# Patient Record
Sex: FEMALE | Race: WHITE | NOT HISPANIC OR LATINO | Employment: OTHER | URBAN - METROPOLITAN AREA
[De-identification: names, ages, dates, MRNs, and addresses within clinical notes are randomized per-mention and may not be internally consistent; named-entity substitution may affect disease eponyms.]

---

## 2017-01-16 ENCOUNTER — HOSPITAL ENCOUNTER (OUTPATIENT)
Dept: ULTRASOUND IMAGING | Facility: CLINIC | Age: 77
Discharge: HOME | End: 2017-01-16
Attending: NURSE PRACTITIONER | Admitting: NURSE PRACTITIONER
Payer: MEDICARE

## 2017-01-16 ENCOUNTER — OFFICE VISIT (OUTPATIENT)
Dept: FAMILY MEDICINE | Facility: CLINIC | Age: 77
End: 2017-01-16
Payer: COMMERCIAL

## 2017-01-16 VITALS
SYSTOLIC BLOOD PRESSURE: 132 MMHG | BODY MASS INDEX: 24.8 KG/M2 | TEMPERATURE: 97.7 F | OXYGEN SATURATION: 96 % | HEIGHT: 59 IN | WEIGHT: 123 LBS | DIASTOLIC BLOOD PRESSURE: 72 MMHG | HEART RATE: 67 BPM

## 2017-01-16 DIAGNOSIS — M79.89 RIGHT LEG SWELLING: Primary | ICD-10-CM

## 2017-01-16 DIAGNOSIS — Z94.4 LIVER REPLACED BY TRANSPLANT (H): Chronic | ICD-10-CM

## 2017-01-16 DIAGNOSIS — N18.30 CKD (CHRONIC KIDNEY DISEASE) STAGE 3, GFR 30-59 ML/MIN (H): ICD-10-CM

## 2017-01-16 PROCEDURE — 99214 OFFICE O/P EST MOD 30 MIN: CPT | Performed by: NURSE PRACTITIONER

## 2017-01-16 PROCEDURE — 93971 EXTREMITY STUDY: CPT | Mod: RT

## 2017-01-16 RX ORDER — FUROSEMIDE 20 MG
60 TABLET ORAL DAILY
Qty: 30 TABLET | Refills: 0 | Status: SHIPPED | OUTPATIENT
Start: 2017-01-16 | End: 2017-01-26

## 2017-01-16 NOTE — NURSING NOTE
"Initial /72 mmHg  Pulse 67  Temp(Src) 97.7  F (36.5  C) (Tympanic)  Ht 4' 11\" (1.499 m)  Wt 123 lb (55.792 kg)  BMI 24.83 kg/m2  SpO2 96% Estimated body mass index is 24.83 kg/(m^2) as calculated from the following:    Height as of this encounter: 4' 11\" (1.499 m).    Weight as of this encounter: 123 lb (55.792 kg). .    Nancy Bruce    "

## 2017-01-16 NOTE — PROGRESS NOTES
"  SUBJECTIVE:                                                    Amanda Nails is a 76 year old female who presents to clinic today for the following health issues:      C/O bilateral leg edema off/on for one month. Right swells more than the left- painful right leg. No redness.   No cough, no fever. No history of DVT. Does not monitor salt in diet. Wearing compression socks.     History of cirrhosis of liver and transplant in 1983.     -------------------------------------    Problem list and histories reviewed & adjusted, as indicated.  Additional history: as documented    Problem list, Medication list, Allergies, and Medical/Social/Surgical histories reviewed in EPIC and updated as appropriate.    ROS:  Constitutional, HEENT, cardiovascular, pulmonary, GI, , musculoskeletal, neuro, skin, endocrine and psych systems are negative, except as otherwise noted.    OBJECTIVE:                                                    /72 mmHg  Pulse 67  Temp(Src) 97.7  F (36.5  C) (Tympanic)  Ht 4' 11\" (1.499 m)  Wt 123 lb (55.792 kg)  BMI 24.83 kg/m2  SpO2 96%  Body mass index is 24.83 kg/(m^2).  GENERAL: healthy, alert and no distress  RESP: lungs clear to auscultation - no rales, rhonchi or wheezes  CV: regular rates and rhythm, normal S1 S2, no S3 or S4, peripheral pulses strong and 2+ right lower extremity and 1+ edema of LEFT LOWER EXTREMITY. + murmur.     MS: no gross musculoskeletal defects noted, no edema    Diagnostic Test Results:  none      ASSESSMENT/PLAN:                                                      1. Right leg swelling  ? DVT vs edema- will order STAT ultrasound to r/u DVT.  Patient does take Amlodipine which can be contributing to her symptoms.   - US Lower Extremity Venous Duplex Right  -start  furosemide (LASIX) 20 MG tablet; Take 3 tablets (60 mg) by mouth daily  Dispense: 30 tablet; Refill: 0 (take for 5 days then stop taking medication)  - decrease sodium in diet- elevate feet when " sitting/ wear compression socks  - follow up in 1 week    2. CKD (chronic kidney disease) stage 3, GFR 30-59 ml/min  Will need to check BMP in 1 week-     3. Liver Replaced by Transplant  Followed by Hepatologist yearly - patient has been feeling well- therefore, doubt that patient having acute decompensation of liver disease causing edema        JEMAL Ferrari CNP  [facility]

## 2017-01-16 NOTE — MR AVS SNAPSHOT
After Visit Summary   1/16/2017    Amanda Nails    MRN: [id number]           Patient Information     Date Of Birth          1940        Visit Information        Provider Department      1/16/2017 8:20 AM Aby Downing APRN CNP [facility]        Today's Diagnoses     Right leg swelling    -  1       Care Instructions    1. Take 3 tablets of lasix once a day in the morning for 4- 5 days then stop taking  2. Ultrasound today  3. Follow up in 1 week        Thank you for choosing [facility].  You may be receiving a survey in the mail from [facility] regarding your visit today.  Please take a few minutes to complete and return the survey to let us know how we are doing.      If you have questions or concerns, please contact us via [employer] or you can contact your care team at [phone].    Our Clinic hours are:  Monday 6:40 am  to 7:00 pm  Tuesday -Friday 6:40 am to 5:00 pm    The [place] outpatient lab hours are:  Monday - Friday 6:10 am to 4:45 pm  Saturdays 7:00 am to 11:00 am  Appointments are required, call [phone]    If you have clinical questions after hours or would like to schedule an appointment,  call the clinic at [phone].        Follow-ups after your visit        Your next 10 appointments already scheduled     Apr 04, 2017 11:45 AM   (Arrive by 11:30 AM)   Return Liver Transplant with Luis Daniel Lomeli MD   [facility] Hepatology ([facility])    [address] [place] [id number] [phone]            Apr 04, 2017  1:05 PM   (Arrive by 12:35 PM)   Return Visit with Sarai Abrams MD   [facility] Nephrology ([facility])    [address] [place] [id number] [phone]              Who to contact     If you have questions or need follow up information about today's clinic visit or your schedule please contact [facility] directly at  "[phone].  Normal or non-critical lab and imaging results will be communicated to you by MyChart, letter or phone within 4 business days after the clinic has received the results. If you do not hear from us within 7 days, please contact the clinic through [employer] or phone. If you have a critical or abnormal lab result, we will notify you by phone as soon as possible.  Submit refill requests through [employer] or call your pharmacy and they will forward the refill request to us. Please allow 3 business days for your refill to be completed.          Additional Information About Your Visit        [employer] Information     [employer] gives you secure access to your electronic health record. If you see a primary care provider, you can also send messages to your care team and make appointments. If you have questions, please call your primary care clinic.  If you do not have a primary care provider, please call [phone] and they will assist you.        Care EveryWhere ID     This is your Care EveryWhere ID. This could be used by other organizations to access your [facility] medical records  [id number]        Your Vitals Were     Pulse Temperature Height BMI (Body Mass Index) Pulse Oximetry       67 97.7  F (36.5  C) (Tympanic) 4' 11\" (1.499 m) 24.83 kg/m2 96%        Blood Pressure from Last 3 Encounters:   01/16/17 132/72   11/17/16 158/86   11/12/16 115/62    Weight from Last 3 Encounters:   01/16/17 123 lb (55.792 kg)   11/17/16 123 lb (55.792 kg)   11/11/16 127 lb 10.3 oz (57.9 kg)              We Performed the Following     US Lower Extremity Venous Duplex Right          Today's Medication Changes          These changes are accurate as of: 1/16/17  8:40 AM.  If you have any questions, ask your nurse or doctor.               Start taking these medicines.        Dose/Directions    furosemide 20 MG tablet   Commonly known as:  LASIX   Used for:  Right leg swelling        Dose:  60 mg   Take 3 tablets (60 mg) by mouth " daily   Quantity:  30 tablet   Refills:  0            Where to get your medicines      These medications were sent to [id number] - [place], [place] - [address]  [address], [facility] [address]     Phone:  [phone]    - furosemide 20 MG tablet             Primary Care Provider Office Phone # Fax #    JEMAL Ferrari [facility] [phone]       [facility] [address] [facility] [address]        Thank you!     Thank you for choosing [facility]  for your care. Our goal is always to provide you with excellent care. Hearing back from our patients is one way we can continue to improve our services. Please take a few minutes to complete the written survey that you may receive in the mail after your visit with us. Thank you!             Your Updated Medication List - Protect others around you: Learn how to safely use, store and throw away your medicines at www.disposemymeds.org.          This list is accurate as of: 1/16/17  8:40 AM.  Always use your most recent med list.                   Brand Name Dispense Instructions for use    amLODIPine 2.5 MG tablet    NORVASC    180 tablet    Take 2 tablets (5 mg) by mouth daily       carvedilol 6.25 MG tablet    COREG    180 tablet    Take 1 tablet (6.25 mg) by mouth 2 times daily (with meals)       furosemide 20 MG tablet    LASIX    30 tablet    Take 3 tablets (60 mg) by mouth daily       lactobacillus rhamnosus (GG) capsule     90 capsule    Take 1 capsule by mouth 3 times daily (before meals)       predniSONE 1 MG tablet    DELTASONE    270 tablet    TAKE THREE TABLETS BY MOUTH EVERY DAY       sertraline 50 MG tablet    ZOLOFT    90 tablet    TAKE ONE TABLET BY MOUTH EVERY DAY       sirolimus 0.5 MG tablet    RAPAMUNE - GENERIC EQUIVALENT    360 tablet    TAKE FOUR TABLETS BY MOUTH EVERY DAY       triamcinolone 0.1 % ointment    KENALOG    500 g    Apply to itchy rash twice daily for 7 to 10 days as needed.        ursodiol 300 MG capsule    ACTIGALL    270 capsule    TAKE THREE CAPSULES BY MOUTH EVERY DAY       VANICREAM EX      Externally apply topically 2 times daily

## 2017-01-16 NOTE — PATIENT INSTRUCTIONS
1. Take 3 tablets of lasix once a day in the morning for 4- 5 days then stop taking  2. Ultrasound today  3. Follow up in 1 week        Thank you for choosing [facility].  You may be receiving a survey in the mail from [employer] regarding your visit today.  Please take a few minutes to complete and return the survey to let us know how we are doing.      If you have questions or concerns, please contact us via [employer] or you can contact your care team at [phone].    Our Clinic hours are:  Monday 6:40 am  to 7:00 pm  Tuesday -Friday 6:40 am to 5:00 pm    The [place] outpatient lab hours are:  Monday - Friday 6:10 am to 4:45 pm  Saturdays 7:00 am to 11:00 am  Appointments are required, call [phone]    If you have clinical questions after hours or would like to schedule an appointment,  call the clinic at [phone].

## 2017-01-26 ENCOUNTER — OFFICE VISIT (OUTPATIENT)
Dept: FAMILY MEDICINE | Facility: CLINIC | Age: 77
End: 2017-01-26
Payer: COMMERCIAL

## 2017-01-26 VITALS
BODY MASS INDEX: 24.19 KG/M2 | OXYGEN SATURATION: 99 % | HEART RATE: 62 BPM | SYSTOLIC BLOOD PRESSURE: 166 MMHG | HEIGHT: 59 IN | WEIGHT: 120 LBS | DIASTOLIC BLOOD PRESSURE: 70 MMHG

## 2017-01-26 DIAGNOSIS — M79.89 RIGHT LEG SWELLING: Primary | ICD-10-CM

## 2017-01-26 DIAGNOSIS — I10 BENIGN ESSENTIAL HYPERTENSION: ICD-10-CM

## 2017-01-26 DIAGNOSIS — R01.1 HEART MURMUR: ICD-10-CM

## 2017-01-26 DIAGNOSIS — Z94.4 LIVER REPLACED BY TRANSPLANT (H): ICD-10-CM

## 2017-01-26 DIAGNOSIS — R06.09 DOE (DYSPNEA ON EXERTION): ICD-10-CM

## 2017-01-26 DIAGNOSIS — N18.30 CHRONIC KIDNEY DISEASE, STAGE III (MODERATE) (H): Chronic | ICD-10-CM

## 2017-01-26 DIAGNOSIS — Z94.4 LIVER REPLACED BY TRANSPLANT (H): Chronic | ICD-10-CM

## 2017-01-26 LAB
ALBUMIN SERPL-MCNC: 3.2 G/DL (ref 3.4–5)
ALP SERPL-CCNC: 197 U/L (ref 40–150)
ALT SERPL W P-5'-P-CCNC: 33 U/L (ref 0–50)
ANION GAP SERPL CALCULATED.3IONS-SCNC: 9 MMOL/L (ref 3–14)
AST SERPL W P-5'-P-CCNC: 41 U/L (ref 0–45)
BILIRUB DIRECT SERPL-MCNC: <0.1 MG/DL (ref 0–0.2)
BILIRUB SERPL-MCNC: 0.4 MG/DL (ref 0.2–1.3)
BUN SERPL-MCNC: 21 MG/DL (ref 7–30)
CALCIUM SERPL-MCNC: 8.7 MG/DL (ref 8.5–10.1)
CHLORIDE SERPL-SCNC: 104 MMOL/L (ref 94–109)
CO2 SERPL-SCNC: 27 MMOL/L (ref 20–32)
CREAT SERPL-MCNC: 1.26 MG/DL (ref 0.52–1.04)
ERYTHROCYTE [DISTWIDTH] IN BLOOD BY AUTOMATED COUNT: 16.2 % (ref 10–15)
GFR SERPL CREATININE-BSD FRML MDRD: 41 ML/MIN/1.7M2
GLUCOSE SERPL-MCNC: 80 MG/DL (ref 70–99)
HCT VFR BLD AUTO: 34.4 % (ref 35–47)
HGB BLD-MCNC: 10.4 G/DL (ref 11.7–15.7)
MCH RBC QN AUTO: 27.7 PG (ref 26.5–33)
MCHC RBC AUTO-ENTMCNC: 30.2 G/DL (ref 31.5–36.5)
MCV RBC AUTO: 92 FL (ref 78–100)
PLATELET # BLD AUTO: 280 10E9/L (ref 150–450)
POTASSIUM SERPL-SCNC: 3.6 MMOL/L (ref 3.4–5.3)
PROT SERPL-MCNC: 7.1 G/DL (ref 6.8–8.8)
RBC # BLD AUTO: 3.75 10E12/L (ref 3.8–5.2)
SODIUM SERPL-SCNC: 140 MMOL/L (ref 133–144)
WBC # BLD AUTO: 5 10E9/L (ref 4–11)

## 2017-01-26 PROCEDURE — 36415 COLL VENOUS BLD VENIPUNCTURE: CPT | Performed by: INTERNAL MEDICINE

## 2017-01-26 PROCEDURE — 80048 BASIC METABOLIC PNL TOTAL CA: CPT | Performed by: INTERNAL MEDICINE

## 2017-01-26 PROCEDURE — 85027 COMPLETE CBC AUTOMATED: CPT | Performed by: INTERNAL MEDICINE

## 2017-01-26 PROCEDURE — 99214 OFFICE O/P EST MOD 30 MIN: CPT | Performed by: NURSE PRACTITIONER

## 2017-01-26 PROCEDURE — 80076 HEPATIC FUNCTION PANEL: CPT | Performed by: INTERNAL MEDICINE

## 2017-01-26 RX ORDER — FUROSEMIDE 20 MG
20 TABLET ORAL DAILY PRN
Qty: 30 TABLET | Refills: 0 | COMMUNITY
Start: 2017-01-26 | End: 2017-05-15

## 2017-01-26 NOTE — NURSING NOTE
"Initial /78 mmHg  Pulse 62  Ht 4' 11\" (1.499 m)  Wt 120 lb (54.432 kg)  BMI 24.22 kg/m2  SpO2 99% Estimated body mass index is 24.22 kg/(m^2) as calculated from the following:    Height as of this encounter: 4' 11\" (1.499 m).    Weight as of this encounter: 120 lb (54.432 kg). .    Nancy Bruce    "

## 2017-01-26 NOTE — PATIENT INSTRUCTIONS
1. Take Lasix only if needed once a day for increase in swelling  2. Labs today   3. Take Carvedilol and amlodipine as directed          Thank you for choosing [facility].  You may be receiving a survey in the mail from [employer] regarding your visit today.  Please take a few minutes to complete and return the survey to let us know how we are doing.      If you have questions or concerns, please contact us via [employer] or you can contact your care team at [phone].    Our Clinic hours are:  Monday 6:40 am  to 7:00 pm  Tuesday -Friday 6:40 am to 5:00 pm    The [place] outpatient lab hours are:  Monday - Friday 6:10 am to 4:45 pm  Saturdays 7:00 am to 11:00 am  Appointments are required, call [phone]    If you have clinical questions after hours or would like to schedule an appointment,  call the clinic at [phone].

## 2017-01-26 NOTE — PROGRESS NOTES
"  SUBJECTIVE:                                                    Amanda Nails is a 76 year old female who presents to clinic today for the following health issues:          Follow up edema, seen 1/16/17. Swelling has improved- bilateral edema- rt> left. ultrasound was done of right leg- negative for DVT.   Swelling resolved after taking 60 mg Lasix X 5 days- she is off of lasix now.     Hypertension:  Uncontrolled- patient forgot to take her Carvedilol this week  BP Readings from Last 3 Encounters:   01/26/17 166/70   01/16/17 132/72   11/17/16 158/86     Chronic kidney disease: will check K+ and kidney function today since was taking lasix.     History of liver transplant in 1983- stable on medications. Followed yearly by Dr. Armani GARBER.       -------------------------------------    Problem list and histories reviewed & adjusted, as indicated.  Additional history: as documented    Problem list, Medication list, Allergies, and Medical/Social/Surgical histories reviewed in EPIC and updated as appropriate.    ROS:  Constitutional, HEENT, cardiovascular, pulmonary, GI, , musculoskeletal, neuro, skin, endocrine and psych systems are negative, except as otherwise noted.    OBJECTIVE:                                                    /70 mmHg  Pulse 62  Ht 4' 11\" (1.499 m)  Wt 120 lb (54.432 kg)  BMI 24.22 kg/m2  SpO2 99%  Body mass index is 24.22 kg/(m^2).  GENERAL: healthy, alert and no distress  RESP: lungs clear to auscultation - no rales, rhonchi or wheezes  CV: regular rate and rhythm, normal S1 S2, no S3 or S4, no  click or rub, no peripheral edema and peripheral pulses strong, + Murmur.   MS: no gross musculoskeletal defects noted, no edema    Diagnostic Test Results:  none      ASSESSMENT/PLAN:                                                      1. Right leg swelling  Resolved- patient is taking Amlodipine which can contribute to edema- will continue to monitor  - furosemide (LASIX) 20 MG tablet; " Take 1 tablet (20 mg) by mouth daily as needed  Dispense: 30 tablet; Refill: 0 (use prn)    2. Benign essential hypertension  Uncontrolled- patient forgot to take Carvedilol this week  Return to clinic next week for blood pressure recheck with RN    3. Chronic kidney disease, stage III (moderate)  Stable- will need to recheck kidney function today due to take Lasix    4. Liver replaced by transplant (H)  Stable- patient overdue for appointment with Dr. Lomeli    5. Heart murmur  Continue to monitor- patient asymptomatic- (no shortness of breath, fatigue)  Consider in future to obtain echo- especially if continues to have problems with edema          JEMAL Ferrari [facility]

## 2017-02-09 ENCOUNTER — TELEPHONE (OUTPATIENT)
Dept: TRANSPLANT | Facility: CLINIC | Age: 77
End: 2017-02-09

## 2017-02-09 DIAGNOSIS — I10 HTN (HYPERTENSION): Primary | ICD-10-CM

## 2017-02-13 DIAGNOSIS — I10 ESSENTIAL HYPERTENSION: ICD-10-CM

## 2017-02-13 RX ORDER — CARVEDILOL 6.25 MG/1
6.25 TABLET ORAL 2 TIMES DAILY WITH MEALS
Qty: 180 TABLET | Refills: 3 | Status: SHIPPED | OUTPATIENT
Start: 2017-02-13 | End: 2017-06-16

## 2017-02-13 RX ORDER — CARVEDILOL 6.25 MG/1
6.25 TABLET ORAL 2 TIMES DAILY WITH MEALS
Qty: 180 TABLET | Refills: 3 | Status: CANCELLED | OUTPATIENT
Start: 2017-02-13

## 2017-02-14 NOTE — TELEPHONE ENCOUNTER
[facility] Prior Authorization Team   Phone: [phone]  Fax: [phone]  Prior Authorization Approval    Authorization Effective Date: 11/15/2016  Authorization Expiration Date: 12/31/2039  Medication: sirolimus (RAPAMUNE - GENERIC EQUIVALENT) 0.5 MG tablet   Approved Dose/Quantity: TAKE FOUR TABLETS BY MOUTH EVERY DAY / #360  Reference #: CMM KEY# [id number]   Insurance Company: [employer] - Phone [phone] Fax [phone]  Expected CoPay: $10.00     CoPay Card Available: N/A  Foundation Assistance Needed: N/A  Which Pharmacy is filling the prescription (Not needed for infusion/clinic administered): [facility] - [place], [place] - [facility] [address]  Pharmacy Notified: Yes  Patient Notified: Yes

## 2017-03-09 ENCOUNTER — OFFICE VISIT (OUTPATIENT)
Dept: FAMILY MEDICINE | Facility: CLINIC | Age: 77
End: 2017-03-09
Payer: COMMERCIAL

## 2017-03-09 VITALS
OXYGEN SATURATION: 98 % | WEIGHT: 121 LBS | BODY MASS INDEX: 24.44 KG/M2 | HEART RATE: 62 BPM | DIASTOLIC BLOOD PRESSURE: 74 MMHG | SYSTOLIC BLOOD PRESSURE: 150 MMHG | TEMPERATURE: 98.2 F

## 2017-03-09 DIAGNOSIS — J20.9 ACUTE BRONCHITIS, UNSPECIFIED ORGANISM: Primary | ICD-10-CM

## 2017-03-09 DIAGNOSIS — I10 BENIGN ESSENTIAL HYPERTENSION: ICD-10-CM

## 2017-03-09 DIAGNOSIS — Z94.4 LIVER REPLACED BY TRANSPLANT (H): Chronic | ICD-10-CM

## 2017-03-09 PROCEDURE — 99214 OFFICE O/P EST MOD 30 MIN: CPT | Performed by: NURSE PRACTITIONER

## 2017-03-09 RX ORDER — AZITHROMYCIN 250 MG/1
TABLET, FILM COATED ORAL
Qty: 6 TABLET | Refills: 0 | Status: SHIPPED | OUTPATIENT
Start: 2017-03-09 | End: 2017-04-19

## 2017-03-09 RX ORDER — ALBUTEROL SULFATE 90 UG/1
2 AEROSOL, METERED RESPIRATORY (INHALATION) EVERY 6 HOURS PRN
Qty: 1 INHALER | Refills: 0 | Status: SHIPPED | OUTPATIENT
Start: 2017-03-09 | End: 2017-04-19

## 2017-03-09 NOTE — PROGRESS NOTES
SUBJECTIVE:                                                    Amanda Nails is a 76 year old female who presents to clinic today for the following health issues:      ENT Symptoms             Symptoms: cc Present Absent Comment   Fever/Chills   x    Fatigue  x     Muscle Aches   x    Eye Irritation   x    Sneezing   x    Nasal Nba/Drg   x    Sinus Pressure/Pain   x    Loss of smell   x    Dental pain   x    Sore Throat   x    Swollen Glands   x    Ear Pain/Fullness   x    Cough  x     Wheeze   x    Chest Pain   x    Shortness of breath  x     Rash   x    Other   x      Symptom duration:  5 days   Symptom severity:  mild   Treatments tried:  none   Contacts:  sisters/brother- was on vacation   No history of asthma or COPD- no tobacco use.   Feels like a tight non productive cough with wheezing. History of liver transplant- on immunosuppressed therapy.   No fevers.     Hypertension: patient forgot to take medications today.   BP Readings from Last 3 Encounters:   03/09/17 150/74   01/26/17 166/70   01/16/17 132/72         -------------------------------------    Problem list and histories reviewed & adjusted, as indicated.  Additional history: as documented    Patient Active Problem List   Diagnosis     Chronic kidney disease, stage III (moderate)     Liver replaced by transplant (H)     Primary biliary cirrhosis (H)     CARDIOVASCULAR SCREENING; LDL GOAL LESS THAN 130     Chronic dermatitis     Esophageal reflux     Mixed hyperlipidemia     Iron deficiency anemia     Gout attack     Skin cancer     Osteoporosis     Advanced directives, counseling/discussion     Nausea and vomiting     Fever, unspecified     Diarrhea     Benign essential hypertension     Past Surgical History   Procedure Laterality Date     Transplant  1983     liver     Colonoscopy  4/9/2013     Procedure: COLONOSCOPY;  Colonoscopy;  Surgeon: Kendra Archer MD;  Location: [facility]     Esophagoscopy, gastroscopy, duodenoscopy (egd),  combined  8/23/2013     Procedure: COMBINED ESOPHAGOSCOPY, GASTROSCOPY, DUODENOSCOPY (EGD), BIOPSY SINGLE OR MULTIPLE;  Gastroscopy       Cholecytectomy       Splenectomy         Social History   Substance Use Topics     Smoking status: Former Smoker     Years: 3.00     Smokeless tobacco: Never Used      Comment: Years ago.     Alcohol use Yes      Comment: very rarely     Family History   Problem Relation Age of Onset     Respiratory Mother      GASTROINTESTINAL DISEASE Sister      celiac     GASTROINTESTINAL DISEASE Son      celiac     GASTROINTESTINAL DISEASE Daughter      celiac     GASTROINTESTINAL DISEASE Sister      GASTROINTESTINAL DISEASE Son      celiac     GASTROINTESTINAL DISEASE Daughter      PBC     Endocrine Disease Daughter      Graves disease     Endocrine Disease Daughter      hypothyroidism           Reviewed and updated as needed this visit by clinical staff  Meds       Reviewed and updated as needed this visit by Provider         ROS:  Constitutional, HEENT, cardiovascular, pulmonary, GI, , musculoskeletal, neuro, skin, endocrine and psych systems are negative, except as otherwise noted.    OBJECTIVE:                                                    /86 (BP Location: Left arm, Patient Position: Chair, Cuff Size: Adult Regular)  Pulse 62  Temp 98.2  F (36.8  C) (Tympanic)  Wt 121 lb (54.9 kg)  SpO2 98%  BMI 24.44 kg/m2  Body mass index is 24.44 kg/(m^2).  GENERAL: healthy, alert and no distress  NECK: no adenopathy, no asymmetry, masses, or scars and thyroid normal to palpation  RESP: wheeze in LLL- congested cough  CV: regular rate and rhythm, normal S1 S2, no S3 or S4, no murmur, click or rub,   MS: no gross musculoskeletal defects noted, no edema    Diagnostic Test Results:  none      ASSESSMENT/PLAN:                                                      1. Acute bronchitis, unspecified organism  Will treat patient with antibiotics since patient taking immunosuppressive therapy for  history of liver tx.   - albuterol (PROAIR HFA/PROVENTIL HFA/VENTOLIN HFA) 108 (90 BASE) MCG/ACT Inhaler; Inhale 2 puffs into the lungs every 6 hours as needed for shortness of breath / dyspnea or wheezing  Dispense: 1 Inhaler; Refill: 0  - azithromycin (ZITHROMAX) 250 MG tablet; Two tablets first day, then one tablet daily for four days.  Dispense: 6 tablet; Refill: 0    2. Liver replaced by transplant (H)   patient taking immunosuppressive therapy for history of liver tx. - at risk for developing infection.     3. Hypertension:  Patient forgot to take medications today  Return to clinic for RN blood pressure check        JEMAL Ferrari [facility]

## 2017-03-09 NOTE — NURSING NOTE
"Initial /74  Pulse 62  Temp 98.2  F (36.8  C) (Tympanic)  Wt 121 lb (54.9 kg)  SpO2 98%  BMI 24.44 kg/m2 Estimated body mass index is 24.44 kg/(m^2) as calculated from the following:    Height as of 1/26/17: 4' 11\" (1.499 m).    Weight as of this encounter: 121 lb (54.9 kg). .  Nancy Bruce    "

## 2017-03-09 NOTE — NURSING NOTE
"Initial /86 (BP Location: Left arm, Patient Position: Chair, Cuff Size: Adult Regular)  Pulse 62  Temp 98.2  F (36.8  C) (Tympanic)  Wt 121 lb (54.9 kg)  SpO2 98%  BMI 24.44 kg/m2 Estimated body mass index is 24.44 kg/(m^2) as calculated from the following:    Height as of 1/26/17: 4' 11\" (1.499 m).    Weight as of this encounter: 121 lb (54.9 kg). .    Nancy Bruec    "

## 2017-03-09 NOTE — MR AVS SNAPSHOT
After Visit Summary   3/9/2017    Amanda Nails    MRN: [id number]           Patient Information     Date Of Birth          1940        Visit Information        Provider Department      3/9/2017 1:40 PM Aby Downing APRN CNP [facility]        Today's Diagnoses     Acute bronchitis, unspecified organism    -  1    Liver replaced by transplant (H)          Care Instructions          Thank you for choosing [facility].  You may be receiving a survey in the mail from [facility] regarding your visit today.  Please take a few minutes to complete and return the survey to let us know how we are doing.      If you have questions or concerns, please contact us via [employer] or you can contact your care team at [phone].    Our Clinic hours are:  Monday 6:40 am  to 7:00 pm  Tuesday -Friday 6:40 am to 5:00 pm    The [place] outpatient lab hours are:  Monday - Friday 6:10 am to 4:45 pm  Saturdays 7:00 am to 11:00 am  Appointments are required, call [phone]    If you have clinical questions after hours or would like to schedule an appointment,  call the clinic at [phone].        Follow-ups after your visit        Your next 10 appointments already scheduled     Apr 04, 2017 11:45 AM CDT   (Arrive by 11:30 AM)   Return Liver Transplant with Luis Daniel Lomeli MD   [facility] Hepatology ([facility])    [address] [place] [address] [id number] [phone]            Apr 04, 2017  1:05 PM CDT   (Arrive by 12:35 PM)   Return Visit with Sarai Abrams MD   [facility] Nephrology ([facility])    [address] [place] [address] [id number] [phone]              Who to contact     If you have questions or need follow up information about today's clinic visit or your schedule please contact [facility] directly at [phone].  Normal or non-critical lab and imaging results will be  communicated to you by [employer], letter or phone within 4 business days after the clinic has received the results. If you do not hear from us within 7 days, please contact the clinic through [employer] or phone. If you have a critical or abnormal lab result, we will notify you by phone as soon as possible.  Submit refill requests through [employer] or call your pharmacy and they will forward the refill request to us. Please allow 3 business days for your refill to be completed.          Additional Information About Your Visit        PingMD Information     [employer] gives you secure access to your electronic health record. If you see a primary care provider, you can also send messages to your care team and make appointments. If you have questions, please call your primary care clinic.  If you do not have a primary care provider, please call [phone] and they will assist you.        Care EveryWhere ID     This is your Care EveryWhere ID. This could be used by other organizations to access your [place] medical records  [id number]        Your Vitals Were     Pulse Temperature Pulse Oximetry BMI (Body Mass Index)          62 98.2  F (36.8  C) (Tympanic) 98% 24.44 kg/m2         Blood Pressure from Last 3 Encounters:   03/09/17 182/86   01/26/17 166/70   01/16/17 132/72    Weight from Last 3 Encounters:   03/09/17 121 lb (54.9 kg)   01/26/17 120 lb (54.4 kg)   01/16/17 123 lb (55.8 kg)              Today, you had the following     No orders found for display         Today's Medication Changes          These changes are accurate as of: 3/9/17  1:48 PM.  If you have any questions, ask your nurse or doctor.               Start taking these medicines.        Dose/Directions    albuterol 108 (90 BASE) MCG/ACT Inhaler   Commonly known as:  PROAIR HFA/PROVENTIL HFA/VENTOLIN HFA   Used for:  Acute bronchitis, unspecified organism   Started by:  Aby Downing APRN CNP        Dose:  2 puff   Inhale 2 puffs into the lungs every 6  hours as needed for shortness of breath / dyspnea or wheezing   Quantity:  1 Inhaler   Refills:  0       azithromycin 250 MG tablet   Commonly known as:  ZITHROMAX   Used for:  Acute bronchitis, unspecified organism   Started by:  Aby Downing APRN CNP        Two tablets first day, then one tablet daily for four days.   Quantity:  6 tablet   Refills:  0            Where to get your medicines      These medications were sent to [facility] [address] [facility]  [address] [facility] [address]     Phone:  [phone]     albuterol 108 (90 BASE) MCG/ACT Inhaler    azithromycin 250 MG tablet                Primary Care Provider Office Phone # Fax #    JEMAL Ferrari [phone]       [facility] [address] [facility] [address]        Thank you!     Thank you for choosing [facility]  for your care. Our goal is always to provide you with excellent care. Hearing back from our patients is one way we can continue to improve our services. Please take a few minutes to complete the written survey that you may receive in the mail after your visit with us. Thank you!             Your Updated Medication List - Protect others around you: Learn how to safely use, store and throw away your medicines at www.disposemymeds.org.          This list is accurate as of: 3/9/17  1:48 PM.  Always use your most recent med list.                   Brand Name Dispense Instructions for use    albuterol 108 (90 BASE) MCG/ACT Inhaler    PROAIR HFA/PROVENTIL HFA/VENTOLIN HFA    1 Inhaler    Inhale 2 puffs into the lungs every 6 hours as needed for shortness of breath / dyspnea or wheezing       amLODIPine 2.5 MG tablet    NORVASC    180 tablet    Take 2 tablets (5 mg) by mouth daily       azithromycin 250 MG tablet    ZITHROMAX    6 tablet    Two tablets first day, then one tablet daily for four days.       carvedilol 6.25 MG tablet    COREG    180 tablet     Take 1 tablet (6.25 mg) by mouth 2 times daily (with meals)       lactobacillus rhamnosus (GG) capsule     90 capsule    Take 1 capsule by mouth 3 times daily (before meals)       LASIX 20 MG tablet   Generic drug:  furosemide     30 tablet    Take 1 tablet (20 mg) by mouth daily as needed       predniSONE 1 MG tablet    DELTASONE    270 tablet    TAKE THREE TABLETS BY MOUTH EVERY DAY       sertraline 50 MG tablet    ZOLOFT    90 tablet    TAKE ONE TABLET BY MOUTH EVERY DAY       sirolimus 0.5 MG tablet    RAPAMUNE - GENERIC EQUIVALENT    360 tablet    TAKE FOUR TABLETS BY MOUTH EVERY DAY       triamcinolone 0.1 % ointment    KENALOG    500 g    Apply to itchy rash twice daily for 7 to 10 days as needed.       ursodiol 300 MG capsule    ACTIGALL    270 capsule    TAKE THREE CAPSULES BY MOUTH EVERY DAY       VANICREAM EX      Externally apply topically 2 times daily

## 2017-03-09 NOTE — PATIENT INSTRUCTIONS
Thank you for choosing [facility].  You may be receiving a survey in the mail from Martín Guardado regarding your visit today.  Please take a few minutes to complete and return the survey to let us know how we are doing.      If you have questions or concerns, please contact us via [employer] or you can contact your care team at [phone].    Our Clinic hours are:  Monday 6:40 am  to 7:00 pm  Tuesday -Friday 6:40 am to 5:00 pm    The [place] outpatient lab hours are:  Monday - Friday 6:10 am to 4:45 pm  Saturdays 7:00 am to 11:00 am  Appointments are required, call [phone]    If you have clinical questions after hours or would like to schedule an appointment,  call the clinic at [phone].

## 2017-03-22 DIAGNOSIS — N18.30 CKD (CHRONIC KIDNEY DISEASE) STAGE 3, GFR 30-59 ML/MIN (H): Primary | ICD-10-CM

## 2017-04-05 DIAGNOSIS — M10.9 GOUT OF LEFT FOOT, UNSPECIFIED CAUSE, UNSPECIFIED CHRONICITY: ICD-10-CM

## 2017-04-05 RX ORDER — COLCHICINE 0.6 MG/1
TABLET ORAL
Qty: 15 TABLET | Refills: 0 | Status: SHIPPED | OUTPATIENT
Start: 2017-04-05 | End: 2018-04-17

## 2017-04-05 NOTE — TELEPHONE ENCOUNTER
[facility]      Last Written Prescription Date:  Not on med ist  Last Fill Quantity: 0,   # refills: 0  Last Office Visit with [facility], P or [facility] prescribing provider: 03/09/2017  Future Office visit:       Routing refill request to provider for review/approval because:  Drug not active on patient's medication list    Leroy CHAVEZ (R)

## 2017-04-13 ENCOUNTER — TELEPHONE (OUTPATIENT)
Dept: GASTROENTEROLOGY | Facility: CLINIC | Age: 77
End: 2017-04-13

## 2017-04-13 NOTE — TELEPHONE ENCOUNTER
Patient contacted and reminded of upcoming appointment.  Patient confirmed they will be attending.  Patient instructed to bring updated medications list to appointment.  Patient instructed to arrive early for check-in  Kael Amaya CMA

## 2017-04-18 DIAGNOSIS — Z94.4 LIVER REPLACED BY TRANSPLANT (H): ICD-10-CM

## 2017-04-18 DIAGNOSIS — N18.30 CKD (CHRONIC KIDNEY DISEASE) STAGE 3, GFR 30-59 ML/MIN (H): ICD-10-CM

## 2017-04-18 LAB
ALBUMIN SERPL-MCNC: 3 G/DL (ref 3.4–5)
ALP SERPL-CCNC: 206 U/L (ref 40–150)
ALT SERPL W P-5'-P-CCNC: 53 U/L (ref 0–50)
ANION GAP SERPL CALCULATED.3IONS-SCNC: 6 MMOL/L (ref 3–14)
AST SERPL W P-5'-P-CCNC: 38 U/L (ref 0–45)
BILIRUB DIRECT SERPL-MCNC: <0.1 MG/DL (ref 0–0.2)
BILIRUB SERPL-MCNC: 0.3 MG/DL (ref 0.2–1.3)
BUN SERPL-MCNC: 26 MG/DL (ref 7–30)
CALCIUM SERPL-MCNC: 8.7 MG/DL (ref 8.5–10.1)
CHLORIDE SERPL-SCNC: 109 MMOL/L (ref 94–109)
CO2 SERPL-SCNC: 28 MMOL/L (ref 20–32)
CREAT SERPL-MCNC: 1.13 MG/DL (ref 0.52–1.04)
CREAT UR-MCNC: 107 MG/DL
ERYTHROCYTE [DISTWIDTH] IN BLOOD BY AUTOMATED COUNT: 14.8 % (ref 10–15)
GFR SERPL CREATININE-BSD FRML MDRD: 47 ML/MIN/1.7M2
GLUCOSE SERPL-MCNC: 80 MG/DL (ref 70–99)
HCT VFR BLD AUTO: 33.9 % (ref 35–47)
HGB BLD-MCNC: 11.3 G/DL (ref 11.7–15.7)
MCH RBC QN AUTO: 29.4 PG (ref 26.5–33)
MCHC RBC AUTO-ENTMCNC: 33.3 G/DL (ref 31.5–36.5)
MCV RBC AUTO: 88 FL (ref 78–100)
PHOSPHATE SERPL-MCNC: 2.8 MG/DL (ref 2.5–4.5)
PLATELET # BLD AUTO: 268 10E9/L (ref 150–450)
POTASSIUM SERPL-SCNC: 3.5 MMOL/L (ref 3.4–5.3)
PROT SERPL-MCNC: 6.7 G/DL (ref 6.8–8.8)
PROT UR-MCNC: 1.34 G/L
PROT/CREAT 24H UR: 1.25 G/G CR (ref 0–0.2)
RBC # BLD AUTO: 3.84 10E12/L (ref 3.8–5.2)
SODIUM SERPL-SCNC: 143 MMOL/L (ref 133–144)
WBC # BLD AUTO: 4.1 10E9/L (ref 4–11)

## 2017-04-18 PROCEDURE — 84550 ASSAY OF BLOOD/URIC ACID: CPT | Performed by: INTERNAL MEDICINE

## 2017-04-18 PROCEDURE — 84460 ALANINE AMINO (ALT) (SGPT): CPT | Performed by: INTERNAL MEDICINE

## 2017-04-18 PROCEDURE — 84075 ASSAY ALKALINE PHOSPHATASE: CPT | Performed by: INTERNAL MEDICINE

## 2017-04-18 PROCEDURE — 36415 COLL VENOUS BLD VENIPUNCTURE: CPT | Performed by: INTERNAL MEDICINE

## 2017-04-18 PROCEDURE — 82247 BILIRUBIN TOTAL: CPT | Performed by: INTERNAL MEDICINE

## 2017-04-18 PROCEDURE — 84155 ASSAY OF PROTEIN SERUM: CPT | Performed by: INTERNAL MEDICINE

## 2017-04-18 PROCEDURE — 80069 RENAL FUNCTION PANEL: CPT | Performed by: INTERNAL MEDICINE

## 2017-04-18 PROCEDURE — 84156 ASSAY OF PROTEIN URINE: CPT | Performed by: INTERNAL MEDICINE

## 2017-04-18 PROCEDURE — 82248 BILIRUBIN DIRECT: CPT | Performed by: INTERNAL MEDICINE

## 2017-04-18 PROCEDURE — 80195 ASSAY OF SIROLIMUS: CPT | Performed by: INTERNAL MEDICINE

## 2017-04-18 PROCEDURE — 84450 TRANSFERASE (AST) (SGOT): CPT | Performed by: INTERNAL MEDICINE

## 2017-04-18 PROCEDURE — 85027 COMPLETE CBC AUTOMATED: CPT | Performed by: INTERNAL MEDICINE

## 2017-04-19 ENCOUNTER — OFFICE VISIT (OUTPATIENT)
Dept: GASTROENTEROLOGY | Facility: CLINIC | Age: 77
End: 2017-04-19
Attending: INTERNAL MEDICINE
Payer: MEDICARE

## 2017-04-19 ENCOUNTER — OFFICE VISIT (OUTPATIENT)
Dept: NEPHROLOGY | Facility: CLINIC | Age: 77
End: 2017-04-19
Attending: INTERNAL MEDICINE
Payer: MEDICARE

## 2017-04-19 VITALS
RESPIRATION RATE: 18 BRPM | HEART RATE: 67 BPM | WEIGHT: 120 LBS | OXYGEN SATURATION: 99 % | SYSTOLIC BLOOD PRESSURE: 193 MMHG | DIASTOLIC BLOOD PRESSURE: 73 MMHG | TEMPERATURE: 97.6 F | BODY MASS INDEX: 24.19 KG/M2 | HEIGHT: 59 IN

## 2017-04-19 VITALS — DIASTOLIC BLOOD PRESSURE: 88 MMHG | SYSTOLIC BLOOD PRESSURE: 187 MMHG

## 2017-04-19 DIAGNOSIS — M10.072 ACUTE IDIOPATHIC GOUT OF LEFT FOOT: Primary | ICD-10-CM

## 2017-04-19 DIAGNOSIS — R80.9 PROTEINURIA: ICD-10-CM

## 2017-04-19 DIAGNOSIS — N18.30 CHRONIC KIDNEY DISEASE, STAGE III (MODERATE) (H): Primary | Chronic | ICD-10-CM

## 2017-04-19 DIAGNOSIS — I10 BENIGN ESSENTIAL HYPERTENSION: ICD-10-CM

## 2017-04-19 LAB
SIROLIMUS BLD-MCNC: 5.5 UG/L (ref 5–15)
TME LAST DOSE: 840 H
URATE SERPL-MCNC: 5.3 MG/DL (ref 2.6–6)

## 2017-04-19 PROCEDURE — 99212 OFFICE O/P EST SF 10 MIN: CPT | Mod: ZF

## 2017-04-19 PROCEDURE — 99213 OFFICE O/P EST LOW 20 MIN: CPT | Mod: ZF

## 2017-04-19 RX ORDER — ALLOPURINOL 100 MG/1
100 TABLET ORAL DAILY
Qty: 90 TABLET | Refills: 3 | Status: SHIPPED | OUTPATIENT
Start: 2017-04-19 | End: 2017-11-02

## 2017-04-19 ASSESSMENT — PAIN SCALES - GENERAL: PAINLEVEL: NO PAIN (0)

## 2017-04-19 NOTE — MR AVS SNAPSHOT
After Visit Summary   4/19/2017    Amanda Nails    MRN: [id number]           Patient Information     Date Of Birth          1940        Visit Information        Provider Department      4/19/2017 1:30 PM Sarai Abrams MD [facility] Nephrology        Today's Diagnoses     Chronic kidney disease, stage III (moderate)    -  1    Proteinuria        Benign essential hypertension          Care Instructions    1.  Increase carvedilol to 6.25 mg twice per day.  Can increase this further if BP stays high  2.  Allopurinol 100 mg.  Check uric acid level 2 weeks after you start this medicine  3.  Check your BP at home and call us with readings.  Stephanie Pereyra   4.  Target BP is < 140/90  5.  Exercise is good!         Follow-ups after your visit        Follow-up notes from your care team     Return in about 6 months (around 10/19/2017).      Your next 10 appointments already scheduled     Oct 17, 2017  4:25 PM CDT   (Arrive by 3:55 PM)   Return Visit with Sarai Abrams MD   [facility] Nephrology ([facility])    [address] [place] [id number] [phone]              Who to contact     If you have questions or need follow up information about today's clinic visit or your schedule please contact [facility] NEPHROLOG[facility] directly at [phone].  Normal or non-critical lab and imaging results will be communicated to you by MyChart, letter or phone within 4 business days after the clinic has received the results. If you do not hear from us within 7 days, please contact the clinic through MyChart or phone. If you have a critical or abnormal lab result, we will notify you by phone as soon as possible.  Submit refill requests through [employer] or call your pharmacy and they will forward the refill request to us. Please allow 3 business days for your refill to be completed.          Additional Information About Your Visit        MyChart Information      [employer] gives you secure access to your electronic health record. If you see a primary care provider, you can also send messages to your care team and make appointments. If you have questions, please call your primary care clinic.  If you do not have a primary care provider, please call [phone] and they will assist you.        Care EveryWhere ID     This is your Care EveryWhere ID. This could be used by other organizations to access your [place] medical records  [id number]         Blood Pressure from Last 3 Encounters:   04/19/17 187/88   04/19/17 193/73   03/09/17 150/74    Weight from Last 3 Encounters:   04/19/17 54.4 kg (120 lb)   03/09/17 54.9 kg (121 lb)   01/26/17 54.4 kg (120 lb)              Today, you had the following     No orders found for display         Today's Medication Changes          These changes are accurate as of: 4/19/17 11:59 PM.  If you have any questions, ask your nurse or doctor.               Start taking these medicines.        Dose/Directions    allopurinol 100 MG tablet   Commonly known as:  ZYLOPRIM   Used for:  Acute idiopathic gout of left foot   Started by:  Luis Daniel Lomeli MD        Dose:  100 mg   Take 1 tablet (100 mg) by mouth daily   Quantity:  90 tablet   Refills:  3         These medicines have changed or have updated prescriptions.        Dose/Directions    carvedilol 6.25 MG tablet   Commonly known as:  COREG   This may have changed:  when to take this   Used for:  Essential hypertension        Dose:  6.25 mg   Take 1 tablet (6.25 mg) by mouth 2 times daily (with meals)   Quantity:  180 tablet   Refills:  3            Where to get your medicines      These medications were sent to [facility] - [place], [place] - [address] [place] [address]  [address] [facility] [address]    Hours:  Mon-Fri 8:30am-5:00pm Toll Free [phone] Phone:  [phone]     allopurinol 100 MG tablet                Primary Care Provider Office Phone # Fae  #    Abyisaac Downing, APRN [phone]       [facility] [address] [facility] [address]        Thank you!     Thank you for choosing [facility] NEPHROLOGY  for your care. Our goal is always to provide you with excellent care. Hearing back from our patients is one way we can continue to improve our services. Please take a few minutes to complete the written survey that you may receive in the mail after your visit with us. Thank you!             Your Updated Medication List - Protect others around you: Learn how to safely use, store and throw away your medicines at www.disposemymeds.org.          This list is accurate as of: 4/19/17 11:59 PM.  Always use your most recent med list.                   Brand Name Dispense Instructions for use    allopurinol 100 MG tablet    ZYLOPRIM    90 tablet    Take 1 tablet (100 mg) by mouth daily       amLODIPine 2.5 MG tablet    NORVASC    180 tablet    Take 2 tablets (5 mg) by mouth daily       carvedilol 6.25 MG tablet    COREG    180 tablet    Take 1 tablet (6.25 mg) by mouth 2 times daily (with meals)       colchicine 0.6 MG tablet     15 tablet    TAKE 2 TABLETS BY MOUTH AT THE FIRST SIGN OF FLARE, TAKE 1 ADDITIONAL TABLET ONE HOUR LATER       lactobacillus rhamnosus (GG) capsule     90 capsule    Take 1 capsule by mouth 3 times daily (before meals)       LASIX 20 MG tablet   Generic drug:  furosemide     30 tablet    Take 20 mg by mouth daily as needed Reported on 4/19/2017       predniSONE 1 MG tablet    DELTASONE    270 tablet    TAKE THREE TABLETS BY MOUTH EVERY DAY       sertraline 50 MG tablet    ZOLOFT    90 tablet    TAKE ONE TABLET BY MOUTH EVERY DAY       sirolimus 0.5 MG tablet    RAPAMUNE - GENERIC EQUIVALENT    360 tablet    TAKE FOUR TABLETS BY MOUTH EVERY DAY       triamcinolone 0.1 % ointment    KENALOG    500 g    Apply to itchy rash twice daily for 7 to 10 days as needed.       TYLENOL 325 MG Caps   Generic drug:  Acetaminophen       Take 325-650 mg by mouth daily as needed (pain)       ursodiol 300 MG capsule    ACTIGALL    270 capsule    TAKE THREE CAPSULES BY MOUTH EVERY DAY       VANICREAM EX      Externally apply topically 2 times daily

## 2017-04-19 NOTE — NURSING NOTE
"Chief Complaint   Patient presents with     RECHECK     S/P Liver Transplant 11/7/1984       Initial /73 (BP Location: Right arm, Patient Position: Chair, Cuff Size: Adult Regular)  Pulse 67  Temp 97.6  F (36.4  C) (Oral)  Resp 18  Ht 1.499 m (4' 11.02\")  Wt 54.4 kg (120 lb)  SpO2 99%  BMI 24.22 kg/m2 Estimated body mass index is 24.22 kg/(m^2) as calculated from the following:    Height as of this encounter: 1.499 m (4' 11.02\").    Weight as of this encounter: 54.4 kg (120 lb).  Medication Reconciliation: complete    "

## 2017-04-19 NOTE — MR AVS SNAPSHOT
"              After Visit Summary   4/19/2017    Amanda Nails    MRN: [id number]           Patient Information     Date Of Birth          1940        Visit Information        Provider Department      4/19/2017 12:45 PM Luis Daniel Lomeli MD [facility] Hepatology        Today's Diagnoses     Acute idiopathic gout of left foot    -  1       Follow-ups after your visit        Follow-up notes from your care team     Return in about 1 year (around 4/19/2018).      Who to contact     If you have questions or need follow up information about today's clinic visit or your schedule please contact [facility] HEPATOLOG[facility] directly at [phone].  Normal or non-critical lab and imaging results will be communicated to you by MyChart, letter or phone within 4 business days after the clinic has received the results. If you do not hear from us within 7 days, please contact the clinic through [employer] or phone. If you have a critical or abnormal lab result, we will notify you by phone as soon as possible.  Submit refill requests through [employer] or call your pharmacy and they will forward the refill request to us. Please allow 3 business days for your refill to be completed.          Additional Information About Your Visit        MyChart Information     [employer] gives you secure access to your electronic health record. If you see a primary care provider, you can also send messages to your care team and make appointments. If you have questions, please call your primary care clinic.  If you do not have a primary care provider, please call [phone] and they will assist you.        Care EveryWhere ID     This is your Care EveryWhere ID. This could be used by other organizations to access your [facility] medical records  [id number]        Your Vitals Were     Pulse Temperature Respirations Height Pulse Oximetry BMI (Body Mass Index)    67 97.6  F (36.4  C) (Oral) 18 1.499 m (4' 11.02\") 99% 24.22 kg/m2       Blood Pressure from Last 3 " Encounters:   04/19/17 187/88   04/19/17 193/73   03/09/17 150/74    Weight from Last 3 Encounters:   04/19/17 54.4 kg (120 lb)   03/09/17 54.9 kg (121 lb)   01/26/17 54.4 kg (120 lb)              Today, you had the following     No orders found for display         Today's Medication Changes          These changes are accurate as of: 4/19/17 11:59 PM.  If you have any questions, ask your nurse or doctor.               Start taking these medicines.        Dose/Directions    allopurinol 100 MG tablet   Commonly known as:  ZYLOPRIM   Used for:  Acute idiopathic gout of left foot   Started by:  Luis Daniel Lomeli MD        Dose:  100 mg   Take 1 tablet (100 mg) by mouth daily   Quantity:  90 tablet   Refills:  3         These medicines have changed or have updated prescriptions.        Dose/Directions    carvedilol 6.25 MG tablet   Commonly known as:  COREG   This may have changed:  when to take this   Used for:  Essential hypertension        Dose:  6.25 mg   Take 1 tablet (6.25 mg) by mouth 2 times daily (with meals)   Quantity:  180 tablet   Refills:  3            Where to get your medicines      These medications were sent to [facility] - [address]  [address] [facility] [address]    Hours:  Mon-Fri 8:30am-5:00pm Toll Free [phone] Phone:  [phone]     allopurinol 100 MG tablet                Primary Care Provider Office Phone # Fax #    Aby JEMAL Villanueva [facility] [phone]       [address] [address]        Thank you!     Thank you for choosing [facility] HEPATOLOGY  for your care. Our goal is always to provide you with excellent care. Hearing back from our patients is one way we can continue to improve our services. Please take a few minutes to complete the written survey that you may receive in the mail after your visit with us. Thank you!             Your Updated Medication List - Protect others  around you: Learn how to safely use, store and throw away your medicines at www.disposemymeds.org.          This list is accurate as of: 4/19/17 11:59 PM.  Always use your most recent med list.                   Brand Name Dispense Instructions for use    allopurinol 100 MG tablet    ZYLOPRIM    90 tablet    Take 1 tablet (100 mg) by mouth daily       amLODIPine 2.5 MG tablet    NORVASC    180 tablet    Take 2 tablets (5 mg) by mouth daily       carvedilol 6.25 MG tablet    COREG    180 tablet    Take 1 tablet (6.25 mg) by mouth 2 times daily (with meals)       colchicine 0.6 MG tablet     15 tablet    TAKE 2 TABLETS BY MOUTH AT THE FIRST SIGN OF FLARE, TAKE 1 ADDITIONAL TABLET ONE HOUR LATER       lactobacillus rhamnosus (GG) capsule     90 capsule    Take 1 capsule by mouth 3 times daily (before meals)       LASIX 20 MG tablet   Generic drug:  furosemide     30 tablet    Take 20 mg by mouth daily as needed Reported on 4/19/2017       predniSONE 1 MG tablet    DELTASONE    270 tablet    TAKE THREE TABLETS BY MOUTH EVERY DAY       sertraline 50 MG tablet    ZOLOFT    90 tablet    TAKE ONE TABLET BY MOUTH EVERY DAY       sirolimus 0.5 MG tablet    RAPAMUNE - GENERIC EQUIVALENT    360 tablet    TAKE FOUR TABLETS BY MOUTH EVERY DAY       triamcinolone 0.1 % ointment    KENALOG    500 g    Apply to itchy rash twice daily for 7 to 10 days as needed.       TYLENOL 325 MG Caps   Generic drug:  Acetaminophen      Take 325-650 mg by mouth daily as needed (pain)       ursodiol 300 MG capsule    ACTIGALL    270 capsule    TAKE THREE CAPSULES BY MOUTH EVERY DAY       VANICREAM EX      Externally apply topically 2 times daily

## 2017-04-19 NOTE — PROGRESS NOTES
I had the pleasure of seeing Amanda Nails for followup in the Liver Transplantation Clinic at the [facility] on 04/19/2017.  Ms. Nails returns for followup now 32-1/2 years status post liver transplantation for primary biliary cirrhosis.      She is actually doing reasonably well at this point in time.  She just had an episode of gout and does have a high uric acid.  She was treated with colchicine which seemed to work well, but she is not taking anything in between time.      She denies any abdominal pain, itching or skin rash.  She did have another skin cancer just recently removed from her right lower lip.  She has very mild fatigue.  She denies any increased abdominal girth or lower extremity edema.  She denies fevers or chills, cough or shortness of breath.  She denies any nausea, vomiting or diarrhea.  Her appetite has been good, and her weight has been stable.  Besides that noted above, there have been no other new events since she was last seen.       Current Outpatient Prescriptions   Medication     Acetaminophen (TYLENOL) 325 MG CAPS     allopurinol (ZYLOPRIM) 100 MG tablet     carvedilol (COREG) 6.25 MG tablet     ursodiol (ACTIGALL) 300 MG capsule     amLODIPine (NORVASC) 2.5 MG tablet     sirolimus (RAPAMUNE - GENERIC EQUIVALENT) 0.5 MG tablet     predniSONE (DELTASONE) 1 MG tablet     sertraline (ZOLOFT) 50 MG tablet     triamcinolone (KENALOG) 0.1 % ointment     Emollient (VANICREAM EX)     colchicine 0.6 MG tablet     furosemide (LASIX) 20 MG tablet     lactobacillus rhamnosus, GG, (CULTURELL) capsule     No current facility-administered medications for this visit.      B/P: 193/73, T: 97.6, P: 67, R: 18    HEENT exam shows no scleral icterus and no temporal muscle wasting.  Chest is clear.  Abdominal exam shows no increase in girth.  No masses or tenderness to palpation are present.  Liver is 10 cm in span without left lobe enlargement.  No spleen tip is palpable, and  extremity exam shows no edema.  There is no evidence of active gout.  Skin exam shows a healing lesion on her right lower lip where the skin cancer was removed.      Laboratory Data:  White count is 4.1, hemoglobin 11.3, platelets are 268,000.  Sodium 143, potassium 2.5, BUN is 26, creatinine 1.13.  AST 38, ALT is 53, alkaline phosphatase 206, albumin is 3.0 with total protein of 6.7, total bilirubin is 0.3      My impression is that Ms. Nails is 32-1/2 years status post liver transplantation.  She certainly is one of the longest surviving adults who have undergone liver transplantation in the [place] at this point in time.  Her immunosuppressive regimen is a bit of an issue because she does have some proteinuria; however, she has chronic kidney disease and so CNIs are not particularly attractive.  She also has had recurrent skin cancers, and so azathioprine is not particularly attractive.  She has had GI tract issues with the MM[facility]; and thus, I have spoken with Dr. Abrams, and we will plan on just simply tolerating the degree of proteinuria that she has.  She probably could benefit from being on an ACE inhibitor.        Thank you very much for allowing me to participate in the care of this patient.  If you have any questions regarding my recommendations, please do not hesitate to contact me.       Luis Daniel Lomeli MD      Professor of Medicine  [employer]      Executive Medical Director, Solid Organ Transplant Program  [facility]

## 2017-04-19 NOTE — NURSING NOTE
"Vitals taken from previous visit, in flow sheet.  Chief Complaint   Patient presents with     RECHECK     Follow up CKD stage 3.       Initial /88 Estimated body mass index is 24.22 kg/(m^2) as calculated from the following:    Height as of an earlier encounter on 4/19/17: 1.499 m (4' 11.02\").    Weight as of an earlier encounter on 4/19/17: 54.4 kg (120 lb).  Medication Reconciliation: complete   Elizabeth Goodman. CMA    "

## 2017-04-19 NOTE — LETTER
4/19/2017      RE: Amanda Nails  [address]   [place] [address]       REASON FOR VISIT:  Ms. Nails is a 73-year-old woman here for followup of CKD status post liver transplant in 1984.  She is the oldest living liver transplant recipient in our program.  She has CKD with a baseline creatinine 1.5-2, attributed to CNI toxicity.  She was switched from CNI to Sirolimus in 2012 and her baseline creatinine has been somewhat lower recently.      Since her last clinic visit she has done well.  She had an additional episode of gout, once again treated with colchicine and Dr. Lomeli is considering putting her on allopurinol.  Her symptoms are classic with the big toe involvement in the top of the foot and she has an immediate response to colchicine.      Her blood pressure control has been variable to poor with the reading today in clinic of 193/73.  She says it is higher at home, but she has not been taking her blood pressure meds correctly and has omitted the evening Coreg dose, so her current blood pressure regimen is Coreg 6.25 b.i.d. and amlodipine 5 daily.      She has ongoing problems with skin cancers with 4 lesions removed at her last visit.  She just got back from winter in [place] at [place].      Overall, she feels well but her energy level is low over the past several months.  Of note, she was treated for pneumonia over Christmas and she is probably not quite back up to her usual level of exercise.  She does not have any specific symptoms, sleeps well and does not have shortness of breath or chest pain or other complaints.  She has been on colchicine for 2 weeks, but does not have any myopathy or muscle pain.      REVIEW OF SYSTEMS:  Comprehensive review of systems is completed and negative except as in the HPI.      PAST MEDICAL HISTORY:   1.  Status post liver transplant in 1984.   2.  Angioedema from ACE inhibitor.   3.  Osteoporosis.   4.  Gout, not on prophylaxis.   5.  Many skin  cancers removed with 4 in the last session.   6.  Hypertension.   7.  Primary biliary cirrhosis.      SOCIAL HISTORY:  She lives in [place].  [facility], but her daughter does teach water aerobics there.  Does not smoke or drink alcohol.      CURRENT MEDICATIONS:  See below.      PHYSICAL EXAMINATION:   VITAL SIGNS:  Blood pressure 187/88, 3 reading systolic range from 172 to 193, weight 120 pounds, BMI 24.   GENERAL:  She is petite, well-developed, well-nourished, extensive skin changes on her forehead, cheeks, arms, legs with some underlying ecchymoses in addition.   LUNGS:  Clear bilaterally.   CARDIAC:  Regular sinus rhythm, 2/6 systolic ejection murmur which she says is not new.   ABDOMEN:  Benign, no bruits over her kidneys.   LOWER EXTREMITIES:  No peripheral edema.      LABORATORY DATA:  Creatinine 1.13, albumin 3.0.  Protein-to-creatinine ratio 1.25 grams per gram.  Uric acid add on 5.3.   Electrolytes/Renal -   Recent Labs   Lab Test  04/18/17 0833 01/26/17   0826  11/12/16   1120  11/12/16   0640   08/01/16   1142  04/04/16   1015   07/10/15   0805   NA  143  140   --   144   < >  140  140   < >  138   POTASSIUM  3.5  3.6  4.7  3.3*   < >  3.8  4.0   < >  3.9   CHLORIDE  109  104   --   109   < >  105  106   < >  105   CO2  28  27   --   29   < >  29  26   < >  24   BUN  26  21   --   22   < >  32*  26   < >  31*   CR  1.13*  1.26*   --   1.23*   < >  1.16*  1.22*   < >  1.32*   GLC  80  80   --   79   < >  84  99   < >  94   BLANK  8.7  8.7   --   8.2*   < >  9.4  8.8   < >  8.5   MAG   --    --    --   1.7   --   1.8   --    --   1.6   PHOS  2.8   --    --    --    --   3.1  2.9   --   3.4    < > = values in this interval not displayed.       CBC -   Recent Labs   Lab Test  04/18/17 0833 01/26/17   0826  11/17/16   0757   WBC  4.1  5.0  4.7   HGB  11.3*  10.4*  10.3*   PLT  268  280  255       LFTs -   Recent Labs   Lab Test  04/18/17   0833   01/26/17   0826  11/10/16   2055   ALKPHOS  206*  197*  252*   BILITOTAL  0.3  0.4  0.5   ALT  53*  33  49   AST  38  41  56*   PROTTOTAL  6.7*  7.1  7.7   ALBUMIN  3.0*  3.2*  3.5       Coags - No lab results found.    Iron Panel -   Recent Labs   Lab Test  08/01/16   1142  04/04/16   1015  01/02/15   0851   IRON  70  54  70   IRONSAT  17  15  21   CHAPIN  9  13  56       Endocrine -   Recent Labs   Lab Test  11/17/16   0757  10/06/14   1017  09/26/12   0807   TSH  3.78  2.60  4.17        IMPRESSION:   1.  Chronic kidney disease.  She has stable CKD, creatinine 1.1 with estimated GFR 47 and is probably closer to 30 given her very small size and low muscle mass.  It has been stable for many years.  As we discussed today in clinic, best strategy for her is excellent blood pressure control and she will work harder on that.   2.  Hypertension.  Blood pressure is uncontrolled.  She is going back to carvedilol b.i.d. and will increase the dose if her systolic pressure does not come down to less than 150.  I have asked my nurse to contact her in followup on the blood pressure.   3.  Skin cancer.  She is getting regular followup.  She is still not very good about staying out of the sun according to her daughter.   4.  Joint pain.  Her symptoms are classic for gout but her uric acid is not elevated, so she might not respond to allopurinol.  Will discuss with Dr. Lomeli.   5.  Proteinuria.  She has got stable proteinuria likely secondary to RAPA.  It is stable at 1.3 grams per gram which might actually be an estimation given her very small muscle mass. We will continue to monitor.      PLAN:   1.  B.i.d. carvedilol, increase to 12.5 b.i.d. if needed.   2.  Patient to monitor blood pressure at home and call us with blood pressure results.   3.  Discuss allopurinol with Dr. Lomeli in view of the uric acid level in the normal range.   4.  The patient encouraged to increase exercise because of her nonspecific increase in fatigue.          WILL LEON MD             D: 2017 17:26   T: 2017 10:10   MT: LG      Name:     QING CORONA   MRN:      [id number]        Account:      [id number]   :      1940           Service Date: 2017      Document: [id number]

## 2017-04-19 NOTE — LETTER
4/19/2017      RE: Amanda Nails  [address]   [place] [address]       I had the pleasure of seeing Amanda Nails for followup in the Liver Transplantation Clinic at the [facility] on 04/19/2017.  Ms. Nails returns for followup now 32-1/2 years status post liver transplantation for primary biliary cirrhosis.      She is actually doing reasonably well at this point in time.  She just had an episode of gout and does have a high uric acid.  She was treated with colchicine which seemed to work well, but she is not taking anything in between time.      She denies any abdominal pain, itching or skin rash.  She did have another skin cancer just recently removed from her right lower lip.  She has very mild fatigue.  She denies any increased abdominal girth or lower extremity edema.  She denies fevers or chills, cough or shortness of breath.  She denies any nausea, vomiting or diarrhea.  Her appetite has been good, and her weight has been stable.  Besides that noted above, there have been no other new events since she was last seen.       Current Outpatient Prescriptions   Medication     Acetaminophen (TYLENOL) 325 MG CAPS     allopurinol (ZYLOPRIM) 100 MG tablet     carvedilol (COREG) 6.25 MG tablet     ursodiol (ACTIGALL) 300 MG capsule     amLODIPine (NORVASC) 2.5 MG tablet     sirolimus (RAPAMUNE - GENERIC EQUIVALENT) 0.5 MG tablet     predniSONE (DELTASONE) 1 MG tablet     sertraline (ZOLOFT) 50 MG tablet     triamcinolone (KENALOG) 0.1 % ointment     Emollient (VANICREAM EX)     colchicine 0.6 MG tablet     furosemide (LASIX) 20 MG tablet     lactobacillus rhamnosus, GG, (CULTURELL) capsule     No current facility-administered medications for this visit.      B/P: 193/73, T: 97.6, P: 67, R: 18    HEENT exam shows no scleral icterus and no temporal muscle wasting.  Chest is clear.  Abdominal exam shows no increase in girth.  No masses or tenderness to palpation are  present.  Liver is 10 cm in span without left lobe enlargement.  No spleen tip is palpable, and extremity exam shows no edema.  There is no evidence of active gout.  Skin exam shows a healing lesion on her right lower lip where the skin cancer was removed.      Laboratory Data:  White count is 4.1, hemoglobin 11.3, platelets are 268,000.  Sodium 143, potassium 2.5, BUN is 26, creatinine 1.13.  AST 38, ALT is 53, alkaline phosphatase 206, albumin is 3.0 with total protein of 6.7, total bilirubin is 0.3      My impression is that Ms. Nails is 32-1/2 years status post liver transplantation.  She certainly is one of the longest surviving adults who have undergone liver transplantation in the [place] at this point in time.  Her immunosuppressive regimen is a bit of an issue because she does have some proteinuria; however, she has chronic kidney disease and so CNIs are not particularly attractive.  She also has had recurrent skin cancers, and so azathioprine is not particularly attractive.  She has had GI tract issues with the [facility]; and thus, I have spoken with Dr. Abrams, and we will plan on just simply tolerating the degree of proteinuria that she has.  She probably could benefit from being on an ACE inhibitor.        Thank you very much for allowing me to participate in the care of this patient.  If you have any questions regarding my recommendations, please do not hesitate to contact me.       Luis Daniel Lomeli MD      Professor of Medicine  [employer]      Executive Medical Director, Solid Organ Transplant Program  [facility]     Luis Daniel Lomeli MD

## 2017-04-19 NOTE — PATIENT INSTRUCTIONS
1.  Increase carvedilol to 6.25 mg twice per day.  Can increase this further if BP stays high  2.  Allopurinol 100 mg.  Check uric acid level 2 weeks after you start this medicine  3.  Check your BP at home and call us with readings.  Stephanie Pereyra   4.  Target BP is < 140/90  5.  Exercise is good!

## 2017-04-20 ENCOUNTER — TELEPHONE (OUTPATIENT)
Dept: NEPHROLOGY | Facility: CLINIC | Age: 77
End: 2017-04-20

## 2017-04-20 NOTE — PROGRESS NOTES
REASON FOR VISIT:  Ms. Nails is a 73-year-old woman here for followup of CKD status post liver transplant in 1984.  She is the oldest living liver transplant recipient in our program.  She has CKD with a baseline creatinine 1.5-2, attributed to CNI toxicity.  She was switched from CNI to Sirolimus in 2012 and her baseline creatinine has been somewhat lower recently.      Since her last clinic visit she has done well.  She had an additional episode of gout, once again treated with colchicine and Dr. Lomeli is considering putting her on allopurinol.  Her symptoms are classic with the big toe involvement in the top of the foot and she has an immediate response to colchicine.      Her blood pressure control has been variable to poor with the reading today in clinic of 193/73.  She says it is higher at home, but she has not been taking her blood pressure meds correctly and has omitted the evening Coreg dose, so her current blood pressure regimen is Coreg 6.25 b.i.d. and amlodipine 5 daily.      She has ongoing problems with skin cancers with 4 lesions removed at her last visit.  She just got back from winter in [place] at [place].      Overall, she feels well but her energy level is low over the past several months.  Of note, she was treated for pneumonia over Christmas and she is probably not quite back up to her usual level of exercise.  She does not have any specific symptoms, sleeps well and does not have shortness of breath or chest pain or other complaints.  She has been on colchicine for 2 weeks, but does not have any myopathy or muscle pain.      REVIEW OF SYSTEMS:  Comprehensive review of systems is completed and negative except as in the HPI.      PAST MEDICAL HISTORY:   1.  Status post liver transplant in 1984.   2.  Angioedema from ACE inhibitor.   3.  Osteoporosis.   4.  Gout, not on prophylaxis.   5.  Many skin cancers removed with 4 in the last session.   6.  Hypertension.   7.  Primary biliary  cirrhosis.      SOCIAL HISTORY:  She lives in [place].  [facility], but her daughter does teach water aerobics there.  Does not smoke or drink alcohol.      CURRENT MEDICATIONS:  See below.      PHYSICAL EXAMINATION:   VITAL SIGNS:  Blood pressure 187/88, 3 reading systolic range from 172 to 193, weight 120 pounds, BMI 24.   GENERAL:  She is petite, well-developed, well-nourished, extensive skin changes on her forehead, cheeks, arms, legs with some underlying ecchymoses in addition.   LUNGS:  Clear bilaterally.   CARDIAC:  Regular sinus rhythm, 2/6 systolic ejection murmur which she says is not new.   ABDOMEN:  Benign, no bruits over her kidneys.   LOWER EXTREMITIES:  No peripheral edema.      LABORATORY DATA:  Creatinine 1.13, albumin 3.0.  Protein-to-creatinine ratio 1.25 grams per gram.  Uric acid add on 5.3.   Electrolytes/Renal -   Recent Labs   Lab Test  04/18/17   0833  01/26/17   0826  11/12/16   1120  11/12/16   0640   08/01/16   1142  04/04/16   1015   07/10/15   0805   NA  143  140   --   144   < >  140  140   < >  138   POTASSIUM  3.5  3.6  4.7  3.3*   < >  3.8  4.0   < >  3.9   CHLORIDE  109  104   --   109   < >  105  106   < >  105   CO2  28  27   --   29   < >  29  26   < >  24   BUN  26  21   --   22   < >  32*  26   < >  31*   CR  1.13*  1.26*   --   1.23*   < >  1.16*  1.22*   < >  1.32*   GLC  80  80   --   79   < >  84  99   < >  94   BLANK  8.7  8.7   --   8.2*   < >  9.4  8.8   < >  8.5   MAG   --    --    --   1.7   --   1.8   --    --   1.6   PHOS  2.8   --    --    --    --   3.1  2.9   --   3.4    < > = values in this interval not displayed.       CBC -   Recent Labs   Lab Test  04/18/17   0833  01/26/17   0826  11/17/16   0757   WBC  4.1  5.0  4.7   HGB  11.3*  10.4*  10.3*   PLT  268  280  255       LFTs -   Recent Labs   Lab Test  04/18/17   0833  01/26/17   0826  11/10/16   2055   ALKPHOS  206*  197*  252*   BILITOTAL  0.3  0.4   0.5   ALT  53*  33  49   AST  38  41  56*   PROTTOTAL  6.7*  7.1  7.7   ALBUMIN  3.0*  3.2*  3.5       Coags - No lab results found.    Iron Panel -   Recent Labs   Lab Test  08/01/16   1142  04/04/16   1015  01/02/15   0851   IRON  70  54  70   IRONSAT  17  15  21   CHAPIN  9  13  56       Endocrine -   Recent Labs   Lab Test  11/17/16   0757  10/06/14   1017  09/26/12   0807   TSH  3.78  2.60  4.17        IMPRESSION:   1.  Chronic kidney disease.  She has stable CKD, creatinine 1.1 with estimated GFR 47 and is probably closer to 30 given her very small size and low muscle mass.  It has been stable for many years.  As we discussed today in clinic, best strategy for her is excellent blood pressure control and she will work harder on that.   2.  Hypertension.  Blood pressure is uncontrolled.  She is going back to carvedilol b.i.d. and will increase the dose if her systolic pressure does not come down to less than 150.  I have asked my nurse to contact her in followup on the blood pressure.   3.  Skin cancer.  She is getting regular followup.  She is still not very good about staying out of the sun according to her daughter.   4.  Joint pain.  Her symptoms are classic for gout but her uric acid is not elevated, so she might not respond to allopurinol.  Will discuss with Dr. Lomeli.   5.  Proteinuria.  She has got stable proteinuria likely secondary to RAPA.  It is stable at 1.3 grams per gram which might actually be an estimation given her very small muscle mass. We will continue to monitor.      PLAN:   1.  B.i.d. carvedilol, increase to 12.5 b.i.d. if needed.   2.  Patient to monitor blood pressure at home and call us with blood pressure results.   3.  Discuss allopurinol with Dr. Lomeli in view of the uric acid level in the normal range.   4.  The patient encouraged to increase exercise because of her nonspecific increase in fatigue.         WILL LEON MD             D: 04/19/2017 17:26   T: 04/20/2017 10:10   MT:  LG      Name:     QING CORONA   MRN:      [id number]        Account:      [id number]   :      1940           Service Date: 2017      Document: [id number]

## 2017-04-25 ENCOUNTER — TELEPHONE (OUTPATIENT)
Dept: NEPHROLOGY | Facility: CLINIC | Age: 77
End: 2017-04-25

## 2017-05-15 ENCOUNTER — TELEPHONE (OUTPATIENT)
Dept: NEPHROLOGY | Facility: CLINIC | Age: 77
End: 2017-05-15

## 2017-05-15 DIAGNOSIS — M79.89 RIGHT LEG SWELLING: ICD-10-CM

## 2017-05-15 RX ORDER — FUROSEMIDE 20 MG
20 TABLET ORAL DAILY PRN
Qty: 30 TABLET | Refills: 1 | Status: SHIPPED | OUTPATIENT
Start: 2017-05-15 | End: 2017-08-22

## 2017-05-15 NOTE — TELEPHONE ENCOUNTER
"Patient called and reports that she has not been taking her BP's (too much going on) and reports that SBP has been around 166/\"something\". Patient reports that she checks her BP first thing in the morning and is taking Coreg 6.25 mg BID and amlodipine 5 mg in am. Patient reports that the lasix 20 mg has been a PRN (last time she took was in January for puffy feet), patient reports that her feet and ankles are puffy again (2 days now) but she needs a new prescription. Will update Dr. Abrams. Patient wanted to check her BP for 3 days and call back as she did not have enough readings.   Debbie Quintana LPN  Nephrology  [facility]  [phone]    "

## 2017-05-24 ENCOUNTER — TELEPHONE (OUTPATIENT)
Dept: NEPHROLOGY | Facility: CLINIC | Age: 77
End: 2017-05-24

## 2017-05-31 NOTE — TELEPHONE ENCOUNTER
Call to patient regarding BP who reports BP for the past 5 days as below:  164/79 P 63  168/70 P 67  152/70 P 58  168/79 P 70  169/73 P 67 and an hour after her BP medications today 166/71 P 66  Will update Dr. Abrams and follow up with any recommendations.  Debbie Quintana LPN  Nephrology  [facility]  [phone]

## 2017-06-14 ENCOUNTER — TELEPHONE (OUTPATIENT)
Dept: NEPHROLOGY | Facility: CLINIC | Age: 77
End: 2017-06-14

## 2017-06-14 DIAGNOSIS — I10 ESSENTIAL HYPERTENSION: ICD-10-CM

## 2017-06-14 NOTE — TELEPHONE ENCOUNTER
Call to patient regarding Dr. Abrams's message below. Left voice message. Provided number for call back. Unsure at this point if she is taking 6.25 BID of Coreg or 12.5. Will update Stephanie DANIELS as well.  Debbie Quintana LPN  Nephrology  [facility]  [phone]

## 2017-06-14 NOTE — TELEPHONE ENCOUNTER
----- Message from Sarai Abrams MD sent at 6/12/2017  4:04 PM CDT -----  Regarding: RE: Bp readings, finally. sorry so late  Did she increase carvedilol to 12.5 bid?  If not she should.  If she has done that then can go to 25 bid. Thanks  ----- Message -----     From: Debbie Quintana LPN     Sent: 5/31/2017  11:51 AM       To: Sarai Abrams MD  Subject: Bp readings, finally. mireille so late              Call to patient regarding BP who reports BP for the past 5 days as below:  164/79 P 63  168/70 P 67  152/70 P 58  168/79 P 70  169/73 P 67 and an hour after her BP medications today 166/71 P 66  Will update Dr. Abrams and follow up with any recommendations.

## 2017-06-16 RX ORDER — CARVEDILOL 6.25 MG/1
12.5 TABLET ORAL 2 TIMES DAILY WITH MEALS
Qty: 180 TABLET | Refills: 3 | COMMUNITY
Start: 2017-06-16 | End: 2017-08-22

## 2017-06-16 NOTE — TELEPHONE ENCOUNTER
Spoke with patient. Confirmed she is taking 6.25mg coreg BID. Encouraged her to increase dose to 12.5mg BID. She reports minor edema and occasional shortness of breath. Said shortness of breath occurs when BP is high. Denied chest pain. She will increase coreg and follow up next week.    Stephanie Pereyra RN

## 2017-06-23 ENCOUNTER — CARE COORDINATION (OUTPATIENT)
Dept: NEPHROLOGY | Facility: CLINIC | Age: 77
End: 2017-06-23

## 2017-06-23 NOTE — PROGRESS NOTES
Spoke with patient. States she has a computer person at her house now, will call back on Monday.    Stephanie Pereyra RN

## 2017-06-26 NOTE — PROGRESS NOTES
Called patient. Said she hasn't been checking it consistently, will call back later with readings.    Stephanie Pereyra RN

## 2017-07-03 ENCOUNTER — CARE COORDINATION (OUTPATIENT)
Dept: NEPHROLOGY | Facility: CLINIC | Age: 77
End: 2017-07-03

## 2017-07-03 DIAGNOSIS — I10 ESSENTIAL HYPERTENSION WITH GOAL BLOOD PRESSURE LESS THAN 140/90: ICD-10-CM

## 2017-07-03 NOTE — PROGRESS NOTES
Reason for Call    Spoke with patient. BP has been higher lately, especially in the morning.    6/27: before meds: 163/75, 61           7:00pm: 155/73, 63  6/28: before meds: 174/73, 61           Noon: 152/63  6/29: before meds: 175/70, 60           4:00pm: 153/75, 59  6/30: before meds: 187/81, 58           5:00pm 177/69, 63           9:00pm 167/73, 67  7/2: before meds: 172/82, 63         4:00pm: 133/73, 74    She scraped her leg sometime last week. Said it became quite swollen and had white drainage. Bandaged it herself and took lasix to help with swelling. Denied further drainage or edema. Also denies chest pain, headache, dizziness, or blurred vision.    Currently prescribed coreg 12.5mg BID, lasix 20mg daily prn, and amlodipine 5mg daily.    Collaboration    Above discussed with Dr. Abrams.  Orders received.    Increase amlodine to 10 mg and she should see doc if the leg is still red. Might need abx.  Thanks     Patient Education    1. Call this nurse if systolic (top number) blood pressure is consistently <110 or >160 for further instructions.     Plan    1. Increase amlodipine to 10mg daily  2. Follow up in 2 weeks    Patient was given an opportunity to ask questions and have those questions answered to her satisfaction.  Patient verbalized understanding of instructions provided and agreed to plan of care.    Stephanie Pereyra RN

## 2017-07-05 RX ORDER — AMLODIPINE BESYLATE 10 MG/1
10 TABLET ORAL DAILY
Qty: 90 TABLET | Refills: 3 | COMMUNITY
Start: 2017-07-05 | End: 2017-08-21

## 2017-07-06 DIAGNOSIS — Z94.4 LIVER REPLACED BY TRANSPLANT (H): ICD-10-CM

## 2017-07-14 ENCOUNTER — TELEPHONE (OUTPATIENT)
Dept: TRANSPLANT | Facility: CLINIC | Age: 77
End: 2017-07-14

## 2017-07-14 NOTE — TELEPHONE ENCOUNTER
Writer rec'd message that pt lost her bottle of Zoloft and is requesting a refill. Called pt who states she thinks she may have thrown the bottle away. Writer explained insurance may not cover a refill this soon, but she could call the pharmacy to find out. Pt verbalized understanding and had no further questions.

## 2017-07-19 ENCOUNTER — CARE COORDINATION (OUTPATIENT)
Dept: NEPHROLOGY | Facility: CLINIC | Age: 77
End: 2017-07-19

## 2017-07-20 NOTE — PROGRESS NOTES
Reason for Call    Patient states she still hasn't been checking BP. She's been very busy planning a family reunion, and just hasn't gotten to it. She will call next week with an update.      Plan    1. Begin monitoring BP  2. Follow up in 1-2 weeks    Patient was given an opportunity to ask questions and have those questions answered to her satisfaction.  Patient verbalized understanding of instructions provided and agreed to plan of care.    Stephanie Pereyra RN

## 2017-08-07 ENCOUNTER — CARE COORDINATION (OUTPATIENT)
Dept: NEPHROLOGY | Facility: CLINIC | Age: 77
End: 2017-08-07

## 2017-08-14 DIAGNOSIS — R80.9 PROTEINURIA: ICD-10-CM

## 2017-08-14 DIAGNOSIS — Z94.4 LIVER REPLACED BY TRANSPLANT (H): ICD-10-CM

## 2017-08-14 DIAGNOSIS — N18.30 CHRONIC KIDNEY DISEASE, STAGE III (MODERATE) (H): Chronic | ICD-10-CM

## 2017-08-14 LAB
ALBUMIN SERPL-MCNC: 3.2 G/DL (ref 3.4–5)
ALP SERPL-CCNC: 244 U/L (ref 40–150)
ALT SERPL W P-5'-P-CCNC: 42 U/L (ref 0–50)
ANION GAP SERPL CALCULATED.3IONS-SCNC: 6 MMOL/L (ref 3–14)
AST SERPL W P-5'-P-CCNC: 50 U/L (ref 0–45)
BILIRUB DIRECT SERPL-MCNC: 0.1 MG/DL (ref 0–0.2)
BILIRUB SERPL-MCNC: 0.5 MG/DL (ref 0.2–1.3)
BUN SERPL-MCNC: 28 MG/DL (ref 7–30)
CALCIUM SERPL-MCNC: 9 MG/DL (ref 8.5–10.1)
CHLORIDE SERPL-SCNC: 102 MMOL/L (ref 94–109)
CO2 SERPL-SCNC: 28 MMOL/L (ref 20–32)
CREAT SERPL-MCNC: 1.29 MG/DL (ref 0.52–1.04)
CREAT UR-MCNC: 69 MG/DL
ERYTHROCYTE [DISTWIDTH] IN BLOOD BY AUTOMATED COUNT: 14.3 % (ref 10–15)
GFR SERPL CREATININE-BSD FRML MDRD: 40 ML/MIN/1.7M2
GLUCOSE SERPL-MCNC: 106 MG/DL (ref 70–99)
HCT VFR BLD AUTO: 34.1 % (ref 35–47)
HGB BLD-MCNC: 10.8 G/DL (ref 11.7–15.7)
MCH RBC QN AUTO: 29.1 PG (ref 26.5–33)
MCHC RBC AUTO-ENTMCNC: 31.7 G/DL (ref 31.5–36.5)
MCV RBC AUTO: 92 FL (ref 78–100)
PLATELET # BLD AUTO: 282 10E9/L (ref 150–450)
POTASSIUM SERPL-SCNC: 3.6 MMOL/L (ref 3.4–5.3)
PROT SERPL-MCNC: 7.3 G/DL (ref 6.8–8.8)
PROT UR-MCNC: 0.66 G/L
PROT/CREAT 24H UR: 0.95 G/G CR (ref 0–0.2)
RBC # BLD AUTO: 3.71 10E12/L (ref 3.8–5.2)
SODIUM SERPL-SCNC: 136 MMOL/L (ref 133–144)
URATE SERPL-MCNC: 4.5 MG/DL (ref 2.6–6)
WBC # BLD AUTO: 4.4 10E9/L (ref 4–11)

## 2017-08-14 PROCEDURE — 85027 COMPLETE CBC AUTOMATED: CPT | Performed by: INTERNAL MEDICINE

## 2017-08-14 PROCEDURE — 80076 HEPATIC FUNCTION PANEL: CPT | Performed by: INTERNAL MEDICINE

## 2017-08-14 PROCEDURE — 36415 COLL VENOUS BLD VENIPUNCTURE: CPT | Performed by: INTERNAL MEDICINE

## 2017-08-14 PROCEDURE — 80048 BASIC METABOLIC PNL TOTAL CA: CPT | Performed by: INTERNAL MEDICINE

## 2017-08-14 PROCEDURE — 84550 ASSAY OF BLOOD/URIC ACID: CPT | Performed by: INTERNAL MEDICINE

## 2017-08-14 PROCEDURE — 84156 ASSAY OF PROTEIN URINE: CPT | Performed by: INTERNAL MEDICINE

## 2017-08-21 ENCOUNTER — TELEPHONE (OUTPATIENT)
Dept: NEPHROLOGY | Facility: CLINIC | Age: 77
End: 2017-08-21

## 2017-08-21 DIAGNOSIS — I10 ESSENTIAL HYPERTENSION WITH GOAL BLOOD PRESSURE LESS THAN 140/90: ICD-10-CM

## 2017-08-21 RX ORDER — AMLODIPINE BESYLATE 2.5 MG/1
TABLET ORAL
Qty: 180 TABLET | Refills: 3 | OUTPATIENT
Start: 2017-08-21

## 2017-08-21 RX ORDER — AMLODIPINE BESYLATE 10 MG/1
10 TABLET ORAL DAILY
Qty: 90 TABLET | Refills: 3 | Status: SHIPPED | OUTPATIENT
Start: 2017-08-21 | End: 2017-08-22

## 2017-08-21 NOTE — TELEPHONE ENCOUNTER
Pt called into today, as she wanted Kenyetta to know that she is completely out of her Amlodipine.  She cannot wait for it to come to her mail order, so she would like it sent to [facility] pharmacy in [place].    Pt also has questions about her lasix order, she does need a refill and is wondering how often she needs to take it?  Please seen My Chart encounter from 8/2/17, she sent a message with all the questions.    Will send to Nephrology nurse for follow up.    Jewell Garner RN  [facility]

## 2017-08-22 ENCOUNTER — TELEPHONE (OUTPATIENT)
Dept: NEPHROLOGY | Facility: CLINIC | Age: 77
End: 2017-08-22

## 2017-08-22 DIAGNOSIS — I10 ESSENTIAL HYPERTENSION: ICD-10-CM

## 2017-08-22 DIAGNOSIS — M79.89 RIGHT LEG SWELLING: ICD-10-CM

## 2017-08-22 DIAGNOSIS — I10 ESSENTIAL HYPERTENSION WITH GOAL BLOOD PRESSURE LESS THAN 140/90: ICD-10-CM

## 2017-08-22 RX ORDER — CARVEDILOL 6.25 MG/1
6.25 TABLET ORAL 2 TIMES DAILY WITH MEALS
Qty: 60 TABLET | Refills: 1 | Status: SHIPPED | OUTPATIENT
Start: 2017-08-22 | End: 2018-01-04

## 2017-08-22 RX ORDER — FUROSEMIDE 20 MG
20 TABLET ORAL DAILY
Qty: 30 TABLET | Refills: 1 | Status: SHIPPED | OUTPATIENT
Start: 2017-08-22 | End: 2017-09-05

## 2017-08-22 RX ORDER — AMLODIPINE BESYLATE 5 MG/1
10 TABLET ORAL DAILY
Qty: 90 TABLET | Refills: 3 | COMMUNITY
Start: 2017-08-22 | End: 2018-02-06

## 2017-08-22 RX ORDER — CARVEDILOL 12.5 MG/1
12.5 TABLET ORAL 2 TIMES DAILY WITH MEALS
Qty: 180 TABLET | Refills: 3 | Status: SHIPPED | OUTPATIENT
Start: 2017-08-22 | End: 2017-08-22

## 2017-08-22 NOTE — TELEPHONE ENCOUNTER
"Communicated to patient message below per PAUL Javier. Sent in script for lasix 20 mg daily, changed Coreg dose to 6.25 mg BID, spoke to pharmacy regarding previous dose. Went over Coreg dose with patient a few times. Will also send message in my chart per request. Patient reports BP at eye clinic today 130/62 P 64. Encouraged patient to check daily weights and BP if possible and will call later this week or early next.  Debbie Quintana LPN  Nephrology  [facility]  [phone]    \"Thanks Debbie,     Based on the low HR, we should leave the Coreg total daily dose as is.  Let's do the following instead since per patient report, she has a little extra volume on board.     Coreg 6.25 mg twice daily   Cut Amlodipine down to 5 mg at night.   Lasix 20 mg daily (rather than PRN) for now.   Patient should try to weigh herself daily if possible, but at least every 2-3 days.  If she is losing more than 5-7 lbs in next week, she can  switch back to as needed Lasix.  Probably will need additional adjustment but lets just start there for now.  Can we follow-up with her in one week to see how things are going.     Thanks so much.       Starla\"  "

## 2017-08-22 NOTE — TELEPHONE ENCOUNTER
Patient called today (after speaking to Starla yesterday) and reports BP last night before medications 167/79 P 91. This morning /79 P 62 (before meds), 30 minutes later 151/74 P 62 (patient reports that she took Coreg this morning). Patient also reports that she prefers to communicate to Manuel Gustafson Pharmacist I believe from [facility]. Informed patient of Starla's recommendation to decrease amlodipine to 5 mg HS and to take carvedilol 12.5 BID. Went over this a few times with patient. Patient reports that she has been taking Coreg 6.25 mg (2 tabs at night), encouraged her to take 2 tabs in am and 2 tabs in pm and will send new script for 1 tab 12.5 BID. Patient reports that swelling still present, will send message to Starla regarding lasix and how often. And follow up with patient.  Debbie Quintana LPN  Nephrology  [facility]  [phone]

## 2017-09-05 ENCOUNTER — TELEPHONE (OUTPATIENT)
Dept: FAMILY MEDICINE | Facility: CLINIC | Age: 77
End: 2017-09-05

## 2017-09-05 DIAGNOSIS — M79.89 RIGHT LEG SWELLING: ICD-10-CM

## 2017-09-05 DIAGNOSIS — Z94.4 LIVER REPLACED BY TRANSPLANT (H): ICD-10-CM

## 2017-09-05 NOTE — TELEPHONE ENCOUNTER
Drug: Prednisone 1mg  Last Fill Date: 6/8/2017  Quantity: 270        Drug: Sirolimus  Last Fill Date: 6/8/2017  Quantity: 360

## 2017-09-06 RX ORDER — FUROSEMIDE 20 MG
20 TABLET ORAL DAILY
Qty: 30 TABLET | Refills: 1 | Status: SHIPPED | OUTPATIENT
Start: 2017-09-06 | End: 2017-11-02

## 2017-09-06 RX ORDER — FUROSEMIDE 20 MG
20 TABLET ORAL DAILY
Qty: 30 TABLET | Refills: 1 | OUTPATIENT
Start: 2017-09-06

## 2017-09-06 RX ORDER — SIROLIMUS 0.5 MG/1
2 TABLET, FILM COATED ORAL DAILY
Qty: 360 TABLET | Refills: 3 | Status: SHIPPED | OUTPATIENT
Start: 2017-09-06 | End: 2018-09-11

## 2017-09-06 RX ORDER — PREDNISONE 1 MG/1
3 TABLET ORAL DAILY
Qty: 270 TABLET | Refills: 3 | Status: SHIPPED | OUTPATIENT
Start: 2017-09-06 | End: 2018-09-11

## 2017-09-06 NOTE — TELEPHONE ENCOUNTER
Left message for patient to return call to clinic.  Please give message below when patient calls back.    May Sultana RN

## 2017-09-06 NOTE — TELEPHONE ENCOUNTER
Routing refill request to provider for review/approval because:  Last signed by another provider.    Lyly Arellano RN

## 2017-09-06 NOTE — TELEPHONE ENCOUNTER
refilled X 2 months- patient will need to be seen in clinic for further refills  She will need BMP /creatinine checked

## 2017-09-12 ENCOUNTER — TELEPHONE (OUTPATIENT)
Dept: NEPHROLOGY | Facility: CLINIC | Age: 77
End: 2017-09-12

## 2017-09-12 NOTE — TELEPHONE ENCOUNTER
Call to patient regarding BP. Patient reports that she will check BP and call back.  Debbie Quintana LPN  Nephrology  [facility]  [phone]

## 2017-09-13 ENCOUNTER — TELEPHONE (OUTPATIENT)
Dept: NEPHROLOGY | Facility: CLINIC | Age: 77
End: 2017-09-13

## 2017-09-13 NOTE — TELEPHONE ENCOUNTER
Patient called and reports that BP before medications in am and pm has been 160's/70's P 60's around 1pm /60's P 70's. Patient reports that she is taking amlodipine 5 mg HS, lasix 20 mg daily, carvedilol 6.25 mg BID. Patient reports that she has had some swelling in her feet but has not been checking her weight. Encouraged patient to start checking her weight and keeping a log daily and take a few BP readings in am and pm after her medications. Will call back in a week in or two.  Debbie Quintana LPN  Nephrology  [facility]  [phone]

## 2017-10-04 ENCOUNTER — DOCUMENTATION ONLY (OUTPATIENT)
Dept: TRANSPLANT | Facility: CLINIC | Age: 77
End: 2017-10-04

## 2017-11-02 ENCOUNTER — OFFICE VISIT (OUTPATIENT)
Dept: FAMILY MEDICINE | Facility: CLINIC | Age: 77
End: 2017-11-02
Payer: COMMERCIAL

## 2017-11-02 VITALS
TEMPERATURE: 96 F | SYSTOLIC BLOOD PRESSURE: 144 MMHG | DIASTOLIC BLOOD PRESSURE: 77 MMHG | BODY MASS INDEX: 24.31 KG/M2 | HEIGHT: 59 IN | HEART RATE: 60 BPM | WEIGHT: 120.6 LBS

## 2017-11-02 DIAGNOSIS — M79.89 RIGHT LEG SWELLING: ICD-10-CM

## 2017-11-02 DIAGNOSIS — Z94.4 LIVER REPLACED BY TRANSPLANT (H): Primary | Chronic | ICD-10-CM

## 2017-11-02 DIAGNOSIS — R60.0 LOCALIZED EDEMA: ICD-10-CM

## 2017-11-02 DIAGNOSIS — M10.072 ACUTE IDIOPATHIC GOUT OF LEFT FOOT: ICD-10-CM

## 2017-11-02 DIAGNOSIS — N18.30 CHRONIC KIDNEY DISEASE, STAGE III (MODERATE) (H): Chronic | ICD-10-CM

## 2017-11-02 PROCEDURE — 99215 OFFICE O/P EST HI 40 MIN: CPT | Performed by: NURSE PRACTITIONER

## 2017-11-02 RX ORDER — FUROSEMIDE 20 MG
20 TABLET ORAL DAILY
Qty: 90 TABLET | Refills: 3 | Status: SHIPPED | OUTPATIENT
Start: 2017-11-02 | End: 2018-11-12

## 2017-11-02 RX ORDER — ALLOPURINOL 100 MG/1
200 TABLET ORAL DAILY
Qty: 180 TABLET | Refills: 3 | Status: SHIPPED | OUTPATIENT
Start: 2017-11-02 | End: 2017-11-02

## 2017-11-02 RX ORDER — ALLOPURINOL 100 MG/1
200 TABLET ORAL DAILY
Qty: 180 TABLET | Refills: 3 | Status: SHIPPED | OUTPATIENT
Start: 2017-11-02 | End: 2018-11-12

## 2017-11-02 NOTE — PROGRESS NOTES
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 Followed by Dr. Lomeli  History of CHRONIC KIDNEY DISEASE and hypertension - followed by Nephrology       Concern - Gout  Onset: 10 days ago- took colchicine and it went away    Description:   Right foot is swollen on the top of her foot     Intensity: moderate    Progression of Symptoms:  improving    Accompanying Signs & Symptoms:  None    Previous history of similar problem:   Yes    Precipitating factors:   Worsened by: None    Alleviating factors:  Improved by: None    Therapies Tried and outcome: Tylenol       PROBLEMS TO ADD ON...  Edema in legs: stopped taking lasix daily- wonders if she should restart.     Problem list and histories reviewed & adjusted, as indicated.  Additional history: as documented    Patient Active Problem List   Diagnosis     Chronic kidney disease, stage III (moderate)     Liver replaced by transplant (H)     Primary biliary cirrhosis     CARDIOVASCULAR SCREENING; LDL GOAL LESS THAN 130     Chronic dermatitis     Esophageal reflux     Mixed hyperlipidemia     Iron deficiency anemia     Gout attack     Skin cancer     Osteoporosis     Advanced directives, counseling/discussion     Nausea and vomiting     Fever, unspecified     Diarrhea     Benign essential hypertension     Proteinuria     Past Surgical History:   Procedure Laterality Date     cholecytectomy       COLONOSCOPY  4/9/2013    Procedure: COLONOSCOPY;  Colonoscopy;  Surgeon: Kendra Archer MD;  Location: [facility]     ESOPHAGOSCOPY, GASTROSCOPY, DUODENOSCOPY (EGD), COMBINED  8/23/2013    Procedure: COMBINED ESOPHAGOSCOPY, GASTROSCOPY, DUODENOSCOPY (EGD), BIOPSY SINGLE OR MULTIPLE;  Gastroscopy       SPLENECTOMY       TRANSPLANT  1983    liver       Social History   Substance Use Topics     Smoking status: Former Smoker     Years: 3.00      "Smokeless tobacco: Never Used      Comment: Years ago.     Alcohol use Yes      Comment: very rarely     Family History   Problem Relation Age of Onset     Respiratory Mother      GASTROINTESTINAL DISEASE Sister      celiac     GASTROINTESTINAL DISEASE Son      celiac     GASTROINTESTINAL DISEASE Daughter      celiac     GASTROINTESTINAL DISEASE Sister      GASTROINTESTINAL DISEASE Son      celiac     GASTROINTESTINAL DISEASE Daughter      PBC     Endocrine Disease Daughter      Graves disease     Endocrine Disease Daughter      hypothyroidism             Reviewed and updated as needed this visit by clinical staffTobacco  Allergies  Med Hx  Surg Hx  Fam Hx  Soc Hx      Reviewed and updated as needed this visit by Provider         ROS:  Constitutional, HEENT, cardiovascular, pulmonary, GI, , musculoskeletal, neuro, skin, endocrine and psych systems are negative, except as otherwise noted.      OBJECTIVE:   /77  Pulse 60  Temp 96  F (35.6  C) (Tympanic)  Ht 4' 11\" (1.499 m)  Wt 120 lb 9.6 oz (54.7 kg)  BMI 24.36 kg/m2  Body mass index is 24.36 kg/(m^2).  GENERAL: healthy, alert and no distress  RESP: lungs clear to auscultation - no rales, rhonchi or wheezes  CV: regular rates and rhythm, normal S1 S2, no S3 or S4, no murmur, click or rub, peripheral pulses strong 1+ bilateral LE edema  MS: no gross musculoskeletal defects noted, no edema  No redness of MTP on right foot     Diagnostic Test Results:  none     ASSESSMENT/PLAN:         1. Acute idiopathic gout of left foot  Recently resolved with Cochicine patient had at home  She is taking allopurinol however 100 mg will increase to 200 mg daily to see if this helps- reviewed diet with patient   - allopurinol (ZYLOPRIM) 100 MG tablet; Take 2 tablets (200 mg) by mouth daily  Dispense: 180 tablet; Refill: 3  - will need to monitor LFT's.   - will recheck uric acid level with next lab/ acute attack- overall uric acid has been WNL    2. Localized " edema  Ok to start Lasix daily- will need to monitor kidneys  - furosemide (LASIX) 20 MG tablet; Take 1 tablet (20 mg) by mouth daily  Dispense: 90 tablet; Refill: 3  - low sodium diet    4. Liver replaced by transplant (H)  Stable- followed by Dr. Lomeli  - need to monitor LFT's with use of Allopurinol     5. Chronic kidney disease, stage III (moderate)  Stable- followed by Nephrology- creatine baseline 1.2-1.3    Plan for patient to follow up in 1 month for reevaluation of swelling and BMP     > 40  min spent in direct face to face time with this patient, greater than 50% in counseling and coordination of care discussing liver transplant, chronic kidney disease, edema, gout          JEMAL Ferrari [facility]

## 2017-11-02 NOTE — MR AVS SNAPSHOT
After Visit Summary   11/2/2017    Amanda Nails    MRN: [id number]           Patient Information     Date Of Birth          1940        Visit Information        Provider Department      11/2/2017 11:20 AM Aby Downing APRN CNP [facility]        Today's Diagnoses     Acute idiopathic gout of left foot        Right leg swelling          Care Instructions    1. Start taking Allopurinol 2 tablet once a day  2. Start taking Lasix once a day  3. Labs and office visit in 1 month          Thank you for choosing [facility].  You may be receiving a survey in the mail from [employer] [facility] [employer] regarding your visit today.  Please take a few minutes to complete and return the survey to let us know how we are doing.      If you have questions or concerns, please contact us via [employer] or you can contact your care team at [phone].    Our Clinic hours are:  Monday 6:40 am  to 7:00 pm  Tuesday -Friday 6:40 am to 5:00 pm    The [place] outpatient lab hours are:  Monday - Friday 6:10 am to 4:45 pm  Saturdays 7:00 am to 11:00 am  Appointments are required, call [phone]    If you have clinical questions after hours or would like to schedule an appointment,  call the clinic at [phone].          Follow-ups after your visit        Your next 10 appointments already scheduled     Nov 02, 2017 11:20 AM CDT   Office Visit with JEMAL Ferrari CNP   [facility]    [address] [address]   [phone]           Bring a current list of meds and any records pertaining to this visit. For Physicals, please bring immunization records and any forms needing to be filled out. Please arrive 10 minutes early to complete paperwork.            Apr 17, 2018 11:45 AM CDT   (Arrive by 11:30 AM)   Return Liver Transplant with Luis Daniel Lomeli MD   [facility] ([facility])    [address] [place] [address]  "Floor  [facility] [id number] [phone]            Apr 17, 2018  1:30 PM CDT   (Arrive by 1:00 PM)   Return Visit with Sarai Abrams MD   [facility] Nephrology ([facility])    [address]  [address] [facility] [id number] [phone]              Who to contact     If you have questions or need follow up information about today's clinic visit or your schedule please contact [facility] directly at [phone].  Normal or non-critical lab and imaging results will be communicated to you by SocialStay[employer], letter or phone within 4 business days after the clinic has received the results. If you do not hear from us within 7 days, please contact the clinic through [employer] or phone. If you have a critical or abnormal lab result, we will notify you by phone as soon as possible.  Submit refill requests through [employer] or call your pharmacy and they will forward the refill request to us. Please allow 3 business days for your refill to be completed.          Additional Information About Your Visit        MyChart Information     [employer] gives you secure access to your electronic health record. If you see a primary care provider, you can also send messages to your care team and make appointments. If you have questions, please call your primary care clinic.  If you do not have a primary care provider, please call [phone] and they will assist you.        Care EveryWhere ID     This is your Care EveryWhere ID. This could be used by other organizations to access your [facility] medical records  [id number]        Your Vitals Were     Pulse Temperature Height BMI (Body Mass Index)          60 96  F (35.6  C) (Tympanic) 4' 11\" (1.499 m) 24.36 kg/m2         Blood Pressure from Last 3 Encounters:   11/02/17 144/77   04/19/17 187/88   04/19/17 193/73    Weight from Last 3 Encounters:   11/02/17 120 lb 9.6 oz (54.7 kg)   04/19/17 120 lb (54.4 kg)   03/09/17 121 lb (54.9 kg)    "           Today, you had the following     No orders found for display         Today's Medication Changes          These changes are accurate as of: 11/2/17 11:07 AM.  If you have any questions, ask your nurse or doctor.               Start taking these medicines.        Dose/Directions    allopurinol 100 MG tablet   Commonly known as:  ZYLOPRIM   Used for:  Acute idiopathic gout of left foot   Started by:  Aby Downing APRN CNP        Dose:  200 mg   Take 2 tablets (200 mg) by mouth daily   Quantity:  180 tablet   Refills:  3            Where to get your medicines      These medications were sent to [facility] - [place], [place] - [address] [facility]  [address] [address]    Hours:  Mon-Fri 8:30am-5:00pm Toll Free [phone] Phone:  [phone]     allopurinol 100 MG tablet    furosemide 20 MG tablet                Primary Care Provider Office Phone # Fax #    JEMAL Ferrari [phone] [address] [address]        Equal Access to Services     ISABEL STRAUSS AH: Hadii toi manzanares hadasho Sosoledad, waaxda luqadaha, qaybta kaalmada adeegyaorin, gloria briceno . So [facility] [phone].    ATENCIÓN: Si habla español, tiene a power disposición servicios gratuitos de asistencia lingüística. Deepika al [phone].    We comply with applicable federal civil rights laws and [place] laws. We do not discriminate on the basis of race, color, national origin, age, disability, sex, sexual orientation, or gender identity.            Thank you!     Thank you for choosing [facility]  for your care. Our goal is always to provide you with excellent care. Hearing back from our patients is one way we can continue to improve our services. Please take a few minutes to complete the written survey that you may receive in the mail after your visit with us. Thank you!             Your Updated Medication List -  Protect others around you: Learn how to safely use, store and throw away your medicines at www.disposemymeds.org.          This list is accurate as of: 11/2/17 11:07 AM.  Always use your most recent med list.                   Brand Name Dispense Instructions for use Diagnosis    allopurinol 100 MG tablet    ZYLOPRIM    180 tablet    Take 2 tablets (200 mg) by mouth daily    Acute idiopathic gout of left foot       amLODIPine 5 MG tablet    NORVASC    90 tablet    Take 10 mg by mouth daily    Essential hypertension with goal blood pressure less than 140/90       carvedilol 6.25 MG tablet    COREG    60 tablet    Take 1 tablet (6.25 mg) by mouth 2 times daily (with meals)    Essential hypertension       colchicine 0.6 MG tablet     15 tablet    TAKE 2 TABLETS BY MOUTH AT THE FIRST SIGN OF FLARE, TAKE 1 ADDITIONAL TABLET ONE HOUR LATER    Gout of left foot, unspecified cause, unspecified chronicity       furosemide 20 MG tablet    LASIX    90 tablet    Take 1 tablet (20 mg) by mouth daily    Right leg swelling       lactobacillus rhamnosus (GG) capsule     90 capsule    Take 1 capsule by mouth 3 times daily (before meals)    Non-intractable vomiting with nausea, unspecified vomiting type       predniSONE 1 MG tablet    DELTASONE    270 tablet    Take 3 tablets (3 mg) by mouth daily    Liver replaced by transplant (H)       sertraline 50 MG tablet    ZOLOFT    30 tablet    Take 1 tablet (50 mg) by mouth daily    Liver replaced by transplant (H)       sirolimus 0.5 MG tablet    GENERIC EQUIVALENT    360 tablet    Take 4 tablets (2 mg) by mouth daily    Liver replaced by transplant (H)       triamcinolone 0.1 % ointment    KENALOG    500 g    Apply to itchy rash twice daily for 7 to 10 days as needed.    Contact dermatitis and other eczema, due to unspecified cause       TYLENOL 325 MG Caps   Generic drug:  Acetaminophen      Take 325-650 mg by mouth daily as needed (pain)        ursodiol 300 MG capsule    ACTIGALL     270 capsule    TAKE THREE CAPSULES BY MOUTH EVERY DAY    Liver replaced by transplant (H)       VANICREAM EX      Externally apply topically 2 times daily

## 2017-11-02 NOTE — NURSING NOTE
"Chief Complaint   Patient presents with     Arthritis     Recheck gout on right foot        Initial /77  Pulse 60  Temp 96  F (35.6  C) (Tympanic)  Ht 4' 11\" (1.499 m)  Wt 120 lb 9.6 oz (54.7 kg)  BMI 24.36 kg/m2 Estimated body mass index is 24.36 kg/(m^2) as calculated from the following:    Height as of this encounter: 4' 11\" (1.499 m).    Weight as of this encounter: 120 lb 9.6 oz (54.7 kg).  Medication Reconciliation: complete  "

## 2017-11-24 DIAGNOSIS — N18.30 CHRONIC KIDNEY DISEASE, STAGE III (MODERATE) (H): Chronic | ICD-10-CM

## 2017-11-24 LAB
ANION GAP SERPL CALCULATED.3IONS-SCNC: 7 MMOL/L (ref 3–14)
BUN SERPL-MCNC: 37 MG/DL (ref 7–30)
CALCIUM SERPL-MCNC: 8.8 MG/DL (ref 8.5–10.1)
CHLORIDE SERPL-SCNC: 103 MMOL/L (ref 94–109)
CO2 SERPL-SCNC: 28 MMOL/L (ref 20–32)
CREAT SERPL-MCNC: 1.59 MG/DL (ref 0.52–1.04)
GFR SERPL CREATININE-BSD FRML MDRD: 32 ML/MIN/1.7M2
GLUCOSE SERPL-MCNC: 105 MG/DL (ref 70–99)
POTASSIUM SERPL-SCNC: 3.7 MMOL/L (ref 3.4–5.3)
SODIUM SERPL-SCNC: 138 MMOL/L (ref 133–144)

## 2017-11-24 PROCEDURE — 80048 BASIC METABOLIC PNL TOTAL CA: CPT | Performed by: NURSE PRACTITIONER

## 2017-11-24 PROCEDURE — 36415 COLL VENOUS BLD VENIPUNCTURE: CPT | Performed by: NURSE PRACTITIONER

## 2017-11-27 ENCOUNTER — CARE COORDINATION (OUTPATIENT)
Dept: NEPHROLOGY | Facility: CLINIC | Age: 77
End: 2017-11-27

## 2017-11-27 DIAGNOSIS — R80.9 PROTEINURIA: Primary | ICD-10-CM

## 2017-11-27 NOTE — PROGRESS NOTES
Per Dr. Abrams:  Cr is up.  She has proteinuria, needs serum and urine immunofixation and serum free light chains.     Called patient. She agreed to plan. Said she's been having trouble urinating the last 2 weeks due to back pain. Will go to lab tomorrow.    Stephanie Pereyra RN

## 2017-11-28 ENCOUNTER — HOSPITAL ENCOUNTER (OUTPATIENT)
Dept: PHYSICAL THERAPY | Facility: CLINIC | Age: 77
Setting detail: THERAPIES SERIES
End: 2017-11-28
Attending: NURSE PRACTITIONER
Payer: MEDICARE

## 2017-11-28 ENCOUNTER — OFFICE VISIT (OUTPATIENT)
Dept: FAMILY MEDICINE | Facility: CLINIC | Age: 77
End: 2017-11-28
Payer: COMMERCIAL

## 2017-11-28 VITALS
BODY MASS INDEX: 23.79 KG/M2 | HEART RATE: 66 BPM | DIASTOLIC BLOOD PRESSURE: 77 MMHG | OXYGEN SATURATION: 98 % | WEIGHT: 118 LBS | TEMPERATURE: 97.9 F | HEIGHT: 59 IN | SYSTOLIC BLOOD PRESSURE: 150 MMHG

## 2017-11-28 DIAGNOSIS — R80.9 PROTEINURIA: ICD-10-CM

## 2017-11-28 DIAGNOSIS — M54.50 ACUTE BILATERAL LOW BACK PAIN WITHOUT SCIATICA: Primary | ICD-10-CM

## 2017-11-28 DIAGNOSIS — R32 URINARY INCONTINENCE, UNSPECIFIED TYPE: ICD-10-CM

## 2017-11-28 LAB
KAPPA LC UR-MCNC: 2.8 MG/DL (ref 0.33–1.94)
KAPPA LC/LAMBDA SER: 1.09 {RATIO} (ref 0.26–1.65)
LAMBDA LC SERPL-MCNC: 2.57 MG/DL (ref 0.57–2.63)

## 2017-11-28 PROCEDURE — 97110 THERAPEUTIC EXERCISES: CPT | Mod: GP | Performed by: PHYSICAL THERAPIST

## 2017-11-28 PROCEDURE — 86334 IMMUNOFIX E-PHORESIS SERUM: CPT | Performed by: INTERNAL MEDICINE

## 2017-11-28 PROCEDURE — 83883 ASSAY NEPHELOMETRY NOT SPEC: CPT | Performed by: INTERNAL MEDICINE

## 2017-11-28 PROCEDURE — 99214 OFFICE O/P EST MOD 30 MIN: CPT | Performed by: NURSE PRACTITIONER

## 2017-11-28 PROCEDURE — 82565 ASSAY OF CREATININE: CPT | Performed by: INTERNAL MEDICINE

## 2017-11-28 PROCEDURE — G8979 MOBILITY GOAL STATUS: HCPCS | Mod: GP,CI | Performed by: PHYSICAL THERAPIST

## 2017-11-28 PROCEDURE — 82784 ASSAY IGA/IGD/IGG/IGM EACH: CPT | Performed by: INTERNAL MEDICINE

## 2017-11-28 PROCEDURE — 86335 IMMUNFIX E-PHORSIS/URINE/CSF: CPT | Performed by: INTERNAL MEDICINE

## 2017-11-28 PROCEDURE — G8978 MOBILITY CURRENT STATUS: HCPCS | Mod: GP,CK | Performed by: PHYSICAL THERAPIST

## 2017-11-28 PROCEDURE — 40000718 ZZHC STATISTIC PT DEPARTMENT ORTHO VISIT: Performed by: PHYSICAL THERAPIST

## 2017-11-28 PROCEDURE — 97161 PT EVAL LOW COMPLEX 20 MIN: CPT | Mod: GP | Performed by: PHYSICAL THERAPIST

## 2017-11-28 PROCEDURE — 36415 COLL VENOUS BLD VENIPUNCTURE: CPT | Performed by: INTERNAL MEDICINE

## 2017-11-28 PROCEDURE — 97140 MANUAL THERAPY 1/> REGIONS: CPT | Mod: GP | Performed by: PHYSICAL THERAPIST

## 2017-11-28 RX ORDER — CYCLOBENZAPRINE HCL 10 MG
5-10 TABLET ORAL 3 TIMES DAILY PRN
Qty: 30 TABLET | Refills: 0 | Status: SHIPPED | OUTPATIENT
Start: 2017-11-28 | End: 2018-04-17

## 2017-11-28 NOTE — MR AVS SNAPSHOT
After Visit Summary   11/28/2017    Amanda Nails    MRN: [id number]           Patient Information     Date Of Birth          1940        Visit Information        Provider Department      11/28/2017 8:20 AM Aby Downing APRN [facility]        Today's Diagnoses     Acute bilateral low back pain without sciatica    -  1    Urinary incontinence, unspecified type          Care Instructions    1. Take 1/2 tablet- 1 tablet up to three times a day as needed for back pain or muscle spasms   2. MRI today  3. Call physical therapy as needed          Thank you for choosing [facility].  You may be receiving a survey in the mail from [employer] [facility] regarding your visit today.  Please take a few minutes to complete and return the survey to let us know how we are doing.      If you have questions or concerns, please contact us via [employer] or you can contact your care team at [phone].    Our Clinic hours are:  Monday 6:40 am  to 7:00 pm  Tuesday -Friday 6:40 am to 5:00 pm    The [place] outpatient lab hours are:  Monday - Friday 6:10 am to 4:45 pm  Saturdays 7:00 am to 11:00 am  Appointments are required, call [phone]    If you have clinical questions after hours or would like to schedule an appointment,  call the clinic at [phone].          Follow-ups after your visit        Additional Services     PHYSICAL THERAPY REFERRAL       *This therapy referral will be filtered to a centralized scheduling office at [facility] and the patient will receive a call to schedule an appointment at a [facility] location most convenient for them. *     [facility] provides Physical Therapy evaluation and treatment and many specialty services across the [facility] system.  If requesting a specialty program, please choose from the list below.    If you have not heard from the scheduling office within 2 business days, please call [phone] for all  "locations, with the exception of [place], please call [phone].  Treatment: Evaluation & Treatment  Special Instructions/Modalities:   Special Programs: none    Please be aware that coverage of these services is subject to the terms and limitations of your health insurance plan.  Call member services at your health plan with any benefit or coverage questions.      **Note to Provider:  If you are referring outside of [place] for the therapy appointment, please list the name of the location in the \"special instructions\" above, print the referral and give to the patient to schedule the appointment.                  Your next 10 appointments already scheduled     Nov 28, 2017  9:05 AM CST   LAB with [facility] ([facility])    [address] [address] [phone]           Please do not eat 10-12 hours before your appointment if you are coming in fasting for labs on lipids, cholesterol, or glucose (sugar). This does not apply to pregnant women. Water, hot tea and black coffee (with nothing added) are okay. Do not drink other fluids, diet soda or chew gum.            Apr 17, 2018 11:45 AM CDT   (Arrive by 11:30 AM)   Return Liver Transplant with Luis Daniel Lomeli MD   [facility] ([facility])    [address] [place] [id number] [phone]            Apr 17, 2018  1:30 PM CDT   (Arrive by 1:00 PM)   Return Visit with Sarai Abrams MD   [facility] ([facility])    [address] [place] [id number] [phone]              Future tests that were ordered for you today     Open Future Orders        Priority Expected Expires Ordered    MR Lumbar Spine w/o Contrast STAT  11/28/2018 11/28/2017    Protein Immunofixation Serum Routine  12/27/2017 11/27/2017    Protein immunofixation urine Routine  12/27/2017 11/27/2017    Kappa and lambda light " "chain Routine  12/27/2017 11/27/2017            Who to contact     If you have questions or need follow up information about today's clinic visit or your schedule please contact [facility] directly at [phone].  Normal or non-critical lab and imaging results will be communicated to you by [employer], letter or phone within 4 business days after the clinic has received the results. If you do not hear from us within 7 days, please contact the clinic through [employer] or phone. If you have a critical or abnormal lab result, we will notify you by phone as soon as possible.  Submit refill requests through [employer] or call your pharmacy and they will forward the refill request to us. Please allow 3 business days for your refill to be completed.          Additional Information About Your Visit        [employer] Information     [employer] gives you secure access to your electronic health record. If you see a primary care provider, you can also send messages to your care team and make appointments. If you have questions, please call your primary care clinic.  If you do not have a primary care provider, please call [phone] and they will assist you.        Care EveryWhere ID     This is your Care EveryWhere ID. This could be used by other organizations to access your [place] medical records  [id number]        Your Vitals Were     Pulse Temperature Height Pulse Oximetry BMI (Body Mass Index)       66 97.9  F (36.6  C) (Tympanic) 4' 11\" (1.499 m) 98% 23.83 kg/m2        Blood Pressure from Last 3 Encounters:   11/28/17 150/77   11/02/17 144/77   04/19/17 187/88    Weight from Last 3 Encounters:   11/28/17 118 lb (53.5 kg)   11/02/17 120 lb 9.6 oz (54.7 kg)   04/19/17 120 lb (54.4 kg)              We Performed the Following     PHYSICAL THERAPY REFERRAL          Today's Medication Changes          These changes are accurate as of: 11/28/17  8:56 AM.  If you have any questions, ask your nurse or doctor.             "   Start taking these medicines.        Dose/Directions    cyclobenzaprine 10 MG tablet   Commonly known as:  FLEXERIL   Used for:  Acute bilateral low back pain without sciatica   Started by:  Aby Downing APRN CNP        Dose:  5-10 mg   Take 0.5-1 tablets (5-10 mg) by mouth 3 times daily as needed for muscle spasms   Quantity:  30 tablet   Refills:  0            Where to get your medicines      These medications were sent to [facility] - [address]  [address] [facility] [address]    Hours:  Mon-Fri 8:30am-5:00pm Toll Free [phone] Phone:  [phone]     cyclobenzaprine 10 MG tablet                Primary Care Provider Office Phone # Fax #    JEMAL Ferrari [phone] [address] [address]        Equal Access to Services     ALFONSO STRAUSS AH: Hadii toi ku hadasho Soomaali, waaxda luqadaha, qaybta kaalmada adeegyada, waxay melein hayfatoumata briceno . So [facility] [phone].    ATENCIÓN: Si aissatoula esprosa, tiene a power disposición servicios gratuitos de asistencia lingüística. Llame al [phone].    We comply with applicable federal civil rights laws and [place] laws. We do not discriminate on the basis of race, color, national origin, age, disability, sex, sexual orientation, or gender identity.            Thank you!     Thank you for choosing [facility]  for your care. Our goal is always to provide you with excellent care. Hearing back from our patients is one way we can continue to improve our services. Please take a few minutes to complete the written survey that you may receive in the mail after your visit with us. Thank you!             Your Updated Medication List - Protect others around you: Learn how to safely use, store and throw away your medicines at www.disposemymeds.org.          This list is accurate as of: 11/28/17  8:56 AM.  Always use your most recent  med list.                   Brand Name Dispense Instructions for use Diagnosis    allopurinol 100 MG tablet    ZYLOPRIM    180 tablet    Take 2 tablets (200 mg) by mouth daily    Acute idiopathic gout of left foot       amLODIPine 5 MG tablet    NORVASC    90 tablet    Take 10 mg by mouth daily    Essential hypertension with goal blood pressure less than 140/90       carvedilol 6.25 MG tablet    COREG    60 tablet    Take 1 tablet (6.25 mg) by mouth 2 times daily (with meals)    Essential hypertension       colchicine 0.6 MG tablet     15 tablet    TAKE 2 TABLETS BY MOUTH AT THE FIRST SIGN OF FLARE, TAKE 1 ADDITIONAL TABLET ONE HOUR LATER    Gout of left foot, unspecified cause, unspecified chronicity       cyclobenzaprine 10 MG tablet    FLEXERIL    30 tablet    Take 0.5-1 tablets (5-10 mg) by mouth 3 times daily as needed for muscle spasms    Acute bilateral low back pain without sciatica       furosemide 20 MG tablet    LASIX    90 tablet    Take 1 tablet (20 mg) by mouth daily    Right leg swelling       lactobacillus rhamnosus (GG) capsule     90 capsule    Take 1 capsule by mouth 3 times daily (before meals)    Non-intractable vomiting with nausea, unspecified vomiting type       predniSONE 1 MG tablet    DELTASONE    270 tablet    Take 3 tablets (3 mg) by mouth daily    Liver replaced by transplant (H)       sertraline 50 MG tablet    ZOLOFT    30 tablet    Take 1 tablet (50 mg) by mouth daily    Liver replaced by transplant (H)       sirolimus 0.5 MG tablet    GENERIC EQUIVALENT    360 tablet    Take 4 tablets (2 mg) by mouth daily    Liver replaced by transplant (H)       triamcinolone 0.1 % ointment    KENALOG    500 g    Apply to itchy rash twice daily for 7 to 10 days as needed.    Contact dermatitis and other eczema, due to unspecified cause       TYLENOL 325 MG Caps   Generic drug:  Acetaminophen      Take 325-650 mg by mouth daily as needed (pain)        ursodiol 300 MG capsule    ACTIGALL    270  capsule    TAKE THREE CAPSULES BY MOUTH EVERY DAY    Liver replaced by transplant (H)       VANICREAM EX      Externally apply topically 2 times daily

## 2017-11-28 NOTE — PATIENT INSTRUCTIONS
1. Take 1/2 tablet- 1 tablet up to three times a day as needed for back pain or muscle spasms   2. MRI today  3. Call physical therapy as needed          Thank you for choosing [facility].  You may be receiving a survey in the mail from [employer] regarding your visit today.  Please take a few minutes to complete and return the survey to let us know how we are doing.      If you have questions or concerns, please contact us via [employer] or you can contact your care team at [phone].    Our Clinic hours are:  Monday 6:40 am  to 7:00 pm  Tuesday -Friday 6:40 am to 5:00 pm    The [place] outpatient lab hours are:  Monday - Friday 6:10 am to 4:45 pm  Saturdays 7:00 am to 11:00 am  Appointments are required, call [phone]    If you have clinical questions after hours or would like to schedule an appointment,  call the clinic at [phone].

## 2017-11-28 NOTE — PROGRESS NOTES
[facility]          OUTPATIENT PHYSICAL THERAPY ORTHOPEDIC EVALUATION  PLAN OF TREATMENT FOR OUTPATIENT REHABILITATION  (COMPLETE FOR INITIAL CLAIMS ONLY)  Patient's Last Name, First Name, M.I.  YOB: 1940  Amanda Nails    Provider s Name:  [facility]   Medical Record No.  [id number]   Start of Care Date:  11/28/17   Onset Date:  11/07/17   Type:     _X__PT   ___OT   ___SLP Medical Diagnosis:  Acute bilateral low back pain without sciatica      PT Diagnosis:  Diffuse low back pain related to likely underlying stenotic changes and weakness   Visits from SOC:  1      _________________________________________________________________________________  Plan of Treatment/Functional Goals:  ADL retraining, joint mobilization, manual therapy, motor coordination training, neuromuscular re-education, ROM, strengthening, stretching     Cryotherapy, TENS     Goals     Goal Description: Following PT interventions, patient will be able to walk >=2 blocks without low back pain.  Target Date: 01/09/18       Goal Description: Following PT interventions, patient will be able to perform sit to stand without upper extremity support.  Target Date: 01/09/18       Goal Description: Following PT interventions, patient will have >=4+/5 bilateral hip abduction strength to reduce Trendelenburg gait.  Target Date: 01/23/18        Therapy Frequency:  1 time/week  Predicted Duration of Therapy Intervention:  8 weeks    Ashanti Barrett, PT                 I CERTIFY THE NEED FOR THESE SERVICES FURNISHED UNDER        THIS PLAN OF TREATMENT AND WHILE UNDER MY CARE     (Physician co-signature of this document indicates review and certification of the therapy plan).                         Certification Date From:  11/28/17   Certification Date To:  01/23/18    Referring Provider:  Aby Downing MD    Initial Assessment        See Epic Evaluation Start of Care Date: 11/28/17                                                                             I CERTIFY THE NEED FOR THESE SERVICES FURNISHED UNDER        THIS PLAN OF TREATMENT AND WHILE UNDER MY CARE       Aby Downing  11/29/2017

## 2017-11-28 NOTE — PROGRESS NOTES
Patient called to check on lab results. Advised they are in process now, will call when results are final.     She saw her PCP today for back pain. Started PT, which she said is helpful. They advised she have an MRI done. Patient wondering if she should do this, requesting recommendations from Dr. Abrams.    Stephanie Pereyra RN

## 2017-11-28 NOTE — PROGRESS NOTES
SUBJECTIVE:   Amanda Nails is a 77 year old female who presents to clinic today for the following health issues:      Back Pain       Duration: 2-3 weeks         Specific cause: lifting something over head     Description:   Location of pain: low back bilateral and waist bilateral  Character of pain: spasms  Pain radiation:none  New numbness or weakness in legs, not attributed to pain:  no     Intensity: Currently 3/10, At its worst 5/10    History:   Pain interferes with job: Not applicable  History of back problems: no prior back problems  Any previous MRI or X-rays: None  Sees a specialist for back pain:  No  Therapies tried without relief: tylenol, cold and heat    Alleviating factors:   Improved by: None      Precipitating factors:  Worsened by: Lifting, Bending, Standing and Lying Flat    Functional and Psychosocial Screen (Rupali STarT Back):      Not performed today      Accompanying Signs & Symptoms:  Risk of Fracture:  Age >64  Risk of Cauda Equina:  Unexplained bowel or bladder changes- patient states that when she gets up to walk she has urinary incontinence and no control over her bladder- she does have history of overactive bladder however nothing to this extent.   Risk of Infection:  None  Risk of Cancer:  None  Risk of Ankylosing Spondylitis:  Onset at age <35, male, AND morning back stiffness. no           -------------------------------------    Problem list and histories reviewed & adjusted, as indicated.  Additional history: as documented    Patient Active Problem List   Diagnosis     Chronic kidney disease, stage III (moderate)     Liver replaced by transplant (H)     Primary biliary cirrhosis     CARDIOVASCULAR SCREENING; LDL GOAL LESS THAN 130     Chronic dermatitis     Esophageal reflux     Mixed hyperlipidemia     Iron deficiency anemia     Gout attack     Skin cancer     Osteoporosis     Advanced directives, counseling/discussion     Nausea and vomiting     Fever, unspecified      "Diarrhea     Benign essential hypertension     Proteinuria     Past Surgical History:   Procedure Laterality Date     cholecytectomy       COLONOSCOPY  4/9/2013    Procedure: COLONOSCOPY;  Colonoscopy;  Surgeon: Kendra Archer MD;  Location: [facility]     ESOPHAGOSCOPY, GASTROSCOPY, DUODENOSCOPY (EGD), COMBINED  8/23/2013    Procedure: COMBINED ESOPHAGOSCOPY, GASTROSCOPY, DUODENOSCOPY (EGD), BIOPSY SINGLE OR MULTIPLE;  Gastroscopy       SPLENECTOMY       TRANSPLANT  1983    liver       Social History   Substance Use Topics     Smoking status: Former Smoker     Years: 3.00     Smokeless tobacco: Never Used      Comment: Years ago.     Alcohol use Yes      Comment: very rarely     Family History   Problem Relation Age of Onset     Respiratory Mother      GASTROINTESTINAL DISEASE Sister      celiac     GASTROINTESTINAL DISEASE Son      celiac     GASTROINTESTINAL DISEASE Daughter      celiac     GASTROINTESTINAL DISEASE Sister      GASTROINTESTINAL DISEASE Son      celiac     GASTROINTESTINAL DISEASE Daughter      PBC     Endocrine Disease Daughter      Graves disease     Endocrine Disease Daughter      hypothyroidism             Reviewed and updated as needed this visit by clinical staff       Reviewed and updated as needed this visit by Provider         ROS:  Constitutional, HEENT, cardiovascular, pulmonary, GI, , musculoskeletal, neuro, skin, endocrine and psych systems are negative, except as otherwise noted.      OBJECTIVE:   /77  Pulse 66  Temp 97.9  F (36.6  C) (Tympanic)  Ht 4' 11\" (1.499 m)  Wt 118 lb (53.5 kg)  SpO2 98%  BMI 23.83 kg/m2  Body mass index is 23.83 kg/(m^2).  GENERAL APPEARANCE: healthy, alert and no distress  RESP: lungs clear to auscultation - no rales, rhonchi or wheezes  ORTHO: Lumber/Thoracic Spine Exam: Tender:  lumbar spinous processes  Non-tender:  thoracic spinous processes, left para lumbar muscles, right para lumbar muscles  Range of Motion: patient declined- " "states that \" any bending or rotation is limited by pain\"  Strength:  able to heel walk, able to toe walk- bilateral equal LE strength      Diagnostic Test Results:  none     ASSESSMENT/PLAN:       1. Acute bilateral low back pain without sciatica  Concerned that patient having increase in urinary incontinence since she has injured her back- no weakness or numbness though in extremities. Will obtain stat MRI today - earliest time today was 5 pm- patient did not want that time- therefore she stated she would do tomorrow- I explained to patient my concerns about Risk of Cauda Equina- patient verbalized understanding   - cyclobenzaprine (FLEXERIL) 10 MG tablet; Take 0.5-1 tablets (5-10 mg) by mouth 3 times daily as needed for muscle spasms  Dispense: 30 tablet; Refill: 0  - MR Lumbar Spine w/o Contrast; Future  - PHYSICAL THERAPY REFERRAL    2. Urinary incontinence, unspecified type  Ongoing history however patient feels like symptoms worsened since her back pain staretd 2-3 weeks ago   - MR Lumbar Spine w/o Contrast; Future    25  min spent in direct face to face time with this patient, greater than 50% in counseling and coordination of care.      JEMAL Ferrari [facility]  "

## 2017-11-28 NOTE — NURSING NOTE
"Chief Complaint   Patient presents with     Musculoskeletal Problem       Initial LMP 02/24/2017 (Exact Date) Estimated body mass index is 23.49 kg/(m^2) as calculated from the following:    Height as of 4/4/17: 5' 7\" (1.702 m).    Weight as of 4/4/17: 150 lb (68 kg).  Medication Reconciliation: complete     Sandra Rooney CMA   "

## 2017-11-28 NOTE — PROGRESS NOTES
11/28/17 1000   General Information   Type of Visit Initial OP Ortho PT Evaluation   Start of Care Date 11/28/17   Referring Physician Aby Downing MD   Patient/Family Goals Statement To make it better, to strengthen the back, to have you massage it.   Orders Evaluate and Treat   Date of Order 11/28/17   Insurance Type [employer];Medicare   Medical Diagnosis Acute bilateral low back pain without sciatica    Surgical/Medical history reviewed Yes   General Information Comments s/p kidney transplant > 30 years prior       Present No   Body Part(s)   Body Part(s) Lumbar Spine/SI   Presentation and Etiology   Pertinent history of current problem (include personal factors and/or comorbidities that impact the POC) Pt reports low back pain started about 2 weeks ago after lifting overhead.  Feels like her bladder control is worse after the back injury, this is why the MD advised a MR tomorrow morning.  Patient feels her abdomen is more swollen after the injury as well.   Impairments A. Pain   Functional Limitations perform required work activities;perform desired leisure / sports activities;perform activities of daily living   Symptom Location Bilateral L3-L5   How/Where did it occur While lifting   Onset date of current episode/exacerbation 11/07/17   Chronicity New   Pain rating (0-10 point scale) Best (/10);Worst (/10)   Best (/10) 0   Worst (/10) 0   Pain quality C. Aching   Frequency of pain/symptoms C. With activity   Pain/symptoms are: The same all the time   Pain/symptoms exacerbated by I. Bending;J. ADL;K. Home tasks   Pain/symptoms eased by G. Heat   Progression of symptoms since onset: Unchanged   Current / Previous Interventions   Diagnostic Tests: MRI   Prior Level of Function   Prior Level of Function-Mobility Independent   Prior Level of Function-ADLs Indpendent   Current Level of Function   Current Community Support Family/friend caregiver   Patient role/employment history F. Retired  "  Living environment [facility]   Fall Risk Screen   Fall screen completed by PT   Have you fallen 2 or more times in the past year? No   Have you fallen and had an injury in the past year? No   Is patient a fall risk? No   System Outcome Measures   Outcome Measures Low Back Pain (see Oswestry and Rupali)   Lumbar Spine/SI Objective Findings   Flexion ROM 75% fingertips to inferior patella \"Normally this is more painful\"   Extension ROM 50% pain free   Right Side Bending ROM 50% pain free   Left Side Bending ROM 50% pain free   Hip Flexion (L2) Strength Osmar=5/5 with back pain   Hip Abduction Strength Osmar=2/5   Knee Extension (L3) Strength Osmar=4/5 pain free   Ankle Dorsiflexion (L4) Strength Osmar=5/5   Great Toe Extension (L5) Strength Osmar=5/5   Ankle Plantar Flexion (S1) Strength Osmar=5/5   Palpation Hypertonic left > right paraspinals; QL   Posture Increased thoracic kyphosis, osmar rounded shoulders, forward head   Gait/Locomotion Bilateral compensated Trendelenburg   Planned Therapy Interventions   Planned Therapy Interventions ADL retraining;joint mobilization;manual therapy;motor coordination training;neuromuscular re-education;ROM;strengthening;stretching   Planned Modality Interventions   Planned Modality Interventions Cryotherapy;TENS   Clinical Impression   Criteria for Skilled Therapeutic Interventions Met yes, treatment indicated   PT Diagnosis Diffuse low back pain related to likely underlying stenotic changes and weakness   Influenced by the following impairments Pain; impaired ROM; weakness; impaired posture   Functional limitations due to impairments Pain and difficulty walking any distance, performing ADL's, transferring from sit <> stand   Clinical Presentation Stable/Uncomplicated   Clinical Presentation Rationale (+) motivation; overall health   (-) bladder changes - pending MR   Clinical Decision Making (Complexity) Low complexity   Therapy Frequency 1 time/week   Predicted Duration of Therapy " Intervention (days/wks) 8 weeks   Risk & Benefits of therapy have been explained Yes   Patient, Family & other staff in agreement with plan of care Yes   Clinical Impression Comments Anali is a pleasant 78 yo female who presents today with diffuse low back pain after lifting overhead.   She will benefit from skilled PT to address the above impairments and functional limitations.  Recommended patient to undergo MR tomorrow as prescribed by MD to address any red flags with kidney history and bladder changes.   Education Assessment   Preferred Learning Style Listening;Demonstration;Pictures/video   Barriers to Learning No barriers   ORTHO GOALS   PT Ortho Eval Goals 1;2;3   Ortho Goal 1   Goal Description Following PT interventions, patient will be able to walk >=2 blocks without low back pain.   Target Date 01/09/18   Ortho Goal 2   Goal Description Following PT interventions, patient will be able to perform sit to stand without upper extremity support.   Target Date 01/09/18   Ortho Goal 3   Goal Description Following PT interventions, patient will have >=4+/5 bilateral hip abduction strength to reduce Trendelenburg gait.   Target Date 01/23/18   Total Evaluation Time   Total Evaluation Time 15 min   Therapy Certification   Certification date from 11/28/17   Certification date to 01/23/18   Medical Diagnosis Acute bilateral low back pain without sciatica        Ashanti Barrett, PT, DPT  Doctor of Physical Therapy #[id number]  [facility]  [phone]  [email or web address]

## 2017-11-29 NOTE — PROGRESS NOTES
Per Dr. Abrams:  MRI is not much radiation.  I would do it unless she has a contraindication like something metal in her body somewhere.     Called patient, who verbalized understanding.    Stephanie Pereyra RN

## 2017-11-30 ENCOUNTER — HOSPITAL ENCOUNTER (OUTPATIENT)
Dept: MRI IMAGING | Facility: CLINIC | Age: 77
Discharge: HOME | End: 2017-11-30
Attending: NURSE PRACTITIONER | Admitting: NURSE PRACTITIONER
Payer: MEDICARE

## 2017-11-30 DIAGNOSIS — M54.50 ACUTE BILATERAL LOW BACK PAIN WITHOUT SCIATICA: ICD-10-CM

## 2017-11-30 DIAGNOSIS — R32 URINARY INCONTINENCE, UNSPECIFIED TYPE: ICD-10-CM

## 2017-11-30 LAB
CREAT SERPL-MCNC: 1.57 MG/DL (ref 0.52–1.04)
GFR SERPL CREATININE-BSD FRML MDRD: 32 ML/MIN/1.7M2
IGA SERPL-MCNC: 241 MG/DL (ref 70–380)
IGG SERPL-MCNC: 928 MG/DL (ref 695–1620)
IGM SERPL-MCNC: 132 MG/DL (ref 60–265)
PROT ELPH PNL UR ELPH: NORMAL
PROT PATTERN SERPL IFE-IMP: NORMAL

## 2017-11-30 PROCEDURE — 72148 MRI LUMBAR SPINE W/O DYE: CPT

## 2017-12-05 ENCOUNTER — HOSPITAL ENCOUNTER (OUTPATIENT)
Dept: PHYSICAL THERAPY | Facility: CLINIC | Age: 77
Setting detail: THERAPIES SERIES
End: 2017-12-05
Attending: NURSE PRACTITIONER
Payer: MEDICARE

## 2017-12-05 PROCEDURE — 97110 THERAPEUTIC EXERCISES: CPT | Mod: GP | Performed by: PHYSICAL THERAPIST

## 2017-12-05 PROCEDURE — 97140 MANUAL THERAPY 1/> REGIONS: CPT | Mod: GP | Performed by: PHYSICAL THERAPIST

## 2017-12-05 PROCEDURE — 40000718 ZZHC STATISTIC PT DEPARTMENT ORTHO VISIT: Performed by: PHYSICAL THERAPIST

## 2017-12-13 DIAGNOSIS — Z94.4 LIVER REPLACED BY TRANSPLANT (H): ICD-10-CM

## 2017-12-13 DIAGNOSIS — I10 ESSENTIAL HYPERTENSION: ICD-10-CM

## 2017-12-14 DIAGNOSIS — Z94.4 LIVER REPLACED BY TRANSPLANT (H): ICD-10-CM

## 2017-12-14 RX ORDER — URSODIOL 300 MG/1
CAPSULE ORAL
Qty: 270 CAPSULE | Refills: 3 | Status: SHIPPED | OUTPATIENT
Start: 2017-12-14 | End: 2018-11-29

## 2017-12-18 DIAGNOSIS — I10 ESSENTIAL HYPERTENSION: ICD-10-CM

## 2017-12-18 DIAGNOSIS — Z94.4 LIVER REPLACED BY TRANSPLANT (H): ICD-10-CM

## 2017-12-18 RX ORDER — CARVEDILOL 6.25 MG/1
TABLET ORAL
Qty: 60 TABLET | Refills: 0 | Status: SHIPPED | OUTPATIENT
Start: 2017-12-18 | End: 2018-01-04

## 2017-12-18 NOTE — TELEPHONE ENCOUNTER
Left message for patient to return call to clinic.    9/5/17 OV notes  refilled X 2 months- patient will need to be seen in clinic for further refills  She will need BMP /creatinine checked  Labs done 11/2017, elevated CR    LOV 11/28/17 for back pain and urinary incontinence    Do you want to see patient back in clinic for Carvedilol and Sertraline refill?  Carvedilol was also prescribed by another provider 8/22/17    Routing to provider.    Mago CEE Rn

## 2017-12-22 RX ORDER — CARVEDILOL 6.25 MG/1
TABLET ORAL
Qty: 180 TABLET | Refills: 3 | Status: SHIPPED | OUTPATIENT
Start: 2017-12-22 | End: 2017-12-22

## 2017-12-22 NOTE — TELEPHONE ENCOUNTER
Please see pt refill request and notes from 12/13/17.  Erroneously sent coreg for refill before seeing note from Aby regarding labs.  Called pharmacy and cancelled prescription.  Please advise on pended medications.    Carmina RODRIGUEZ RN

## 2017-12-26 ENCOUNTER — HOSPITAL ENCOUNTER (OUTPATIENT)
Dept: PHYSICAL THERAPY | Facility: CLINIC | Age: 77
Setting detail: THERAPIES SERIES
End: 2017-12-26
Attending: NURSE PRACTITIONER
Payer: MEDICARE

## 2017-12-26 PROCEDURE — 97110 THERAPEUTIC EXERCISES: CPT | Mod: GP | Performed by: PHYSICAL THERAPIST

## 2017-12-26 PROCEDURE — 97140 MANUAL THERAPY 1/> REGIONS: CPT | Mod: GP | Performed by: PHYSICAL THERAPIST

## 2017-12-26 PROCEDURE — 40000718 ZZHC STATISTIC PT DEPARTMENT ORTHO VISIT: Performed by: PHYSICAL THERAPIST

## 2017-12-27 RX ORDER — CARVEDILOL 6.25 MG/1
6.25 TABLET ORAL 2 TIMES DAILY WITH MEALS
Qty: 60 TABLET | Refills: 0 | Status: SHIPPED | OUTPATIENT
Start: 2017-12-27 | End: 2018-03-13

## 2018-01-04 ENCOUNTER — APPOINTMENT (OUTPATIENT)
Dept: GENERAL RADIOLOGY | Facility: CLINIC | Age: 78
End: 2018-01-04
Attending: FAMILY MEDICINE
Payer: MEDICARE

## 2018-01-04 ENCOUNTER — HOSPITAL ENCOUNTER (EMERGENCY)
Facility: CLINIC | Age: 78
Discharge: HOME | End: 2018-01-04
Attending: EMERGENCY MEDICINE | Admitting: EMERGENCY MEDICINE
Payer: MEDICARE

## 2018-01-04 ENCOUNTER — TELEPHONE (OUTPATIENT)
Dept: FAMILY MEDICINE | Facility: CLINIC | Age: 78
End: 2018-01-04

## 2018-01-04 ENCOUNTER — APPOINTMENT (OUTPATIENT)
Dept: CT IMAGING | Facility: CLINIC | Age: 78
End: 2018-01-04
Attending: EMERGENCY MEDICINE
Payer: MEDICARE

## 2018-01-04 ENCOUNTER — HOSPITAL ENCOUNTER (OUTPATIENT)
Dept: PHYSICAL THERAPY | Facility: CLINIC | Age: 78
Setting detail: THERAPIES SERIES
End: 2018-01-04
Attending: NURSE PRACTITIONER
Payer: MEDICARE

## 2018-01-04 VITALS
RESPIRATION RATE: 18 BRPM | DIASTOLIC BLOOD PRESSURE: 77 MMHG | WEIGHT: 118 LBS | OXYGEN SATURATION: 95 % | TEMPERATURE: 97.4 F | BODY MASS INDEX: 23.79 KG/M2 | SYSTOLIC BLOOD PRESSURE: 168 MMHG | HEIGHT: 59 IN

## 2018-01-04 DIAGNOSIS — R06.09 DYSPNEA ON EXERTION: ICD-10-CM

## 2018-01-04 DIAGNOSIS — R07.89 LEFT-SIDED CHEST WALL PAIN: ICD-10-CM

## 2018-01-04 DIAGNOSIS — R05.9 COUGH: ICD-10-CM

## 2018-01-04 LAB
ANION GAP SERPL CALCULATED.3IONS-SCNC: 9 MMOL/L (ref 3–14)
BASOPHILS # BLD AUTO: 0.1 10E9/L (ref 0–0.2)
BASOPHILS NFR BLD AUTO: 0.8 %
BUN SERPL-MCNC: 22 MG/DL (ref 7–30)
CALCIUM SERPL-MCNC: 9.2 MG/DL (ref 8.5–10.1)
CHLORIDE SERPL-SCNC: 104 MMOL/L (ref 94–109)
CO2 SERPL-SCNC: 25 MMOL/L (ref 20–32)
CREAT SERPL-MCNC: 1.13 MG/DL (ref 0.52–1.04)
DIFFERENTIAL METHOD BLD: ABNORMAL
EOSINOPHIL # BLD AUTO: 0.1 10E9/L (ref 0–0.7)
EOSINOPHIL NFR BLD AUTO: 2.1 %
ERYTHROCYTE [DISTWIDTH] IN BLOOD BY AUTOMATED COUNT: 15.1 % (ref 10–15)
FLUAV+FLUBV AG SPEC QL: NEGATIVE
FLUAV+FLUBV AG SPEC QL: NEGATIVE
GFR SERPL CREATININE-BSD FRML MDRD: 47 ML/MIN/1.7M2
GLUCOSE SERPL-MCNC: 87 MG/DL (ref 70–99)
HCT VFR BLD AUTO: 32.6 % (ref 35–47)
HGB BLD-MCNC: 10 G/DL (ref 11.7–15.7)
IMM GRANULOCYTES # BLD: 0 10E9/L (ref 0–0.4)
IMM GRANULOCYTES NFR BLD: 0.5 %
LYMPHOCYTES # BLD AUTO: 0.6 10E9/L (ref 0.8–5.3)
LYMPHOCYTES NFR BLD AUTO: 9.2 %
MCH RBC QN AUTO: 27 PG (ref 26.5–33)
MCHC RBC AUTO-ENTMCNC: 30.7 G/DL (ref 31.5–36.5)
MCV RBC AUTO: 88 FL (ref 78–100)
MONOCYTES # BLD AUTO: 0.6 10E9/L (ref 0–1.3)
MONOCYTES NFR BLD AUTO: 9.1 %
NEUTROPHILS # BLD AUTO: 4.7 10E9/L (ref 1.6–8.3)
NEUTROPHILS NFR BLD AUTO: 78.3 %
PLATELET # BLD AUTO: 329 10E9/L (ref 150–450)
POTASSIUM SERPL-SCNC: 4 MMOL/L (ref 3.4–5.3)
RBC # BLD AUTO: 3.71 10E12/L (ref 3.8–5.2)
SODIUM SERPL-SCNC: 138 MMOL/L (ref 133–144)
SPECIMEN SOURCE: NORMAL
WBC # BLD AUTO: 6.1 10E9/L (ref 4–11)

## 2018-01-04 PROCEDURE — G8979 MOBILITY GOAL STATUS: HCPCS | Mod: GP,CI | Performed by: PHYSICAL THERAPIST

## 2018-01-04 PROCEDURE — 99284 EMERGENCY DEPT VISIT MOD MDM: CPT | Mod: 25 | Performed by: EMERGENCY MEDICINE

## 2018-01-04 PROCEDURE — 97110 THERAPEUTIC EXERCISES: CPT | Mod: GP | Performed by: PHYSICAL THERAPIST

## 2018-01-04 PROCEDURE — 96360 HYDRATION IV INFUSION INIT: CPT | Performed by: EMERGENCY MEDICINE

## 2018-01-04 PROCEDURE — 99284 EMERGENCY DEPT VISIT MOD MDM: CPT | Mod: Z6 | Performed by: EMERGENCY MEDICINE

## 2018-01-04 PROCEDURE — 71260 CT THORAX DX C+: CPT

## 2018-01-04 PROCEDURE — 40000718 ZZHC STATISTIC PT DEPARTMENT ORTHO VISIT: Performed by: PHYSICAL THERAPIST

## 2018-01-04 PROCEDURE — 85025 COMPLETE CBC W/AUTO DIFF WBC: CPT | Performed by: FAMILY MEDICINE

## 2018-01-04 PROCEDURE — 71046 X-RAY EXAM CHEST 2 VIEWS: CPT

## 2018-01-04 PROCEDURE — G8980 MOBILITY D/C STATUS: HCPCS | Mod: GP,CJ | Performed by: PHYSICAL THERAPIST

## 2018-01-04 PROCEDURE — 25000128 H RX IP 250 OP 636: Performed by: EMERGENCY MEDICINE

## 2018-01-04 PROCEDURE — 25000125 ZZHC RX 250: Performed by: EMERGENCY MEDICINE

## 2018-01-04 PROCEDURE — 80048 BASIC METABOLIC PNL TOTAL CA: CPT | Performed by: FAMILY MEDICINE

## 2018-01-04 PROCEDURE — 87804 INFLUENZA ASSAY W/OPTIC: CPT | Mod: 91 | Performed by: EMERGENCY MEDICINE

## 2018-01-04 RX ORDER — IOPAMIDOL 755 MG/ML
64 INJECTION, SOLUTION INTRAVASCULAR ONCE
Status: COMPLETED | OUTPATIENT
Start: 2018-01-04 | End: 2018-01-04

## 2018-01-04 RX ADMIN — IOPAMIDOL 64 ML: 755 INJECTION, SOLUTION INTRAVENOUS at 14:47

## 2018-01-04 RX ADMIN — SODIUM CHLORIDE 93 ML: 9 INJECTION, SOLUTION INTRAVENOUS at 14:47

## 2018-01-04 RX ADMIN — SODIUM CHLORIDE 500 ML: 9 INJECTION, SOLUTION INTRAVENOUS at 14:30

## 2018-01-04 ASSESSMENT — ENCOUNTER SYMPTOMS
PSYCHIATRIC NEGATIVE: 1
GASTROINTESTINAL NEGATIVE: 1
CARDIOVASCULAR NEGATIVE: 1
ENDOCRINE NEGATIVE: 1
ALLERGIC/IMMUNOLOGIC NEGATIVE: 1
SHORTNESS OF BREATH: 1
COUGH: 1
EYES NEGATIVE: 1
MUSCULOSKELETAL NEGATIVE: 1
HEMATOLOGIC/LYMPHATIC NEGATIVE: 1
WEAKNESS: 1

## 2018-01-04 NOTE — ED NOTES
Patient presents with flu like symptoms for the past 4 days.  Pt states a productive cough for 2 days.

## 2018-01-04 NOTE — ED AVS SNAPSHOT
[facility]    [address] [place] [address]    Phone:  [phone]    Fax:  [phone]                                       Amanda Nails   MRN: [id number]    Department:  [facility]   Date of Visit:  1/4/2018           After Visit Summary Signature Page     I have received my discharge instructions, and my questions have been answered. I have discussed any challenges I see with this plan with the nurse or doctor.    ..........................................................................................................................................  Patient/Patient Representative Signature      ..........................................................................................................................................  Patient Representative Print Name and Relationship to Patient    ..................................................               ................................................  Date                                            Time    ..........................................................................................................................................  Reviewed by Signature/Title    ...................................................              ..............................................  Date                                                            Time
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
## 2018-01-04 NOTE — ED AVS SNAPSHOT
[facility] Emergency Department    [address] [facility] [address]    Phone:  [phone]    Fax:  [phone]                                       Amanda Nails   MRN: [id number]    Department:  [facility] Emergency Department   Date of Visit:  1/4/2018           Patient Information     Date Of Birth          1940        Your diagnoses for this visit were:     Cough     Dyspnea on exertion     Left-sided chest wall pain Posterior rib and upper thoracic back       You were seen by Kvng Downs MD.      Follow-up Information     Follow up with Aby Downing APRN CNP.    Specialty:  Nurse Practitioner    Why:  As needed, If symptoms worsen.     Contact information:    [address] [place] [address]  [phone]          Follow up with [facility] Emergency Department.    Specialty:  EMERGENCY MEDICINE    Why:  Check with your transplant team to ensure that taking ibuprofen is okay    Contact information:    [address] [place] [address] [phone]    Additional information:    The medical center is located at   [address] [place] [address] (between [facility] and   [place] [address] in [place], [place] miles north   of [place]).      Discharge References/Attachments     SYMPTOMS WITH UNCERTAIN CAUSE (ENGLISH)      Future Appointments        Provider Department Dept Phone Center    1/8/2018 1:00 PM Ashanti Barrett, MAURICE [facility] Physical Therapy [phone] [facility]    4/17/2018 11:45 AM Luis Daniel Lomeli MD [facility] Hepatology [phone] [facility]    4/17/2018 1:30 PM Sarai Abrams MD [facility] Nephrology [phone] [facility]      24 Hour Appointment Hotline       To make an appointment at any [facility], call [phone] ([phone]). If you don't have a family doctor or clinic, we will help you find one. [facility] are conveniently located to serve the needs of you and your family.             Review of your medicines      Our records  show that you are taking the medicines listed below. If these are incorrect, please call your family doctor or clinic.        Dose / Directions Last dose taken    allopurinol 100 MG tablet   Commonly known as:  ZYLOPRIM   Dose:  200 mg   Quantity:  180 tablet        Take 2 tablets (200 mg) by mouth daily   Refills:  3        amLODIPine 5 MG tablet   Commonly known as:  NORVASC   Dose:  10 mg   Quantity:  90 tablet        Take 10 mg by mouth daily   Refills:  3        carvedilol 6.25 MG tablet   Commonly known as:  COREG   Dose:  6.25 mg   Quantity:  60 tablet        Take 1 tablet (6.25 mg) by mouth 2 times daily (with meals)   Refills:  0        colchicine 0.6 MG tablet   Quantity:  15 tablet        TAKE 2 TABLETS BY MOUTH AT THE FIRST SIGN OF FLARE, TAKE 1 ADDITIONAL TABLET ONE HOUR LATER   Refills:  0        cyclobenzaprine 10 MG tablet   Commonly known as:  FLEXERIL   Dose:  5-10 mg   Quantity:  30 tablet        Take 0.5-1 tablets (5-10 mg) by mouth 3 times daily as needed for muscle spasms   Refills:  0        furosemide 20 MG tablet   Commonly known as:  LASIX   Dose:  20 mg   Quantity:  90 tablet        Take 1 tablet (20 mg) by mouth daily   Refills:  3        predniSONE 1 MG tablet   Commonly known as:  DELTASONE   Dose:  3 mg   Quantity:  270 tablet        Take 3 tablets (3 mg) by mouth daily   Refills:  3        sertraline 50 MG tablet   Commonly known as:  ZOLOFT   Quantity:  30 tablet        TAKE ONE TABLET BY MOUTH EVERY DAY   Refills:  1        sirolimus 0.5 MG tablet   Commonly known as:  GENERIC EQUIVALENT   Dose:  2 mg   Quantity:  360 tablet        Take 4 tablets (2 mg) by mouth daily   Refills:  3        triamcinolone 0.1 % ointment   Commonly known as:  KENALOG   Quantity:  500 g        Apply to itchy rash twice daily for 7 to 10 days as needed.   Refills:  3        TYLENOL PO   Dose:  500 mg        Take 500 mg by mouth every 6 hours as needed for mild pain or fever   Refills:  0        ursodiol  300 MG capsule   Commonly known as:  ACTIGALL   Quantity:  270 capsule        TAKE THREE CAPSULES BY MOUTH EVERY DAY   Refills:  3        VANICREAM EX        Externally apply topically 2 times daily   Refills:  0                Procedures and tests performed during your visit     Basic metabolic panel    CBC with platelets, differential    Chest CT - IV contrast only - PE protocol    Chest XR,  PA & LAT    Influenza A/B antigen      Orders Needing Specimen Collection     None      Pending Results     Date and Time Order Name Status Description    1/4/2018 1418 Chest CT - IV contrast only - PE protocol Preliminary             Pending Culture Results     No orders found from 1/2/2018 to 1/5/2018.            Pending Results Instructions     If you had any lab results that were not finalized at the time of your Discharge, you can call the ED Lab Result RN at [phone]. You will be contacted by this team for any positive Lab results or changes in treatment. The nurses are available 7 days a week from 10A to 6:30P.  You can leave a message 24 hours per day and they will return your call.        Test Results From Your Hospital Stay        1/4/2018  2:04 PM      Component Results     Component Value Ref Range & Units Status    WBC 6.1 4.0 - 11.0 10e9/L Final    RBC Count 3.71 (L) 3.8 - 5.2 10e12/L Final    Hemoglobin 10.0 (L) 11.7 - 15.7 g/dL Final    Hematocrit 32.6 (L) 35.0 - 47.0 % Final    MCV 88 78 - 100 fl Final    MCH 27.0 26.5 - 33.0 pg Final    MCHC 30.7 (L) 31.5 - 36.5 g/dL Final    RDW 15.1 (H) 10.0 - 15.0 % Final    Platelet Count 329 150 - 450 10e9/L Final    Diff Method Automated Method  Final    % Neutrophils 78.3 % Final    % Lymphocytes 9.2 % Final    % Monocytes 9.1 % Final    % Eosinophils 2.1 % Final    % Basophils 0.8 % Final    % Immature Granulocytes 0.5 % Final    Absolute Neutrophil 4.7 1.6 - 8.3 10e9/L Final    Absolute Lymphocytes 0.6 (L) 0.8 - 5.3 10e9/L Final    Absolute Monocytes 0.6 0.0 -  1.3 10e9/L Final    Absolute Eosinophils 0.1 0.0 - 0.7 10e9/L Final    Absolute Basophils 0.1 0.0 - 0.2 10e9/L Final    Abs Immature Granulocytes 0.0 0 - 0.4 10e9/L Final         1/4/2018  2:20 PM      Component Results     Component Value Ref Range & Units Status    Sodium 138 133 - 144 mmol/L Final    Potassium 4.0 3.4 - 5.3 mmol/L Final    Chloride 104 94 - 109 mmol/L Final    Carbon Dioxide 25 20 - 32 mmol/L Final    Anion Gap 9 3 - 14 mmol/L Final    Glucose 87 70 - 99 mg/dL Final    Urea Nitrogen 22 7 - 30 mg/dL Final    Creatinine 1.13 (H) 0.52 - 1.04 mg/dL Final    GFR Estimate 47 (L) >60 mL/min/1.7m2 Final    Non  GFR Calc    GFR Estimate If Black 57 (L) >60 mL/min/1.7m2 Final    African American GFR Calc    Calcium 9.2 8.5 - 10.1 mg/dL Final         1/4/2018  2:08 PM      Narrative     XR CHEST 2 VW 1/4/2018 2:05 PM    COMPARISON: 11/10/2016    HISTORY: Cough and congestion.        Impression     IMPRESSION: Mildly enlarged cardiac silhouette is again seen. Old  healed sternal fracture again seen. No focal airspace disease, pleural  effusion or pneumothorax.    GIOVANNI OJEDA MD         1/4/2018  3:15 PM      Component Results     Component Value Ref Range & Units Status    Influenza A/B Agn Specimen Nasal  Final    Influenza A Negative NEG^Negative Final    Influenza B Negative NEG^Negative Final    Test results must be correlated with clinical data. If necessary, results   should be confirmed by a molecular assay or viral culture.           1/4/2018  4:03 PM      Narrative     CT CHEST WITH CONTRAST   1/4/2018 3:02 PM     HISTORY: History of liver transplant. Left-sided posterior chest wall  pain and discomfort.    COMPARISON: 12/17/2014-CT chest, abdomen and pelvis.    TECHNIQUE: Following the uneventful administration of 64 mL Isovue-370  intravenous contrast, helical sections were acquired through the lungs  according to the pulmonary embolism protocol. Coronal reconstructions  were  generated. Radiation dose for this scan was reduced using  automated exposure control, adjustment of the mA and/or kV according  to the patient's size, or iterative reconstruction technique.    FINDINGS: No visualized pulmonary embolus. The thoracic aorta is  normal in caliber without dissection. Atherosclerotic calcification in  the thoracic aorta. Mild cardiomegaly.    The lungs are clear. No pleural or pericardial effusion. No enlarged  lymph nodes in the chest. A few mild to moderate age-indeterminate  compression deformities of the thoracic spine.    Scan through the upper abdomen is significant for evidence of prior  hepatic transplant. A 1.2 cm cyst in the posterior aspect of the  pancreatic neck (series 4 image 19) was also present on 12/17/2014 and  is therefore of unlikely clinical significance. A few bilateral renal  cysts, the largest a 5.4 cm partially visualized cyst in the inferior  pole of the right kidney.        Impression     IMPRESSION:   1. No visualized pulmonary embolus.  2. No evidence of active pulmonary disease.                Thank you for choosing [facility]       Thank you for choosing [facility] for your care. Our goal is always to provide you with excellent care. Hearing back from our patients is one way we can continue to improve our services. Please take a few minutes to complete the written survey that you may receive in the mail after you visit with us. Thank you!        [employer] Information     [employer] gives you secure access to your electronic health record. If you see a primary care provider, you can also send messages to your care team and make appointments. If you have questions, please call your primary care clinic.  If you do not have a primary care provider, please call [phone] and they will assist you.        Care EveryWhere ID     This is your Care EveryWhere ID. This could be used by other organizations to access your [facility] medical records  [id number]        Equal  Access to Services     ISABEL [facility] : Oswaldo Rangel, luis geiger, qagloria young. So [facility] [phone].    ATENCIÓN: Si habla español, tiene a power disposición servicios gratuitos de asistencia lingüística. Llame al [phone].    We comply with applicable federal civil rights laws and [place] laws. We do not discriminate on the basis of race, color, national origin, age, disability, sex, sexual orientation, or gender identity.            After Visit Summary       This is your record. Keep this with you and show to your community pharmacist(s) and doctor(s) at your next visit.

## 2018-01-04 NOTE — TELEPHONE ENCOUNTER
Reason for Call: Request for an order or referral:    Order or referral being requested: x-ray    Date needed: as soon as possible    Has the patient been seen by the PCP for this problem? NO    Additional comments: Pt is having PT for for pulled muscles in lower back. She is at PT now here in [place]. She hurt herself lifting a pan of rigatoni on [place] and the pain is up under her rib cage. Can she get on x-ray?    Phone number Patient can be reached at:  Home number on file [phone] (home)    Best Time:  any    Call taken on 1/4/2018 at 12:10 PM by Lyly Jj

## 2018-01-31 DIAGNOSIS — Z94.4 LIVER REPLACED BY TRANSPLANT (H): ICD-10-CM

## 2018-01-31 NOTE — TELEPHONE ENCOUNTER
Never received rx from 12/27/17 please send new rx    Thank you    Tala Barakat   [facility]  [phone]

## 2018-02-01 DIAGNOSIS — Z94.4 LIVER REPLACED BY TRANSPLANT (H): ICD-10-CM

## 2018-02-01 NOTE — TELEPHONE ENCOUNTER
Per patient, request for refills on Sertraline needs to go to Dr Lomeli. She states that Dr. Lomeli prescribed this for itching with bile ducts.

## 2018-02-05 NOTE — TELEPHONE ENCOUNTER
[facility] reports the rx for Zoloft is current as of 2/2/18, they are sending Rx in the mail today    Mago CEE Rn

## 2018-02-06 ENCOUNTER — TELEPHONE (OUTPATIENT)
Dept: NEPHROLOGY | Facility: CLINIC | Age: 78
End: 2018-02-06

## 2018-02-06 DIAGNOSIS — I10 ESSENTIAL HYPERTENSION WITH GOAL BLOOD PRESSURE LESS THAN 140/90: ICD-10-CM

## 2018-02-06 RX ORDER — AMLODIPINE BESYLATE 5 MG/1
5 TABLET ORAL DAILY
Qty: 90 TABLET | Refills: 3 | Status: SHIPPED | OUTPATIENT
Start: 2018-02-06 | End: 2019-09-09

## 2018-03-02 DIAGNOSIS — Z94.4 LIVER REPLACED BY TRANSPLANT (H): Primary | ICD-10-CM

## 2018-03-13 DIAGNOSIS — I10 ESSENTIAL HYPERTENSION: ICD-10-CM

## 2018-03-13 DIAGNOSIS — I10 ESSENTIAL HYPERTENSION WITH GOAL BLOOD PRESSURE LESS THAN 140/90: ICD-10-CM

## 2018-03-13 RX ORDER — AMLODIPINE BESYLATE 2.5 MG/1
TABLET ORAL
Qty: 180 TABLET | Refills: 3 | Status: SHIPPED | OUTPATIENT
Start: 2018-03-13 | End: 2018-04-17

## 2018-03-13 NOTE — TELEPHONE ENCOUNTER
Last Office Visit with Nephrologist:  4/19/17.  Medication refilled per Nephrology Clinic protocol.     Stephanie Pereyra RN

## 2018-03-13 NOTE — TELEPHONE ENCOUNTER
"Requested Prescriptions   Pending Prescriptions Disp Refills     carvedilol (COREG) 6.25 MG tablet [Pharmacy Med Name: CARVEDILOL 6.25MG TABS]  Last Written Prescription Date:  12/27/2017  Last Fill Quantity: 60,  # refills: 0   Last office visit: 11/28/2017 with prescribing provider:  Chang   Future Office Visit:     60 tablet 0     Sig: TAKE ONE TABLET BY MOUTH TWICE A DAY WITH MEALS    Beta-Blockers Protocol Failed    3/13/2018  2:44 PM       Failed - Blood pressure under 140/90 in past 12 months    BP Readings from Last 3 Encounters:   01/04/18 168/77   11/28/17 150/77   11/02/17 144/77                Passed - Patient is age 6 or older       Passed - Recent (12 mo) or future (30 days) visit within the authorizing provider's specialty    Patient had office visit in the last 12 months or has a visit in the next 30 days with authorizing provider or within the authorizing provider's specialty.  See \"Patient Info\" tab in inbasket, or \"Choose Columns\" in Meds & Orders section of the refill encounter.            Leroy Perez RT (R)    "

## 2018-03-15 RX ORDER — CARVEDILOL 6.25 MG/1
6.25 TABLET ORAL 2 TIMES DAILY WITH MEALS
Qty: 60 TABLET | Refills: 0 | Status: SHIPPED | OUTPATIENT
Start: 2018-03-15 | End: 2018-04-25

## 2018-03-29 DIAGNOSIS — Z94.4 LIVER REPLACED BY TRANSPLANT (H): ICD-10-CM

## 2018-04-03 NOTE — ADDENDUM NOTE
Encounter addended by: Ashanti Barrett, PT on: 4/3/2018  7:27 AM<BR>     Actions taken: Sign clinical note, Flowsheet accepted, Charge Capture section accepted, Episode resolved

## 2018-04-03 NOTE — PROGRESS NOTES
Outpatient Physical Therapy Discharge Note     Patient: Amanda Nails  : 1940    Beginning/End Dates of Reporting Period:  17 to 4/3/2018    Referring Provider: Aby Downing MD    Therapy Diagnosis: Bilateral low back pain     Client Self Report: The low back pain continues to improve, however mid back pain is still present.  Feeling sick, just had a 7 minute coughing fit.      Objective Measurements:  Objective Measure: Rupali     Objective Measure: NICOLE     Objective Measure: Posture  Details: Flexed through the thoracic spine, able to extend to neutral pain free  Objective Measure: Palpation  Details: Pinpoint pain with palpation of 7th rib midline         Goals:  Goal Identifier     Goal Description Following PT interventions, patient will be able to walk >=2 blocks without low back pain.   Target Date 18   Date Met      Progress:     Goal Identifier     Goal Description Following PT interventions, patient will be able to perform sit to stand without upper extremity support.   Target Date 18   Date Met  18   Progress:     Goal Identifier     Goal Description Following PT interventions, patient will have >=4+/5 bilateral hip abduction strength to reduce Trendelenburg gait.   Target Date 18   Date Met      Progress:       Progress Toward Goals:   Progress this reporting period: Christa attended 4 PT sessions to address her low back pain.  Initially, she made good progress with pain reduction and postural strength.  Pain flared after lifting a heavy pan into the oven.  Patient followed up with MD to address potential for compression fracture.      Plan:  Discharge from therapy.    Discharge:    Reason for Discharge: No further expectation of progress.    Equipment Issued: Theraband    Discharge Plan: Patient to continue home program.      Ashanti Barrett, PT, DPT  Doctor of Physical Therapy #[id number]  [facility]  [facility]  [phone]  [email or web address]

## 2018-04-11 ENCOUNTER — TELEPHONE (OUTPATIENT)
Dept: FAMILY MEDICINE | Facility: CLINIC | Age: 78
End: 2018-04-11

## 2018-04-11 NOTE — TELEPHONE ENCOUNTER
Panel Management Review      Patient has the following on her problem list:     Hypertension   Last three blood pressure readings:  BP Readings from Last 3 Encounters:   01/04/18 168/77   11/28/17 150/77   11/02/17 144/77     Blood pressure: FAILED    HTN Guidelines:  Age 18-59 BP range:  Less than 140/90  Age 60-85 with Diabetes:  Less than 140/90  Age 60-85 without Diabetes:  less than 150/90      Composite cancer screening  Chart review shows that this patient is due/due soon for the following None  Summary:    Patient is due/failing the following:   BP CHECK    Action needed:   Patient needs nurse only appointment.    Type of outreach:    Phone, spoke to patient.  patient has an apt on4/17    Questions for provider review:    None                                                                                                                                    Nancy Bruce       Chart routed to  .

## 2018-04-12 DIAGNOSIS — R80.9 PROTEINURIA: ICD-10-CM

## 2018-04-12 DIAGNOSIS — D63.1 ANEMIA IN CHRONIC KIDNEY DISEASE (CODE): ICD-10-CM

## 2018-04-12 DIAGNOSIS — N18.30 CHRONIC KIDNEY DISEASE, STAGE III (MODERATE) (H): Primary | Chronic | ICD-10-CM

## 2018-04-16 DIAGNOSIS — R80.9 PROTEINURIA: ICD-10-CM

## 2018-04-16 DIAGNOSIS — Z94.4 LIVER REPLACED BY TRANSPLANT (H): ICD-10-CM

## 2018-04-16 DIAGNOSIS — D63.1 ANEMIA IN CHRONIC KIDNEY DISEASE (CODE): ICD-10-CM

## 2018-04-16 DIAGNOSIS — N18.30 CHRONIC KIDNEY DISEASE, STAGE III (MODERATE) (H): Chronic | ICD-10-CM

## 2018-04-16 LAB
ALBUMIN SERPL-MCNC: 3.2 G/DL (ref 3.4–5)
ALBUMIN UR-MCNC: ABNORMAL MG/DL
ANION GAP SERPL CALCULATED.3IONS-SCNC: 5 MMOL/L (ref 3–14)
APPEARANCE UR: CLEAR
BILIRUB UR QL STRIP: NEGATIVE
BUN SERPL-MCNC: 37 MG/DL (ref 7–30)
CALCIUM SERPL-MCNC: 8.8 MG/DL (ref 8.5–10.1)
CHLORIDE SERPL-SCNC: 104 MMOL/L (ref 94–109)
CO2 SERPL-SCNC: 29 MMOL/L (ref 20–32)
COLOR UR AUTO: YELLOW
CREAT SERPL-MCNC: 1.36 MG/DL (ref 0.52–1.04)
CREAT UR-MCNC: 53 MG/DL
DEPRECATED CALCIDIOL+CALCIFEROL SERPL-MC: 32 UG/L (ref 20–75)
ERYTHROCYTE [DISTWIDTH] IN BLOOD BY AUTOMATED COUNT: 15.2 % (ref 10–15)
FERRITIN SERPL-MCNC: 7 NG/ML (ref 8–252)
GFR SERPL CREATININE-BSD FRML MDRD: 38 ML/MIN/1.7M2
GLUCOSE SERPL-MCNC: 88 MG/DL (ref 70–99)
GLUCOSE UR STRIP-MCNC: NEGATIVE MG/DL
HCT VFR BLD AUTO: 27.9 % (ref 35–47)
HGB BLD-MCNC: 8.4 G/DL (ref 11.7–15.7)
HGB UR QL STRIP: ABNORMAL
IRON SATN MFR SERPL: 5 % (ref 15–46)
IRON SERPL-MCNC: 20 UG/DL (ref 35–180)
KETONES UR STRIP-MCNC: NEGATIVE MG/DL
LEUKOCYTE ESTERASE UR QL STRIP: NEGATIVE
MCH RBC QN AUTO: 25.7 PG (ref 26.5–33)
MCHC RBC AUTO-ENTMCNC: 30.1 G/DL (ref 31.5–36.5)
MCV RBC AUTO: 85 FL (ref 78–100)
NITRATE UR QL: NEGATIVE
NON-SQ EPI CELLS #/AREA URNS LPF: ABNORMAL /LPF
PH UR STRIP: 6 PH (ref 5–7)
PHOSPHATE SERPL-MCNC: 3.8 MG/DL (ref 2.5–4.5)
PLATELET # BLD AUTO: 318 10E9/L (ref 150–450)
POTASSIUM SERPL-SCNC: 3.6 MMOL/L (ref 3.4–5.3)
PROT UR-MCNC: 0.32 G/L
PROT/CREAT 24H UR: 0.62 G/G CR (ref 0–0.2)
PTH-INTACT SERPL-MCNC: 172 PG/ML (ref 18–80)
RBC # BLD AUTO: 3.27 10E12/L (ref 3.8–5.2)
RBC #/AREA URNS AUTO: ABNORMAL /HPF
SODIUM SERPL-SCNC: 138 MMOL/L (ref 133–144)
SOURCE: ABNORMAL
SP GR UR STRIP: 1.01 (ref 1–1.03)
TIBC SERPL-MCNC: 441 UG/DL (ref 240–430)
UROBILINOGEN UR STRIP-ACNC: 0.2 EU/DL (ref 0.2–1)
WBC # BLD AUTO: 4.1 10E9/L (ref 4–11)
WBC #/AREA URNS AUTO: ABNORMAL /HPF

## 2018-04-16 PROCEDURE — 80069 RENAL FUNCTION PANEL: CPT | Performed by: INTERNAL MEDICINE

## 2018-04-16 PROCEDURE — 81001 URINALYSIS AUTO W/SCOPE: CPT | Performed by: INTERNAL MEDICINE

## 2018-04-16 PROCEDURE — 85027 COMPLETE CBC AUTOMATED: CPT | Performed by: INTERNAL MEDICINE

## 2018-04-16 PROCEDURE — 83550 IRON BINDING TEST: CPT | Performed by: INTERNAL MEDICINE

## 2018-04-16 PROCEDURE — 82728 ASSAY OF FERRITIN: CPT | Performed by: INTERNAL MEDICINE

## 2018-04-16 PROCEDURE — 36415 COLL VENOUS BLD VENIPUNCTURE: CPT | Performed by: INTERNAL MEDICINE

## 2018-04-16 PROCEDURE — 83540 ASSAY OF IRON: CPT | Performed by: INTERNAL MEDICINE

## 2018-04-16 PROCEDURE — 83970 ASSAY OF PARATHORMONE: CPT | Performed by: INTERNAL MEDICINE

## 2018-04-16 PROCEDURE — 84156 ASSAY OF PROTEIN URINE: CPT | Performed by: INTERNAL MEDICINE

## 2018-04-16 PROCEDURE — 82306 VITAMIN D 25 HYDROXY: CPT | Performed by: INTERNAL MEDICINE

## 2018-04-17 ENCOUNTER — OFFICE VISIT (OUTPATIENT)
Dept: NEPHROLOGY | Facility: CLINIC | Age: 78
End: 2018-04-17
Attending: INTERNAL MEDICINE
Payer: MEDICARE

## 2018-04-17 VITALS
DIASTOLIC BLOOD PRESSURE: 77 MMHG | SYSTOLIC BLOOD PRESSURE: 148 MMHG | HEIGHT: 59 IN | WEIGHT: 116 LBS | BODY MASS INDEX: 23.39 KG/M2 | RESPIRATION RATE: 16 BRPM | TEMPERATURE: 97.5 F | OXYGEN SATURATION: 97 % | HEART RATE: 66 BPM

## 2018-04-17 DIAGNOSIS — I10 ESSENTIAL HYPERTENSION WITH GOAL BLOOD PRESSURE LESS THAN 140/90: ICD-10-CM

## 2018-04-17 DIAGNOSIS — D63.1 ANEMIA IN CHRONIC KIDNEY DISEASE (CODE): ICD-10-CM

## 2018-04-17 DIAGNOSIS — N18.30 CHRONIC KIDNEY DISEASE, STAGE III (MODERATE) (H): Primary | ICD-10-CM

## 2018-04-17 DIAGNOSIS — R80.8 OTHER PROTEINURIA: ICD-10-CM

## 2018-04-17 PROCEDURE — G0463 HOSPITAL OUTPT CLINIC VISIT: HCPCS | Mod: ZF

## 2018-04-17 ASSESSMENT — PAIN SCALES - GENERAL: PAINLEVEL: NO PAIN (0)

## 2018-04-17 NOTE — NURSING NOTE
"Chief Complaint   Patient presents with     RECHECK     CKD       Initial /77  Pulse 66  Temp 97.5  F (36.4  C) (Oral)  Resp 16  Ht 1.499 m (4' 11\")  Wt 52.6 kg (116 lb)  SpO2 97%  BMI 23.43 kg/m2 Estimated body mass index is 23.43 kg/(m^2) as calculated from the following:    Height as of this encounter: 1.499 m (4' 11\").    Weight as of this encounter: 52.6 kg (116 lb).  Medication Reconciliation: seble [facility]  4/17/2018 1:00 PM          "

## 2018-04-17 NOTE — MR AVS SNAPSHOT
After Visit Summary   4/17/2018    Amanda Nails    MRN: [id number]           Patient Information     Date Of Birth          1940        Visit Information        Provider Department      4/17/2018 1:30 PM Sarai Abrams MD [facility] Nephrology        Today's Diagnoses     Chronic kidney disease, stage III (moderate)    -  1    Anemia in chronic kidney disease (CODE)         Essential hypertension with goal blood pressure less than 140/90        Other proteinuria          Care Instructions    1.  Kidney is stable  2.  Primary care providers: Doe Dubose, Arcadio Collado, Shelia Corea, Demetrius Delatorre, Bud Amaya, Stanton Currie  3.  Will arrange iron infusions  4.  Will discuss anemia GUIDRY with Dr Lomeli  5.  Ask PCP about stool occult blood cards          Follow-ups after your visit        Follow-up notes from your care team     Return in about 1 year (around 4/17/2019).      Your next 10 appointments already scheduled     Apr 20, 2018  9:00 AM CDT   (Arrive by 8:45 AM)   Return Liver Transplant with Luis Daniel Lomeli MD   [facility] Hepatology ([facility])    [address] [place] [address]  [address] [place] [address] [phone]              Who to contact     If you have questions or need follow up information about today's clinic visit or your schedule please contact [facility] NEPHROLOGY directly at [phone].  Normal or non-critical lab and imaging results will be communicated to you by MyChart, letter or phone within 4 business days after the clinic has received the results. If you do not hear from us within 7 days, please contact the clinic through MyChart or phone. If you have a critical or abnormal lab result, we will notify you by phone as soon as possible.  Submit refill requests through [employer] or call your pharmacy and they will forward the refill request to us. Please allow 3 business days for your refill to be completed.          Additional Information About Your  "Visit        MyChart Information     [employer] gives you secure access to your electronic health record. If you see a primary care provider, you can also send messages to your care team and make appointments. If you have questions, please call your primary care clinic.  If you do not have a primary care provider, please call [phone] and they will assist you.        Care EveryWhere ID     This is your Care EveryWhere ID. This could be used by other organizations to access your [facility] medical records  [id number]        Your Vitals Were     Pulse Temperature Respirations Height Pulse Oximetry BMI (Body Mass Index)    66 97.5  F (36.4  C) (Oral) 16 1.499 m (4' 11\") 97% 23.43 kg/m2       Blood Pressure from Last 3 Encounters:   04/17/18 148/77   01/04/18 168/77   11/28/17 150/77    Weight from Last 3 Encounters:   04/17/18 52.6 kg (116 lb)   01/04/18 53.5 kg (118 lb)   11/28/17 53.5 kg (118 lb)              Today, you had the following     No orders found for display         Today's Medication Changes          These changes are accurate as of 4/17/18  1:58 PM.  If you have any questions, ask your nurse or doctor.               These medicines have changed or have updated prescriptions.        Dose/Directions    ursodiol 300 MG capsule   Commonly known as:  ACTIGALL   This may have changed:    - how to take this  - additional instructions   Used for:  Liver replaced by transplant (H)        TAKE THREE CAPSULES BY MOUTH EVERY DAY   Quantity:  270 capsule   Refills:  3                Primary Care Provider Office Phone # Fax #    Aby JEMAL Villanueva [facility] [phone] [address] [facility] [address]        Equal Access to Services     ISABEL [facility] AH: Hadii toi Rangel, waaxda luqadaha, qaybta kaalmada adeamandeep, gloria hodges. So [facility] [phone].    ATENCIÓN: Si habla español, tiene a power disposición servicios gratuitos de asistencia lingüística. Llame " al [phone].    We comply with applicable federal civil rights laws and [place] laws. We do not discriminate on the basis of race, color, national origin, age, disability, sex, sexual orientation, or gender identity.            Thank you!     Thank you for choosing [facility] NEPHROLOGY  for your care. Our goal is always to provide you with excellent care. Hearing back from our patients is one way we can continue to improve our services. Please take a few minutes to complete the written survey that you may receive in the mail after your visit with us. Thank you!             Your Updated Medication List - Protect others around you: Learn how to safely use, store and throw away your medicines at www.disposemymeds.org.          This list is accurate as of 4/17/18  1:58 PM.  Always use your most recent med list.                   Brand Name Dispense Instructions for use Diagnosis    allopurinol 100 MG tablet    ZYLOPRIM    180 tablet    Take 2 tablets (200 mg) by mouth daily    Acute idiopathic gout of left foot       amLODIPine 5 MG tablet    NORVASC    90 tablet    Take 1 tablet (5 mg) by mouth daily    Essential hypertension with goal blood pressure less than 140/90       carvedilol 6.25 MG tablet    COREG    60 tablet    Take 1 tablet (6.25 mg) by mouth 2 times daily (with meals) (Needs follow-up appointment for this medication)    Essential hypertension       furosemide 20 MG tablet    LASIX    90 tablet    Take 1 tablet (20 mg) by mouth daily    Right leg swelling       predniSONE 1 MG tablet    DELTASONE    270 tablet    Take 3 tablets (3 mg) by mouth daily    Liver replaced by transplant (H)       sertraline 50 MG tablet    ZOLOFT    30 tablet    Take 1 tablet (50 mg) by mouth daily    Liver replaced by transplant (H)       sirolimus 0.5 MG tablet    GENERIC EQUIVALENT    360 tablet    Take 4 tablets (2 mg) by mouth daily    Liver replaced by transplant (H)       triamcinolone 0.1 % ointment    KENALOG     500 g    Apply to itchy rash twice daily for 7 to 10 days as needed.    Contact dermatitis and other eczema, due to unspecified cause       TYLENOL PO      Take 500 mg by mouth every 6 hours as needed for mild pain or fever        ursodiol 300 MG capsule    ACTIGALL    270 capsule    TAKE THREE CAPSULES BY MOUTH EVERY DAY    Liver replaced by transplant (H)       VANICREAM EX      Externally apply topically 2 times daily

## 2018-04-17 NOTE — LETTER
4/17/2018       RE: mAanda Nails  [address] [address]   [place] [address]     Dear Colleague,    Thank you for referring your patient, Amanda Nails, to the [facility] at [facility]. Please see a copy of my visit note below.    Service Date: 04/17/2018      REASON FOR THE VISIT:  Ms. Nails is a 77-year-old woman here for followup of CKD, status post liver transplant in 1984.  She is the oldest living liver transplant recipient in our program.  She has baseline CKD.  Creatinine 1.2-2.0 and she is quite small, so presumably this is CKD stage IV.  She was switched from CNI to Sirolimus in 2012.  Her baseline creatinine has been closer to 1.2 recently.  Her current immunosuppression is prednisone 3 mg and Sirolimus 2 mg b.i.d.      Since her last clinic visit she has done well.  She has had no hospitalizations.  She has developed iron deficiency with a ferritin of 7.  She had an iron infusion in 09/2014 per Dr. Lomeli.  She has no source of bleeding.  She had her last colonoscopy 2 years ago.  Does have esophageal stricture periodically dilated, not recently.  Her other cell lines are normal.      Her blood pressure has been variably controlled as an outpatient, but we adjusted some medications and she has been doing well for the past several months.  She is currently taking Lasix 20, amlodipine 5 and Coreg 6.25 b.i.d. with systolic pressures in the 120-130 range.      She has ongoing problems with skin cancer and will see a dermatologist in 2 days.      Overall, she feels well.  No further gout attacks now that she is on allopurinol.      She has low level proteinuria in the range of 0.4-0.9 grams per gram.  Most recently 0.62 grams per gram.  It might be artificially higher as an estimation because she is so small.      REVIEW OF SYSTEMS:  Comprehensive review of systems is completed and negative except as in the HPI.  She does have itching for which she is  not taking Zoloft which has improved her symptoms.  Of note, she says it did absolutely nothing for her mood, which was already good.      PAST MEDICAL HISTORY:   1.  Status post liver transplant 1984.  No rejections.   2.  Angioedema from ACE inhibitor.   3.  Osteoporosis.   4.  Gout now on prophylaxis.   5.  History of multiple skin cancers.   6.  Hypertension, controlled.   7.  Primary biliary cirrhosis.      SOCIAL/FAMILY HISTORY:  She lives in [place].  She is here with her daughter who has 6 children and has celiac disease.  The patient is wondering if she might also have celiac disease and she has not been tested for that.  She has no GI symptoms.      CURRENT MEDICATIONS:  See below.      PHYSICAL EXAMINATION:   VITAL SIGNS:  Blood pressure 128/75, weight 116 pounds.   GENERAL:  She is well-developed, well-nourished.   HEENT:  Not remarkable.   SKIN:  Shows chronic changes of immunosuppression.   LUNGS:  Clear bilaterally.   CARDIAC:  Regular sinus rhythm, no murmurs, rubs or gallops.   ABDOMEN:  Benign.   EXTREMITIES:  Lower extremities, no peripheral edema.      LABORATORY DATA:  .  Iron saturation 5, protein creatinine 0.6 grams per gram.   Electrolytes/Renal -   Recent Labs   Lab Test  04/16/18   1000  01/04/18   1345  11/28/17   0905  11/24/17   0836   04/18/17   0833   11/12/16   0640   08/01/16   1142   07/10/15   0805   NA  138  138   --   138   < >  143   < >  144   < >  140   < >  138   POTASSIUM  3.6  4.0   --   3.7   < >  3.5   < >  3.3*   < >  3.8   < >  3.9   CHLORIDE  104  104   --   103   < >  109   < >  109   < >  105   < >  105   CO2  29  25   --   28   < >  28   < >  29   < >  29   < >  24   BUN  37*  22   --   37*   < >  26   < >  22   < >  32*   < >  31*   CR  1.36*  1.13*  1.57*  1.59*   < >  1.13*   < >  1.23*   < >  1.16*   < >  1.32*   GLC  88  87   --   105*   < >  80   < >  79   < >  84   < >  94   BLANK  8.8  9.2   --   8.8   < >  8.7   < >  8.2*   < >  9.4   < >  8.5    MAG   --    --    --    --    --    --    --   1.7   --   1.8   --   1.6   PHOS  3.8   --    --    --    --   2.8   --    --    --   3.1   < >  3.4    < > = values in this interval not displayed.       CBC -   Recent Labs   Lab Test  04/16/18   1000  01/04/18   1345  08/14/17   1005   WBC  4.1  6.1  4.4   HGB  8.4*  10.0*  10.8*   PLT  318  329  282       LFTs -   Recent Labs   Lab Test  04/16/18   1000  08/14/17   1005  04/18/17   0833  01/26/17   0826   ALKPHOS   --   244*  206*  197*   BILITOTAL   --   0.5  0.3  0.4   ALT   --   42  53*  33   AST   --   50*  38  41   PROTTOTAL   --   7.3  6.7*  7.1   ALBUMIN  3.2*  3.2*  3.0*  3.2*       Coags - No lab results found.    Iron Panel -   Recent Labs   Lab Test  04/16/18   1000  08/01/16   1142  04/04/16   1015   IRON  20*  70  54   IRONSAT  5*  17  15   CHAPIN  7*  9  13       Endocrine -   Recent Labs   Lab Test  11/17/16   0757  10/06/14   1017  09/26/12   0807   TSH  3.78  2.60  4.17        IMPRESSION:   1.  CKD.  She has stable CKD stage IV, with good blood pressure control and mild to moderate amount of proteinuria.     2.  Hypertension.  Blood pressure is now well controlled.     3.  Anemia.  She has a history of iron deficiency requiring IV iron.  She could possibly have celiac disease since her daughter has that.  She has been tried on oral iron in the past but developed GI toxicity and did not tolerate it.  We discussed the possibility of having a workup for blood loss.  She does not have a primary care provider just now so I gave her names of several that might be good [facility] and additional I will ask Dr. Lomeli everything she needs colonoscopy and endoscopy.  For now we plan to replace her iron IV since she is does have fatigue on exertion.     4.  Family history of celiac disease.  The official recommendations are to test for this.  Will discuss that with Dr. Lomeli.     5.  Hyperparathyroidism.  Her PTH is increasing slowly.  Vitamin D is 32,  calcium 8.8, phosphorus 3.8.  Will continue to monitor and consider calcitriol if it continues to increase.      PLAN:   1.  IV iron.   2.  Discuss evaluation for GI blood loss with Dr Lomeli  3.  Discuss celiac testing.   4.  Consider calcitriol if PTH continues to increase.   5.  No change in blood pressure meds.  Patient to continue monitoring blood pressure at home.         SARAI LEON MD      Current Outpatient Prescriptions   Medication Sig Dispense Refill     Acetaminophen (TYLENOL PO) Take 500 mg by mouth every 6 hours as needed for mild pain or fever       allopurinol (ZYLOPRIM) 100 MG tablet Take 2 tablets (200 mg) by mouth daily 180 tablet 3     amLODIPine (NORVASC) 5 MG tablet Take 1 tablet (5 mg) by mouth daily 90 tablet 3     carvedilol (COREG) 6.25 MG tablet Take 1 tablet (6.25 mg) by mouth 2 times daily (with meals) (Needs follow-up appointment for this medication) 60 tablet 0     Emollient (VANICREAM EX) Externally apply topically 2 times daily       furosemide (LASIX) 20 MG tablet Take 1 tablet (20 mg) by mouth daily 90 tablet 3     predniSONE (DELTASONE) 1 MG tablet Take 3 tablets (3 mg) by mouth daily 270 tablet 3     sertraline (ZOLOFT) 50 MG tablet Take 1 tablet (50 mg) by mouth daily 30 tablet 11     sirolimus (GENERIC EQUIVALENT) 0.5 MG tablet Take 4 tablets (2 mg) by mouth daily 360 tablet 3     triamcinolone (KENALOG) 0.1 % ointment Apply to itchy rash twice daily for 7 to 10 days as needed. 500 g 3     ursodiol (ACTIGALL) 300 MG capsule TAKE THREE CAPSULES BY MOUTH EVERY DAY (Patient taking differently: 600 mg in AM and 300 mg HS) 270 capsule 3              D: 2018   T: 2018   MT: DAKSHA      Name:     QING CORONA   MRN:      [id number]        Account:      [id number]   :      1940           Service Date: 2018      Document: [id number]        Again, thank you for allowing me to participate in the care of your patient.      Sincerely,    Sarai Leon,  MD

## 2018-04-17 NOTE — PATIENT INSTRUCTIONS
1.  Kidney is stable  2.  Primary care providers: Doe Dubose, Arcadio Collado, Shelia Corea, Demetrius Delatorre, Bud Amaya, Stanton Currie  3.  Will arrange iron infusions  4.  Will discuss anemia GUIDRY with Dr Lomeli  5.  Ask PCP about stool occult blood cards

## 2018-04-17 NOTE — LETTER
4/17/2018      RE: Amanda Nails  [address] [address]   [place] [address]       Service Date: 04/17/2018      REASON FOR THE VISIT:  Ms. Nails is a 77-year-old woman here for followup of CKD, status post liver transplant in 1984.  She is the oldest living liver transplant recipient in our program.  She has baseline CKD.  Creatinine 1.2-2.0 and she is quite small, so presumably this is CKD stage IV.  She was switched from CNI to Sirolimus in 2012.  Her baseline creatinine has been closer to 1.2 recently.  Her current immunosuppression is prednisone 3 mg and Sirolimus 2 mg b.i.d.      Since her last clinic visit she has done well.  She has had no hospitalizations.  She has developed iron deficiency with a ferritin of 7.  She had an iron infusion in 09/2014 per Dr. Lomeli.  She has no source of bleeding.  She had her last colonoscopy 2 years ago.  Does have esophageal stricture periodically dilated, not recently.  Her other cell lines are normal.      Her blood pressure has been variably controlled as an outpatient, but we adjusted some medications and she has been doing well for the past several months.  She is currently taking Lasix 20, amlodipine 5 and Coreg 6.25 b.i.d. with systolic pressures in the 120-130 range.      She has ongoing problems with skin cancer and will see a dermatologist in 2 days.      Overall, she feels well.  No further gout attacks now that she is on allopurinol.      She has low level proteinuria in the range of 0.4-0.9 grams per gram.  Most recently 0.62 grams per gram.  It might be artificially higher as an estimation because she is so small.      REVIEW OF SYSTEMS:  Comprehensive review of systems is completed and negative except as in the HPI.  She does have itching for which she is not taking Zoloft which has improved her symptoms.  Of note, she says it did absolutely nothing for her mood, which was already good.      PAST MEDICAL HISTORY:   1.  Status post liver transplant  1984.  No rejections.   2.  Angioedema from ACE inhibitor.   3.  Osteoporosis.   4.  Gout now on prophylaxis.   5.  History of multiple skin cancers.   6.  Hypertension, controlled.   7.  Primary biliary cirrhosis.      SOCIAL/FAMILY HISTORY:  She lives in [place].  She is here with her daughter who has 6 children and has celiac disease.  The patient is wondering if she might also have celiac disease and she has not been tested for that.  She has no GI symptoms.      CURRENT MEDICATIONS:  See below.      PHYSICAL EXAMINATION:   VITAL SIGNS:  Blood pressure 128/75, weight 116 pounds.   GENERAL:  She is well-developed, well-nourished.   HEENT:  Not remarkable.   SKIN:  Shows chronic changes of immunosuppression.   LUNGS:  Clear bilaterally.   CARDIAC:  Regular sinus rhythm, no murmurs, rubs or gallops.   ABDOMEN:  Benign.   EXTREMITIES:  Lower extremities, no peripheral edema.      LABORATORY DATA:  .  Iron saturation 5, protein creatinine 0.6 grams per gram.   Electrolytes/Renal -   Recent Labs   Lab Test  04/16/18   1000  01/04/18   1345  11/28/17   0905  11/24/17   0836   04/18/17   0833   11/12/16   0640   08/01/16   1142   07/10/15   0805   NA  138  138   --   138   < >  143   < >  144   < >  140   < >  138   POTASSIUM  3.6  4.0   --   3.7   < >  3.5   < >  3.3*   < >  3.8   < >  3.9   CHLORIDE  104  104   --   103   < >  109   < >  109   < >  105   < >  105   CO2  29  25   --   28   < >  28   < >  29   < >  29   < >  24   BUN  37*  22   --   37*   < >  26   < >  22   < >  32*   < >  31*   CR  1.36*  1.13*  1.57*  1.59*   < >  1.13*   < >  1.23*   < >  1.16*   < >  1.32*   GLC  88  87   --   105*   < >  80   < >  79   < >  84   < >  94   BLANK  8.8  9.2   --   8.8   < >  8.7   < >  8.2*   < >  9.4   < >  8.5   MAG   --    --    --    --    --    --    --   1.7   --   1.8   --   1.6   PHOS  3.8   --    --    --    --   2.8   --    --    --   3.1   < >  3.4    < > = values in this interval not displayed.        CBC -   Recent Labs   Lab Test  04/16/18   1000  01/04/18   1345  08/14/17   1005   WBC  4.1  6.1  4.4   HGB  8.4*  10.0*  10.8*   PLT  318  329  282       LFTs -   Recent Labs   Lab Test  04/16/18   1000  08/14/17   1005  04/18/17   0833  01/26/17   0826   ALKPHOS   --   244*  206*  197*   BILITOTAL   --   0.5  0.3  0.4   ALT   --   42  53*  33   AST   --   50*  38  41   PROTTOTAL   --   7.3  6.7*  7.1   ALBUMIN  3.2*  3.2*  3.0*  3.2*       Coags - No lab results found.    Iron Panel -   Recent Labs   Lab Test  04/16/18   1000  08/01/16   1142  04/04/16   1015   IRON  20*  70  54   IRONSAT  5*  17  15   CHAPIN  7*  9  13       Endocrine -   Recent Labs   Lab Test  11/17/16   0757  10/06/14   1017  09/26/12   0807   TSH  3.78  2.60  4.17        IMPRESSION:   1.  CKD.  She has stable CKD stage IV, with good blood pressure control and mild to moderate amount of proteinuria.     2.  Hypertension.  Blood pressure is now well controlled.     3.  Anemia.  She has a history of iron deficiency requiring IV iron.  She could possibly have celiac disease since her daughter has that.  She has been tried on oral iron in the past but developed GI toxicity and did not tolerate it.  We discussed the possibility of having a workup for blood loss.  She does not have a primary care provider just now so I gave her names of several that might be good [facility] and additional I will ask Dr. Lomeli everything she needs colonoscopy and endoscopy.  For now we plan to replace her iron IV since she is does have fatigue on exertion.     4.  Family history of celiac disease.  The official recommendations are to test for this.  Will discuss that with Dr. Lomeli.     5.  Hyperparathyroidism.  Her PTH is increasing slowly.  Vitamin D is 32, calcium 8.8, phosphorus 3.8.  Will continue to monitor and consider calcitriol if it continues to increase.      PLAN:   1.  IV iron.   2.  Discuss evaluation for GI blood loss with Dr Lomeli  3.   Discuss celiac testing.   4.  Consider calcitriol if PTH continues to increase.   5.  No change in blood pressure meds.  Patient to continue monitoring blood pressure at home.         SARAI LEON MD      Current Outpatient Prescriptions   Medication Sig Dispense Refill     Acetaminophen (TYLENOL PO) Take 500 mg by mouth every 6 hours as needed for mild pain or fever       allopurinol (ZYLOPRIM) 100 MG tablet Take 2 tablets (200 mg) by mouth daily 180 tablet 3     amLODIPine (NORVASC) 5 MG tablet Take 1 tablet (5 mg) by mouth daily 90 tablet 3     carvedilol (COREG) 6.25 MG tablet Take 1 tablet (6.25 mg) by mouth 2 times daily (with meals) (Needs follow-up appointment for this medication) 60 tablet 0     Emollient (VANICREAM EX) Externally apply topically 2 times daily       furosemide (LASIX) 20 MG tablet Take 1 tablet (20 mg) by mouth daily 90 tablet 3     predniSONE (DELTASONE) 1 MG tablet Take 3 tablets (3 mg) by mouth daily 270 tablet 3     sertraline (ZOLOFT) 50 MG tablet Take 1 tablet (50 mg) by mouth daily 30 tablet 11     sirolimus (GENERIC EQUIVALENT) 0.5 MG tablet Take 4 tablets (2 mg) by mouth daily 360 tablet 3     triamcinolone (KENALOG) 0.1 % ointment Apply to itchy rash twice daily for 7 to 10 days as needed. 500 g 3     ursodiol (ACTIGALL) 300 MG capsule TAKE THREE CAPSULES BY MOUTH EVERY DAY (Patient taking differently: 600 mg in AM and 300 mg HS) 270 capsule 3              D: 2018   T: 2018   MT: DAKSHA      Name:     QING CORONA   MRN:      [id number]        Account:      [id number]   :      1940           Service Date: 2018      Document: [id number]        Sarai Leon MD

## 2018-04-18 ENCOUNTER — TELEPHONE (OUTPATIENT)
Dept: PHARMACY | Facility: CLINIC | Age: 78
End: 2018-04-18

## 2018-04-18 ENCOUNTER — CARE COORDINATION (OUTPATIENT)
Dept: NEPHROLOGY | Facility: CLINIC | Age: 78
End: 2018-04-18

## 2018-04-18 DIAGNOSIS — N18.30 ANEMIA IN STAGE 3 CHRONIC KIDNEY DISEASE (H): ICD-10-CM

## 2018-04-18 DIAGNOSIS — N18.30 CHRONIC KIDNEY DISEASE, STAGE III (MODERATE) (H): Primary | Chronic | ICD-10-CM

## 2018-04-18 DIAGNOSIS — D63.1 ANEMIA IN CKD (CHRONIC KIDNEY DISEASE): Primary | ICD-10-CM

## 2018-04-18 DIAGNOSIS — N18.9 ANEMIA IN CKD (CHRONIC KIDNEY DISEASE): Primary | ICD-10-CM

## 2018-04-18 DIAGNOSIS — D63.1 ANEMIA IN STAGE 3 CHRONIC KIDNEY DISEASE (H): ICD-10-CM

## 2018-04-18 NOTE — PROGRESS NOTES
Service Date: 04/17/2018      REASON FOR THE VISIT:  Ms. Nails is a 77-year-old woman here for followup of CKD, status post liver transplant in 1984.  She is the oldest living liver transplant recipient in our program.  She has baseline CKD.  Creatinine 1.2-2.0 and she is quite small, so presumably this is CKD stage IV.  She was switched from CNI to Sirolimus in 2012.  Her baseline creatinine has been closer to 1.2 recently.  Her current immunosuppression is prednisone 3 mg and Sirolimus 2 mg b.i.d.      Since her last clinic visit she has done well.  She has had no hospitalizations.  She has developed iron deficiency with a ferritin of 7.  She had an iron infusion in 09/2014 per Dr. Lomeli.  She has no source of bleeding.  She had her last colonoscopy 2 years ago.  Does have esophageal stricture periodically dilated, not recently.  Her other cell lines are normal.      Her blood pressure has been variably controlled as an outpatient, but we adjusted some medications and she has been doing well for the past several months.  She is currently taking Lasix 20, amlodipine 5 and Coreg 6.25 b.i.d. with systolic pressures in the 120-130 range.      She has ongoing problems with skin cancer and will see a dermatologist in 2 days.      Overall, she feels well.  No further gout attacks now that she is on allopurinol.      She has low level proteinuria in the range of 0.4-0.9 grams per gram.  Most recently 0.62 grams per gram.  It might be artificially higher as an estimation because she is so small.      REVIEW OF SYSTEMS:  Comprehensive review of systems is completed and negative except as in the HPI.  She does have itching for which she is not taking Zoloft which has improved her symptoms.  Of note, she says it did absolutely nothing for her mood, which was already good.      PAST MEDICAL HISTORY:   1.  Status post liver transplant 1984.  No rejections.   2.  Angioedema from ACE inhibitor.   3.  Osteoporosis.   4.  Gout now  on prophylaxis.   5.  History of multiple skin cancers.   6.  Hypertension, controlled.   7.  Primary biliary cirrhosis.      SOCIAL/FAMILY HISTORY:  She lives in [place].  She is here with her daughter who has 6 children and has celiac disease.  The patient is wondering if she might also have celiac disease and she has not been tested for that.  She has no GI symptoms.      CURRENT MEDICATIONS:  See below.      PHYSICAL EXAMINATION:   VITAL SIGNS:  Blood pressure 128/75, weight 116 pounds.   GENERAL:  She is well-developed, well-nourished.   HEENT:  Not remarkable.   SKIN:  Shows chronic changes of immunosuppression.   LUNGS:  Clear bilaterally.   CARDIAC:  Regular sinus rhythm, no murmurs, rubs or gallops.   ABDOMEN:  Benign.   EXTREMITIES:  Lower extremities, no peripheral edema.      LABORATORY DATA:  .  Iron saturation 5, protein creatinine 0.6 grams per gram.   Electrolytes/Renal -   Recent Labs   Lab Test  04/16/18   1000  01/04/18   1345  11/28/17   0905  11/24/17   0836   04/18/17   0833   11/12/16   0640   08/01/16   1142   07/10/15   0805   NA  138  138   --   138   < >  143   < >  144   < >  140   < >  138   POTASSIUM  3.6  4.0   --   3.7   < >  3.5   < >  3.3*   < >  3.8   < >  3.9   CHLORIDE  104  104   --   103   < >  109   < >  109   < >  105   < >  105   CO2  29  25   --   28   < >  28   < >  29   < >  29   < >  24   BUN  37*  22   --   37*   < >  26   < >  22   < >  32*   < >  31*   CR  1.36*  1.13*  1.57*  1.59*   < >  1.13*   < >  1.23*   < >  1.16*   < >  1.32*   GLC  88  87   --   105*   < >  80   < >  79   < >  84   < >  94   BLANK  8.8  9.2   --   8.8   < >  8.7   < >  8.2*   < >  9.4   < >  8.5   MAG   --    --    --    --    --    --    --   1.7   --   1.8   --   1.6   PHOS  3.8   --    --    --    --   2.8   --    --    --   3.1   < >  3.4    < > = values in this interval not displayed.       CBC -   Recent Labs   Lab Test  04/16/18   1000  01/04/18   1345  08/14/17   1005   WBC   4.1  6.1  4.4   HGB  8.4*  10.0*  10.8*   PLT  318  329  282       LFTs -   Recent Labs   Lab Test  04/16/18   1000  08/14/17   1005  04/18/17   0833  01/26/17   0826   ALKPHOS   --   244*  206*  197*   BILITOTAL   --   0.5  0.3  0.4   ALT   --   42  53*  33   AST   --   50*  38  41   PROTTOTAL   --   7.3  6.7*  7.1   ALBUMIN  3.2*  3.2*  3.0*  3.2*       Coags - No lab results found.    Iron Panel -   Recent Labs   Lab Test  04/16/18   1000  08/01/16   1142  04/04/16   1015   IRON  20*  70  54   IRONSAT  5*  17  15   CHAPIN  7*  9  13       Endocrine -   Recent Labs   Lab Test  11/17/16   0757  10/06/14   1017  09/26/12   0807   TSH  3.78  2.60  4.17        IMPRESSION:   1.  CKD.  She has stable CKD stage IV, with good blood pressure control and mild to moderate amount of proteinuria.     2.  Hypertension.  Blood pressure is now well controlled.     3.  Anemia.  She has a history of iron deficiency requiring IV iron.  She could possibly have celiac disease since her daughter has that.  She has been tried on oral iron in the past but developed GI toxicity and did not tolerate it.  We discussed the possibility of having a workup for blood loss.  She does not have a primary care provider just now so I gave her names of several that might be good [facility] and additional I will ask Dr. Lomeli everything she needs colonoscopy and endoscopy.  For now we plan to replace her iron IV since she is does have fatigue on exertion.     4.  Family history of celiac disease.  The official recommendations are to test for this.  Will discuss that with Dr. Lomeli.     5.  Hyperparathyroidism.  Her PTH is increasing slowly.  Vitamin D is 32, calcium 8.8, phosphorus 3.8.  Will continue to monitor and consider calcitriol if it continues to increase.      PLAN:   1.  IV iron.   2.  Discuss evaluation for GI blood loss with Dr Lomeli  3.  Discuss celiac testing.   4.  Consider calcitriol if PTH continues to increase.   5.  No change  in blood pressure meds.  Patient to continue monitoring blood pressure at home.         WILL LEON MD      Current Outpatient Prescriptions   Medication Sig Dispense Refill     Acetaminophen (TYLENOL PO) Take 500 mg by mouth every 6 hours as needed for mild pain or fever       allopurinol (ZYLOPRIM) 100 MG tablet Take 2 tablets (200 mg) by mouth daily 180 tablet 3     amLODIPine (NORVASC) 5 MG tablet Take 1 tablet (5 mg) by mouth daily 90 tablet 3     carvedilol (COREG) 6.25 MG tablet Take 1 tablet (6.25 mg) by mouth 2 times daily (with meals) (Needs follow-up appointment for this medication) 60 tablet 0     Emollient (VANICREAM EX) Externally apply topically 2 times daily       furosemide (LASIX) 20 MG tablet Take 1 tablet (20 mg) by mouth daily 90 tablet 3     predniSONE (DELTASONE) 1 MG tablet Take 3 tablets (3 mg) by mouth daily 270 tablet 3     sertraline (ZOLOFT) 50 MG tablet Take 1 tablet (50 mg) by mouth daily 30 tablet 11     sirolimus (GENERIC EQUIVALENT) 0.5 MG tablet Take 4 tablets (2 mg) by mouth daily 360 tablet 3     triamcinolone (KENALOG) 0.1 % ointment Apply to itchy rash twice daily for 7 to 10 days as needed. 500 g 3     ursodiol (ACTIGALL) 300 MG capsule TAKE THREE CAPSULES BY MOUTH EVERY DAY (Patient taking differently: 600 mg in AM and 300 mg HS) 270 capsule 3              D: 2018   T: 2018   MT: DAKSHA      Name:     QING CORONA   MRN:      [id number]        Account:      [id number]   :      1940           Service Date: 2018      Document: [id number]

## 2018-04-18 NOTE — PROGRESS NOTES
Ordered Anemia Services referral to set up injectafer dosing, per Dr. Abrams's request. Updated neph tracking flowsheet.    Stephanie Pereyra RN

## 2018-04-23 DIAGNOSIS — I10 ESSENTIAL HYPERTENSION: ICD-10-CM

## 2018-04-23 NOTE — TELEPHONE ENCOUNTER
Can we please expedite this today as she will be out on Wednesday, and it comes by mail order. Please call when sent to the mail order pharmacy.  Cathy Mir  [facility]

## 2018-04-23 NOTE — TELEPHONE ENCOUNTER
"Requested Prescriptions   Pending Prescriptions Disp Refills     carvedilol (COREG) 6.25 MG tablet [Pharmacy Med Name: CARVEDILOL 6.25MG TABS] 60 tablet 0     Sig: TAKE ONE TABLET BY MOUTH TWICE A DAY WITH MEALS NEED FOLLOW UP APPOINTMENT FOR THIS MEDICATION    Beta-Blockers Protocol Failed    4/23/2018  3:43 PM       Failed - Blood pressure under 140/90 in past 12 months    BP Readings from Last 3 Encounters:   04/17/18 148/77   01/04/18 168/77   11/28/17 150/77                Passed - Patient is age 6 or older       Passed - Recent (12 mo) or future (30 days) visit within the authorizing provider's specialty    Patient had office visit in the last 12 months or has a visit in the next 30 days with authorizing provider or within the authorizing provider's specialty.  See \"Patient Info\" tab in inbasket, or \"Choose Columns\" in Meds & Orders section of the refill encounter.            Ellen:    Please advise on this refill as pt does not meet BP protocol requirements.    Carmina RODRIGUEZ RN    "

## 2018-04-24 ENCOUNTER — TELEPHONE (OUTPATIENT)
Dept: NEPHROLOGY | Facility: CLINIC | Age: 78
End: 2018-04-24

## 2018-04-24 DIAGNOSIS — I10 ESSENTIAL HYPERTENSION WITH GOAL BLOOD PRESSURE LESS THAN 140/90: ICD-10-CM

## 2018-04-24 RX ORDER — AMLODIPINE BESYLATE 5 MG/1
5 TABLET ORAL DAILY
Qty: 90 TABLET | Refills: 3 | Status: CANCELLED | OUTPATIENT
Start: 2018-04-24

## 2018-04-24 RX ORDER — CARVEDILOL 6.25 MG/1
TABLET ORAL
Qty: 30 TABLET | Refills: 0 | Status: SHIPPED | OUTPATIENT
Start: 2018-04-24 | End: 2018-04-25

## 2018-04-24 NOTE — TELEPHONE ENCOUNTER
Pt returning call and appointment made for April 25 at 9 am.    [facility] [place]

## 2018-04-24 NOTE — TELEPHONE ENCOUNTER
[facility] Call Center    Phone Message    May a detailed message be left on voicemail: yes    Reason for Call: Order(s): Other:   Reason for requested: Requesting orders for endoscopy, would like to do it closer to home at the [facility]. Also wanted to inform Dr. Abrams that she won't be seeing Dr. Lomeli until July.  Provider name: Dr. Abrams      Action Taken: Message routed to:  [facility] ([facility] Nephrology

## 2018-04-24 NOTE — TELEPHONE ENCOUNTER
Left message for patient to return call to clinic.    [facility] ok to deliver message below.    Mago CEE Rn

## 2018-04-25 ENCOUNTER — OFFICE VISIT (OUTPATIENT)
Dept: FAMILY MEDICINE | Facility: CLINIC | Age: 78
End: 2018-04-25
Payer: COMMERCIAL

## 2018-04-25 ENCOUNTER — TELEPHONE (OUTPATIENT)
Dept: TRANSPLANT | Facility: CLINIC | Age: 78
End: 2018-04-25

## 2018-04-25 ENCOUNTER — TELEPHONE (OUTPATIENT)
Dept: GASTROENTEROLOGY | Facility: CLINIC | Age: 78
End: 2018-04-25

## 2018-04-25 VITALS
DIASTOLIC BLOOD PRESSURE: 75 MMHG | WEIGHT: 115.2 LBS | SYSTOLIC BLOOD PRESSURE: 127 MMHG | BODY MASS INDEX: 23.27 KG/M2 | OXYGEN SATURATION: 100 % | HEART RATE: 65 BPM

## 2018-04-25 DIAGNOSIS — Z94.4 LIVER REPLACED BY TRANSPLANT (H): Primary | Chronic | ICD-10-CM

## 2018-04-25 DIAGNOSIS — I10 ESSENTIAL HYPERTENSION: ICD-10-CM

## 2018-04-25 DIAGNOSIS — D50.9 IRON DEFICIENCY ANEMIA, UNSPECIFIED IRON DEFICIENCY ANEMIA TYPE: ICD-10-CM

## 2018-04-25 DIAGNOSIS — N18.30 CHRONIC KIDNEY DISEASE, STAGE III (MODERATE) (H): Chronic | ICD-10-CM

## 2018-04-25 DIAGNOSIS — K22.2 ESOPHAGEAL STRICTURE: Primary | ICD-10-CM

## 2018-04-25 DIAGNOSIS — Z94.4 LIVER TRANSPLANTED (H): ICD-10-CM

## 2018-04-25 PROBLEM — N18.4 ANEMIA IN STAGE 4 CHRONIC KIDNEY DISEASE (H): Status: ACTIVE | Noted: 2018-04-18

## 2018-04-25 PROCEDURE — 99214 OFFICE O/P EST MOD 30 MIN: CPT | Performed by: NURSE PRACTITIONER

## 2018-04-25 RX ORDER — AMLODIPINE BESYLATE 5 MG/1
5 TABLET ORAL DAILY
Qty: 90 TABLET | Refills: 3 | Status: CANCELLED | OUTPATIENT
Start: 2018-04-25

## 2018-04-25 RX ORDER — FUROSEMIDE 20 MG
20 TABLET ORAL DAILY
Qty: 90 TABLET | Refills: 3 | Status: CANCELLED | OUTPATIENT
Start: 2018-04-25

## 2018-04-25 RX ORDER — CARVEDILOL 6.25 MG/1
6.25 TABLET ORAL 2 TIMES DAILY WITH MEALS
Qty: 180 TABLET | Refills: 3 | Status: SHIPPED | OUTPATIENT
Start: 2018-04-25 | End: 2019-03-25

## 2018-04-25 NOTE — TELEPHONE ENCOUNTER
Called to schedule pt for EGD. Pt wants to have it done at [place] and states she has the number to call and schedule.     Adelia Graham RN

## 2018-04-25 NOTE — NURSING NOTE
"Initial /75 (BP Location: Right arm, Patient Position: Chair, Cuff Size: Adult Regular)  Pulse 65  Wt 115 lb 3.2 oz (52.3 kg)  SpO2 100%  BMI 23.27 kg/m2 Estimated body mass index is 23.27 kg/(m^2) as calculated from the following:    Height as of 4/17/18: 4' 11\" (1.499 m).    Weight as of this encounter: 115 lb 3.2 oz (52.3 kg). .    Nancy Bruce    "

## 2018-04-25 NOTE — PROGRESS NOTES
SUBJECTIVE:   Amanda Nails is a 77 year old female who presents to clinic today for the following health issues:    Patient with history of chronic kidney disease- stage 4; followed by nephrology  History of liver transplant in 1984- followed by Hepatology- Dr. Lomeli      Hypertension Follow-up      Outpatient blood pressures are not being checked.    Low Salt Diet: some added      Amount of exercise or physical activity: none    Problems taking medications regularly: No    Medication side effects: none    Diet: regular (no restrictions)  BP Readings from Last 3 Encounters:   04/25/18 127/75   04/17/18 148/77   01/04/18 168/77         LENNIE: history of iron infusion in 2014- recent ferritin of 7- is scheduled to have iron infusions- denies bleeding source- colonoscopy done 2 yrs ago- history of esophageal strictures needing dilation.       -------------------------------------    Problem list and histories reviewed & adjusted, as indicated.  Additional history: as documented    Patient Active Problem List   Diagnosis     Chronic kidney disease, stage III (moderate)     Liver replaced by transplant (H)     Primary biliary cirrhosis     CARDIOVASCULAR SCREENING; LDL GOAL LESS THAN 130     Mixed hyperlipidemia     Iron deficiency anemia     Skin cancer     Osteoporosis     Advanced directives, counseling/discussion     Proteinuria     Anemia in stage 3 chronic kidney disease     Past Surgical History:   Procedure Laterality Date     cholecytectomy       COLONOSCOPY  4/9/2013    Procedure: COLONOSCOPY;  Colonoscopy;  Surgeon: Kendra Archer MD;  Location: [facility]     ESOPHAGOSCOPY, GASTROSCOPY, DUODENOSCOPY (EGD), COMBINED  8/23/2013    Procedure: COMBINED ESOPHAGOSCOPY, GASTROSCOPY, DUODENOSCOPY (EGD), BIOPSY SINGLE OR MULTIPLE;  Gastroscopy       SPLENECTOMY       TRANSPLANT  1983    liver       Social History   Substance Use Topics     Smoking status: Former Smoker     Years: 3.00     Smokeless tobacco:  Never Used      Comment: Years ago.     Alcohol use Yes      Comment: very rarely     Family History   Problem Relation Age of Onset     Respiratory Mother      GASTROINTESTINAL DISEASE Sister      celiac     GASTROINTESTINAL DISEASE Son      celiac     GASTROINTESTINAL DISEASE Daughter      celiac     GASTROINTESTINAL DISEASE Sister      GASTROINTESTINAL DISEASE Son      celiac     GASTROINTESTINAL DISEASE Daughter      PBC     Endocrine Disease Daughter      Graves disease     Endocrine Disease Daughter      hypothyroidism           Reviewed and updated as needed this visit by clinical staff       Reviewed and updated as needed this visit by Provider         ROS:  Constitutional, HEENT, cardiovascular, pulmonary, GI, , musculoskeletal, neuro, skin, endocrine and psych systems are negative, except as otherwise noted.    OBJECTIVE:     /75 (BP Location: Right arm, Patient Position: Chair, Cuff Size: Adult Regular)  Pulse 65  Wt 115 lb 3.2 oz (52.3 kg)  SpO2 100%  BMI 23.27 kg/m2  Body mass index is 23.27 kg/(m^2).  GENERAL: healthy, alert and no distress  RESP: lungs clear to auscultation - no rales, rhonchi or wheezes  CV: regular rate and rhythm, normal S1 S2, no S3 or S4, no murmur, click or rub, no peripheral edema and peripheral pulses strong  MS: no gross musculoskeletal defects noted, no edema    Diagnostic Test Results:  none     ASSESSMENT/PLAN:         1. Essential hypertension  Controlled  - will continue Amlodipine,  Laix   - carvedilol (COREG) 6.25 MG tablet; Take 1 tablet (6.25 mg) by mouth 2 times daily (with meals)  Dispense: 180 tablet; Refill: 3    2. Liver replaced by transplant (H)  Stable- followed by Dr. Lomeli - hepatology- has upcoming appointment 6/2018    3. Chronic kidney disease, stage 4  Stable disease- followed by Nephrology       4. Iron deficiency anemia, unspecified iron deficiency anemia type  Patient is scheduled to have iron infusions this week  - plan to have  colonoscopy and EGD- patient wanting to wait at this time         JEMAL Ferrari [facility]

## 2018-04-25 NOTE — TELEPHONE ENCOUNTER
Anali would like this done at [facility].  Haydee castillo, spoke to Tracy in scheduling in [place], she will contact Anali and get scheduled.

## 2018-04-25 NOTE — MR AVS SNAPSHOT
After Visit Summary   4/25/2018    Amanda Nails    MRN: [id number]           Patient Information     Date Of Birth          1940        Visit Information        Provider Department      4/25/2018 9:00 AM Aby Downing APRN CNP [facility]        Today's Diagnoses     Essential hypertension with goal blood pressure less than 140/90        Essential hypertension        Right leg swelling          Care Instructions    1. Follow up in 6 months           Follow-ups after your visit        Your next 10 appointments already scheduled     May 02, 2018  3:00 PM CDT   Level 1 with ROOM 6 [facility] ([facility])    [facility]  [address]  [facility] [address]   [phone]            May 09, 2018  1:00 PM CDT   Level 1 with ROOM 4 [facility] ([facility])    [facility]  [address]  [facility] [address]   [phone]            Jul 13, 2018 11:30 AM CDT   (Arrive by 11:15 AM)   Return Liver Transplant with Luis Daniel Lomeli MD   [facility] ([facility])    [address] [place] [address]  [address]  [facility] [address] [phone]              Who to contact     If you have questions or need follow up information about today's clinic visit or your schedule please contact [facility] directly at [phone].  Normal or non-critical lab and imaging results will be communicated to you by MyChart, letter or phone within 4 business days after the clinic has received the results. If you do not hear from us within 7 days, please contact the clinic through MyChart or phone. If you have a critical or abnormal lab result, we will notify you by phone as soon as possible.  Submit refill requests through [employer] or call your pharmacy and they will forward the refill request to us. Please allow 3 business days for your refill  to be completed.          Additional Information About Your Visit        [employer] Information     [employer] gives you secure access to your electronic health record. If you see a primary care provider, you can also send messages to your care team and make appointments. If you have questions, please call your primary care clinic.  If you do not have a primary care provider, please call [phone] and they will assist you.        Care EveryWhere ID     This is your Care EveryWhere ID. This could be used by other organizations to access your [place] medical records  [id number]        Your Vitals Were     Pulse Pulse Oximetry BMI (Body Mass Index)             65 100% 23.27 kg/m2          Blood Pressure from Last 3 Encounters:   04/25/18 127/75   04/17/18 148/77   01/04/18 168/77    Weight from Last 3 Encounters:   04/25/18 115 lb 3.2 oz (52.3 kg)   04/17/18 116 lb (52.6 kg)   01/04/18 118 lb (53.5 kg)              Today, you had the following     No orders found for display         Today's Medication Changes          These changes are accurate as of 4/25/18  9:18 AM.  If you have any questions, ask your nurse or doctor.               These medicines have changed or have updated prescriptions.        Dose/Directions    * carvedilol 6.25 MG tablet   Commonly known as:  COREG   This may have changed:  Another medication with the same name was changed. Make sure you understand how and when to take each.   Used for:  Essential hypertension        TAKE ONE TABLET BY MOUTH TWICE A DAY WITH MEALS ***NEED FOLLOW UP APPOINTMENT FOR THIS MEDICATION***   Quantity:  30 tablet   Refills:  0       * carvedilol 6.25 MG tablet   Commonly known as:  COREG   This may have changed:  additional instructions   Used for:  Essential hypertension        Dose:  6.25 mg   Take 1 tablet (6.25 mg) by mouth 2 times daily (with meals)   Quantity:  180 tablet   Refills:  3       ursodiol 300 MG capsule   Commonly known as:  ACTIGALL   This may have  changed:    - how to take this  - additional instructions   Used for:  Liver replaced by transplant (H)        TAKE THREE CAPSULES BY MOUTH EVERY DAY   Quantity:  270 capsule   Refills:  3       * Notice:  This list has 2 medication(s) that are the same as other medications prescribed for you. Read the directions carefully, and ask your doctor or other care provider to review them with you.         Where to get your medicines      These medications were sent to [facility] [place] - [place], [place] - [address]  [address] [address]     Phone:  [phone]     carvedilol 6.25 MG tablet                Primary Care Provider Office Phone # Fax #    JEMAL Ferrari [facility] [phone]       [address] [address]        Equal Access to Services     ALFONSO STRAUSS : Oswaldo patelo Sosoledad, waaxda luqadaha, qaybta kaalmada adeegyada, gloria briceno . So [facility] [phone].    ATENCIÓN: Si habla español, tiene a power disposición servicios gratuitos de asistencia lingüística. Llame al [phone].    We comply with applicable federal civil rights laws and [place] laws. We do not discriminate on the basis of race, color, national origin, age, disability, sex, sexual orientation, or gender identity.            Thank you!     Thank you for choosing [facility]  for your care. Our goal is always to provide you with excellent care. Hearing back from our patients is one way we can continue to improve our services. Please take a few minutes to complete the written survey that you may receive in the mail after your visit with us. Thank you!             Your Updated Medication List - Protect others around you: Learn how to safely use, store and throw away your medicines at www.disposemymeds.org.          This list is accurate as of 4/25/18  9:18 AM.  Always use your most recent med list.                   Brand Name Dispense  Instructions for use Diagnosis    allopurinol 100 MG tablet    ZYLOPRIM    180 tablet    Take 2 tablets (200 mg) by mouth daily    Acute idiopathic gout of left foot       amLODIPine 5 MG tablet    NORVASC    90 tablet    Take 1 tablet (5 mg) by mouth daily    Essential hypertension with goal blood pressure less than 140/90       * carvedilol 6.25 MG tablet    COREG    30 tablet    TAKE ONE TABLET BY MOUTH TWICE A DAY WITH MEALS ***NEED FOLLOW UP APPOINTMENT FOR THIS MEDICATION***    Essential hypertension       * carvedilol 6.25 MG tablet    COREG    180 tablet    Take 1 tablet (6.25 mg) by mouth 2 times daily (with meals)    Essential hypertension       furosemide 20 MG tablet    LASIX    90 tablet    Take 1 tablet (20 mg) by mouth daily    Right leg swelling       predniSONE 1 MG tablet    DELTASONE    270 tablet    Take 3 tablets (3 mg) by mouth daily    Liver replaced by transplant (H)       sertraline 50 MG tablet    ZOLOFT    30 tablet    Take 1 tablet (50 mg) by mouth daily    Liver replaced by transplant (H)       sirolimus 0.5 MG tablet    GENERIC EQUIVALENT    360 tablet    Take 4 tablets (2 mg) by mouth daily    Liver replaced by transplant (H)       triamcinolone 0.1 % ointment    KENALOG    500 g    Apply to itchy rash twice daily for 7 to 10 days as needed.    Contact dermatitis and other eczema, due to unspecified cause       TYLENOL PO      Take 500 mg by mouth every 6 hours as needed for mild pain or fever        ursodiol 300 MG capsule    ACTIGALL    270 capsule    TAKE THREE CAPSULES BY MOUTH EVERY DAY    Liver replaced by transplant (H)       VANICREAM EX      Externally apply topically 2 times daily        * Notice:  This list has 2 medication(s) that are the same as other medications prescribed for you. Read the directions carefully, and ask your doctor or other care provider to review them with you.

## 2018-04-25 NOTE — PATIENT INSTRUCTIONS
1. Follow up in 6 months           Thank you for choosing [facility].  You may be receiving a survey in the mail from [facility] regarding your visit today.  Please take a few minutes to complete and return the survey to let us know how we are doing.      If you have questions or concerns, please contact us via [employer] or you can contact your care team at [phone].    Our Clinic hours are:  Monday 6:40 am  to 7:00 pm  Tuesday -Friday 6:40 am to 5:00 pm    The [place] outpatient lab hours are:  Monday - Friday 6:10 am to 4:45 pm  Saturdays 7:00 am to 11:00 am  Appointments are required, call [phone]    If you have clinical questions after hours or would like to schedule an appointment,  call the clinic at [phone].

## 2018-04-25 NOTE — TELEPHONE ENCOUNTER
"Notes from nephrology wondering about Anali needing upper endoscopy.  I see that she had a mild esophageal stricture di;ated in Nov 2011 and another EGD in August of 2013 which noted a hiatal hernia.  She says that \"stuff has been getting stuck there\" a bit more and she is wondering if she should have an upper endoscopy.  She has an appt w/ Dr. Lomeli in July, says she will discuss this with him when she see's him.  I told her to call me if it gets worse and she thinks something needs to be done sooner.  "

## 2018-04-27 ENCOUNTER — ANESTHESIA EVENT (OUTPATIENT)
Dept: GASTROENTEROLOGY | Facility: CLINIC | Age: 78
End: 2018-04-27
Payer: MEDICARE

## 2018-04-27 ASSESSMENT — LIFESTYLE VARIABLES: TOBACCO_USE: 1

## 2018-04-27 NOTE — ANESTHESIA PREPROCEDURE EVALUATION
Anesthesia Evaluation     . Pt has had prior anesthetic. Type: General           ROS/MED HX    ENT/Pulmonary:     (+)tobacco use, Past use , . .    Neurologic:  - neg neurologic ROS     Cardiovascular:     (+) Dyslipidemia, hypertension----. : . . . :. .       METS/Exercise Tolerance:  4 - Raking leaves, gardening   Hematologic:     (+) Anemia, -      Musculoskeletal:   (+) , , other musculoskeletal- gout      GI/Hepatic:     (+) liver disease (S/P liver transplant),       Renal/Genitourinary:     (+) chronic renal disease, type: CRI,       Endo:  - neg endo ROS   (+) Chronic steroid usage for Post Transplant Immunosuppression .      Psychiatric:  - neg psychiatric ROS       Infectious Disease:  - neg infectious disease ROS       Malignancy:   (+) Malignancy History of Skin  Skin CA Remission status post Surgery,         Other:    - neg other ROS                 Physical Exam  Normal systems: cardiovascular, pulmonary and dental    Airway   Mallampati: II  TM distance: >3 FB  Neck ROM: full    Dental     Cardiovascular       Pulmonary                     Anesthesia Plan      History & Physical Review      ASA Status:  3 .    NPO Status:  > 8 hours    Plan for MAC Reason for MAC:  Deep or markedly invasive procedure (G8) and Procedure to face, neck, head or breast    Solucortef 50 mg before and after.      Postoperative Care      Consents  Anesthetic plan, risks, benefits and alternatives discussed with:  Patient..                          .

## 2018-04-30 ENCOUNTER — SURGERY (OUTPATIENT)
Age: 78
End: 2018-04-30
Payer: COMMERCIAL

## 2018-04-30 ENCOUNTER — ANESTHESIA (OUTPATIENT)
Dept: GASTROENTEROLOGY | Facility: CLINIC | Age: 78
End: 2018-04-30
Payer: MEDICARE

## 2018-04-30 ENCOUNTER — HOSPITAL ENCOUNTER (OUTPATIENT)
Facility: CLINIC | Age: 78
Discharge: HOME | End: 2018-04-30
Attending: SURGERY | Admitting: SURGERY
Payer: MEDICARE

## 2018-04-30 VITALS
BODY MASS INDEX: 23.39 KG/M2 | RESPIRATION RATE: 16 BRPM | OXYGEN SATURATION: 98 % | HEIGHT: 59 IN | WEIGHT: 116 LBS | SYSTOLIC BLOOD PRESSURE: 155 MMHG | TEMPERATURE: 98 F | DIASTOLIC BLOOD PRESSURE: 82 MMHG

## 2018-04-30 LAB — UPPER GI ENDOSCOPY: NORMAL

## 2018-04-30 PROCEDURE — 25000125 ZZHC RX 250: Performed by: NURSE ANESTHETIST, CERTIFIED REGISTERED

## 2018-04-30 PROCEDURE — A9270 NON-COVERED ITEM OR SERVICE: HCPCS | Mod: GY | Performed by: SURGERY

## 2018-04-30 PROCEDURE — 43239 EGD BIOPSY SINGLE/MULTIPLE: CPT | Mod: XS

## 2018-04-30 PROCEDURE — 88305 TISSUE EXAM BY PATHOLOGIST: CPT | Performed by: SURGERY

## 2018-04-30 PROCEDURE — 25000125 ZZHC RX 250: Performed by: SURGERY

## 2018-04-30 PROCEDURE — 25000128 H RX IP 250 OP 636: Performed by: NURSE ANESTHETIST, CERTIFIED REGISTERED

## 2018-04-30 PROCEDURE — 25000128 H RX IP 250 OP 636: Performed by: SURGERY

## 2018-04-30 PROCEDURE — 88305 TISSUE EXAM BY PATHOLOGIST: CPT | Mod: 26 | Performed by: SURGERY

## 2018-04-30 PROCEDURE — 43239 EGD BIOPSY SINGLE/MULTIPLE: CPT | Mod: 59 | Performed by: SURGERY

## 2018-04-30 PROCEDURE — 25000132 ZZH RX MED GY IP 250 OP 250 PS 637: Mod: GY | Performed by: SURGERY

## 2018-04-30 PROCEDURE — 43249 ESOPH EGD DILATION <30 MM: CPT | Performed by: SURGERY

## 2018-04-30 PROCEDURE — 37000008 ZZH ANESTHESIA TECHNICAL FEE, 1ST 30 MIN: Performed by: SURGERY

## 2018-04-30 RX ORDER — SIMETHICONE 40MG/0.6ML
SUSPENSION, DROPS(FINAL DOSAGE FORM)(ML) ORAL PRN
Status: DISCONTINUED | OUTPATIENT
Start: 2018-04-30 | End: 2018-04-30 | Stop reason: HOSPADM

## 2018-04-30 RX ORDER — ONDANSETRON 2 MG/ML
4 INJECTION INTRAMUSCULAR; INTRAVENOUS
Status: DISCONTINUED | OUTPATIENT
Start: 2018-04-30 | End: 2018-04-30 | Stop reason: HOSPADM

## 2018-04-30 RX ORDER — PROPOFOL 10 MG/ML
INJECTION, EMULSION INTRAVENOUS CONTINUOUS PRN
Status: DISCONTINUED | OUTPATIENT
Start: 2018-04-30 | End: 2018-04-30

## 2018-04-30 RX ORDER — GLYCOPYRROLATE 0.2 MG/ML
INJECTION, SOLUTION INTRAMUSCULAR; INTRAVENOUS PRN
Status: DISCONTINUED | OUTPATIENT
Start: 2018-04-30 | End: 2018-04-30

## 2018-04-30 RX ORDER — LIDOCAINE HYDROCHLORIDE 10 MG/ML
INJECTION, SOLUTION EPIDURAL; INFILTRATION; INTRACAUDAL; PERINEURAL PRN
Status: DISCONTINUED | OUTPATIENT
Start: 2018-04-30 | End: 2018-04-30

## 2018-04-30 RX ORDER — LIDOCAINE 40 MG/G
CREAM TOPICAL
Status: DISCONTINUED | OUTPATIENT
Start: 2018-04-30 | End: 2018-04-30 | Stop reason: HOSPADM

## 2018-04-30 RX ORDER — PROPOFOL 10 MG/ML
INJECTION, EMULSION INTRAVENOUS PRN
Status: DISCONTINUED | OUTPATIENT
Start: 2018-04-30 | End: 2018-04-30

## 2018-04-30 RX ORDER — SODIUM CHLORIDE, SODIUM LACTATE, POTASSIUM CHLORIDE, CALCIUM CHLORIDE 600; 310; 30; 20 MG/100ML; MG/100ML; MG/100ML; MG/100ML
INJECTION, SOLUTION INTRAVENOUS CONTINUOUS
Status: DISCONTINUED | OUTPATIENT
Start: 2018-04-30 | End: 2018-04-30 | Stop reason: HOSPADM

## 2018-04-30 RX ADMIN — GLYCOPYRROLATE 0.2 MG: 0.2 INJECTION, SOLUTION INTRAMUSCULAR; INTRAVENOUS at 11:16

## 2018-04-30 RX ADMIN — HYDROCORTISONE SODIUM SUCCINATE 50 MG: 100 INJECTION, POWDER, FOR SOLUTION INTRAMUSCULAR; INTRAVENOUS at 11:24

## 2018-04-30 RX ADMIN — SIMETHICONE 20 MG: 20 SUSPENSION/ DROPS ORAL at 11:17

## 2018-04-30 RX ADMIN — SODIUM CHLORIDE, POTASSIUM CHLORIDE, SODIUM LACTATE AND CALCIUM CHLORIDE: 600; 310; 30; 20 INJECTION, SOLUTION INTRAVENOUS at 11:03

## 2018-04-30 RX ADMIN — PROPOFOL 200 MCG/KG/MIN: 10 INJECTION, EMULSION INTRAVENOUS at 11:17

## 2018-04-30 RX ADMIN — LIDOCAINE HYDROCHLORIDE 1 ML: 10 INJECTION, SOLUTION EPIDURAL; INFILTRATION; INTRACAUDAL; PERINEURAL at 11:03

## 2018-04-30 RX ADMIN — HYDROCORTISONE SODIUM SUCCINATE 50 MG: 100 INJECTION, POWDER, FOR SOLUTION INTRAMUSCULAR; INTRAVENOUS at 11:15

## 2018-04-30 RX ADMIN — TOPICAL ANESTHETIC 1 SPRAY: 200 SPRAY DENTAL; PERIODONTAL at 11:16

## 2018-04-30 RX ADMIN — PROPOFOL 100 MG: 10 INJECTION, EMULSION INTRAVENOUS at 11:17

## 2018-04-30 RX ADMIN — LIDOCAINE HYDROCHLORIDE 50 MG: 10 INJECTION, SOLUTION EPIDURAL; INFILTRATION; INTRACAUDAL; PERINEURAL at 11:16

## 2018-04-30 NOTE — OP NOTE
EGD - mildly stenosed Schatzki ring in lower 1/3 of esophagus (dilated). Gastritis biopsied for H pylori. No ulcers. Duodenum normal.

## 2018-04-30 NOTE — ANESTHESIA CARE TRANSFER NOTE
Patient: Amanda Nails    Procedure(s):  gastroscopy - Wound Class: II-Clean Contaminated    Diagnosis: esophageal stricture, liver transplant patient    diagnostic  Diagnosis Additional Information: No value filed.    Anesthesia Type:   MAC     Note:  Airway :Room Air  Patient transferred to:Phase II  Handoff Report: Identifed the Patient, Identified the Reponsible Provider, Reviewed the pertinent medical history, Discussed the surgical course, Reviewed Intra-OP anesthesia mangement and issues during anesthesia, Set expectations for post-procedure period and Allowed opportunity for questions and acknowledgement of understanding      Vitals: (Last set prior to Anesthesia Care Transfer)    CRNA VITALS  4/30/2018 1102 - 4/30/2018 1132      4/30/2018             Pulse: 96    SpO2: 100 %                Electronically Signed By: JEMAL Allen CRNA  April 30, 2018  11:32 AM

## 2018-04-30 NOTE — ANESTHESIA POSTPROCEDURE EVALUATION
Patient: Amanda Nails    Procedure(s):  gastroscopy - Wound Class: II-Clean Contaminated    Diagnosis:esophageal stricture, liver transplant patient    diagnostic  Diagnosis Additional Information: No value filed.    Anesthesia Type:  MAC    Note:  Anesthesia Post Evaluation    Patient location during evaluation: Bedside  Patient participation: Able to fully participate in evaluation  Level of consciousness: awake and alert  Pain management: adequate  Airway patency: patent  Cardiovascular status: acceptable  Respiratory status: acceptable  Hydration status: acceptable  PONV: none     Anesthetic complications: None          Last vitals:  Vitals:    04/30/18 1016   BP: (!) 179/92   Resp: 16   Temp: 36.7  C (98  F)   SpO2: 98%         Electronically Signed By: JEMAL Allen CRNA  April 30, 2018  11:33 AM

## 2018-04-30 NOTE — ANESTHESIA CARE TRANSFER NOTE
Patient: Amanda Nails    Procedure(s):  gastroscopy - Wound Class: II-Clean Contaminated    Diagnosis: esophageal stricture, liver transplant patient    diagnostic  Diagnosis Additional Information: No value filed.    Anesthesia Type:   MAC     Note:  Airway :Room Air  Patient transferred to:Phase II  Handoff Report: Identifed the Patient, Identified the Reponsible Provider, Reviewed the pertinent medical history, Discussed the surgical course, Reviewed Intra-OP anesthesia mangement and issues during anesthesia, Set expectations for post-procedure period and Allowed opportunity for questions and acknowledgement of understanding      Vitals: (Last set prior to Anesthesia Care Transfer)    CRNA VITALS  4/30/2018 1102 - 4/30/2018 1138      4/30/2018             Pulse: 96    SpO2: 100 %                Electronically Signed By: JEMAL Allen CRNA  April 30, 2018  11:38 AM
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
## 2018-05-02 ENCOUNTER — INFUSION THERAPY VISIT (OUTPATIENT)
Dept: INFUSION THERAPY | Facility: CLINIC | Age: 78
End: 2018-05-02
Attending: INTERNAL MEDICINE
Payer: MEDICARE

## 2018-05-02 VITALS — SYSTOLIC BLOOD PRESSURE: 142 MMHG | HEART RATE: 65 BPM | DIASTOLIC BLOOD PRESSURE: 60 MMHG

## 2018-05-02 DIAGNOSIS — D63.1 ANEMIA IN STAGE 4 CHRONIC KIDNEY DISEASE (H): Primary | ICD-10-CM

## 2018-05-02 DIAGNOSIS — N18.30 CHRONIC KIDNEY DISEASE, STAGE III (MODERATE) (H): ICD-10-CM

## 2018-05-02 DIAGNOSIS — N18.4 ANEMIA IN STAGE 4 CHRONIC KIDNEY DISEASE (H): Primary | ICD-10-CM

## 2018-05-02 LAB — COPATH REPORT: NORMAL

## 2018-05-02 PROCEDURE — 96365 THER/PROPH/DIAG IV INF INIT: CPT

## 2018-05-02 PROCEDURE — 25000128 H RX IP 250 OP 636: Performed by: INTERNAL MEDICINE

## 2018-05-02 RX ADMIN — FERRIC CARBOXYMALTOSE INJECTION 750 MG: 50 INJECTION, SOLUTION INTRAVENOUS at 15:27

## 2018-05-02 RX ADMIN — SODIUM CHLORIDE 250 ML: 9 INJECTION, SOLUTION INTRAVENOUS at 15:27

## 2018-05-02 NOTE — PROGRESS NOTES
Infusion Nursing Note:  Amanda Nails presents today for Injectafer 1 of 2.  Patient seen by provider today: No   present during visit today: Not Applicable.    Note: N/A.    Intravenous Access:  Peripheral IV placed.    Treatment Conditions:  Not Applicable.      Post Infusion Assessment:  Patient tolerated infusion without incident.  Patient observed for 30 minutes post Injectafer per protocol.  Blood return noted pre and post infusion.  Site patent and intact, free from redness, edema or discomfort.  No evidence of extravasations.  Access discontinued per protocol.    Discharge Plan:   Patient discharged in stable condition accompanied by: self.  Departure Mode: Ambulatory.  Pt to return 5/9/18 at 1:00 pm for Injectafer 2 of 2.     Bree Rubin RN

## 2018-05-02 NOTE — MR AVS SNAPSHOT
After Visit Summary   5/2/2018    Amanda Nails    MRN: [id number]           Patient Information     Date Of Birth          1940        Visit Information        Provider Department      5/2/2018 3:00 PM ROOM 6 [facility]        Today's Diagnoses     Anemia in stage 4 chronic kidney disease (H)    -  1    Chronic kidney disease, stage III (moderate)           Follow-ups after your visit        Your next 10 appointments already scheduled     May 09, 2018  1:00 PM CDT   Level 1 with ROOM 4 [facility] ([facility])    n [facility]  [address]  [facility] [address]   [phone]            Jul 13, 2018 11:30 AM CDT   (Arrive by 11:15 AM)   Return Liver Transplant with Luis Daniel Lomeli MD   [facility] Hepatology ([facility])    [address] [place] [address]  [address]  [facility] [address] [phone]              Who to contact     If you have questions or need follow up information about today's clinic visit or your schedule please contact [facility] directly at [phone].  Normal or non-critical lab and imaging results will be communicated to you by [employer] People[employer], letter or phone within 4 business days after the clinic has received the results. If you do not hear from us within 7 days, please contact the clinic through [employer] or phone. If you have a critical or abnormal lab result, we will notify you by phone as soon as possible.  Submit refill requests through Eye Phone or call your pharmacy and they will forward the refill request to us. Please allow 3 business days for your refill to be completed.          Additional Information About Your Visit        MyChart Information     Eye Phone gives you secure access to your electronic health record. If you see a primary care provider, you can also send messages to your care team and make appointments. If you have questions, please call your primary  care clinic.  If you do not have a primary care provider, please call [phone] and they will assist you.        Care EveryWhere ID     This is your Care EveryWhere ID. This could be used by other organizations to access your [place] medical records  [id number]        Your Vitals Were     Pulse                   65            Blood Pressure from Last 3 Encounters:   05/02/18 142/60   04/30/18 155/82   04/25/18 127/75    Weight from Last 3 Encounters:   04/30/18 52.6 kg (116 lb)   04/25/18 52.3 kg (115 lb 3.2 oz)   04/17/18 52.6 kg (116 lb)              Today, you had the following     No orders found for display         Today's Medication Changes          These changes are accurate as of 5/2/18  5:09 PM.  If you have any questions, ask your nurse or doctor.               These medicines have changed or have updated prescriptions.        Dose/Directions    ursodiol 300 MG capsule   Commonly known as:  ACTIGALL   This may have changed:    - how to take this  - additional instructions   Used for:  Liver replaced by transplant (H)        TAKE THREE CAPSULES BY MOUTH EVERY DAY   Quantity:  270 capsule   Refills:  3                Primary Care Provider Office Phone # Fax #    Aby Downing, APRN [facility] [phone] [address] [address]        Equal Access to Services     ALFONSO STRAUSS AH: Oswaldo patelo Sosoledad, waaxda luqadaha, qaybta kaalmada adeegyada, gloria hodges. So [facility] [phone].    ATENCIÓN: Si habla español, tiene a power disposición servicios gratuitos de asistencia lingüística. Llame al [phone].    We comply with applicable federal civil rights laws and [place] laws. We do not discriminate on the basis of race, color, national origin, age, disability, sex, sexual orientation, or gender identity.            Thank you!     Thank you for choosing [facility]  for your care. Our goal is always to provide you with excellent  care. Hearing back from our patients is one way we can continue to improve our services. Please take a few minutes to complete the written survey that you may receive in the mail after your visit with us. Thank you!             Your Updated Medication List - Protect others around you: Learn how to safely use, store and throw away your medicines at www.disposemymeds.org.          This list is accurate as of 5/2/18  5:09 PM.  Always use your most recent med list.                   Brand Name Dispense Instructions for use Diagnosis    allopurinol 100 MG tablet    ZYLOPRIM    180 tablet    Take 2 tablets (200 mg) by mouth daily    Acute idiopathic gout of left foot       amLODIPine 5 MG tablet    NORVASC    90 tablet    Take 1 tablet (5 mg) by mouth daily    Essential hypertension with goal blood pressure less than 140/90       carvedilol 6.25 MG tablet    COREG    180 tablet    Take 1 tablet (6.25 mg) by mouth 2 times daily (with meals)    Essential hypertension       furosemide 20 MG tablet    LASIX    90 tablet    Take 1 tablet (20 mg) by mouth daily    Right leg swelling       predniSONE 1 MG tablet    DELTASONE    270 tablet    Take 3 tablets (3 mg) by mouth daily    Liver replaced by transplant (H)       SERTRALINE HCL PO      Take 50 mg by mouth daily        sirolimus 0.5 MG tablet    GENERIC EQUIVALENT    360 tablet    Take 4 tablets (2 mg) by mouth daily    Liver replaced by transplant (H)       triamcinolone 0.1 % ointment    KENALOG    500 g    Apply to itchy rash twice daily for 7 to 10 days as needed.    Contact dermatitis and other eczema, due to unspecified cause       TYLENOL PO      Take 500 mg by mouth every 6 hours as needed for mild pain or fever        ursodiol 300 MG capsule    ACTIGALL    270 capsule    TAKE THREE CAPSULES BY MOUTH EVERY DAY    Liver replaced by transplant (H)       VANICREAM EX      Externally apply topically 2 times daily

## 2018-05-03 ENCOUNTER — TELEPHONE (OUTPATIENT)
Dept: PHARMACY | Facility: CLINIC | Age: 78
End: 2018-05-03

## 2018-05-03 NOTE — TELEPHONE ENCOUNTER
Anemia Management Note  SUBJECTIVE/OBJECTIVE:  Referred by Sarai Abrams MD on 2018  Primary Diagnosis: Anemia in Chronic Kidney Disease (N18.3, D63.1)   Secondary Diagnosis:  Chronic Kidney Disease, Stage 3 (N18.3)   Hgb goal range: 9-10  Epo/Darbo: None.     Order expires: N/A  Iron regimen:  Does not tolerate PO iron   Lab orders   in EPIC  Contact:  Ok to leave message on home phone regarding medical, billing and scheduling per consent to communicate dated 2013        Anemia Latest Ref Rng & Units 2016   Hemoglobin 11.7 - 15.7 g/dL 10.3(L) 10.4(L) 11.3(L) 10.8(L) 10.0(L) 8.4(L) -   IV Iron Dose - - - - - - - 750mg   TSAT 15 - 46 % - - - - - 5(L) -   Ferritin 8 - 252 ng/mL - - - - - 7(L) -         BP Readings from Last 3 Encounters:   18 142/60   18 155/82   18 127/75     Wt Readings from Last 2 Encounters:   18 116 lb (52.6 kg)   18 115 lb 3.2 oz (52.3 kg)     Amanda states not feeling any different, but tolerated IV infusion well.  Explained may see rise in Hgb next week from first dose IV iron.    ASSESSMENT:  Hgb:Not at goal/Initiation of therapy  TSat: Due for iron studies 4 weeks after last IV iron infusion Ferritin: Due for iron studies 4 weeks after last IV iron infusion    PLAN:  RTC for IV iron in 1 week(s), scheduled 2018  Check Hgb in 1 week if infusion center can draw next week.  Otherwise OK to defer until other labs due.    Orders needed to be renewed (for next follow-up date) in [facility]: None    Iron labs due:  4 weeks after second iron infusion    Plan discussed with: Amanda   Plan provided by:      NEXT FOLLOW-UP DATE:  05/10/2018    Anemia Management Service  Yola Rangel,PharmD and Nini Alfredo CPhT  Phone: [phone]  Fax: [phone]

## 2018-05-09 ENCOUNTER — INFUSION THERAPY VISIT (OUTPATIENT)
Dept: INFUSION THERAPY | Facility: CLINIC | Age: 78
End: 2018-05-09
Attending: INTERNAL MEDICINE
Payer: MEDICARE

## 2018-05-09 ENCOUNTER — HOSPITAL ENCOUNTER (OUTPATIENT)
Facility: CLINIC | Age: 78
Discharge: HOME | End: 2018-05-09
Attending: INTERNAL MEDICINE | Admitting: INTERNAL MEDICINE
Payer: MEDICARE

## 2018-05-09 VITALS — DIASTOLIC BLOOD PRESSURE: 58 MMHG | HEART RATE: 70 BPM | SYSTOLIC BLOOD PRESSURE: 139 MMHG

## 2018-05-09 DIAGNOSIS — N18.4 ANEMIA IN STAGE 4 CHRONIC KIDNEY DISEASE (H): Primary | ICD-10-CM

## 2018-05-09 DIAGNOSIS — D63.1 ANEMIA IN STAGE 4 CHRONIC KIDNEY DISEASE (H): Primary | ICD-10-CM

## 2018-05-09 DIAGNOSIS — N18.30 ANEMIA IN STAGE 3 CHRONIC KIDNEY DISEASE (H): ICD-10-CM

## 2018-05-09 DIAGNOSIS — D63.1 ANEMIA IN STAGE 3 CHRONIC KIDNEY DISEASE (H): ICD-10-CM

## 2018-05-09 DIAGNOSIS — N18.30 CHRONIC KIDNEY DISEASE, STAGE III (MODERATE) (H): ICD-10-CM

## 2018-05-09 LAB
HCT VFR BLD AUTO: 28.1 % (ref 35–47)
HGB BLD-MCNC: 8.6 G/DL (ref 11.7–15.7)

## 2018-05-09 PROCEDURE — 85018 HEMOGLOBIN: CPT | Performed by: INTERNAL MEDICINE

## 2018-05-09 PROCEDURE — 96365 THER/PROPH/DIAG IV INF INIT: CPT

## 2018-05-09 PROCEDURE — 85014 HEMATOCRIT: CPT | Performed by: INTERNAL MEDICINE

## 2018-05-09 PROCEDURE — 25000128 H RX IP 250 OP 636: Performed by: INTERNAL MEDICINE

## 2018-05-09 RX ADMIN — FERRIC CARBOXYMALTOSE INJECTION 750 MG: 50 INJECTION, SOLUTION INTRAVENOUS at 13:21

## 2018-05-09 RX ADMIN — SODIUM CHLORIDE 250 ML: 9 INJECTION, SOLUTION INTRAVENOUS at 13:21

## 2018-05-09 NOTE — PROGRESS NOTES
Infusion Nursing Note:  Amanda LÓPEZ Jeremiasroscoe presents today for Injectafer.    Patient seen by provider today: No   present during visit today: Not Applicable.    Note: N/A.    Intravenous Access:  Peripheral IV placed.    Treatment Conditions:  Not Applicable.      Post Infusion Assessment:  Patient tolerated infusion without incident.  Patient observed for 30 minutes post Injectafer per protocol.  Site patent and intact, free from redness, edema or discomfort.  No evidence of extravasations.  Access discontinued per protocol.    Discharge Plan:   Patient discharged in stable condition accompanied by: self.  Departure Mode: Ambulatory.    Deja Talley RN

## 2018-05-09 NOTE — MR AVS SNAPSHOT
After Visit Summary   5/9/2018    Amanda Nails    MRN: [id number]           Patient Information     Date Of Birth          1940        Visit Information        Provider Department      5/9/2018 1:00 PM ROOM 4 [facility]        Today's Diagnoses     Anemia in stage 4 chronic kidney disease (H)    -  1    Chronic kidney disease, stage III (moderate)        Anemia in stage 3 chronic kidney disease           Follow-ups after your visit        Your next 10 appointments already scheduled     Jul 13, 2018 11:30 AM CDT   (Arrive by 11:15 AM)   Return Liver Transplant with Luis Daniel Lomeli MD   [facility] Hepatology ([facility] [place])    [address]  [address]  [facility] [address] [phone]              Who to contact     If you have questions or need follow up information about today's clinic visit or your schedule please contact [facility] directly at [phone].  Normal or non-critical lab and imaging results will be communicated to you by UpDownhart, letter or phone within 4 business days after the clinic has received the results. If you do not hear from us within 7 days, please contact the clinic through [employer] or phone. If you have a critical or abnormal lab result, we will notify you by phone as soon as possible.  Submit refill requests through PowerOasis or call your pharmacy and they will forward the refill request to us. Please allow 3 business days for your refill to be completed.          Additional Information About Your Visit        MyChart Information     PowerOasis gives you secure access to your electronic health record. If you see a primary care provider, you can also send messages to your care team and make appointments. If you have questions, please call your primary care clinic.  If you do not have a primary care provider, please call [phone] and they will assist you.        Care EveryWhere ID     This is your Care EveryWhere  ID. This could be used by other organizations to access your [place] medical records  [id number]        Your Vitals Were     Pulse                   70            Blood Pressure from Last 3 Encounters:   05/09/18 139/58   05/02/18 142/60   04/30/18 155/82    Weight from Last 3 Encounters:   04/30/18 52.6 kg (116 lb)   04/25/18 52.3 kg (115 lb 3.2 oz)   04/17/18 52.6 kg (116 lb)              We Performed the Following     Hemoglobin and hematocrit          Today's Medication Changes          These changes are accurate as of 5/9/18  3:22 PM.  If you have any questions, ask your nurse or doctor.               These medicines have changed or have updated prescriptions.        Dose/Directions    ursodiol 300 MG capsule   Commonly known as:  ACTIGALL   This may have changed:    - how to take this  - additional instructions   Used for:  Liver replaced by transplant (H)        TAKE THREE CAPSULES BY MOUTH EVERY DAY   Quantity:  270 capsule   Refills:  3                Primary Care Provider Office Phone # Fax #    Aby Downing, APRN [facility] [phone] [address] [address]        Equal Access to Services     ALFONSO STRAUSS AH: Hadii toi patelo Sosoledad, waaxda luqadaha, qaybta kaalmada adeamandeep, gloria briceno . So [facility] [phone].    ATENCIÓN: Si habla español, tiene a power disposición servicios gratuitos de asistencia lingüística. Llame al [phone].    We comply with applicable federal civil rights laws and [place] laws. We do not discriminate on the basis of race, color, national origin, age, disability, sex, sexual orientation, or gender identity.            Thank you!     Thank you for choosing [facility]  for your care. Our goal is always to provide you with excellent care. Hearing back from our patients is one way we can continue to improve our services. Please take a few minutes to complete the written survey that you may receive in  the mail after your visit with us. Thank you!             Your Updated Medication List - Protect others around you: Learn how to safely use, store and throw away your medicines at www.disposemymeds.org.          This list is accurate as of 5/9/18  3:22 PM.  Always use your most recent med list.                   Brand Name Dispense Instructions for use Diagnosis    allopurinol 100 MG tablet    ZYLOPRIM    180 tablet    Take 2 tablets (200 mg) by mouth daily    Acute idiopathic gout of left foot       amLODIPine 5 MG tablet    NORVASC    90 tablet    Take 1 tablet (5 mg) by mouth daily    Essential hypertension with goal blood pressure less than 140/90       carvedilol 6.25 MG tablet    COREG    180 tablet    Take 1 tablet (6.25 mg) by mouth 2 times daily (with meals)    Essential hypertension       furosemide 20 MG tablet    LASIX    90 tablet    Take 1 tablet (20 mg) by mouth daily    Right leg swelling       predniSONE 1 MG tablet    DELTASONE    270 tablet    Take 3 tablets (3 mg) by mouth daily    Liver replaced by transplant (H)       SERTRALINE HCL PO      Take 50 mg by mouth daily        sirolimus 0.5 MG tablet    GENERIC EQUIVALENT    360 tablet    Take 4 tablets (2 mg) by mouth daily    Liver replaced by transplant (H)       triamcinolone 0.1 % ointment    KENALOG    500 g    Apply to itchy rash twice daily for 7 to 10 days as needed.    Contact dermatitis and other eczema, due to unspecified cause       TYLENOL PO      Take 500 mg by mouth every 6 hours as needed for mild pain or fever        ursodiol 300 MG capsule    ACTIGALL    270 capsule    TAKE THREE CAPSULES BY MOUTH EVERY DAY    Liver replaced by transplant (H)       VANICREAM EX      Externally apply topically 2 times daily

## 2018-05-10 ENCOUNTER — TELEPHONE (OUTPATIENT)
Dept: PHARMACY | Facility: CLINIC | Age: 78
End: 2018-05-10

## 2018-05-10 NOTE — TELEPHONE ENCOUNTER
Anemia Management Note  SUBJECTIVE/OBJECTIVE:  Referred by Sarai Abrams MD on 2018  Primary Diagnosis: Anemia in Chronic Kidney Disease (N18.3, D63.1)   Secondary Diagnosis:  Chronic Kidney Disease, Stage 3 (N18.3)   Hgb goal range: 9-10  Epo/Darbo: None.     Order expires: N/A  Iron regimen:  Does not tolerate PO iron   Lab orders   in EPIC  Contact:  Ok to leave message on home phone regarding medical, billing and scheduling per consent to communicate dated 2013    Anemia Latest Ref Rng & Units 2017   Hemoglobin 11.7 - 15.7 g/dL 10.4(L) 11.3(L) 10.8(L) 10.0(L) 8.4(L) - 8.6(L)   IV Iron Dose - - - - - - 750mg 750mg   TSAT 15 - 46 % - - - - 5(L) - -   Ferritin 8 - 252 ng/mL - - - - 7(L) - -     BP Readings from Last 3 Encounters:   18 139/58   18 142/60   18 155/82     Wt Readings from Last 2 Encounters:   18 116 lb (52.6 kg)   18 115 lb 3.2 oz (52.3 kg)         ASSESSMENT:  Hgb: Not at goal but improving -  continue current regimen  TSat: Due for iron studies 4 weeks after last IV iron infusion Ferritin: Due for iron studies 4 weeks after last IV iron infusion    PLAN:  RTC for Hgb, ferritin and iron labs in 4 week(s)    Orders needed to be renewed (for next follow-up date) in [facility]: None    Iron labs due:  18    Plan discussed with:  Amanda  Plan provided by:  Beth Alfredo CPhT  Anemia Clinic  [phone]    NEXT FOLLOW-UP DATE:  18  Reviewed 2018 [facility]  Anemia Management Service  Yola Rangel,PharmD and Nini Alfredo CPhT  Phone: [phone]  Fax: [phone]

## 2018-06-04 DIAGNOSIS — Z94.4 LIVER REPLACED BY TRANSPLANT (H): ICD-10-CM

## 2018-06-04 DIAGNOSIS — D63.1 ANEMIA IN STAGE 3 CHRONIC KIDNEY DISEASE (H): ICD-10-CM

## 2018-06-04 DIAGNOSIS — N18.30 ANEMIA IN STAGE 3 CHRONIC KIDNEY DISEASE (H): ICD-10-CM

## 2018-06-04 DIAGNOSIS — N18.30 CHRONIC KIDNEY DISEASE, STAGE III (MODERATE) (H): Chronic | ICD-10-CM

## 2018-06-04 LAB
ALBUMIN SERPL-MCNC: 3 G/DL (ref 3.4–5)
ALP SERPL-CCNC: 357 U/L (ref 40–150)
ALT SERPL W P-5'-P-CCNC: 37 U/L (ref 0–50)
ANION GAP SERPL CALCULATED.3IONS-SCNC: 9 MMOL/L (ref 3–14)
AST SERPL W P-5'-P-CCNC: 49 U/L (ref 0–45)
BILIRUB DIRECT SERPL-MCNC: 0.4 MG/DL (ref 0–0.2)
BILIRUB SERPL-MCNC: 0.7 MG/DL (ref 0.2–1.3)
BUN SERPL-MCNC: 34 MG/DL (ref 7–30)
CALCIUM SERPL-MCNC: 8.9 MG/DL (ref 8.5–10.1)
CHLORIDE SERPL-SCNC: 102 MMOL/L (ref 94–109)
CO2 SERPL-SCNC: 29 MMOL/L (ref 20–32)
CREAT SERPL-MCNC: 1.43 MG/DL (ref 0.52–1.04)
ERYTHROCYTE [DISTWIDTH] IN BLOOD BY AUTOMATED COUNT: 20.6 % (ref 10–15)
FERRITIN SERPL-MCNC: 540 NG/ML (ref 8–252)
GFR SERPL CREATININE-BSD FRML MDRD: 36 ML/MIN/1.7M2
GLUCOSE SERPL-MCNC: 149 MG/DL (ref 70–99)
HCT VFR BLD AUTO: 30.2 % (ref 35–47)
HGB BLD-MCNC: 9.3 G/DL (ref 11.7–15.7)
IRON SATN MFR SERPL: 12 % (ref 15–46)
IRON SERPL-MCNC: 34 UG/DL (ref 35–180)
MCH RBC QN AUTO: 27.9 PG (ref 26.5–33)
MCHC RBC AUTO-ENTMCNC: 30.8 G/DL (ref 31.5–36.5)
MCV RBC AUTO: 91 FL (ref 78–100)
PLATELET # BLD AUTO: 251 10E9/L (ref 150–450)
POTASSIUM SERPL-SCNC: 3.4 MMOL/L (ref 3.4–5.3)
PROT SERPL-MCNC: 7.6 G/DL (ref 6.8–8.8)
RBC # BLD AUTO: 3.33 10E12/L (ref 3.8–5.2)
SODIUM SERPL-SCNC: 140 MMOL/L (ref 133–144)
TIBC SERPL-MCNC: 277 UG/DL (ref 240–430)
WBC # BLD AUTO: 6.2 10E9/L (ref 4–11)

## 2018-06-04 PROCEDURE — 83550 IRON BINDING TEST: CPT | Performed by: INTERNAL MEDICINE

## 2018-06-04 PROCEDURE — 85027 COMPLETE CBC AUTOMATED: CPT | Performed by: INTERNAL MEDICINE

## 2018-06-04 PROCEDURE — 36415 COLL VENOUS BLD VENIPUNCTURE: CPT | Performed by: INTERNAL MEDICINE

## 2018-06-04 PROCEDURE — 80048 BASIC METABOLIC PNL TOTAL CA: CPT | Performed by: INTERNAL MEDICINE

## 2018-06-04 PROCEDURE — 83540 ASSAY OF IRON: CPT | Performed by: INTERNAL MEDICINE

## 2018-06-04 PROCEDURE — 82728 ASSAY OF FERRITIN: CPT | Performed by: INTERNAL MEDICINE

## 2018-06-04 PROCEDURE — 80076 HEPATIC FUNCTION PANEL: CPT | Performed by: INTERNAL MEDICINE

## 2018-06-06 ENCOUNTER — TELEPHONE (OUTPATIENT)
Dept: TRANSPLANT | Facility: CLINIC | Age: 78
End: 2018-06-06

## 2018-06-06 ENCOUNTER — TELEPHONE (OUTPATIENT)
Dept: PHARMACY | Facility: CLINIC | Age: 78
End: 2018-06-06

## 2018-06-06 NOTE — TELEPHONE ENCOUNTER
Anemia Management Note  SUBJECTIVE/OBJECTIVE:  Referred by Sarai Abrams MD on 2018  Primary Diagnosis: Anemia in Chronic Kidney Disease (N18.3, D63.1)   Secondary Diagnosis:  Chronic Kidney Disease, Stage 3 (N18.3)   Hgb goal range: 9-10  Epo/Darbo: None.     Order expires: N/A  Iron regimen:  Does not tolerate PO iron   Lab orders   in EPIC  Contact:  Ok to leave message on home phone regarding medical, billing and scheduling per consent to communicate dated 2013    Anemia Latest Ref Rng & Units 2017   Hemoglobin 11.7 - 15.7 g/dL 11.3(L) 10.8(L) 10.0(L) 8.4(L) - 8.6(L) 9.3(L)   IV Iron Dose - - - - - 750mg 750mg -   TSAT 15 - 46 % - - - 5(L) - - 12(L)   Ferritin 8 - 252 ng/mL - - - 7(L) - - 540(H)     BP Readings from Last 3 Encounters:   18 139/58   18 142/60   18 155/82     Wt Readings from Last 2 Encounters:   18 116 lb (52.6 kg)   18 115 lb 3.2 oz (52.3 kg)     Amanda admits to being very tired and would like to know if another IV iron infusion would help.    States had tooth pulled last week.  Inflammation can drive up ferritin levels, and probably influencing level falsely. 2018 AMMY    ASSESSMENT:  Hgb:at goal - continue to monitor  TSat: not at goal (>30%) but ferritin >500ng/mL.  IV iron not indicated at this time per anemia protocol. Ferritin: At goal (>100ng/mL)    PLAN:  RTC for Hgb,ferritin and iron labs in 4 week(s)  Referred to Yola Rangel to determine the need for additional iron therapy  Ferritin outside of range for protocol, send order for another round of IV iron to Dr. Abrams for review and signature.  2018 AMMY    Orders needed to be renewed (for next follow-up date) in EPIC: None    Iron labs due:  18    Plan discussed with:  Amanda  Plan provided by:  Beth Alfredo [facility]  Anemia Clinic  [phone]    NEXT FOLLOW-UP DATE:  2018    Anemia  Management Service  Doreen GoinsD and Nini Alfredo CPhT  Phone: [phone]  Fax: [phone]

## 2018-06-06 NOTE — TELEPHONE ENCOUNTER
Please call Anali;  She is thinking she may need infusion.  Regarding her recent lab results 06/04/18 Anali's#[phone]

## 2018-06-11 ENCOUNTER — TELEPHONE (OUTPATIENT)
Dept: FAMILY MEDICINE | Facility: CLINIC | Age: 78
End: 2018-06-11

## 2018-06-11 NOTE — TELEPHONE ENCOUNTER
Patient reports about a week ago she did something to her neck.  She is still experiencing neck pain today.  Requesting PT referral.  RN advised patient needs appt - scheduled for tomorrow.  Jessica CEE RN

## 2018-06-12 ENCOUNTER — HOSPITAL ENCOUNTER (OUTPATIENT)
Dept: PHYSICAL THERAPY | Facility: CLINIC | Age: 78
Setting detail: THERAPIES SERIES
End: 2018-06-12
Attending: NURSE PRACTITIONER
Payer: MEDICARE

## 2018-06-12 ENCOUNTER — OFFICE VISIT (OUTPATIENT)
Dept: FAMILY MEDICINE | Facility: CLINIC | Age: 78
End: 2018-06-12
Payer: COMMERCIAL

## 2018-06-12 VITALS
HEIGHT: 59 IN | TEMPERATURE: 97.8 F | HEART RATE: 71 BPM | BODY MASS INDEX: 22.41 KG/M2 | SYSTOLIC BLOOD PRESSURE: 144 MMHG | WEIGHT: 111.19 LBS | DIASTOLIC BLOOD PRESSURE: 73 MMHG | RESPIRATION RATE: 14 BRPM

## 2018-06-12 DIAGNOSIS — S16.1XXA STRAIN OF NECK MUSCLE, INITIAL ENCOUNTER: Primary | ICD-10-CM

## 2018-06-12 PROCEDURE — 97140 MANUAL THERAPY 1/> REGIONS: CPT | Mod: GP | Performed by: PHYSICAL THERAPIST

## 2018-06-12 PROCEDURE — G8981 BODY POS CURRENT STATUS: HCPCS | Mod: GP,CK | Performed by: PHYSICAL THERAPIST

## 2018-06-12 PROCEDURE — G8982 BODY POS GOAL STATUS: HCPCS | Mod: GP,CI | Performed by: PHYSICAL THERAPIST

## 2018-06-12 PROCEDURE — 99213 OFFICE O/P EST LOW 20 MIN: CPT | Performed by: NURSE PRACTITIONER

## 2018-06-12 PROCEDURE — 40000718 ZZHC STATISTIC PT DEPARTMENT ORTHO VISIT: Performed by: PHYSICAL THERAPIST

## 2018-06-12 PROCEDURE — 97110 THERAPEUTIC EXERCISES: CPT | Mod: GP | Performed by: PHYSICAL THERAPIST

## 2018-06-12 PROCEDURE — 97161 PT EVAL LOW COMPLEX 20 MIN: CPT | Mod: GP | Performed by: PHYSICAL THERAPIST

## 2018-06-12 NOTE — NURSING NOTE
"Chief Complaint   Patient presents with     Neck Pain     No injury, no history of previous neck pain.  Here today with daughter, Yola.  Patient's pain today is 2/10, however, one week ago pain was 10/10 and patient mainly slept.  Has had PT on lower back which helped.       Initial /73  Pulse 71  Temp 97.8  F (36.6  C) (Tympanic)  Resp 14  Ht 4' 11\" (1.499 m)  Wt 111 lb 3 oz (50.4 kg)  BMI 22.46 kg/m2 Estimated body mass index is 22.46 kg/(m^2) as calculated from the following:    Height as of this encounter: 4' 11\" (1.499 m).    Weight as of this encounter: 111 lb 3 oz (50.4 kg).    Medication Reconciliation:  complete    Anastacia Gibson CMA (AAMA)  "

## 2018-06-12 NOTE — PATIENT INSTRUCTIONS
Thank you for choosing [facility].  You may be receiving a survey in the mail from Martín Guardado regarding your visit today.  Please take a few minutes to complete and return the survey to let us know how we are doing.      If you have questions or concerns, please contact us via SiOnyx or you can contact your care team at [phone].    Our Clinic hours are:  Monday 6:40 am  to 7:00 pm  Tuesday -Friday 6:40 am to 5:00 pm    The [place] outpatient lab hours are:  Monday - Friday 6:10 am to 4:45 pm  Saturdays 7:00 am to 11:00 am  Appointments are required, call [phone]    If you have clinical questions after hours or would like to schedule an appointment,  call the clinic at [phone].

## 2018-06-12 NOTE — PROGRESS NOTES
[facility]          OUTPATIENT PHYSICAL THERAPY ORTHOPEDIC EVALUATION  PLAN OF TREATMENT FOR OUTPATIENT REHABILITATION  (COMPLETE FOR INITIAL CLAIMS ONLY)  Patient's Last Name, First Name, M.I.  YOB: 1940  Amanda Nails    Provider s Name:  [facility]   Medical Record No.  [id number]   Start of Care Date:  06/12/18   Onset Date:  05/29/18   Type:     _X__PT   ___OT   ___SLP Medical Diagnosis:  Strain of neck muscle, initial encounter     PT Diagnosis:  Left upper trapezius pain related to postural dysfunction   Visits from SOC:  1      _________________________________________________________________________________  Plan of Treatment/Functional Goals:  ADL retraining, joint mobilization, manual therapy, motor coordination training, neuromuscular re-education, ROM, strengthening, stretching     Cryotherapy     Goals     Goal Description: Patient will have >=60 degrees of bilateral cervical rotation AROM.  Target Date: 08/07/18       Goal Description: Patient will be able to perform ADL's without neck pain.  Target Date: 08/07/18       Goal Description: Patient will be able to participate in pool therapy with minimal neck pain.  Target Date: 08/07/18           Therapy Frequency:  1 time/week  Predicted Duration of Therapy Intervention:  6-8 weeks  Thank you for this referral.  Ashanti Barrett, PT                 I CERTIFY THE NEED FOR THESE SERVICES FURNISHED UNDER        THIS PLAN OF TREATMENT AND WHILE UNDER MY CARE     (Physician co-signature of this document indicates review and certification of the therapy plan).                       Certification Date From:  06/12/18   Certification Date To:  08/07/18    Referring Provider:  JEMAL Redman CNP    Initial Assessment        See Epic Evaluation Start of Care Date: 06/12/18

## 2018-06-12 NOTE — PROGRESS NOTES
06/12/18 1600   General Information   Type of Visit Initial OP Ortho PT Evaluation   Start of Care Date 06/12/18   Referring Physician JEMAL Redman CNP   Orders Evaluate and Treat   Date of Order 06/12/18   Insurance Type Medicare;[employer] Cross   Medical Diagnosis Strain of neck muscle, initial encounter   Surgical/Medical history reviewed Yes   Precautions/Limitations no known precautions/limitations   Body Part(s)   Body Part(s) Cervical Spine   Presentation and Etiology   Pertinent history of current problem (include personal factors and/or comorbidities that impact the POC) Patient reports: about 2 weeks ago she was at a wedding that was very cold.  Could feel her neck tighten up then, and even more after dancing.     Impairments A. Pain;E. Decreased flexibility   Functional Limitations perform activities of daily living;perform required work activities;perform desired leisure / sports activities   Symptom Location Left upper trapezius   How/Where did it occur During recreation/sports   Onset date of current episode/exacerbation 05/29/18   Chronicity New   Pain rating (0-10 point scale) Best (/10);Worst (/10)   Best (/10) 3   Worst (/10) 8   Pain quality C. Aching   Frequency of pain/symptoms A. Constant   Pain/symptoms are: The same all the time   Pain/symptoms exacerbated by G. Certain positions   Pain/symptoms eased by C. Rest;G. Heat   Progression of symptoms since onset: Improved   Prior Level of Function   Prior Level of Function-Mobility Independent   Prior Level of Function-ADLs Independent   Current Level of Function   Current Community Support Family/friend caregiver   Patient role/employment history F. Retired   Fall Risk Screen   Fall screen completed by PT   Have you fallen 2 or more times in the past year? No   Have you fallen and had an injury in the past year? No   Is patient a fall risk? No   Functional Scales   Functional Scales Other   Other Scales  See NDI   Cervical Spine   Cervical  Flexion ROM 50 deg pain free   Cervical Extension ROM 30 deg   Cervical Right Side Bending ROM 10 deg   Cervical Left Side Bending ROM 10 deg   Cervical Right Rotation ROM 55 deg   Cervical Left Rotation ROM 35 deg   Shoulder/Wrist/Hand Strength Comments No gross myotomal weakness; caitlyn rhomboid and lower trapezius weakness 3+/5   Palpation Tender to palpation of left upper trapezius   Planned Therapy Interventions   Planned Therapy Interventions ADL retraining;joint mobilization;manual therapy;motor coordination training;neuromuscular re-education;ROM;strengthening;stretching   Planned Modality Interventions   Planned Modality Interventions Cryotherapy   Clinical Impression   Criteria for Skilled Therapeutic Interventions Met yes, treatment indicated   PT Diagnosis Left upper trapezius pain related to postural dysfunction   Influenced by the following impairments Pain; impaired posture; impaired AROM   Functional limitations due to impairments Difficulty driving, performing ADL's; unable to participate in pool aerobics   Clinical Presentation Stable/Uncomplicated   Clinical Presentation Rationale (+) motivation; previous success with PT (-) thoracic kyphosis   Clinical Decision Making (Complexity) Low complexity   Therapy Frequency 1 time/week   Predicted Duration of Therapy Intervention (days/wks) 6-8 weeks   Risk & Benefits of therapy have been explained Yes   Patient, Family & other staff in agreement with plan of care Yes   Clinical Impression Comments Christa arrives today with 2 week history of left lateral cervical pain.  She will benefit from skilled PT.   Education Assessment   Preferred Learning Style Listening;Demonstration;Pictures/video   Barriers to Learning No barriers   ORTHO GOALS   PT Ortho Eval Goals 1;2;3   Ortho Goal 1   Goal Description Patient will have >=60 degrees of bilateral cervical rotation AROM.   Target Date 08/07/18   Ortho Goal 2   Goal Description Patient will be able to perform ADL's  without neck pain.   Target Date 08/07/18   Ortho Goal 3   Goal Description Patient will be able to participate in pool therapy with minimal neck pain.   Target Date 08/07/18   Total Evaluation Time   Total Evaluation Time 15 min   Therapy Certification   Certification date from 06/12/18   Certification date to 08/07/18   Medical Diagnosis Strain of neck muscle, initial encounter       Ashanti Barrett, PT, DPT  Doctor of Physical Therapy #[id number]  [facility]  [phone]  [email or web address]

## 2018-06-20 ENCOUNTER — TELEPHONE (OUTPATIENT)
Dept: PHARMACY | Facility: CLINIC | Age: 78
End: 2018-06-20

## 2018-06-20 NOTE — TELEPHONE ENCOUNTER
Follow-up with anemia management service:    IV iron orders now signed, let know OK to schedule.  Anali also states feeling very tired, encouraged to get Hgb checked, too.    Anemia Latest Ref Rng & Units 2017   Hemoglobin 11.7 - 15.7 g/dL 11.3(L) 10.8(L) 10.0(L) 8.4(L) - 8.6(L) 9.3(L)   IV Iron Dose - - - - - 750mg 750mg -   TSAT 15 - 46 % - - - 5(L) - - 12(L)   Ferritin 8 - 252 ng/mL - - - 7(L) - - 540(H)       Orders needed to be renewed (for next follow-up date) in EPIC: None   Med order expires: 2018   Lab orders : 2018    Follow-up call date: 2018 to check appt dates    AMMY    Anemia Management Service  Yola Rangel,PharmD and Nini Alfredo CPhT  Phone: [phone]  Fax: [phone]

## 2018-06-25 ENCOUNTER — HOSPITAL ENCOUNTER (OUTPATIENT)
Dept: PHYSICAL THERAPY | Facility: CLINIC | Age: 78
Setting detail: THERAPIES SERIES
End: 2018-06-25
Attending: NURSE PRACTITIONER
Payer: MEDICARE

## 2018-06-25 PROCEDURE — 97140 MANUAL THERAPY 1/> REGIONS: CPT | Mod: GP | Performed by: PHYSICAL THERAPIST

## 2018-06-25 PROCEDURE — 40000718 ZZHC STATISTIC PT DEPARTMENT ORTHO VISIT: Performed by: PHYSICAL THERAPIST

## 2018-06-25 NOTE — TELEPHONE ENCOUNTER
Patient scheduled her IV injectafer appts for 6/29 and 7/6/18- documented IV ion infusions on 7/6/18    Follow up date: 6/29    Beth Alfredo [facility]  Anemia Clinic  [phone]

## 2018-06-29 ENCOUNTER — INFUSION THERAPY VISIT (OUTPATIENT)
Dept: INFUSION THERAPY | Facility: CLINIC | Age: 78
End: 2018-06-29
Attending: INTERNAL MEDICINE
Payer: MEDICARE

## 2018-06-29 VITALS
RESPIRATION RATE: 18 BRPM | HEART RATE: 66 BPM | SYSTOLIC BLOOD PRESSURE: 152 MMHG | DIASTOLIC BLOOD PRESSURE: 63 MMHG | OXYGEN SATURATION: 98 % | TEMPERATURE: 98.1 F

## 2018-06-29 DIAGNOSIS — D63.1 ANEMIA IN STAGE 3 CHRONIC KIDNEY DISEASE (H): ICD-10-CM

## 2018-06-29 DIAGNOSIS — N18.30 ANEMIA IN STAGE 3 CHRONIC KIDNEY DISEASE (H): ICD-10-CM

## 2018-06-29 DIAGNOSIS — N18.4 ANEMIA IN STAGE 4 CHRONIC KIDNEY DISEASE (H): Primary | ICD-10-CM

## 2018-06-29 DIAGNOSIS — D63.1 ANEMIA IN STAGE 4 CHRONIC KIDNEY DISEASE (H): Primary | ICD-10-CM

## 2018-06-29 DIAGNOSIS — N18.30 CHRONIC KIDNEY DISEASE, STAGE III (MODERATE) (H): ICD-10-CM

## 2018-06-29 LAB
HCT VFR BLD AUTO: 30.6 % (ref 35–47)
HGB BLD-MCNC: 9.5 G/DL (ref 11.7–15.7)

## 2018-06-29 PROCEDURE — 25000128 H RX IP 250 OP 636: Mod: ZF | Performed by: INTERNAL MEDICINE

## 2018-06-29 PROCEDURE — 85018 HEMOGLOBIN: CPT | Performed by: INTERNAL MEDICINE

## 2018-06-29 PROCEDURE — 85014 HEMATOCRIT: CPT | Performed by: INTERNAL MEDICINE

## 2018-06-29 PROCEDURE — 96365 THER/PROPH/DIAG IV INF INIT: CPT

## 2018-06-29 PROCEDURE — 36415 COLL VENOUS BLD VENIPUNCTURE: CPT

## 2018-06-29 RX ADMIN — FERRIC CARBOXYMALTOSE INJECTION 750 MG: 50 INJECTION, SOLUTION INTRAVENOUS at 13:20

## 2018-06-29 NOTE — PROGRESS NOTES
Nursing Note  Amanda Nails [facility]:   Chief Complaint   Patient presents with     Infusion     Injectafer     During today's [facility] appointment, orders from Dr. Abrams were completed.  Frequency: today is dose 1 of 2 total, at least 7 days apart.    Progress note:  Patient identification verified by name and date of birth.  Assessment completed.  Vitals recorded in Doc Flowsheets.  Patient was provided with education regarding infusion and possible side effects.  Patient verbalized understanding.      needed: No  Premedications: were not ordered.  Infusion Rates: infusion given over approximately 15 minutes.  Approximate Infusion length:1 hours.   Labs: were not ordered for this appointment.  Vascular access: peripheral IV placed today.  Treatment Conditions: patient monitored for 30 minutes after medication was infused.  Patient tolerated infusion: well.    Discharge Plan:   Follow up plan of care with: ongoing infusions at [facility].  Discharge instructions were reviewed with patient.  Patient/representative verbalized understanding of discharge instructions and all questions answered.  Patient discharged from [facility] in stable condition.    Beverly Mclaughlin RN     Administrations This Visit     ferric carboxymaltose (INJECTAFER) 750 mg in sodium chloride 0.9 % 100 mL intermittent infusion     Admin Date Action Dose Rate Route Administered By          06/29/2018 New Bag 750 mg 500 mL/hr Intravenous Beverly Mclaughlin RN                       /63 (BP Location: Left arm)  Pulse 66  Temp 98.1  F (36.7  C) (Oral)  Resp 18  SpO2 98%

## 2018-06-29 NOTE — MR AVS SNAPSHOT
After Visit Summary   6/29/2018    Amanda Nails    MRN: [id number]           Patient Information     Date Of Birth          1940        Visit Information        Provider Department      6/29/2018 1:00 PM FAIZA 44 ATC; [facility] SPEC INFUSION [facility]        Today's Diagnoses     Anemia in stage 4 chronic kidney disease (H)    -  1    Chronic kidney disease, stage III (moderate)        Anemia in stage 3 chronic kidney disease          Care Instructions    Dear Amanda Nails    Thank you for choosing [facility] ([facility]) for your infusion.  The following information is a summary of our appointment as well as important reminders.      Ferric carboxymaltose Solution for injection  What is this medicine?  FERRIC CARBOXYMALTOSE (ferr-ik car-box-ee-mol-toes) is an iron complex. Iron is used to make healthy red blood cells, which carry oxygen and nutrients throughout the body. This medicine is used to treat anemia in people with chronic kidney disease or people who cannot take iron by mouth.  This medicine may be used for other purposes; ask your health care provider or pharmacist if you have questions.  What should I tell my health care provider before I take this medicine?  They need to know if you have any of these conditions:    anemia not caused by low iron levels    high levels of iron in the blood    liver disease    an unusual or allergic reaction to iron, other medicines, foods, dyes, or preservatives    pregnant or trying to get pregnant    breast-feeding  How should I use this medicine?  This medicine is for infusion into a vein. It is given by a health care professional in a hospital or clinic setting.  Talk to your pediatrician regarding the use of this medicine in children. Special care may be needed.  Overdosage: If you think you've taken too much of this medicine contact a poison  control center or emergency room at once.  NOTE: This medicine is only for you. Do not share this medicine with others.  What if I miss a dose?  It is important not to miss your dose. Call your doctor or health care professional if you are unable to keep an appointment.  What may interact with this medicine?  Do not take this medicine with any of the following medications:  deferoxamine  dimercaprol  other iron products  This medicine may also interact with the following medications:  chloramphenicol  deferasirox  This list may not describe all possible interactions. Give your health care provider a list of all the medicines, herbs, non-prescription drugs, or dietary supplements you use. Also tell them if you smoke, drink alcohol, or use illegal drugs. Some items may interact with your medicine.  What should I watch for while using this medicine?  Visit your doctor or health care professional regularly. Tell your doctor if your symptoms do not start to get better or if they get worse. You may need blood work done while you are taking this medicine.  You may need to follow a special diet. Talk to your doctor. Foods that contain iron include: whole grains/cereals, dried fruits, beans, or peas, leafy green vegetables, and organ meats (liver, kidney).  What side effects may I notice from receiving this medicine?  Side effects that you should report to your doctor or health care professional as soon as possible:  allergic reactions like skin rash, itching or hives, swelling of the face, lips, or tonguebreathing problems  changes in blood pressure  feeling faint or lightheaded, falls  flushing, sweating, or hot feelings  Side effects that usually do not require medical attention (Report these to your doctor or health care professional if they continue or are bothersome.):  changes in taste  constipation  dizziness  headache  nausea  pain, redness, or irritation at site where injected  vomiting  This list may not describe  all possible side effects. Call your doctor for medical advice about side effects. You may report side effects to FDA at 7-903-YIK-2217.  Where should I keep my medicine?  This drug is given in a hospital or clinic and will not be stored at home.  NOTE: This sheet is a summary. It may not cover all possible information. If you have questions about this medicine, talk to your doctor, pharmacist, or health care provider.  NOTE:This sheet is a summary. It may not cover all possible information. If you have questions about this medicine, talk to your doctor, pharmacist, or health care provider. Copyright  2016 Gold Standard        We look forward in seeing you on your next appointment here at [facility].  Please don t hesitate to call us at [phone] to reschedule any of your appointments or to speak with one of the [facility] registered nurses.  It was a pleasure taking care of you today.    Sincerely,    [facility]  [facility]  [address] [place] [address]  [address]  Phone:  [phone]            Follow-ups after your visit        Your next 10 appointments already scheduled     Jul 06, 2018 10:00 AM CDT   Infusion 120 with UC SPEC INFUSION, UC 49 ATC   [facility] [place] [facility] ([facility])    [address]  [address]  [facility] [address] [phone]            Jul 13, 2018 11:30 AM CDT   (Arrive by 11:15 AM)   Return Liver Transplant with Luis Daniel Lomeli MD   [facility] ([facility])    [address]  [address]  [facility] [address] [phone]              Who to contact     If you have questions or need follow up information about today's clinic visit or your schedule please contact [facility] [place] SPECIALTY AND PROCEDURE directly at [phone].  Normal or non-critical lab and imaging results will be communicated to you by  MyChart, letter or phone within 4 business days after the clinic has received the results. If you do not hear from us within 7 days, please contact the clinic through [employer] or phone. If you have a critical or abnormal lab result, we will notify you by phone as soon as possible.  Submit refill requests through [employer] or call your pharmacy and they will forward the refill request to us. Please allow 3 business days for your refill to be completed.          Additional Information About Your Visit        [employer] Information     [employer] gives you secure access to your electronic health record. If you see a primary care provider, you can also send messages to your care team and make appointments. If you have questions, please call your primary care clinic.  If you do not have a primary care provider, please call [phone] and they will assist you.        Care EveryWhere ID     This is your Care EveryWhere ID. This could be used by other organizations to access your [place] medical records  [id number]        Your Vitals Were     Pulse Temperature Respirations Pulse Oximetry          66 98.1  F (36.7  C) (Oral) 18 98%         Blood Pressure from Last 3 Encounters:   06/29/18 (P) 144/72   06/12/18 144/73   05/09/18 139/58    Weight from Last 3 Encounters:   06/12/18 50.4 kg (111 lb 3 oz)   04/30/18 52.6 kg (116 lb)   04/25/18 52.3 kg (115 lb 3.2 oz)              We Performed the Following     Hemoglobin and hematocrit          Today's Medication Changes          These changes are accurate as of 6/29/18 11:59 PM.  If you have any questions, ask your nurse or doctor.               These medicines have changed or have updated prescriptions.        Dose/Directions    ursodiol 300 MG capsule   Commonly known as:  ACTIGALL   This may have changed:    - how to take this  - additional instructions   Used for:  Liver replaced by transplant (H)        TAKE THREE CAPSULES BY MOUTH EVERY DAY   Quantity:  270 capsule   Refills:   3                Primary Care Provider Office Phone # Fax #    JEMAL Ferrari [facility] [phone] [address] [address]        Equal Access to Services     ALFONSO STRAUSS : Hadii aad ku hadadrianoo Somannyali, waaxda luqadaha, qaybta kaalmada adeegyada, gloria marianomark malinrowena patiño janak hodges. So [facility] [phone].    ATENCIÓN: Si habla español, tiene a power disposición servicios gratuitos de asistencia lingüística. Llame al [phone].    We comply with applicable federal civil rights laws and [place] laws. We do not discriminate on the basis of race, color, national origin, age, disability, sex, sexual orientation, or gender identity.            Thank you!     Thank you for choosing [facility] PROCEDURE  for your care. Our goal is always to provide you with excellent care. Hearing back from our patients is one way we can continue to improve our services. Please take a few minutes to complete the written survey that you may receive in the mail after your visit with us. Thank you!             Your Updated Medication List - Protect others around you: Learn how to safely use, store and throw away your medicines at www.disposemymeds.org.          This list is accurate as of 6/29/18 11:59 PM.  Always use your most recent med list.                   Brand Name Dispense Instructions for use Diagnosis    allopurinol 100 MG tablet    ZYLOPRIM    180 tablet    Take 2 tablets (200 mg) by mouth daily    Acute idiopathic gout of left foot       amLODIPine 5 MG tablet    NORVASC    90 tablet    Take 1 tablet (5 mg) by mouth daily    Essential hypertension with goal blood pressure less than 140/90       carvedilol 6.25 MG tablet    COREG    180 tablet    Take 1 tablet (6.25 mg) by mouth 2 times daily (with meals)    Essential hypertension       furosemide 20 MG tablet    LASIX    90 tablet    Take 1 tablet (20 mg) by mouth daily    Right leg swelling        predniSONE 1 MG tablet    DELTASONE    270 tablet    Take 3 tablets (3 mg) by mouth daily    Liver replaced by transplant (H)       SERTRALINE HCL PO      Take 50 mg by mouth daily        sirolimus 0.5 MG tablet    GENERIC EQUIVALENT    360 tablet    Take 4 tablets (2 mg) by mouth daily    Liver replaced by transplant (H)       triamcinolone 0.1 % ointment    KENALOG    500 g    Apply to itchy rash twice daily for 7 to 10 days as needed.    Contact dermatitis and other eczema, due to unspecified cause       TYLENOL PO      Take 500 mg by mouth every 6 hours as needed for mild pain or fever        ursodiol 300 MG capsule    ACTIGALL    270 capsule    TAKE THREE CAPSULES BY MOUTH EVERY DAY    Liver replaced by transplant (H)       VANICREAM EX      Externally apply topically 2 times daily

## 2018-06-29 NOTE — PATIENT INSTRUCTIONS
Dear Amanda Nails    Thank you for choosing [facility] ([facility] for your infusion.  The following information is a summary of our appointment as well as important reminders.      Ferric carboxymaltose Solution for injection  What is this medicine?  FERRIC CARBOXYMALTOSE (ferr-ik car-box-ee-mol-toes) is an iron complex. Iron is used to make healthy red blood cells, which carry oxygen and nutrients throughout the body. This medicine is used to treat anemia in people with chronic kidney disease or people who cannot take iron by mouth.  This medicine may be used for other purposes; ask your health care provider or pharmacist if you have questions.  What should I tell my health care provider before I take this medicine?  They need to know if you have any of these conditions:    anemia not caused by low iron levels    high levels of iron in the blood    liver disease    an unusual or allergic reaction to iron, other medicines, foods, dyes, or preservatives    pregnant or trying to get pregnant    breast-feeding  How should I use this medicine?  This medicine is for infusion into a vein. It is given by a health care professional in a hospital or clinic setting.  Talk to your pediatrician regarding the use of this medicine in children. Special care may be needed.  Overdosage: If you think you've taken too much of this medicine contact a poison control center or emergency room at once.  NOTE: This medicine is only for you. Do not share this medicine with others.  What if I miss a dose?  It is important not to miss your dose. Call your doctor or health care professional if you are unable to keep an appointment.  What may interact with this medicine?  Do not take this medicine with any of the following medications:  deferoxamine  dimercaprol  other iron products  This medicine may also interact with the following medications:  chloramphenicol  deferasirox  This  list may not describe all possible interactions. Give your health care provider a list of all the medicines, herbs, non-prescription drugs, or dietary supplements you use. Also tell them if you smoke, drink alcohol, or use illegal drugs. Some items may interact with your medicine.  What should I watch for while using this medicine?  Visit your doctor or health care professional regularly. Tell your doctor if your symptoms do not start to get better or if they get worse. You may need blood work done while you are taking this medicine.  You may need to follow a special diet. Talk to your doctor. Foods that contain iron include: whole grains/cereals, dried fruits, beans, or peas, leafy green vegetables, and organ meats (liver, kidney).  What side effects may I notice from receiving this medicine?  Side effects that you should report to your doctor or health care professional as soon as possible:  allergic reactions like skin rash, itching or hives, swelling of the face, lips, or tonguebreathing problems  changes in blood pressure  feeling faint or lightheaded, falls  flushing, sweating, or hot feelings  Side effects that usually do not require medical attention (Report these to your doctor or health care professional if they continue or are bothersome.):  changes in taste  constipation  dizziness  headache  nausea  pain, redness, or irritation at site where injected  vomiting  This list may not describe all possible side effects. Call your doctor for medical advice about side effects. You may report side effects to FDA at 6-017-FDA-7024.  Where should I keep my medicine?  This drug is given in a hospital or clinic and will not be stored at home.  NOTE: This sheet is a summary. It may not cover all possible information. If you have questions about this medicine, talk to your doctor, pharmacist, or health care provider.  NOTE:This sheet is a summary. It may not cover all possible information. If you have questions about  this medicine, talk to your doctor, pharmacist, or health care provider. Copyright  2016 Gold Standard        We look forward in seeing you on your next appointment here at [facility].  Please don t hesitate to call us at [phone] to reschedule any of your appointments or to speak with one of the [facility] registered nurses.  It was a pleasure taking care of you today.    Sincerely,    [facility]  [facility]  [address] [place] [address]  [address]  Phone:  [phone]

## 2018-07-06 ENCOUNTER — INFUSION THERAPY VISIT (OUTPATIENT)
Dept: INFUSION THERAPY | Facility: CLINIC | Age: 78
End: 2018-07-06
Attending: INTERNAL MEDICINE
Payer: MEDICARE

## 2018-07-06 VITALS
RESPIRATION RATE: 16 BRPM | HEART RATE: 60 BPM | DIASTOLIC BLOOD PRESSURE: 64 MMHG | TEMPERATURE: 97.4 F | OXYGEN SATURATION: 98 % | SYSTOLIC BLOOD PRESSURE: 133 MMHG

## 2018-07-06 DIAGNOSIS — N18.30 CHRONIC KIDNEY DISEASE, STAGE III (MODERATE) (H): ICD-10-CM

## 2018-07-06 DIAGNOSIS — D63.1 ANEMIA IN STAGE 4 CHRONIC KIDNEY DISEASE (H): Primary | ICD-10-CM

## 2018-07-06 DIAGNOSIS — N18.4 ANEMIA IN STAGE 4 CHRONIC KIDNEY DISEASE (H): Primary | ICD-10-CM

## 2018-07-06 PROCEDURE — 25000128 H RX IP 250 OP 636: Mod: ZF | Performed by: INTERNAL MEDICINE

## 2018-07-06 PROCEDURE — 96365 THER/PROPH/DIAG IV INF INIT: CPT

## 2018-07-06 RX ADMIN — FERRIC CARBOXYMALTOSE INJECTION 750 MG: 50 INJECTION, SOLUTION INTRAVENOUS at 11:11

## 2018-07-06 NOTE — MR AVS SNAPSHOT
After Visit Summary   7/6/2018    Amanda Nails    MRN: [id number]           Patient Information     Date Of Birth          1940        Visit Information        Provider Department      7/6/2018 10:00 AM FAIZA 49 ATC; FAIZA SPEC INFUSION [facility] Specialty [facility] Procedure        Today's Diagnoses     Anemia in stage 4 chronic kidney disease (H)    -  1    Chronic kidney disease, stage III (moderate)          Care Instructions    Dear Amanda Nails    Thank you for choosing [facility] (SIP) for your infusion.  The following information is a summary of our appointment as well as important reminders.        Ferric carboxymaltose Solution for injection  What is this medicine?  FERRIC CARBOXYMALTOSE (ferr-ik car-box-ee-mol-toes) is an iron complex. Iron is used to make healthy red blood cells, which carry oxygen and nutrients throughout the body. This medicine is used to treat anemia in people with chronic kidney disease or people who cannot take iron by mouth.  This medicine may be used for other purposes; ask your health care provider or pharmacist if you have questions.  What should I tell my health care provider before I take this medicine?  They need to know if you have any of these conditions:    anemia not caused by low iron levels    high levels of iron in the blood    liver disease    an unusual or allergic reaction to iron, other medicines, foods, dyes, or preservatives    pregnant or trying to get pregnant    breast-feeding  How should I use this medicine?  This medicine is for infusion into a vein. It is given by a health care professional in a hospital or clinic setting.  Talk to your pediatrician regarding the use of this medicine in children. Special care may be needed.  Overdosage: If you think you've taken too much of this medicine contact a poison control center or emergency room at once.  NOTE:  This medicine is only for you. Do not share this medicine with others.  What if I miss a dose?  It is important not to miss your dose. Call your doctor or health care professional if you are unable to keep an appointment.  What may interact with this medicine?  Do not take this medicine with any of the following medications:  deferoxamine  dimercaprol  other iron products  This medicine may also interact with the following medications:  chloramphenicol  deferasirox  This list may not describe all possible interactions. Give your health care provider a list of all the medicines, herbs, non-prescription drugs, or dietary supplements you use. Also tell them if you smoke, drink alcohol, or use illegal drugs. Some items may interact with your medicine.  What should I watch for while using this medicine?  Visit your doctor or health care professional regularly. Tell your doctor if your symptoms do not start to get better or if they get worse. You may need blood work done while you are taking this medicine.  You may need to follow a special diet. Talk to your doctor. Foods that contain iron include: whole grains/cereals, dried fruits, beans, or peas, leafy green vegetables, and organ meats (liver, kidney).  What side effects may I notice from receiving this medicine?  Side effects that you should report to your doctor or health care professional as soon as possible:  allergic reactions like skin rash, itching or hives, swelling of the face, lips, or tonguebreathing problems  changes in blood pressure  feeling faint or lightheaded, falls  flushing, sweating, or hot feelings  Side effects that usually do not require medical attention (Report these to your doctor or health care professional if they continue or are bothersome.):  changes in taste  constipation  dizziness  headache  nausea  pain, redness, or irritation at site where injected  vomiting  This list may not describe all possible side effects. Call your doctor for  medical advice about side effects. You may report side effects to FDA at 6-289-ELD-3074.  Where should I keep my medicine?  This drug is given in a hospital or clinic and will not be stored at home.  NOTE: This sheet is a summary. It may not cover all possible information. If you have questions about this medicine, talk to your doctor, pharmacist, or health care provider.  NOTE:This sheet is a summary. It may not cover all possible information. If you have questions about this medicine, talk to your doctor, pharmacist, or health care provider. Copyright  2016 Gold Standard          We look forward in seeing you on your next appointment here at [facility].  Please don t hesitate to call us at [phone] to reschedule any of your appointments or to speak with one of the [facility] registered nurses.  It was a pleasure taking care of you today.    Sincerely,    [facility]  Specialty Infusion & Procedure Center  [address] [place]  [address]  Phone:  [phone]            Follow-ups after your visit        Your next 10 appointments already scheduled     Jul 13, 2018 11:30 AM CDT   (Arrive by 11:15 AM)   Return Liver Transplant with Luis Daniel Lomeli MD   [facility] Hepatology ([facility])    [address] [place] [address]  [address]  [facility] [address] [phone]              Who to contact     If you have questions or need follow up information about today's clinic visit or your schedule please contact [facility] SPECIALTY AND PROCEDURE directly at [phone].  Normal or non-critical lab and imaging results will be communicated to you by MyChart, letter or phone within 4 business days after the clinic has received the results. If you do not hear from us within 7 days, please contact the clinic through MyChart or phone. If you have a critical or abnormal lab result, we will notify you by phone as soon as possible.  Submit refill requests  through YOOSE or call your pharmacy and they will forward the refill request to us. Please allow 3 business days for your refill to be completed.          Additional Information About Your Visit        Sobrrhart Information     YOOSE gives you secure access to your electronic health record. If you see a primary care provider, you can also send messages to your care team and make appointments. If you have questions, please call your primary care clinic.  If you do not have a primary care provider, please call [phone] and they will assist you.        Care EveryWhere ID     This is your Care EveryWhere ID. This could be used by other organizations to access your [facility] medical records  [id number]        Your Vitals Were     Pulse Temperature Respirations Pulse Oximetry          58 97.4  F (36.3  C) (Oral) 16 98%         Blood Pressure from Last 3 Encounters:   07/06/18 129/50   06/29/18 (P) 144/72   06/12/18 144/73    Weight from Last 3 Encounters:   06/12/18 50.4 kg (111 lb 3 oz)   04/30/18 52.6 kg (116 lb)   04/25/18 52.3 kg (115 lb 3.2 oz)              Today, you had the following     No orders found for display         Today's Medication Changes          These changes are accurate as of 7/6/18 11:15 AM.  If you have any questions, ask your nurse or doctor.               These medicines have changed or have updated prescriptions.        Dose/Directions    ursodiol 300 MG capsule   Commonly known as:  ACTIGALL   This may have changed:    - how to take this  - additional instructions   Used for:  Liver replaced by transplant (H)        TAKE THREE CAPSULES BY MOUTH EVERY DAY   Quantity:  270 capsule   Refills:  3                Primary Care Provider Office Phone # Fax #    [place] JEMAL Villanueva [phone] [address] [facility] [address]        Equal Access to Services     ALFONSO STRAUSS AH: luis Hahn qaybta kaalmada adeegyada, waxay idiin  sunillorenemark malinrowena williemelinda lageenamark ah. So [facility] [phone].    ATENCIÓN: Si aissatoula mony, tiene a power disposición servicios gratuitos de asistencia lingüística. Deepika chase [phone].    We comply with applicable federal civil rights laws and [place] laws. We do not discriminate on the basis of race, color, national origin, age, disability, sex, sexual orientation, or gender identity.            Thank you!     Thank you for choosing [facility] SPECIALTY AND PROCEDURE  for your care. Our goal is always to provide you with excellent care. Hearing back from our patients is one way we can continue to improve our services. Please take a few minutes to complete the written survey that you may receive in the mail after your visit with us. Thank you!             Your Updated Medication List - Protect others around you: Learn how to safely use, store and throw away your medicines at www.disposemymeds.org.          This list is accurate as of 7/6/18 11:15 AM.  Always use your most recent med list.                   Brand Name Dispense Instructions for use Diagnosis    allopurinol 100 MG tablet    ZYLOPRIM    180 tablet    Take 2 tablets (200 mg) by mouth daily    Acute idiopathic gout of left foot       amLODIPine 5 MG tablet    NORVASC    90 tablet    Take 1 tablet (5 mg) by mouth daily    Essential hypertension with goal blood pressure less than 140/90       carvedilol 6.25 MG tablet    COREG    180 tablet    Take 1 tablet (6.25 mg) by mouth 2 times daily (with meals)    Essential hypertension       furosemide 20 MG tablet    LASIX    90 tablet    Take 1 tablet (20 mg) by mouth daily    Right leg swelling       predniSONE 1 MG tablet    DELTASONE    270 tablet    Take 3 tablets (3 mg) by mouth daily    Liver replaced by transplant (H)       SERTRALINE HCL PO      Take 50 mg by mouth daily        sirolimus 0.5 MG tablet    GENERIC EQUIVALENT    360 tablet    Take 4 tablets (2 mg) by mouth daily    Liver  replaced by transplant (H)       triamcinolone 0.1 % ointment    KENALOG    500 g    Apply to itchy rash twice daily for 7 to 10 days as needed.    Contact dermatitis and other eczema, due to unspecified cause       TYLENOL PO      Take 500 mg by mouth every 6 hours as needed for mild pain or fever        ursodiol 300 MG capsule    ACTIGALL    270 capsule    TAKE THREE CAPSULES BY MOUTH EVERY DAY    Liver replaced by transplant (H)       VANICREAM EX      Externally apply topically 2 times daily

## 2018-07-06 NOTE — PROGRESS NOTES
Nursing Note  Amanda Nails presents [facility] for:   Chief Complaint   Patient presents with     Infusion     Injectafer     During today's [facility] appointment, orders from Dr. Abrams were completed.  Frequency: today is dose 2 of 2 total, at least 7 days apart.    Progress note:  Patient identification verified by name and date of birth.  Assessment completed.  Vitals recorded in Doc Flowsheets.  Patient was provided with education regarding infusion and possible side effects.  Patient verbalized understanding.      needed: No  Premedications: were not ordered.  Infusion Rates: infusion given over approximately 15 minutes.  Approximate Infusion length:1 hours.   Labs: were not ordered for this appointment.  Vascular access: peripheral IV placed today.  Treatment Conditions: patient monitored for 30 minutes after medication was infused.  Patient tolerated infusion: well.    Discharge Plan:   AVS printed and given to patient.   Follow up plan of care with: ongoing infusions at [facility].  Discharge instructions were reviewed with patient.  Patient/representative verbalized understanding of discharge instructions and all questions answered.  Patient discharged from [facility] in stable condition.    Beverly Mclaughlin RN      Administrations This Visit     ferric carboxymaltose (INJECTAFER) 750 mg in sodium chloride 0.9 % 100 mL intermittent infusion     Admin Date Action Dose Rate Route Administered By          07/06/2018 New Bag 750 mg 500 mL/hr Intravenous Beverly Mclaughlin RN                       /50 (BP Location: Right arm)  Pulse 58  Temp 97.4  F (36.3  C) (Oral)  Resp 16  SpO2 98%

## 2018-07-06 NOTE — PATIENT INSTRUCTIONS
Dear Amanda Nails    Thank you for choosing [facility] Specialty Infusion and Procedure Center (SIP) for your infusion.  The following information is a summary of our appointment as well as important reminders.        Ferric carboxymaltose Solution for injection  What is this medicine?  FERRIC CARBOXYMALTOSE (ferr-ik car-box-ee-mol-toes) is an iron complex. Iron is used to make healthy red blood cells, which carry oxygen and nutrients throughout the body. This medicine is used to treat anemia in people with chronic kidney disease or people who cannot take iron by mouth.  This medicine may be used for other purposes; ask your health care provider or pharmacist if you have questions.  What should I tell my health care provider before I take this medicine?  They need to know if you have any of these conditions:    anemia not caused by low iron levels    high levels of iron in the blood    liver disease    an unusual or allergic reaction to iron, other medicines, foods, dyes, or preservatives    pregnant or trying to get pregnant    breast-feeding  How should I use this medicine?  This medicine is for infusion into a vein. It is given by a health care professional in a hospital or clinic setting.  Talk to your pediatrician regarding the use of this medicine in children. Special care may be needed.  Overdosage: If you think you've taken too much of this medicine contact a poison control center or emergency room at once.  NOTE: This medicine is only for you. Do not share this medicine with others.  What if I miss a dose?  It is important not to miss your dose. Call your doctor or health care professional if you are unable to keep an appointment.  What may interact with this medicine?  Do not take this medicine with any of the following medications:  deferoxamine  dimercaprol  other iron products  This medicine may also interact with the following  medications:  chloramphenicol  deferasirox  This list may not describe all possible interactions. Give your health care provider a list of all the medicines, herbs, non-prescription drugs, or dietary supplements you use. Also tell them if you smoke, drink alcohol, or use illegal drugs. Some items may interact with your medicine.  What should I watch for while using this medicine?  Visit your doctor or health care professional regularly. Tell your doctor if your symptoms do not start to get better or if they get worse. You may need blood work done while you are taking this medicine.  You may need to follow a special diet. Talk to your doctor. Foods that contain iron include: whole grains/cereals, dried fruits, beans, or peas, leafy green vegetables, and organ meats (liver, kidney).  What side effects may I notice from receiving this medicine?  Side effects that you should report to your doctor or health care professional as soon as possible:  allergic reactions like skin rash, itching or hives, swelling of the face, lips, or tonguebreathing problems  changes in blood pressure  feeling faint or lightheaded, falls  flushing, sweating, or hot feelings  Side effects that usually do not require medical attention (Report these to your doctor or health care professional if they continue or are bothersome.):  changes in taste  constipation  dizziness  headache  nausea  pain, redness, or irritation at site where injected  vomiting  This list may not describe all possible side effects. Call your doctor for medical advice about side effects. You may report side effects to FDA at 6-829-FDA-2333.  Where should I keep my medicine?  This drug is given in a hospital or clinic and will not be stored at home.  NOTE: This sheet is a summary. It may not cover all possible information. If you have questions about this medicine, talk to your doctor, pharmacist, or health care provider.  NOTE:This sheet is a summary. It may not cover all  possible information. If you have questions about this medicine, talk to your doctor, pharmacist, or health care provider. Copyright  2016 Gold Standard          We look forward in seeing you on your next appointment here at [facility].  Please don t hesitate to call us at [phone] to reschedule any of your appointments or to speak with one of the [facility] registered nurses.  It was a pleasure taking care of you today.    Sincerely,    [facility]  Specialty Infusion & Procedure Center  [address] [place] [address]  [address]  Phone:  [phone]

## 2018-07-13 ENCOUNTER — OFFICE VISIT (OUTPATIENT)
Dept: GASTROENTEROLOGY | Facility: CLINIC | Age: 78
End: 2018-07-13
Attending: INTERNAL MEDICINE
Payer: MEDICARE

## 2018-07-13 VITALS
HEART RATE: 61 BPM | OXYGEN SATURATION: 98 % | TEMPERATURE: 98.1 F | WEIGHT: 110 LBS | SYSTOLIC BLOOD PRESSURE: 136 MMHG | BODY MASS INDEX: 22.22 KG/M2 | DIASTOLIC BLOOD PRESSURE: 69 MMHG

## 2018-07-13 DIAGNOSIS — D64.3 OTHER SIDEROBLASTIC ANEMIA (H): Primary | ICD-10-CM

## 2018-07-13 PROCEDURE — G0463 HOSPITAL OUTPT CLINIC VISIT: HCPCS | Mod: ZF

## 2018-07-13 ASSESSMENT — PAIN SCALES - GENERAL: PAINLEVEL: NO PAIN (0)

## 2018-07-13 NOTE — MR AVS SNAPSHOT
After Visit Summary   7/13/2018    Amanda Nails    MRN: [id number]           Patient Information     Date Of Birth          1940        Visit Information        Provider Department      7/13/2018 11:30 AM Luis Daniel Lomeli MD [facility] Hepatology        Today's Diagnoses     Other sideroblastic anemia (H)    -  1       Follow-ups after your visit        Follow-up notes from your care team     Return in about 1 year (around 7/13/2019).      Who to contact     If you have questions or need follow up information about today's clinic visit or your schedule please contact [facility] HEPATOLOGY directly at [phone].  Normal or non-critical lab and imaging results will be communicated to you by MyChart, letter or phone within 4 business days after the clinic has received the results. If you do not hear from us within 7 days, please contact the clinic through [employer] or phone. If you have a critical or abnormal lab result, we will notify you by phone as soon as possible.  Submit refill requests through CloudPartner or call your pharmacy and they will forward the refill request to us. Please allow 3 business days for your refill to be completed.          Additional Information About Your Visit        MyChart Information     CloudPartner gives you secure access to your electronic health record. If you see a primary care provider, you can also send messages to your care team and make appointments. If you have questions, please call your primary care clinic.  If you do not have a primary care provider, please call [phone] and they will assist you.        Care EveryWhere ID     This is your Care EveryWhere ID. This could be used by other organizations to access your [facility] medical records  [id number]        Your Vitals Were     Pulse Temperature Pulse Oximetry BMI (Body Mass Index)          61 98.1  F (36.7  C) (Oral) 98% 22.22 kg/m2         Blood Pressure from Last 3 Encounters:   07/13/18 136/69   07/06/18  133/64   06/29/18 (P) 144/72    Weight from Last 3 Encounters:   07/13/18 49.9 kg (110 lb)   06/12/18 50.4 kg (111 lb 3 oz)   04/30/18 52.6 kg (116 lb)                 Today's Medication Changes          These changes are accurate as of 7/13/18 11:59 PM.  If you have any questions, ask your nurse or doctor.               These medicines have changed or have updated prescriptions.        Dose/Directions    ursodiol 300 MG capsule   Commonly known as:  ACTIGALL   This may have changed:    - how to take this  - additional instructions   Used for:  Liver replaced by transplant (H)        TAKE THREE CAPSULES BY MOUTH EVERY DAY   Quantity:  270 capsule   Refills:  3                Primary Care Provider Office Phone # Fax #    JEMAL Ferrari [facility] [phone] [address] [id number]        Equal Access to Services     ALFONSO STRAUSS : Hadii toi Rangel, waaxda luqadaha, qaybta kaalmada adeamandeep, gloria briceno . So [facility] [phone].    ATENCIÓN: Si habla español, tiene a power disposición servicios gratuitos de asistencia lingüística. Llame al [phone].    We comply with applicable federal civil rights laws and [place] laws. We do not discriminate on the basis of race, color, national origin, age, disability, sex, sexual orientation, or gender identity.            Thank you!     Thank you for choosing [facility] HEPATOLOGY  for your care. Our goal is always to provide you with excellent care. Hearing back from our patients is one way we can continue to improve our services. Please take a few minutes to complete the written survey that you may receive in the mail after your visit with us. Thank you!             Your Updated Medication List - Protect others around you: Learn how to safely use, store and throw away your medicines at www.disposemymeds.org.          This list is accurate as of 7/13/18 11:59 PM.  Always use your most recent med list.                    Brand Name Dispense Instructions for use Diagnosis    allopurinol 100 MG tablet    ZYLOPRIM    180 tablet    Take 2 tablets (200 mg) by mouth daily    Acute idiopathic gout of left foot       amLODIPine 5 MG tablet    NORVASC    90 tablet    Take 1 tablet (5 mg) by mouth daily    Essential hypertension with goal blood pressure less than 140/90       carvedilol 6.25 MG tablet    COREG    180 tablet    Take 1 tablet (6.25 mg) by mouth 2 times daily (with meals)    Essential hypertension       furosemide 20 MG tablet    LASIX    90 tablet    Take 1 tablet (20 mg) by mouth daily    Right leg swelling       predniSONE 1 MG tablet    DELTASONE    270 tablet    Take 3 tablets (3 mg) by mouth daily    Liver replaced by transplant (H)       SERTRALINE HCL PO      Take 50 mg by mouth daily        sirolimus 0.5 MG tablet    GENERIC EQUIVALENT    360 tablet    Take 4 tablets (2 mg) by mouth daily    Liver replaced by transplant (H)       triamcinolone 0.1 % ointment    KENALOG    500 g    Apply to itchy rash twice daily for 7 to 10 days as needed.    Contact dermatitis and other eczema, due to unspecified cause       TYLENOL PO      Take 500 mg by mouth every 6 hours as needed for mild pain or fever        ursodiol 300 MG capsule    ACTIGALL    270 capsule    TAKE THREE CAPSULES BY MOUTH EVERY DAY    Liver replaced by transplant (H)       VANICREAM EX      Externally apply topically 2 times daily

## 2018-07-13 NOTE — NURSING NOTE
Chief Complaint   Patient presents with     RECHECK     follow up Liver TX     /69 (BP Location: Right arm)  Pulse 61  Temp 98.1  F (36.7  C) (Oral)  Wt 49.9 kg (110 lb)  SpO2 98%  BMI 22.22 kg/m2   Kenyetta Cain

## 2018-07-13 NOTE — LETTER
7/13/2018      RE: Amanda Nails  [address] [address]  [facility] [address]       HISTORY OF PRESENT ILLNESS:  I had the pleasure of seeing Anali Nails for followup in the Liver Transplant Clinic at the [facility] on 07/13/2018.  Ms. Nails returns, now almost 35 years status post liver transplantation for primary biliary cirrhosis.       She is doing well.  She denies any abdominal pain, itching or skin rash.  She has a mild amount of fatigue.  She denies any increased abdominal girth or lower extremity edema.  She denies any fevers or chills, cough or shortness of breath.  She denies any nausea or vomiting, diarrhea or constipation.  Her appetite has been good and her weight has largely remained the same.  There have been no other new events since she was last seen.     Current Outpatient Prescriptions   Medication     Acetaminophen (TYLENOL PO)     allopurinol (ZYLOPRIM) 100 MG tablet     amLODIPine (NORVASC) 5 MG tablet     carvedilol (COREG) 6.25 MG tablet     Emollient (VANICREAM EX)     furosemide (LASIX) 20 MG tablet     predniSONE (DELTASONE) 1 MG tablet     SERTRALINE HCL PO     sirolimus (GENERIC EQUIVALENT) 0.5 MG tablet     triamcinolone (KENALOG) 0.1 % ointment     ursodiol (ACTIGALL) 300 MG capsule     No current facility-administered medications for this visit.      B/P: 136/69, T: 98.1, P: 61, R: Data Unavailable    PHYSICAL EXAMINATION:    GENERAL:  In general, she appears quite healthy.    HEENT:  HEENT exam shows no scleral icterus or temporal muscle wasting.    CHEST:  Her chest is clear.    ABDOMEN:  Her abdominal exam shows no increase in girth.  No masses or tenderness to palpation is present.  Her liver is 10 cm in span without left lobe enlargement.  No spleen tip is palpable.    EXTREMITIES:  Extremity exam shows no edema.    SKIN:  Skin exam shows no particular suspicious lesions.  She does have a number of actinic keratoses.     NEUROLOGIC:  Neurologic  exam is nonfocal.      LABORATORY:  Her most recent laboratory tests show her white count is 6.2, her hemoglobin is 9.3, and her platelets are 251,000.  Sodium is 140, potassium 3.4, BUN is 34, creatinine 1.4.  AST is 49, ALT is 37, alkaline phosphatase is 357.  Albumin is 3.0 with a total protein of 7.6.       IMPRESSION AND PLAN:  My impression is that Ms. Nails is doing well status post liver transplantation.  She does have some recurrence of her PBC, but the disease has been mild and stable.       She does have some anemia.  She is getting iron infusions, but I think that is only part of it.  I did look back and her EPO levels were really quite low and I suspect with her chronic kidney disease she has some erythropoietin deficiency and perhaps, even though her hemoglobin has come up a bit with the iron infusions, that she still will need some EPO to better manage her anemia.  She is getting her hemoglobin checked again in 1 month.       Otherwise, my plan will be to see the patient back in the clinic in 1 year.       Thank you very much for allowing me to participate in the care of this patient.  If you have any questions regarding my recommendations, please do not hesitate to contact me.       Luis Daniel Lomeli MD      Professor of Medicine  [employer]      Executive Medical Director, Solid Organ Transplant Program  [facility]    Luis Daniel Lomeli MD

## 2018-07-14 NOTE — PROGRESS NOTES
HISTORY OF PRESENT ILLNESS:  I had the pleasure of seeing Anali Nails for followup in the Liver Transplant Clinic at the [facility] on 07/13/2018.  Ms. Nails returns, now almost 33 years status post liver transplantation for primary biliary cirrhosis.       She is doing well.  She denies any abdominal pain, itching or skin rash.  She has a mild amount of fatigue.  She denies any increased abdominal girth or lower extremity edema.  She denies any fevers or chills, cough or shortness of breath.  She denies any nausea or vomiting, diarrhea or constipation.  Her appetite has been good and her weight has largely remained the same.  There have been no other new events since she was last seen.     Current Outpatient Prescriptions   Medication     Acetaminophen (TYLENOL PO)     allopurinol (ZYLOPRIM) 100 MG tablet     amLODIPine (NORVASC) 5 MG tablet     carvedilol (COREG) 6.25 MG tablet     Emollient (VANICREAM EX)     furosemide (LASIX) 20 MG tablet     predniSONE (DELTASONE) 1 MG tablet     SERTRALINE HCL PO     sirolimus (GENERIC EQUIVALENT) 0.5 MG tablet     triamcinolone (KENALOG) 0.1 % ointment     ursodiol (ACTIGALL) 300 MG capsule     No current facility-administered medications for this visit.      B/P: 136/69, T: 98.1, P: 61, R: Data Unavailable    PHYSICAL EXAMINATION:    GENERAL:  In general, she appears quite healthy.    HEENT:  HEENT exam shows no scleral icterus or temporal muscle wasting.    CHEST:  Her chest is clear.    ABDOMEN:  Her abdominal exam shows no increase in girth.  No masses or tenderness to palpation is present.  Her liver is 10 cm in span without left lobe enlargement.  No spleen tip is palpable.    EXTREMITIES:  Extremity exam shows no edema.    SKIN:  Skin exam shows no particular suspicious lesions.  She does have a number of actinic keratoses.     NEUROLOGIC:  Neurologic exam is nonfocal.      LABORATORY:  Her most recent laboratory tests show her white count  is 6.2, her hemoglobin is 9.3, and her platelets are 251,000.  Sodium is 140, potassium 3.4, BUN is 34, creatinine 1.4.  AST is 49, ALT is 37, alkaline phosphatase is 357.  Albumin is 3.0 with a total protein of 7.6.       IMPRESSION AND PLAN:  My impression is that Ms. Nails is doing well status post liver transplantation.  She does have some recurrence of her PBC, but the disease has been mild and stable.       She does have some anemia.  She is getting iron infusions, but I think that is only part of it.  I did look back and her EPO levels were really quite low and I suspect with her chronic kidney disease she has some erythropoietin deficiency and perhaps, even though her hemoglobin has come up a bit with the iron infusions, that she still will need some EPO to better manage her anemia.  She is getting her hemoglobin checked again in 1 month.       Otherwise, my plan will be to see the patient back in the clinic in 1 year.       Thank you very much for allowing me to participate in the care of this patient.  If you have any questions regarding my recommendations, please do not hesitate to contact me.       Luis Daniel Lomeli MD      Professor of Medicine  [employer]      Executive Medical Director, Solid Organ Transplant Program  [facility]

## 2018-08-03 ENCOUNTER — TELEPHONE (OUTPATIENT)
Dept: PHARMACY | Facility: CLINIC | Age: 78
End: 2018-08-03

## 2018-08-03 NOTE — TELEPHONE ENCOUNTER
Follow-up with anemia management service:    Due for Hgb, ferritin and iron binding    Anemia Latest Ref Rng & Units 2018   Hemoglobin 11.7 - 15.7 g/dL 10.0(L) 8.4(L) - 8.6(L) 9.3(L) 9.5(L) -   IV Iron Dose - - - 750mg 750mg - 750mg 750mg   TSAT 15 - 46 % - 5(L) - - 12(L) - -   Ferritin 8 - 252 ng/mL - 7(L) - - 540(H) - -          Orders needed to be renewed (for next follow-up date) in EPIC: None                          Med order expires: 2018                          Lab orders : 2018     Follow-up call date: 2018    [facility]    Anemia Management Service  Doreen GoinsD and Nini Alfredo CPhT  Phone: [phone]  Fax: [phone]

## 2018-08-13 ENCOUNTER — TELEPHONE (OUTPATIENT)
Dept: PHARMACY | Facility: CLINIC | Age: 78
End: 2018-08-13

## 2018-08-13 NOTE — TELEPHONE ENCOUNTER
Follow-up with anemia management service:    LM for Amanda reminding her that she is due for Hgb, ferritin and iron labs    Amanda left VM that scheduled for labs 08/15/2018 in [place], states feeling much better. AMMY 2018    Anemia Latest Ref Rng & Units 2018   Hemoglobin 11.7 - 15.7 g/dL 10.0(L) 8.4(L) - 8.6(L) 9.3(L) 9.5(L) -   IV Iron Dose - - - 750mg 750mg - 750mg 750mg   TSAT 15 - 46 % - 5(L) - - 12(L) - -   Ferritin 8 - 252 ng/mL - 7(L) - - 540(H) - -       Orders needed to be renewed (for next follow-up date) in EPIC: None                          Med order expires: 2018                          Lab orders : 2018    Follow-up call date: 8/15/18    Beth Alfredo CPhT  Anemia Clinic  [phone]  Reviewed 2018   Anemia Management Service  Yola Rangel,PharmD and Nini Alfredo CPhT  Phone: [phone]  Fax: [phone]

## 2018-08-15 DIAGNOSIS — D63.1 ANEMIA IN STAGE 3 CHRONIC KIDNEY DISEASE (H): ICD-10-CM

## 2018-08-15 DIAGNOSIS — N18.30 ANEMIA IN STAGE 3 CHRONIC KIDNEY DISEASE (H): ICD-10-CM

## 2018-08-15 DIAGNOSIS — N18.30 CHRONIC KIDNEY DISEASE, STAGE III (MODERATE) (H): Chronic | ICD-10-CM

## 2018-08-15 LAB
FERRITIN SERPL-MCNC: 941 NG/ML (ref 8–252)
HCT VFR BLD AUTO: 32.1 % (ref 35–47)
HGB BLD-MCNC: 10 G/DL (ref 11.7–15.7)
IRON SATN MFR SERPL: 25 % (ref 15–46)
IRON SERPL-MCNC: 72 UG/DL (ref 35–180)
TIBC SERPL-MCNC: 287 UG/DL (ref 240–430)

## 2018-08-15 PROCEDURE — 85014 HEMATOCRIT: CPT | Performed by: INTERNAL MEDICINE

## 2018-08-15 PROCEDURE — 83550 IRON BINDING TEST: CPT | Performed by: INTERNAL MEDICINE

## 2018-08-15 PROCEDURE — 82728 ASSAY OF FERRITIN: CPT | Performed by: INTERNAL MEDICINE

## 2018-08-15 PROCEDURE — 85018 HEMOGLOBIN: CPT | Performed by: INTERNAL MEDICINE

## 2018-08-15 PROCEDURE — 36415 COLL VENOUS BLD VENIPUNCTURE: CPT | Performed by: INTERNAL MEDICINE

## 2018-08-15 PROCEDURE — 83540 ASSAY OF IRON: CPT | Performed by: INTERNAL MEDICINE

## 2018-08-16 ENCOUNTER — TELEPHONE (OUTPATIENT)
Dept: PHARMACY | Facility: CLINIC | Age: 78
End: 2018-08-16

## 2018-08-16 NOTE — TELEPHONE ENCOUNTER
Anemia Management Note  SUBJECTIVE/OBJECTIVE:  Referred by Sarai Abrams MD on 2018  Primary Diagnosis: Anemia in Chronic Kidney Disease (N18.3, D63.1)   Secondary Diagnosis:  Chronic Kidney Disease, Stage 3 (N18.3)   Hgb goal range: 9-10  Epo/Darbo: None.     Order expires: N/A  Iron regimen:  Does not tolerate PO iron   Lab orders   in EPIC  Contact:  Ok to leave message on home phone regarding medical, billing and scheduling per consent to communicate dated 2013    Anemia Latest Ref Rng & Units 2018 2018 2018 2018 2018 2018 8/15/2018   Hemoglobin 11.7 - 15.7 g/dL 8.4(L) - 8.6(L) 9.3(L) 9.5(L) - 10.0(L)   IV Iron Dose - - 750mg 750mg - 750mg 750mg -   TSAT 15 - 46 % 5(L) - - 12(L) - - 25   Ferritin 8 - 252 ng/mL 7(L) - - 540(H) - - 941(H)     BP Readings from Last 3 Encounters:   18 136/69   18 133/64   18 (P) 144/72     Wt Readings from Last 2 Encounters:   18 110 lb (49.9 kg)   18 111 lb 3 oz (50.4 kg)       ASSESSMENT:  Hgb: at goal - continue to monitor  TSat: not at goal (>30%) but ferritin >500ng/mL.  IV iron not indicated at this time per anemia protocol. Ferritin: At goal (>100ng/mL)    PLAN:  RTC for Hgb, ferritin and iron labs in 4 week(s)  OK to defer iron labs to every 3 months after September, and Hgb monthly. 2018 AMMY    Orders needed to be renewed (for next follow-up date) in [facility]: None    Iron labs due:  18    Plan discussed with:  BEN for Amanda  Plan provided by:  Beth Alfredo [facility]  Anemia Clinic  [phone]    NEXT FOLLOW-UP DATE:  18  Reviewed 2018 AMMY  Anemia Management Service  Yola Rangel PharmD and Nini Alfredo CPhT  Phone: [phone]  Fax: [phone]

## 2018-08-16 NOTE — TELEPHONE ENCOUNTER
Christa called back to discuss lab results  She will return for Hgb, ferritin and iron labs in 4 weeks but would like to come less frequently if possibly when lab results are stable.    Beth Alfredo, [facility]  Anemia Clinic  [phone]

## 2018-09-11 DIAGNOSIS — Z94.4 LIVER REPLACED BY TRANSPLANT (H): ICD-10-CM

## 2018-09-11 RX ORDER — SIROLIMUS 0.5 MG/1
2 TABLET, FILM COATED ORAL DAILY
Qty: 360 TABLET | Refills: 3 | Status: SHIPPED | OUTPATIENT
Start: 2018-09-11 | End: 2018-09-18

## 2018-09-11 RX ORDER — PREDNISONE 1 MG/1
3 TABLET ORAL DAILY
Qty: 270 TABLET | Refills: 3 | Status: SHIPPED | OUTPATIENT
Start: 2018-09-11 | End: 2019-09-04

## 2018-09-13 NOTE — TELEPHONE ENCOUNTER
Lab appt is scheduled for 9/17/18    Follow up date: 9/17    Beth Alfredo [facility]  Anemia Clinic  [phone]

## 2018-09-17 ENCOUNTER — TELEPHONE (OUTPATIENT)
Dept: PHARMACY | Facility: CLINIC | Age: 78
End: 2018-09-17

## 2018-09-17 DIAGNOSIS — N18.30 CHRONIC KIDNEY DISEASE, STAGE III (MODERATE) (H): Chronic | ICD-10-CM

## 2018-09-17 DIAGNOSIS — N18.30 ANEMIA IN STAGE 3 CHRONIC KIDNEY DISEASE (H): ICD-10-CM

## 2018-09-17 DIAGNOSIS — Z94.4 LIVER REPLACED BY TRANSPLANT (H): ICD-10-CM

## 2018-09-17 DIAGNOSIS — D63.1 ANEMIA IN STAGE 3 CHRONIC KIDNEY DISEASE (H): ICD-10-CM

## 2018-09-17 LAB
ALBUMIN SERPL-MCNC: 3 G/DL (ref 3.4–5)
ALBUMIN UR-MCNC: 30 MG/DL
ALP SERPL-CCNC: 357 U/L (ref 40–150)
ALT SERPL W P-5'-P-CCNC: 48 U/L (ref 0–50)
ANION GAP SERPL CALCULATED.3IONS-SCNC: 5 MMOL/L (ref 3–14)
APPEARANCE UR: CLEAR
AST SERPL W P-5'-P-CCNC: 46 U/L (ref 0–45)
BILIRUB DIRECT SERPL-MCNC: 0.2 MG/DL (ref 0–0.2)
BILIRUB SERPL-MCNC: 0.4 MG/DL (ref 0.2–1.3)
BILIRUB UR QL STRIP: NEGATIVE
BUN SERPL-MCNC: 41 MG/DL (ref 7–30)
CALCIUM SERPL-MCNC: 8.6 MG/DL (ref 8.5–10.1)
CHLORIDE SERPL-SCNC: 104 MMOL/L (ref 94–109)
CO2 SERPL-SCNC: 29 MMOL/L (ref 20–32)
COLOR UR AUTO: YELLOW
CREAT SERPL-MCNC: 1.34 MG/DL (ref 0.52–1.04)
CREAT UR-MCNC: 108 MG/DL
ERYTHROCYTE [DISTWIDTH] IN BLOOD BY AUTOMATED COUNT: 14.3 % (ref 10–15)
FERRITIN SERPL-MCNC: 709 NG/ML (ref 8–252)
GFR SERPL CREATININE-BSD FRML MDRD: 38 ML/MIN/1.7M2
GLUCOSE SERPL-MCNC: 114 MG/DL (ref 70–99)
GLUCOSE UR STRIP-MCNC: NEGATIVE MG/DL
HCT VFR BLD AUTO: 30.1 % (ref 35–47)
HGB BLD-MCNC: 9.4 G/DL (ref 11.7–15.7)
HGB UR QL STRIP: ABNORMAL
IRON SATN MFR SERPL: 26 % (ref 15–46)
IRON SERPL-MCNC: 70 UG/DL (ref 35–180)
KETONES UR STRIP-MCNC: NEGATIVE MG/DL
LEUKOCYTE ESTERASE UR QL STRIP: ABNORMAL
MCH RBC QN AUTO: 30.3 PG (ref 26.5–33)
MCHC RBC AUTO-ENTMCNC: 31.2 G/DL (ref 31.5–36.5)
MCV RBC AUTO: 97 FL (ref 78–100)
NITRATE UR QL: NEGATIVE
NON-SQ EPI CELLS #/AREA URNS LPF: ABNORMAL /LPF
PH UR STRIP: 5.5 PH (ref 5–7)
PLATELET # BLD AUTO: 215 10E9/L (ref 150–450)
POTASSIUM SERPL-SCNC: 3.4 MMOL/L (ref 3.4–5.3)
PROT SERPL-MCNC: 7 G/DL (ref 6.8–8.8)
PROT UR-MCNC: 0.46 G/L
PROT/CREAT 24H UR: 0.42 G/G CR (ref 0–0.2)
RBC # BLD AUTO: 3.1 10E12/L (ref 3.8–5.2)
RBC #/AREA URNS AUTO: ABNORMAL /HPF
SIROLIMUS BLD-MCNC: 10 UG/L (ref 5–15)
SODIUM SERPL-SCNC: 138 MMOL/L (ref 133–144)
SOURCE: ABNORMAL
SP GR UR STRIP: 1.01 (ref 1–1.03)
TIBC SERPL-MCNC: 269 UG/DL (ref 240–430)
TME LAST DOSE: 1000 H
URATE CRY #/AREA URNS HPF: ABNORMAL /HPF
UROBILINOGEN UR STRIP-ACNC: 0.2 EU/DL (ref 0.2–1)
WBC # BLD AUTO: 5 10E9/L (ref 4–11)
WBC #/AREA URNS AUTO: ABNORMAL /HPF

## 2018-09-17 PROCEDURE — 80048 BASIC METABOLIC PNL TOTAL CA: CPT | Performed by: INTERNAL MEDICINE

## 2018-09-17 PROCEDURE — 84156 ASSAY OF PROTEIN URINE: CPT | Performed by: INTERNAL MEDICINE

## 2018-09-17 PROCEDURE — 36415 COLL VENOUS BLD VENIPUNCTURE: CPT | Performed by: INTERNAL MEDICINE

## 2018-09-17 PROCEDURE — 80076 HEPATIC FUNCTION PANEL: CPT | Performed by: INTERNAL MEDICINE

## 2018-09-17 PROCEDURE — 81001 URINALYSIS AUTO W/SCOPE: CPT | Performed by: INTERNAL MEDICINE

## 2018-09-17 PROCEDURE — 83540 ASSAY OF IRON: CPT | Performed by: INTERNAL MEDICINE

## 2018-09-17 PROCEDURE — 82728 ASSAY OF FERRITIN: CPT | Performed by: INTERNAL MEDICINE

## 2018-09-17 PROCEDURE — 83550 IRON BINDING TEST: CPT | Performed by: INTERNAL MEDICINE

## 2018-09-17 PROCEDURE — 85027 COMPLETE CBC AUTOMATED: CPT | Performed by: INTERNAL MEDICINE

## 2018-09-17 PROCEDURE — 80195 ASSAY OF SIROLIMUS: CPT | Performed by: INTERNAL MEDICINE

## 2018-09-18 ENCOUNTER — TELEPHONE (OUTPATIENT)
Dept: TRANSPLANT | Facility: CLINIC | Age: 78
End: 2018-09-18

## 2018-09-18 DIAGNOSIS — Z94.4 LIVER REPLACED BY TRANSPLANT (H): ICD-10-CM

## 2018-09-18 RX ORDER — SIROLIMUS 0.5 MG/1
1 TABLET, FILM COATED ORAL DAILY
Qty: 180 TABLET | Refills: 3 | Status: SHIPPED | OUTPATIENT
Start: 2018-09-18 | End: 2019-11-20

## 2018-09-18 NOTE — TELEPHONE ENCOUNTER
Spoke to Anali.  She is quite sure that this was a 24 hour trough level.  She will decrease her dose to 1mg daily.

## 2018-09-18 NOTE — TELEPHONE ENCOUNTER
Patient Call: General    Reason for call: patient returning call. Patient stated she believe it to be a 12 hour trough level.    Call back needed? Yes    Return Call Needed  Same as documented in contacts section  When to return call?: Same day: [facility] [employer]

## 2018-09-18 NOTE — TELEPHONE ENCOUNTER
Anemia Management Note  SUBJECTIVE/OBJECTIVE:  Referred by Sarai Abrams MD on 2018  Primary Diagnosis: Anemia in Chronic Kidney Disease (N18.3, D63.1)   Secondary Diagnosis:  Chronic Kidney Disease, Stage 3 (N18.3)   Hgb goal range: 9-10  Epo/Darbo: None.     Order expires: N/A  Iron regimen:  Does not tolerate PO iron   Lab orders   in EPIC  Contact:  Ok to leave message on home phone regarding medical, billing and scheduling per consent to communicate dated 2013    Anemia Latest Ref Rng & Units 2018 2018 2018 2018 2018 8/15/2018 2018   Hemoglobin 11.7 - 15.7 g/dL - 8.6(L) 9.3(L) 9.5(L) - 10.0(L) 9.4(L)   IV Iron Dose - 750mg 750mg - 750mg 750mg - -   TSAT 15 - 46 % - - 12(L) - - 25 26   Ferritin 8 - 252 ng/mL - - 540(H) - - 941(H) 709(H)     BP Readings from Last 3 Encounters:   18 136/69   18 133/64   18 (P) 144/72     Wt Readings from Last 2 Encounters:   18 110 lb (49.9 kg)   18 111 lb 3 oz (50.4 kg)           ASSESSMENT:  Hgb: at goal - continue to monitor  TSat: not at goal (>30%) but ferritin >500ng/mL.  IV iron not indicated at this time per anemia protocol. Ferritin: At goal (>100ng/mL)    PLAN:  RTC for Hgb, ferritin and iron labs in 4 week(s)  If Hgb does not rise, discuss adding MARIAM therapy.  Has HX of skin cancer  - AMMY 2018     Orders needed to be renewed (for next follow-up date) in [facility]: None    Iron labs due:  10/15/18    Plan discussed with:  BEN for Amanda  Plan provided by:  Beth Alfredo [facility]  Anemia Clinic  [phone]    NEXT FOLLOW-UP DATE:  10/15/18  Reviewed 2018 [facility]  Anemia Management Service  Yola Rangel PharmD and Nini Alfredo CPhT  Phone: [phone]  Fax: [phone]

## 2018-09-19 ENCOUNTER — DOCUMENTATION ONLY (OUTPATIENT)
Dept: TRANSPLANT | Facility: CLINIC | Age: 78
End: 2018-09-19

## 2018-10-15 ENCOUNTER — TELEPHONE (OUTPATIENT)
Dept: PHARMACY | Facility: CLINIC | Age: 78
End: 2018-10-15

## 2018-10-15 NOTE — TELEPHONE ENCOUNTER
Follow-up with anemia management service:     for Anali reminding her that she is due for Hgb, ferritin, and iron labs.  She has an appt on 10/18 for a physical and could get labs drawn at that time    Anemia Latest Ref Rng & Units 2018 2018 2018 2018 2018 8/15/2018 2018   Hemoglobin 11.7 - 15.7 g/dL - 8.6(L) 9.3(L) 9.5(L) - 10.0(L) 9.4(L)   IV Iron Dose - 750mg 750mg - 750mg 750mg - -   TSAT 15 - 46 % - - 12(L) - - 25 26   Ferritin 8 - 252 ng/mL - - 540(H) - - 941(H) 709(H)       Orders needed to be renewed (for next follow-up date) in EPIC: None                          Med order expires: 2019                          Lab orders : 2019    Follow-up call date: 10/18/18    Beth Alfredo CPhT  Anemia Clinic  [phone]  Reviewed 10/16/2018 [facility]  Anemia Management Service  Yola Rangel,Roberto and Nini Alfredo CPhT  Phone: [phone]  Fax: [phone]

## 2018-10-18 NOTE — TELEPHONE ENCOUNTER
The 10/18 appt has been rescheduled for 10/24/18 at 12:40 pm    Follow-up call date: 10/24/18     Beth Alfredo CPhT  Anemia Clinic  [phone]  Reviewed 10/16/2018 [facility]  Anemia Management Service  Yola Rangel PharmD and Nini Alfredo CPhT  Phone: [phone]  Fax: [phone]

## 2018-10-24 ENCOUNTER — TELEPHONE (OUTPATIENT)
Dept: PHARMACY | Facility: CLINIC | Age: 78
End: 2018-10-24

## 2018-10-24 ENCOUNTER — OFFICE VISIT (OUTPATIENT)
Dept: FAMILY MEDICINE | Facility: CLINIC | Age: 78
End: 2018-10-24
Payer: COMMERCIAL

## 2018-10-24 VITALS
BODY MASS INDEX: 23.72 KG/M2 | WEIGHT: 113 LBS | SYSTOLIC BLOOD PRESSURE: 134 MMHG | HEART RATE: 60 BPM | DIASTOLIC BLOOD PRESSURE: 68 MMHG | HEIGHT: 58 IN | TEMPERATURE: 97.6 F | OXYGEN SATURATION: 98 %

## 2018-10-24 DIAGNOSIS — Z00.00 ROUTINE GENERAL MEDICAL EXAMINATION AT A HEALTH CARE FACILITY: Primary | ICD-10-CM

## 2018-10-24 DIAGNOSIS — Z23 NEED FOR PROPHYLACTIC VACCINATION AND INOCULATION AGAINST INFLUENZA: ICD-10-CM

## 2018-10-24 PROCEDURE — 90662 IIV NO PRSV INCREASED AG IM: CPT | Performed by: NURSE PRACTITIONER

## 2018-10-24 PROCEDURE — 99397 PER PM REEVAL EST PAT 65+ YR: CPT | Mod: 25 | Performed by: NURSE PRACTITIONER

## 2018-10-24 PROCEDURE — G0008 ADMIN INFLUENZA VIRUS VAC: HCPCS | Performed by: NURSE PRACTITIONER

## 2018-10-24 NOTE — TELEPHONE ENCOUNTER
Follow-up with anemia management service:    I spoke to Anali reminding her that she is due for Hgb, ferritin, and iron labs.  She will go in for her labs by Monday 10/29/18    Anemia Latest Ref Rng & Units 2018 2018 2018 2018 2018 8/15/2018 2018   Hemoglobin 11.7 - 15.7 g/dL - 8.6(L) 9.3(L) 9.5(L) - 10.0(L) 9.4(L)   IV Iron Dose - 750mg 750mg - 750mg 750mg - -   TSAT 15 - 46 % - - 12(L) - - 25 26   Ferritin 8 - 252 ng/mL - - 540(H) - - 941(H) 709(H)       Orders needed to be renewed (for next follow-up date) in EPIC: None                          Med order expires: 2019                          Lab orders : 2019    Follow-up call date: 10/29/18    Beth Alfredo [facility]  Anemia Clinic  [phone]  Reviewed 10/25/2018 [facility]  Anemia Management Service  Yola Rangel,PharmD and Nini Alfredo CPhT  Phone: [phone]  Fax: [phone]

## 2018-10-24 NOTE — MR AVS SNAPSHOT
After Visit Summary   10/24/2018    Amanda Nails    MRN: [id number]           Patient Information     Date Of Birth          1940        Visit Information        Provider Department      10/24/2018 12:40 PM Sweetie Wheeler APRN [facility]        Today's Diagnoses     Routine general medical examination at a health care facility    -  1    Need for prophylactic vaccination and inoculation against influenza          Care Instructions      Preventive Health Recommendations  Female Ages 65 +    Yearly exam:     See your health care provider every year in order to  o Review health changes.   o Discuss preventive care.    o Review your medicines if your doctor has prescribed any.      You no longer need a yearly Pap test unless you've had an abnormal Pap test in the past 10 years. If you have vaginal symptoms, such as bleeding or discharge, be sure to talk with your provider about a Pap test.      Every 1 to 2 years, have a mammogram.  If you are over 69, talk with your health care provider about whether or not you want to continue having screening mammograms.      Every 10 years, have a colonoscopy. Or, have a yearly FIT test (stool test). These exams will check for colon cancer.       Have a cholesterol test every 5 years, or more often if your doctor advises it.       Have a diabetes test (fasting glucose) every three years. If you are at risk for diabetes, you should have this test more often.       At age 65, have a bone density scan (DEXA) to check for osteoporosis (brittle bone disease).    Shots:    Get a flu shot each year.    Get a tetanus shot every 10 years.    Talk to your doctor about your pneumonia vaccines. There are now two you should receive - Pneumovax (PPSV 23) and Prevnar (PCV 13).    Talk to your pharmacist about the shingles vaccine.    Talk to your doctor about the hepatitis B vaccine.    Nutrition:     Eat at least 5 servings of fruits and  vegetables each day.      Eat whole-grain bread, whole-wheat pasta and brown rice instead of white grains and rice.      Get adequate about Calcium and Vitamin D.     Lifestyle    Exercise at least 150 minutes a week (30 minutes a day, 5 days a week). This will help you control your weight and prevent disease.      Limit alcohol to one drink per day.      No smoking.       Wear sunscreen to prevent skin cancer.       See your dentist twice a year for an exam and cleaning.      See your eye doctor every 1 to 2 years to screen for conditions such as glaucoma, macular degeneration, cataracts, etc           Follow-ups after your visit        Who to contact     If you have questions or need follow up information about today's clinic visit or your schedule please contact [facility] directly at [phone].  Normal or non-critical lab and imaging results will be communicated to you by MyChart, letter or phone within 4 business days after the clinic has received the results. If you do not hear from us within 7 days, please contact the clinic through [employer] or phone. If you have a critical or abnormal lab result, we will notify you by phone as soon as possible.  Submit refill requests through [employer] or call your pharmacy and they will forward the refill request to us. Please allow 3 business days for your refill to be completed.          Additional Information About Your Visit        [employer] Information     [employer] gives you secure access to your electronic health record. If you see a primary care provider, you can also send messages to your care team and make appointments. If you have questions, please call your primary care clinic.  If you do not have a primary care provider, please call [phone] and they will assist you.        Care EveryWhere ID     This is your Care EveryWhere ID. This could be used by other organizations to access your [facility] medical records  [id number]        Your Vitals Were   "   Pulse Temperature Height Pulse Oximetry BMI (Body Mass Index)       60 97.6  F (36.4  C) (Tympanic) 4' 10\" (1.473 m) 98% 23.62 kg/m2        Blood Pressure from Last 3 Encounters:   10/24/18 134/68   07/13/18 136/69   07/06/18 133/64    Weight from Last 3 Encounters:   10/24/18 113 lb (51.3 kg)   07/13/18 110 lb (49.9 kg)   06/12/18 111 lb 3 oz (50.4 kg)              We Performed the Following     FLU VACCINE, INCREASED ANTIGEN, PRESV FREE, AGE 65+ [67680]     Vaccine Administration, Initial [20416]          Today's Medication Changes          These changes are accurate as of 10/24/18 12:59 PM.  If you have any questions, ask your nurse or doctor.               These medicines have changed or have updated prescriptions.        Dose/Directions    ursodiol 300 MG capsule   Commonly known as:  ACTIGALL   This may have changed:    - how to take this  - additional instructions   Used for:  Liver replaced by transplant (H)        TAKE THREE CAPSULES BY MOUTH EVERY DAY   Quantity:  270 capsule   Refills:  3                Primary Care Provider Office Phone # Fax #    Aby Downing, APRN [facility] [phone] [address] [facility] [address]        Equal Access to Services     ALFONSO STRAUSS AH: Oswaldo patelo Soomaali, waaxda luqadaha, qaybta kaalmada adeegyada, gloria hodges. So [facility] [phone].    ATENCIÓN: Si habla español, tiene a power disposición servicios gratuitos de asistencia lingüística. Llame al [phone].    We comply with applicable federal civil rights laws and [place] laws. We do not discriminate on the basis of race, color, national origin, age, disability, sex, sexual orientation, or gender identity.            Thank you!     Thank you for choosing [facility]  for your care. Our goal is always to provide you with excellent care. Hearing back from our patients is one way we can continue to improve our services. Please take a few minutes " to complete the written survey that you may receive in the mail after your visit with us. Thank you!             Your Updated Medication List - Protect others around you: Learn how to safely use, store and throw away your medicines at www.disposemymeds.org.          This list is accurate as of 10/24/18 12:59 PM.  Always use your most recent med list.                   Brand Name Dispense Instructions for use Diagnosis    allopurinol 100 MG tablet    ZYLOPRIM    180 tablet    Take 2 tablets (200 mg) by mouth daily    Acute idiopathic gout of left foot       amLODIPine 5 MG tablet    NORVASC    90 tablet    Take 1 tablet (5 mg) by mouth daily    Essential hypertension with goal blood pressure less than 140/90       carvedilol 6.25 MG tablet    COREG    180 tablet    Take 1 tablet (6.25 mg) by mouth 2 times daily (with meals)    Essential hypertension       furosemide 20 MG tablet    LASIX    90 tablet    Take 1 tablet (20 mg) by mouth daily    Right leg swelling       predniSONE 1 MG tablet    DELTASONE    270 tablet    Take 3 tablets (3 mg) by mouth daily    Liver replaced by transplant (H)       SERTRALINE HCL PO      Take 50 mg by mouth daily        sirolimus 0.5 MG tablet    GENERIC EQUIVALENT    180 tablet    Take 2 tablets (1 mg) by mouth daily    Liver replaced by transplant (H)       triamcinolone 0.1 % ointment    KENALOG    500 g    Apply to itchy rash twice daily for 7 to 10 days as needed.    Contact dermatitis and other eczema, due to unspecified cause       TYLENOL PO      Take 500 mg by mouth every 6 hours as needed for mild pain or fever        ursodiol 300 MG capsule    ACTIGALL    270 capsule    TAKE THREE CAPSULES BY MOUTH EVERY DAY    Liver replaced by transplant (H)       VANICREAM EX      Externally apply topically 2 times daily

## 2018-10-24 NOTE — PROGRESS NOTES
SUBJECTIVE:   Amanda Nails is a 78 year old female who presents for Preventive Visit.    Are you in the first 12 months of your Medicare Part B coverage?  No    Healthy Habits:    Do you get at least three servings of calcium containing foods daily (dairy, green leafy vegetables, etc.)? no, taking calcium and/or vitamin D supplement: yes    Amount of exercise or daily activities, outside of work: was going to the [facility] until she pulled groin muscles, now just walking when she can    Problems taking medications regularly No    Medication side effects: No    Have you had an eye exam in the past two years? yes    Do you see a dentist twice per year? yes    Do you have sleep apnea, excessive snoring or daytime drowsiness?no      Ability to successfully perform activities of daily living: Yes, no assistance needed    Home safety:  discussed     Hearing impairment: No    Fall risk:  Fallen 2 or more times in the past year?: No  Any fall with injury in the past year?: No        COGNITIVE SCREEN  1) Repeat 3 items (Leader, Season, Table)    2) Clock draw: NORMAL  3) 3 item recall: Recalls 3 objects  Results: 3 items recalled: COGNITIVE IMPAIRMENT LESS LIKELY    Mini-CogTM Copyright S Juan Miguel. Licensed by the author for use in [facility]; reprinted with permission (soob@[email or web address]). All rights reserved.              Reviewed and updated as needed this visit by clinical staff  Tobacco  Allergies  Meds         Reviewed and updated as needed this visit by Provider        Social History   Substance Use Topics     Smoking status: Former Smoker     Years: 3.00     Smokeless tobacco: Never Used      Comment: Years ago.     Alcohol use Yes      Comment: very rarely       If you drink alcohol do you typically have >3 drinks per day or >7 drinks per week? No                        Today's PHQ-2 Score:   PHQ-2 ( 1999 Pfizer) 10/24/2018 11/28/2017   Q1: Little interest or pleasure in doing things 0 0   Q2: Feeling down,  "depressed or hopeless 0 0   PHQ-2 Score 0 0       Do you feel safe in your environment - Yes    Do you have a Health Care Directive?: No: Advance care planning was reviewed with patient; patient declined at this time.    Current providers sharing in care for this patient include:   Patient Care Team:  Aby Downing APRN CNP as PCP - General (Nurse Practitioner)  Sarai Abrams MD as MD (Nephrology)    The following health maintenance items are reviewed in Epic and correct as of today:  Health Maintenance   Topic Date Due     LIPID MONITORING Q1 YEAR  08/13/2015     MICROALBUMIN Q1 YEAR  10/07/2015     INFLUENZA VACCINE (1) 09/01/2018     PHQ-2 Q1 YR  11/28/2018     FALL RISK ASSESSMENT  07/13/2019     BMP Q1 YR  09/17/2019     HEMOGLOBIN Q1 YR  09/17/2019     ADVANCE DIRECTIVE PLANNING Q5 YRS  11/10/2021     TETANUS IMMUNIZATION (SYSTEM ASSIGNED)  07/10/2023     DEXA SCAN SCREENING (SYSTEM ASSIGNED)  Completed     PNEUMOCOCCAL  Completed       Patient declines lipid panel.  Patient declines further mammograms.      ROS:  Constitutional, HEENT, cardiovascular, pulmonary, GI, , musculoskeletal, neuro, skin, endocrine and psych systems are negative, except as otherwise noted.    OBJECTIVE:   /68 (BP Location: Right arm)  Pulse 60  Temp 97.6  F (36.4  C) (Tympanic)  Ht 4' 10\" (1.473 m)  Wt 113 lb (51.3 kg)  SpO2 98%  BMI 23.62 kg/m2 Estimated body mass index is 23.62 kg/(m^2) as calculated from the following:    Height as of this encounter: 4' 10\" (1.473 m).    Weight as of this encounter: 113 lb (51.3 kg).  EXAM:   GENERAL APPEARANCE: healthy, alert and no distress  EYES: Eyes grossly normal to inspection, PERRL and conjunctivae and sclerae normal  HENT: ear canals and TM's normal, nose and mouth without ulcers or lesions, oropharynx clear and oral mucous membranes moist  NECK: no adenopathy, no asymmetry, masses, or scars and thyroid normal to palpation  RESP: lungs clear to auscultation - no " "rales, rhonchi or wheezes  BREAST: normal without masses, tenderness or nipple discharge and no palpable axillary masses or adenopathy  CV: regular rate and rhythm, normal S1 S2, no S3 or S4, no murmur, click or rub, no peripheral edema and peripheral pulses strong  ABDOMEN: soft, nontender, no hepatosplenomegaly, no masses and bowel sounds normal   (female): Patient declined  MS: no musculoskeletal defects are noted and gait is age appropriate without ataxia  SKIN: no suspicious lesions or rashes  NEURO: Normal strength and tone, sensory exam grossly normal, mentation intact and speech normal  PSYCH: mentation appears normal and affect normal/bright      ASSESSMENT / PLAN:       ICD-10-CM    1. Routine general medical examination at a health care facility Z00.00    2. Need for prophylactic vaccination and inoculation against influenza Z23 FLU VACCINE, INCREASED ANTIGEN, PRESV FREE, AGE 65+ [62423]     Vaccine Administration, Initial [76005]       End of Life Planning:  Patient currently has an advanced directive: No.      COUNSELING:  Reviewed preventive health counseling, as reflected in patient instructions    BP Readings from Last 1 Encounters:   10/24/18 134/68     Estimated body mass index is 23.62 kg/(m^2) as calculated from the following:    Height as of this encounter: 4' 10\" (1.473 m).    Weight as of this encounter: 113 lb (51.3 kg).           reports that she has quit smoking. She quit after 3.00 years of use. She has never used smokeless tobacco.      Appropriate preventive services were discussed with this patient, including applicable screening as appropriate for cardiovascular disease, diabetes, osteopenia/osteoporosis, and glaucoma.  As appropriate for age/gender, discussed screening for colorectal cancer, prostate cancer, breast cancer, and cervical cancer. Checklist reviewing preventive services available has been given to the patient.    Reviewed patients plan of care and provided an AVS. The " Basic Care Plan (routine screening as documented in Health Maintenance) for Amanda meets the Care Plan requirement. This Care Plan has been established and reviewed with the Patient.      The risks, benefits and treatment options of prescribed medications or other treatments have been discussed with the patient. The patient verbalized their understanding and should call or follow up if no improvement or if they develop further problems.    JEMAL [facility]    Injectable Influenza Immunization Documentation    1.  Is the person to be vaccinated sick today?   No    2. Does the person to be vaccinated have an allergy to a component   of the vaccine?   No  Egg Allergy Algorithm Link    3. Has the person to be vaccinated ever had a serious reaction   to influenza vaccine in the past?   No    4. Has the person to be vaccinated ever had Guillain-Barré syndrome?   No    Form completed by CHALINO CHRISTIANSON

## 2018-10-29 ENCOUNTER — TELEPHONE (OUTPATIENT)
Dept: PHARMACY | Facility: CLINIC | Age: 78
End: 2018-10-29

## 2018-10-29 DIAGNOSIS — N18.30 CHRONIC KIDNEY DISEASE, STAGE III (MODERATE) (H): Chronic | ICD-10-CM

## 2018-10-29 DIAGNOSIS — D63.1 ANEMIA IN STAGE 3 CHRONIC KIDNEY DISEASE (H): ICD-10-CM

## 2018-10-29 DIAGNOSIS — N18.30 ANEMIA IN STAGE 3 CHRONIC KIDNEY DISEASE (H): ICD-10-CM

## 2018-10-29 LAB
FERRITIN SERPL-MCNC: 596 NG/ML (ref 8–252)
HCT VFR BLD AUTO: 33.2 % (ref 35–47)
HGB BLD-MCNC: 10.5 G/DL (ref 11.7–15.7)
IRON SATN MFR SERPL: 31 % (ref 15–46)
IRON SERPL-MCNC: 95 UG/DL (ref 35–180)
TIBC SERPL-MCNC: 303 UG/DL (ref 240–430)

## 2018-10-29 PROCEDURE — 85018 HEMOGLOBIN: CPT | Performed by: INTERNAL MEDICINE

## 2018-10-29 PROCEDURE — 36415 COLL VENOUS BLD VENIPUNCTURE: CPT | Performed by: INTERNAL MEDICINE

## 2018-10-29 PROCEDURE — 85014 HEMATOCRIT: CPT | Performed by: INTERNAL MEDICINE

## 2018-10-29 PROCEDURE — 82728 ASSAY OF FERRITIN: CPT | Performed by: INTERNAL MEDICINE

## 2018-10-29 PROCEDURE — 83540 ASSAY OF IRON: CPT | Performed by: INTERNAL MEDICINE

## 2018-10-29 PROCEDURE — 83550 IRON BINDING TEST: CPT | Performed by: INTERNAL MEDICINE

## 2018-10-29 NOTE — TELEPHONE ENCOUNTER
Anemia Management Note  SUBJECTIVE/OBJECTIVE:  Referred by Sarai Abrams MD on 2018  Primary Diagnosis: Anemia in Chronic Kidney Disease (N18.3, D63.1)   Secondary Diagnosis:  Chronic Kidney Disease, Stage 3 (N18.3)   Hgb goal range: 9-10  Epo/Darbo: None.     Order expires: N/A  Iron regimen:  Does not tolerate PO iron   Lab orders   in EPIC  Contact:  Ok to leave message on home phone regarding medical, billing and scheduling per consent to communicate dated 2013    Anemia Latest Ref Rng & Units 2018 2018 2018 2018 8/15/2018 2018 10/29/2018   Hemoglobin 11.7 - 15.7 g/dL 8.6(L) 9.3(L) 9.5(L) - 10.0(L) 9.4(L) 10.5(L)   IV Iron Dose - 750mg - 750mg 750mg - - -   TSAT 15 - 46 % - 12(L) - - 25 26 31   Ferritin 8 - 252 ng/mL - 540(H) - - 941(H) 709(H) 596(H)     BP Readings from Last 3 Encounters:   10/24/18 134/68   18 136/69   18 133/64     Wt Readings from Last 2 Encounters:   10/24/18 113 lb (51.3 kg)   18 110 lb (49.9 kg)       ASSESSMENT:  Hgb:at goal - continue to monitor  TSat: at goal >30% Ferritin: At goal (>100ng/mL)    PLAN:  RTC for Hgb, ferritin and iron labs in 4 week(s)    Orders needed to be renewed (for next follow-up date) in [facility]: None    Iron labs due:  18    Plan discussed with:  BEN for Anali    Plan provided by:  Beth Alfredo CPhT  [facility]  [phone]    NEXT FOLLOW-UP DATE:  18  Reviewed 10/30/2018 [facility]  Anemia Management Service  Yola Rangel,PharmD and Nini Alfredo CPhT  Phone: [phone]  Fax: [phone]

## 2018-11-12 DIAGNOSIS — M79.89 RIGHT LEG SWELLING: ICD-10-CM

## 2018-11-12 DIAGNOSIS — M10.072 ACUTE IDIOPATHIC GOUT OF LEFT FOOT: ICD-10-CM

## 2018-11-12 RX ORDER — ALLOPURINOL 100 MG/1
200 TABLET ORAL DAILY
Qty: 180 TABLET | Refills: 3 | Status: SHIPPED | OUTPATIENT
Start: 2018-11-12 | End: 2019-09-05

## 2018-11-12 RX ORDER — FUROSEMIDE 20 MG
20 TABLET ORAL DAILY
Qty: 90 TABLET | Refills: 3 | Status: SHIPPED | OUTPATIENT
Start: 2018-11-12 | End: 2019-09-06

## 2018-11-12 NOTE — TELEPHONE ENCOUNTER
"Requested Prescriptions   Pending Prescriptions Disp Refills     furosemide (LASIX) 20 MG tablet [Pharmacy Med Name: FUROSEMIDE 20MG TABS] 90 tablet 3     Sig: TAKE ONE TABLET BY MOUTH EVERY DAY    Diuretics (Including Combos) Protocol Failed    11/12/2018  9:28 AM       Failed - Normal serum creatinine on file in past 12 months    Recent Labs   Lab Test  09/17/18   1010   CR  1.34*             Passed - Blood pressure under 140/90 in past 12 months    BP Readings from Last 3 Encounters:   10/24/18 134/68   07/13/18 136/69   07/06/18 133/64                Passed - Recent (12 mo) or future (30 days) visit within the authorizing provider's specialty    Patient had office visit in the last 12 months or has a visit in the next 30 days with authorizing provider or within the authorizing provider's specialty.  See \"Patient Info\" tab in inbasket, or \"Choose Columns\" in Meds & Orders section of the refill encounter.             Passed - Patient is age 18 or older       Passed - No active pregancy on record       Passed - Normal serum potassium on file in past 12 months    Recent Labs   Lab Test  09/17/18   1010   POTASSIUM  3.4                   Passed - Normal serum sodium on file in past 12 months    Recent Labs   Lab Test  09/17/18   1010   NA  138             Passed - No positive pregnancy test in past 12 months        allopurinol (ZYLOPRIM) 100 MG tablet [Pharmacy Med Name: ALLOPURINOL 100MG TABS] 180 tablet 3     Sig: TAKE TWO TABLETS BY MOUTH EVERY DAY    Gout Agents Protocol Failed    11/12/2018  9:28 AM       Failed - Has Uric Acid on file in past 12 months and value is less than 6    Recent Labs   Lab Test  08/14/17   1005   URIC  4.5     If level is 6mg/dL or greater, ok to refill one time and refer to provider.          Failed - Normal serum creatinine on file in the past 12 months    Recent Labs   Lab Test  09/17/18   1010   CR  1.34*            Passed - CBC on file in past 12 months    Recent Labs   Lab Test  " "10/29/18   1215  09/17/18   1010   WBC   --   5.0   RBC   --   3.10*   HGB  10.5*  9.4*   HCT  33.2*  30.1*   PLT   --   215                Passed - ALT on file in past 12 months    Recent Labs   Lab Test  09/17/18   1010   ALT  48            Passed - Recent (12 mo) or future (30 days) visit within the authorizing provider's specialty    Patient had office visit in the last 12 months or has a visit in the next 30 days with authorizing provider or within the authorizing provider's specialty.  See \"Patient Info\" tab in inbasket, or \"Choose Columns\" in Meds & Orders section of the refill encounter.             Passed - Patient is age 18 or older       Passed - No active pregnancy on record       Passed - No positive pregnancy test in past year        Last Written Prescription Date:  11/12/18  Last Fill Quantity: 90,  # refills: 3   Last office visit: 10/24/2018 with prescribing provider:  ZOILA   Future Office Visit:      "

## 2018-11-12 NOTE — TELEPHONE ENCOUNTER
"Routing refill request to provider for review/approval because:  Labs out of range:  Creatinine  Labs not current:  Uric acid    LOV 10/24/18 Sweetie Wheeler for Routine exam.       Requested Prescriptions   Pending Prescriptions Disp Refills     allopurinol (ZYLOPRIM) 100 MG tablet 180 tablet      Sig: Take 2 tablets (200 mg) by mouth daily    Gout Agents Protocol Failed    11/12/2018  8:37 AM       Failed - Has Uric Acid on file in past 12 months and value is less than 6    Recent Labs   Lab Test  08/14/17   1005   URIC  4.5     If level is 6mg/dL or greater, ok to refill one time and refer to provider.          Failed - Normal serum creatinine on file in the past 12 months    Recent Labs   Lab Test  09/17/18   1010   CR  1.34*          Passed - CBC on file in past 12 months    Recent Labs   Lab Test  10/29/18   1215  09/17/18   1010   WBC   --   5.0   RBC   --   3.10*   HGB  10.5*  9.4*   HCT  33.2*  30.1*   PLT   --   215          Passed - ALT on file in past 12 months    Recent Labs   Lab Test  09/17/18   1010   ALT  48          Passed - Recent (12 mo) or future (30 days) visit within the authorizing provider's specialty    Patient had office visit in the last 12 months or has a visit in the next 30 days with authorizing provider or within the authorizing provider's specialty.  See \"Patient Info\" tab in inbasket, or \"Choose Columns\" in Meds & Orders section of the refill encounter.         Passed - Patient is age 18 or older       Passed - No active pregnancy on record       Passed - No positive pregnancy test in past year        furosemide (LASIX) 20 MG tablet 90 tablet      Sig: Take 1 tablet (20 mg) by mouth daily    Diuretics (Including Combos) Protocol Failed    11/12/2018  8:37 AM       Failed - Normal serum creatinine on file in past 12 months    Recent Labs   Lab Test  09/17/18   1010   CR  1.34*          Passed - Blood pressure under 140/90 in past 12 months    BP Readings from Last 3 Encounters: " "  10/24/18 134/68   07/13/18 136/69   07/06/18 133/64                Passed - Recent (12 mo) or future (30 days) visit within the authorizing provider's specialty    Patient had office visit in the last 12 months or has a visit in the next 30 days with authorizing provider or within the authorizing provider's specialty.  See \"Patient Info\" tab in inbasket, or \"Choose Columns\" in Meds & Orders section of the refill encounter.             Passed - Patient is age 18 or older       Passed - No active pregancy on record       Passed - Normal serum potassium on file in past 12 months    Recent Labs   Lab Test  09/17/18   1010   POTASSIUM  3.4                   Passed - Normal serum sodium on file in past 12 months    Recent Labs   Lab Test  09/17/18   1010   NA  138             Passed - No positive pregnancy test in past 12 months          "

## 2018-11-12 NOTE — TELEPHONE ENCOUNTER
Lasix      Last Written Prescription Date:  11/02/17  Last Fill Quantity: 90,   # refills: 3  Last Office Visit: 10/24/18  Future Office visit:       Routing refill request to provider for review/approval because:      Allopurinol      Last Written Prescription Date:  11/02/17  Last Fill Quantity: 180,   # refills: 3  Last Office Visit: 10/24/18  Future Office visit:       Routing refill request to provider for review/approval because:

## 2018-11-13 RX ORDER — ALLOPURINOL 100 MG/1
TABLET ORAL
Qty: 180 TABLET | Refills: 3 | OUTPATIENT
Start: 2018-11-13

## 2018-11-13 RX ORDER — FUROSEMIDE 20 MG
TABLET ORAL
Qty: 90 TABLET | Refills: 3 | OUTPATIENT
Start: 2018-11-13

## 2018-11-21 ENCOUNTER — TELEPHONE (OUTPATIENT)
Dept: PHARMACY | Facility: CLINIC | Age: 78
End: 2018-11-21

## 2018-11-21 NOTE — TELEPHONE ENCOUNTER
Follow-up with anemia management service:    I spoke to Anali reminding her that she is due for Hgb, ferritin and iron labs.  She said she feels great and wants to wait until January to get her labs drawn again  Referred to Yola to determine if lab orders can be changed to every 2 to 3 months since she is not on MARIAM therapy and labs are stable.    OK to defer labs - AMMY  2018    Anemia Latest Ref Rng & Units 2018 2018 2018 2018 8/15/2018 2018 10/29/2018   Hemoglobin 11.7 - 15.7 g/dL 8.6(L) 9.3(L) 9.5(L) - 10.0(L) 9.4(L) 10.5(L)   IV Iron Dose - 750mg - 750mg 750mg - - -   TSAT 15 - 46 % - 12(L) - - 25 26 31   Ferritin 8 - 252 ng/mL - 540(H) - - 941(H) 709(H) 596(H)     Orders needed to be renewed (for next follow-up date) in EPIC: None                          Med order expires: 2019                          Lab orders : 2019    Follow-up call date: 1/3/19    Beth Alfredo CPhT  [facility]  [phone]  Reviewed 2018   Anemia Management Service  Yola Rangel,PharmD and Nini Alfredo CPhT  Phone: [phone]  Fax: [phone]

## 2018-11-29 DIAGNOSIS — Z94.4 LIVER REPLACED BY TRANSPLANT (H): ICD-10-CM

## 2018-11-29 RX ORDER — URSODIOL 300 MG/1
900 CAPSULE ORAL DAILY
Qty: 270 CAPSULE | Refills: 3 | Status: SHIPPED | OUTPATIENT
Start: 2018-11-29 | End: 2020-05-19

## 2019-01-03 ENCOUNTER — TELEPHONE (OUTPATIENT)
Dept: PHARMACY | Facility: CLINIC | Age: 79
End: 2019-01-03

## 2019-01-03 NOTE — TELEPHONE ENCOUNTER
Follow-up with anemia management service:    I spoke to Anali reminding her that she is due for Hgb, ferritin and iron labs.  She will go in next week and requested a follow up call on     Anemia Latest Ref Rng & Units 2018 2018 2018 2018 8/15/2018 2018 10/29/2018   Hemoglobin 11.7 - 15.7 g/dL 8.6(L) 9.3(L) 9.5(L) - 10.0(L) 9.4(L) 10.5(L)   IV Iron Dose - 750mg - 750mg 750mg - - -   TSAT 15 - 46 % - 12(L) - - 25 26 31   Ferritin 8 - 252 ng/mL - 540(H) - - 941(H) 709(H) 596(H)       Orders needed to be renewed (for next follow-up date) in EPIC: None                          Med order expires: 2019                          Lab orders : 2019    Follow-up call date: 19    Beth Alfredo CP  [facility]  [phone]  Reviewed 2019 [facility]  Anemia Management Service  Yola Rangel,PharmD, Vanessa Eduardo RN  Phone: [phone]  Fax: [phone]

## 2019-01-09 ENCOUNTER — TELEPHONE (OUTPATIENT)
Dept: PHARMACY | Facility: CLINIC | Age: 79
End: 2019-01-09

## 2019-01-09 NOTE — TELEPHONE ENCOUNTER
Follow-up with anemia management service:    Anali called in to let us know that she will be going in for her labs later next week. She is sick with upper respiratory cold and laryngitis.    Anemia Latest Ref Rng & Units 2018 2018 2018 2018 8/15/2018 2018 10/29/2018   Hemoglobin 11.7 - 15.7 g/dL 8.6(L) 9.3(L) 9.5(L) - 10.0(L) 9.4(L) 10.5(L)   IV Iron Dose - 750mg - 750mg 750mg - - -   TSAT 15 - 46 % - 12(L) - - 25 26 31   Ferritin 8 - 252 ng/mL - 540(H) - - 941(H) 709(H) 596(H)       Orders needed to be renewed (for next follow-up date) in EPIC: None                          Med order expires: 2019                          Lab orders : 2019  Follow-up call date: 19    Beth Alfredo CPhT  [facility] Clinic  [phone]  Reviewed 2019 [facility]  Anemia Management Service  Yola Rangel,DoreenD, Vanessa Eduardo RN  Phone: [phone]  Fax: [phone]

## 2019-01-16 ENCOUNTER — TELEPHONE (OUTPATIENT)
Dept: PHARMACY | Facility: CLINIC | Age: 79
End: 2019-01-16

## 2019-01-17 NOTE — TELEPHONE ENCOUNTER
Follow-up with anemia management service:    I spoke to Anali, reminding her that she is due for Hgb, ferritin and iron labs.  She said she just got over her cold and will come in for sure by 19 to get her lab work done    Anemia Latest Ref Rng & Units 2018 2018 2018 2018 8/15/2018 2018 10/29/2018   Hemoglobin 11.7 - 15.7 g/dL 8.6(L) 9.3(L) 9.5(L) - 10.0(L) 9.4(L) 10.5(L)   IV Iron Dose - 750mg - 750mg 750mg - - -   TSAT 15 - 46 % - 12(L) - - 25 26 31   Ferritin 8 - 252 ng/mL - 540(H) - - 941(H) 709(H) 596(H)       Orders needed to be renewed (for next follow-up date) in EPIC: None                          Med order expires: 2019                          Lab orders : 2019  Follow-up call date: 19    Follow-up call date: 19    Beth Alfredo [facility]  Anemia Clinic  [phone]    Anemia Management Service  Yola Rangel,DoreenD, Nini AlfredoSiobhan Welsh RN  Phone: [phone]  Fax: [phone]

## 2019-01-23 ENCOUNTER — TELEPHONE (OUTPATIENT)
Dept: PHARMACY | Facility: CLINIC | Age: 79
End: 2019-01-23

## 2019-01-23 DIAGNOSIS — N18.30 ANEMIA IN STAGE 3 CHRONIC KIDNEY DISEASE (H): ICD-10-CM

## 2019-01-23 DIAGNOSIS — N18.30 CHRONIC KIDNEY DISEASE, STAGE III (MODERATE) (H): Chronic | ICD-10-CM

## 2019-01-23 DIAGNOSIS — D63.1 ANEMIA IN STAGE 3 CHRONIC KIDNEY DISEASE (H): ICD-10-CM

## 2019-01-23 DIAGNOSIS — Z94.4 LIVER REPLACED BY TRANSPLANT (H): ICD-10-CM

## 2019-01-23 LAB
ALBUMIN SERPL-MCNC: 3.3 G/DL (ref 3.4–5)
ALP SERPL-CCNC: 223 U/L (ref 40–150)
ALT SERPL W P-5'-P-CCNC: 50 U/L (ref 0–50)
ANION GAP SERPL CALCULATED.3IONS-SCNC: 2 MMOL/L (ref 3–14)
AST SERPL W P-5'-P-CCNC: 47 U/L (ref 0–45)
BILIRUB DIRECT SERPL-MCNC: 0.2 MG/DL (ref 0–0.2)
BILIRUB SERPL-MCNC: 0.4 MG/DL (ref 0.2–1.3)
BUN SERPL-MCNC: 37 MG/DL (ref 7–30)
CALCIUM SERPL-MCNC: 9 MG/DL (ref 8.5–10.1)
CHLORIDE SERPL-SCNC: 102 MMOL/L (ref 94–109)
CO2 SERPL-SCNC: 34 MMOL/L (ref 20–32)
CREAT SERPL-MCNC: 1.13 MG/DL (ref 0.52–1.04)
ERYTHROCYTE [DISTWIDTH] IN BLOOD BY AUTOMATED COUNT: 14.4 % (ref 10–15)
FERRITIN SERPL-MCNC: 357 NG/ML (ref 8–252)
GFR SERPL CREATININE-BSD FRML MDRD: 47 ML/MIN/{1.73_M2}
GLUCOSE SERPL-MCNC: 125 MG/DL (ref 70–99)
HCT VFR BLD AUTO: 34.4 % (ref 35–47)
HGB BLD-MCNC: 11 G/DL (ref 11.7–15.7)
IRON SATN MFR SERPL: 34 % (ref 15–46)
IRON SERPL-MCNC: 89 UG/DL (ref 35–180)
MCH RBC QN AUTO: 30.6 PG (ref 26.5–33)
MCHC RBC AUTO-ENTMCNC: 32 G/DL (ref 31.5–36.5)
MCV RBC AUTO: 96 FL (ref 78–100)
PLATELET # BLD AUTO: 293 10E9/L (ref 150–450)
POTASSIUM SERPL-SCNC: 3.6 MMOL/L (ref 3.4–5.3)
PROT SERPL-MCNC: 7.2 G/DL (ref 6.8–8.8)
RBC # BLD AUTO: 3.59 10E12/L (ref 3.8–5.2)
SODIUM SERPL-SCNC: 138 MMOL/L (ref 133–144)
TIBC SERPL-MCNC: 258 UG/DL (ref 240–430)
WBC # BLD AUTO: 5.3 10E9/L (ref 4–11)

## 2019-01-23 PROCEDURE — 83550 IRON BINDING TEST: CPT | Performed by: INTERNAL MEDICINE

## 2019-01-23 PROCEDURE — 83540 ASSAY OF IRON: CPT | Performed by: INTERNAL MEDICINE

## 2019-01-23 PROCEDURE — 80076 HEPATIC FUNCTION PANEL: CPT | Performed by: INTERNAL MEDICINE

## 2019-01-23 PROCEDURE — 80048 BASIC METABOLIC PNL TOTAL CA: CPT | Performed by: INTERNAL MEDICINE

## 2019-01-23 PROCEDURE — 85027 COMPLETE CBC AUTOMATED: CPT | Performed by: INTERNAL MEDICINE

## 2019-01-23 PROCEDURE — 36415 COLL VENOUS BLD VENIPUNCTURE: CPT | Performed by: INTERNAL MEDICINE

## 2019-01-23 PROCEDURE — 82728 ASSAY OF FERRITIN: CPT | Performed by: INTERNAL MEDICINE

## 2019-01-23 NOTE — TELEPHONE ENCOUNTER
Anemia Management Note  SUBJECTIVE/OBJECTIVE:  Referred by Sarai Abrams MD on 2018  Primary Diagnosis: Anemia in Chronic Kidney Disease (N18.3, D63.1)   Secondary Diagnosis:  Chronic Kidney Disease, Stage 3 (N18.3)   Hgb goal range: 9-10  Epo/Darbo: None.                  Order expires: N/A  Iron regimen:  Does not tolerate PO iron                Lab orders   in EPIC  Contact:  Ok to leave message on home phone regarding medical, billing and scheduling per consent to communicate dated 2013    Anemia Latest Ref Rng & Units 2018 2018 2018 8/15/2018 2018 10/29/2018 2019   Hemoglobin 11.7 - 15.7 g/dL 9.3(L) 9.5(L) - 10.0(L) 9.4(L) 10.5(L) 11.0(L)   IV Iron Dose - - 750mg 750mg - - - -   TSAT 15 - 46 % 12(L) - - 25 26 31 34   Ferritin 8 - 252 ng/mL 540(H) - - 941(H) 709(H) 596(H) 357(H)     BP Readings from Last 3 Encounters:   10/24/18 134/68   18 136/69   18 133/64     Wt Readings from Last 2 Encounters:   10/24/18 113 lb (51.3 kg)   18 110 lb (49.9 kg)     Anali is feeling really good.  She is going to be out of town the end of Feb. Anali said she will have her labs checked again in early March.  If labs are stable , ok to discharge from the Anemia Clinic. Pt is an agreement with this plan.  Will follow up again the  week of 2019.       ASSESSMENT:  Hgb:at goal - continue to monitor  TSat: at goal >30% Ferritin: At goal (>100ng/mL)    PLAN:  RTC for Hgbin 4 week(s) and Check iron studies in 4 week(s)    Orders needed to be renewed (for next follow-up date) in [facility]: None    Iron labs due:  2019    Plan discussed with:  Anali  Plan provided by:  norah    NEXT FOLLOW-UP DATE:  2019    Anemia Management Service  Yola Rangel,DoreenD, Vanessa Eduardo RN  Phone: [phone]  Fax: [phone]

## 2019-02-06 NOTE — ADDENDUM NOTE
Encounter addended by: Ashanti Barrett, PT on: 2/6/2019 9:38 AM   Actions taken: Sign clinical note, Episode resolved

## 2019-02-06 NOTE — PROGRESS NOTES
Outpatient Physical Therapy Discharge Note     Patient: Amanda Nails  : 1940    Beginning/End Dates of Reporting Period:  18 to 2019    Referring Provider: JEMAL Redman CNP    Therapy Diagnosis: Chronic neck pain     Client Self Report: The neck pain is almost gone, just a little on the left now.    Objective Measurements:  Objective Measure: Cervical rotation AROM  Details: Pre=60 deg caitlyn; Post right=72 deg; Left=68 deg            Goals:  Goal Identifier     Goal Description Patient will have >=60 degrees of bilateral cervical rotation AROM.   Target Date 18   Date Met  18   Progress:     Goal Identifier     Goal Description Patient will be able to perform ADL's without neck pain.   Target Date 18   Date Met  18   Progress:     Goal Identifier     Goal Description Patient will be able to participate in pool therapy with minimal neck pain.   Target Date 18   Date Met      Progress:       Progress Toward Goals:   Progress this reporting period: Anali attended 2 PT sessions to address her neck pain; she made good progress with pain relief and ROM.  Independent with HEP.      Plan:  Discharge from therapy.    Discharge:    Reason for Discharge: Working independently towards final goal    Equipment Issued: Theraband    Discharge Plan: Patient to continue home program.    Ashanti Barrett, PT, DPT  Doctor of Physical Therapy #[id number]  [facility]  [phone]  [email or web address]

## 2019-03-11 ENCOUNTER — TELEPHONE (OUTPATIENT)
Dept: PHARMACY | Facility: CLINIC | Age: 79
End: 2019-03-11

## 2019-03-11 NOTE — TELEPHONE ENCOUNTER
Referred by Sarai Abrams MD on 04/18/2018    Spoke with Anali today to check to see when she was  Having labs drawn again, and she stated she had not planned on having any labs drawn. Her Hgb, Ferritin and Iron labs have been stable since 8/15/2018 with no intervention. Last IV Iron was infused on 7/6/2018.     Anali would like to be discharged from Anemia Services. She will call Dr. Abrams if she is having any concerns.     Patient meets criteria for discharge from Anemia Clinic with at least 12 weeks without iron or MARIAM therapy.    Anemia Latest Ref Rng & Units 6/4/2018 6/29/2018 7/6/2018 8/15/2018 9/17/2018 10/29/2018 1/23/2019   Hemoglobin 11.7 - 15.7 g/dL 9.3(L) 9.5(L) - 10.0(L) 9.4(L) 10.5(L) 11.0(L)   IV Iron Dose - - 750mg 750mg - - - -   TSAT 15 - 46 % 12(L) - - 25 26 31 34   Ferritin 8 - 252 ng/mL 540(H) - - 941(H) 709(H) 596(H) 357(H)       Anemia labs discontinued, therapy plans cancelled, removed from active patient list, and referring provider notified.    Nichole Welsh RN  Anemia Clinic  Ph [phone]  Fax [phone]

## 2019-03-25 DIAGNOSIS — Z94.4 LIVER REPLACED BY TRANSPLANT (H): ICD-10-CM

## 2019-03-25 DIAGNOSIS — Z13.220 LIPID SCREENING: ICD-10-CM

## 2019-03-25 DIAGNOSIS — I10 ESSENTIAL HYPERTENSION WITH GOAL BLOOD PRESSURE LESS THAN 140/90: ICD-10-CM

## 2019-03-26 RX ORDER — AMLODIPINE BESYLATE 2.5 MG/1
TABLET ORAL
Qty: 180 TABLET | Refills: 3 | Status: SHIPPED | OUTPATIENT
Start: 2019-03-26 | End: 2019-09-09 | Stop reason: ALTCHOICE

## 2019-03-26 NOTE — TELEPHONE ENCOUNTER
Last Office Visit with Nephrologist:  4/17/18.  Medication refilled per Nephrology Clinic protocol.     Stephanie Pereyra RN

## 2019-05-21 NOTE — TELEPHONE ENCOUNTER
Patient reports that she is out of amlodipine. Prescription sent for 10 mg to pharmacy today. Patient reports that she has swelling and edema in ankles to below her knee for about a month or 2 and SBP is still 150-160. Patient also reports that she has been taking lasix 20 mg as needed and needs another script to be sent, she hasnt been taking this daily. Patient also reports to have been taking carvedilol 12.5 mg HS and not BID as prescribed. Patient reports that she has lots of energy and doesn't want to lose that also. Will review with  and follow up with patient.  Debbie Quintana LPN  Nephrology  [facility]  [phone]    
Hourly Rounding

## 2019-06-10 DIAGNOSIS — Z94.4 LIVER REPLACED BY TRANSPLANT (H): ICD-10-CM

## 2019-06-10 NOTE — TELEPHONE ENCOUNTER
"Requested Prescriptions   Pending Prescriptions Disp Refills     sertraline (ZOLOFT) 50 MG tablet [Pharmacy Med Name: SERTRALINE HCL 50MG TABS]  Last Written Prescription Date:  03/28/19  Last Fill Quantity: 90,  # refills: 0   Last office visit: 10/24/18 with prescribing provider:  YESENIA Wheeler   Future Office Visit:     90 tablet 0     Sig: TAKE ONE TABLET BY MOUTH EVERY DAY       SSRIs Protocol Passed - 6/10/2019  2:30 PM   No flowsheet data found.    No flowsheet data found.     Passed - Recent (12 mo) or future (30 days) visit within the authorizing provider's specialty     Patient had office visit in the last 12 months or has a visit in the next 30 days with authorizing provider or within the authorizing provider's specialty.  See \"Patient Info\" tab in inbasket, or \"Choose Columns\" in Meds & Orders section of the refill encounter.              Passed - Medication is active on med list        Passed - Patient is age 18 or older        Passed - No active pregnancy on record        Passed - No positive pregnancy test in last 12 months          "

## 2019-06-11 NOTE — TELEPHONE ENCOUNTER
Associated Diagnoses     Liver replaced by transplant (H) [Z94.4]        No PhQ9 on file.     Routing refill request to provider for review/approval because:  Diagnosis not on RN refill protocol.   No PHQ9.   Unclear, should this be going to primary care provider?   Last physical was 10/24/18 with Sweetie Wheeler.   Gaye Harris RNC

## 2019-08-09 ENCOUNTER — TELEPHONE (OUTPATIENT)
Dept: TRANSPLANT | Facility: CLINIC | Age: 79
End: 2019-08-09

## 2019-08-12 ENCOUNTER — OFFICE VISIT (OUTPATIENT)
Dept: GASTROENTEROLOGY | Facility: CLINIC | Age: 79
End: 2019-08-12
Attending: INTERNAL MEDICINE
Payer: COMMERCIAL

## 2019-08-12 VITALS
BODY MASS INDEX: 24.22 KG/M2 | OXYGEN SATURATION: 97 % | HEIGHT: 58 IN | HEART RATE: 92 BPM | SYSTOLIC BLOOD PRESSURE: 176 MMHG | WEIGHT: 115.4 LBS | DIASTOLIC BLOOD PRESSURE: 82 MMHG | TEMPERATURE: 97.4 F

## 2019-08-12 DIAGNOSIS — Z94.4 LIVER REPLACED BY TRANSPLANT (H): Primary | ICD-10-CM

## 2019-08-12 DIAGNOSIS — Z13.220 LIPID SCREENING: ICD-10-CM

## 2019-08-12 DIAGNOSIS — Z94.4 LIVER REPLACED BY TRANSPLANT (H): ICD-10-CM

## 2019-08-12 LAB
ALBUMIN SERPL-MCNC: 3.6 G/DL (ref 3.4–5)
ALBUMIN UR-MCNC: NEGATIVE MG/DL
ALP SERPL-CCNC: 235 U/L (ref 40–150)
ALT SERPL W P-5'-P-CCNC: 40 U/L (ref 0–50)
ANION GAP SERPL CALCULATED.3IONS-SCNC: 4 MMOL/L (ref 3–14)
APPEARANCE UR: CLEAR
AST SERPL W P-5'-P-CCNC: 38 U/L (ref 0–45)
BILIRUB DIRECT SERPL-MCNC: 0.2 MG/DL (ref 0–0.2)
BILIRUB SERPL-MCNC: 0.5 MG/DL (ref 0.2–1.3)
BILIRUB UR QL STRIP: NEGATIVE
BUN SERPL-MCNC: 42 MG/DL (ref 7–30)
CALCIUM SERPL-MCNC: 8.9 MG/DL (ref 8.5–10.1)
CHLORIDE SERPL-SCNC: 106 MMOL/L (ref 94–109)
CHOLEST SERPL-MCNC: 288 MG/DL
CO2 SERPL-SCNC: 31 MMOL/L (ref 20–32)
COLOR UR AUTO: ABNORMAL
CREAT SERPL-MCNC: 1.32 MG/DL (ref 0.52–1.04)
CREAT UR-MCNC: 28 MG/DL
ERYTHROCYTE [DISTWIDTH] IN BLOOD BY AUTOMATED COUNT: 15 % (ref 10–15)
GFR SERPL CREATININE-BSD FRML MDRD: 39 ML/MIN/{1.73_M2}
GLUCOSE SERPL-MCNC: 89 MG/DL (ref 70–99)
GLUCOSE UR STRIP-MCNC: NEGATIVE MG/DL
HCT VFR BLD AUTO: 35.5 % (ref 35–47)
HDLC SERPL-MCNC: 113 MG/DL
HGB BLD-MCNC: 11.1 G/DL (ref 11.7–15.7)
HGB UR QL STRIP: NEGATIVE
KETONES UR STRIP-MCNC: NEGATIVE MG/DL
LDLC SERPL CALC-MCNC: 155 MG/DL
LEUKOCYTE ESTERASE UR QL STRIP: NEGATIVE
MCH RBC QN AUTO: 30.7 PG (ref 26.5–33)
MCHC RBC AUTO-ENTMCNC: 31.3 G/DL (ref 31.5–36.5)
MCV RBC AUTO: 98 FL (ref 78–100)
MUCOUS THREADS #/AREA URNS LPF: PRESENT /LPF
NITRATE UR QL: NEGATIVE
NONHDLC SERPL-MCNC: 174 MG/DL
PH UR STRIP: 6 PH (ref 5–7)
PLATELET # BLD AUTO: 220 10E9/L (ref 150–450)
POTASSIUM SERPL-SCNC: 3.7 MMOL/L (ref 3.4–5.3)
PROT SERPL-MCNC: 7.5 G/DL (ref 6.8–8.8)
PROT UR-MCNC: 0.07 G/L
PROT/CREAT 24H UR: 0.27 G/G CR (ref 0–0.2)
RBC # BLD AUTO: 3.62 10E12/L (ref 3.8–5.2)
RBC #/AREA URNS AUTO: 1 /HPF (ref 0–2)
SODIUM SERPL-SCNC: 140 MMOL/L (ref 133–144)
SOURCE: ABNORMAL
SP GR UR STRIP: 1.01 (ref 1–1.03)
TRIGL SERPL-MCNC: 96 MG/DL
UROBILINOGEN UR STRIP-MCNC: 0 MG/DL (ref 0–2)
WBC # BLD AUTO: 5.6 10E9/L (ref 4–11)
WBC #/AREA URNS AUTO: <1 /HPF (ref 0–5)

## 2019-08-12 PROCEDURE — 80048 BASIC METABOLIC PNL TOTAL CA: CPT | Performed by: INTERNAL MEDICINE

## 2019-08-12 PROCEDURE — 80061 LIPID PANEL: CPT | Performed by: INTERNAL MEDICINE

## 2019-08-12 PROCEDURE — G0463 HOSPITAL OUTPT CLINIC VISIT: HCPCS | Mod: ZF

## 2019-08-12 PROCEDURE — 80195 ASSAY OF SIROLIMUS: CPT | Performed by: INTERNAL MEDICINE

## 2019-08-12 PROCEDURE — 84156 ASSAY OF PROTEIN URINE: CPT | Performed by: INTERNAL MEDICINE

## 2019-08-12 PROCEDURE — 85027 COMPLETE CBC AUTOMATED: CPT | Performed by: INTERNAL MEDICINE

## 2019-08-12 PROCEDURE — 36415 COLL VENOUS BLD VENIPUNCTURE: CPT | Performed by: INTERNAL MEDICINE

## 2019-08-12 PROCEDURE — 80076 HEPATIC FUNCTION PANEL: CPT | Performed by: INTERNAL MEDICINE

## 2019-08-12 PROCEDURE — 81001 URINALYSIS AUTO W/SCOPE: CPT | Performed by: INTERNAL MEDICINE

## 2019-08-12 ASSESSMENT — MIFFLIN-ST. JEOR: SCORE: 898.2

## 2019-08-12 ASSESSMENT — PAIN SCALES - GENERAL: PAINLEVEL: NO PAIN (0)

## 2019-08-12 NOTE — PROGRESS NOTES
"I had the pleasure of seeing Nathalie Nails for followup in the Liver Transplant Clinic at the [facility] on 08/12/2019.  Ms. 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      She is doing well at this visit.  She denies any abdominal pain, itching or skin rash.  She does have multiple skin lesions and does go to the dermatologist every 3 months.  She has had, she believes, 4 skin cancers.  She denies any fatigue.      She denies any fevers or chills, cough or shortness of breath.  She denies any nausea or vomiting, diarrhea or constipation.  Her appetite has been good, and her weight has been stable.  There have been no other new events since she was last seen.     Current Outpatient Medications   Medication     Acetaminophen (TYLENOL PO)     allopurinol (ZYLOPRIM) 100 MG tablet     amLODIPine (NORVASC) 2.5 MG tablet     Ascorbic Acid (VITAMIN C PO)     CALCIUM-VITAMIN D PO     carvedilol (COREG) 6.25 MG tablet     Emollient (VANICREAM EX)     furosemide (LASIX) 20 MG tablet     Multiple Vitamins-Minerals (PRESERVISION AREDS 2 PO)     Omega-3 Fatty Acids (FISH OIL PO)     predniSONE (DELTASONE) 1 MG tablet     sertraline (ZOLOFT) 50 MG tablet     sirolimus (GENERIC EQUIVALENT) 0.5 MG tablet     ursodiol (ACTIGALL) 300 MG capsule     amLODIPine (NORVASC) 5 MG tablet     sertraline (ZOLOFT) 50 MG tablet     SERTRALINE HCL PO     triamcinolone (KENALOG) 0.1 % ointment     No current facility-administered medications for this visit.      BP (!) 176/82   Pulse 92   Temp 97.4  F (36.3  C) (Oral)   Ht 1.473 m (4' 10\")   Wt 52.3 kg (115 lb 6.4 oz)   SpO2 97%   BMI 24.12 kg/m      PHYSICAL EXAMINATION:  In general, she looks quite well.  HEENT exam shows no scleral icterus or temporal muscle wasting.  Her chest is clear.  Her abdominal exam shows no increase in girth.  No masses or tenderness to palpation are present.  Her " liver is 10 cm in span without left lobe enlargement.  No spleen tip is palpable, and extremity exam shows no edema.  Skin exam shows numerous actinic keratoses but no particular suspicious lesions.  Neurologic exam is nonfocal.     Recent Results (from the past 168 hour(s))   CBC with platelets    Collection Time: 08/12/19  1:14 PM   Result Value Ref Range    WBC 5.6 4.0 - 11.0 10e9/L    RBC Count 3.62 (L) 3.8 - 5.2 10e12/L    Hemoglobin 11.1 (L) 11.7 - 15.7 g/dL    Hematocrit 35.5 35.0 - 47.0 %    MCV 98 78 - 100 fl    MCH 30.7 26.5 - 33.0 pg    MCHC 31.3 (L) 31.5 - 36.5 g/dL    RDW 15.0 10.0 - 15.0 %    Platelet Count 220 150 - 450 10e9/L   Basic metabolic panel    Collection Time: 08/12/19  1:14 PM   Result Value Ref Range    Sodium 140 133 - 144 mmol/L    Potassium 3.7 3.4 - 5.3 mmol/L    Chloride 106 94 - 109 mmol/L    Carbon Dioxide 31 20 - 32 mmol/L    Anion Gap 4 3 - 14 mmol/L    Glucose 89 70 - 99 mg/dL    Urea Nitrogen 42 (H) 7 - 30 mg/dL    Creatinine 1.32 (H) 0.52 - 1.04 mg/dL    GFR Estimate 39 (L) >60 mL/min/[1.73_m2]    GFR Estimate If Black 45 (L) >60 mL/min/[1.73_m2]    Calcium 8.9 8.5 - 10.1 mg/dL   Hepatic panel    Collection Time: 08/12/19  1:14 PM   Result Value Ref Range    Bilirubin Direct 0.2 0.0 - 0.2 mg/dL    Bilirubin Total 0.5 0.2 - 1.3 mg/dL    Albumin 3.6 3.4 - 5.0 g/dL    Protein Total 7.5 6.8 - 8.8 g/dL    Alkaline Phosphatase 235 (H) 40 - 150 U/L    ALT 40 0 - 50 U/L    AST 38 0 - 45 U/L   Lipid Profile    Collection Time: 08/12/19  1:14 PM   Result Value Ref Range    Cholesterol 288 (H) <200 mg/dL    Triglycerides 96 <150 mg/dL    HDL Cholesterol 113 >49 mg/dL    LDL Cholesterol Calculated 155 (H) <100 mg/dL    Non HDL Cholesterol 174 (H) <130 mg/dL   Protein  random urine with Creat Ratio    Collection Time: 08/12/19  1:28 PM   Result Value Ref Range    Protein Random Urine 0.07 g/L    Protein Total Urine g/gr Creatinine 0.27 (H) 0 - 0.2 g/g Cr   UA with Microscopic    Collection  Time: 08/12/19  1:28 PM   Result Value Ref Range    Color Urine Straw     Appearance Urine Clear     Glucose Urine Negative NEG^Negative mg/dL    Bilirubin Urine Negative NEG^Negative    Ketones Urine Negative NEG^Negative mg/dL    Specific Gravity Urine 1.008 1.003 - 1.035    Blood Urine Negative NEG^Negative    pH Urine 6.0 5.0 - 7.0 pH    Protein Albumin Urine Negative NEG^Negative mg/dL    Urobilinogen mg/dL 0.0 0.0 - 2.0 mg/dL    Nitrite Urine Negative NEG^Negative    Leukocyte Esterase Urine Negative NEG^Negative    Source Midstream Urine     WBC Urine <1 0 - 5 /HPF    RBC Urine 1 0 - 2 /HPF    Mucous Urine Present (A) NEG^Negative /LPF   Creatinine urine calculation only    Collection Time: 08/12/19  1:28 PM   Result Value Ref Range    Creatinine Urine 28 mg/dL      IMPRESSION:  My impression is that Ms. Nails is almost 35 years status post liver transplantation.  She is our longest living patient who was transplanted as an adult.  She is on sirolimus-based immunosuppression as she has really done well.  Her creatinine is a bit up today, but most of it seems to be related to dehydration.  I have recommended that she drink water before she gets her blood work.  She has minimal proteinuria.  She has a normal triglyceride level.  She has an elevated LDL cholesterol which I think reflects some mild recurrence of PBC.  She certainly has no evidence of portal hypertension.  She is otherwise up to date with other cancer screening and vaccinations, and my plan will be to see the patient back in the clinic again in 1 year.      She does have osteoporosis and has stage III chronic kidney disease which has been stable.      Thank you very much for allowing me to participate in the care of this patient.  If you have any questions regarding my recommendations, please do not hesitate to contact me.       Luis Daniel Lomeli MD      Professor of Medicine  [employer]      Executive Medical Director,  Solid Organ Transplant Program  [facility]

## 2019-08-12 NOTE — NURSING NOTE
"Chief Complaint   Patient presents with     RECHECK     liver tx     BP (!) 176/82   Pulse 92   Temp 97.4  F (36.3  C) (Oral)   Ht 1.473 m (4' 10\")   Wt 52.3 kg (115 lb 6.4 oz)   SpO2 97%   BMI 24.12 kg/m    Mary Sparks [facility]  8/12/2019 1:46 PM      "

## 2019-08-12 NOTE — LETTER
"8/12/2019      RE: Amanda Nails  [id number]  [place] [address]       I had the pleasure of seeing Nathalie Nails for followup in the Liver Transplant Clinic at the [facility] on 08/12/2019.  Ms. 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      She is doing well at this visit.  She denies any abdominal pain, itching or skin rash.  She does have multiple skin lesions and does go to the dermatologist every 3 months.  She has had, she believes, 4 skin cancers.  She denies any fatigue.      She denies any fevers or chills, cough or shortness of breath.  She denies any nausea or vomiting, diarrhea or constipation.  Her appetite has been good, and her weight has been stable.  There have been no other new events since she was last seen.     Current Outpatient Medications   Medication     Acetaminophen (TYLENOL PO)     allopurinol (ZYLOPRIM) 100 MG tablet     amLODIPine (NORVASC) 2.5 MG tablet     Ascorbic Acid (VITAMIN C PO)     CALCIUM-VITAMIN D PO     carvedilol (COREG) 6.25 MG tablet     Emollient (VANICREAM EX)     furosemide (LASIX) 20 MG tablet     Multiple Vitamins-Minerals (PRESERVISION AREDS 2 PO)     Omega-3 Fatty Acids (FISH OIL PO)     predniSONE (DELTASONE) 1 MG tablet     sertraline (ZOLOFT) 50 MG tablet     sirolimus (GENERIC EQUIVALENT) 0.5 MG tablet     ursodiol (ACTIGALL) 300 MG capsule     amLODIPine (NORVASC) 5 MG tablet     sertraline (ZOLOFT) 50 MG tablet     SERTRALINE HCL PO     triamcinolone (KENALOG) 0.1 % ointment     No current facility-administered medications for this visit.      BP (!) 176/82   Pulse 92   Temp 97.4  F (36.3  C) (Oral)   Ht 1.473 m (4' 10\")   Wt 52.3 kg (115 lb 6.4 oz)   SpO2 97%   BMI 24.12 kg/m       PHYSICAL EXAMINATION:  In general, she looks quite well.  HEENT exam shows no scleral icterus or temporal muscle wasting.  Her chest is clear.  Her abdominal " exam shows no increase in girth.  No masses or tenderness to palpation are present.  Her liver is 10 cm in span without left lobe enlargement.  No spleen tip is palpable, and extremity exam shows no edema.  Skin exam shows numerous actinic keratoses but no particular suspicious lesions.  Neurologic exam is nonfocal.     Recent Results (from the past 168 hour(s))   CBC with platelets    Collection Time: 08/12/19  1:14 PM   Result Value Ref Range    WBC 5.6 4.0 - 11.0 10e9/L    RBC Count 3.62 (L) 3.8 - 5.2 10e12/L    Hemoglobin 11.1 (L) 11.7 - 15.7 g/dL    Hematocrit 35.5 35.0 - 47.0 %    MCV 98 78 - 100 fl    MCH 30.7 26.5 - 33.0 pg    MCHC 31.3 (L) 31.5 - 36.5 g/dL    RDW 15.0 10.0 - 15.0 %    Platelet Count 220 150 - 450 10e9/L   Basic metabolic panel    Collection Time: 08/12/19  1:14 PM   Result Value Ref Range    Sodium 140 133 - 144 mmol/L    Potassium 3.7 3.4 - 5.3 mmol/L    Chloride 106 94 - 109 mmol/L    Carbon Dioxide 31 20 - 32 mmol/L    Anion Gap 4 3 - 14 mmol/L    Glucose 89 70 - 99 mg/dL    Urea Nitrogen 42 (H) 7 - 30 mg/dL    Creatinine 1.32 (H) 0.52 - 1.04 mg/dL    GFR Estimate 39 (L) >60 mL/min/[1.73_m2]    GFR Estimate If Black 45 (L) >60 mL/min/[1.73_m2]    Calcium 8.9 8.5 - 10.1 mg/dL   Hepatic panel    Collection Time: 08/12/19  1:14 PM   Result Value Ref Range    Bilirubin Direct 0.2 0.0 - 0.2 mg/dL    Bilirubin Total 0.5 0.2 - 1.3 mg/dL    Albumin 3.6 3.4 - 5.0 g/dL    Protein Total 7.5 6.8 - 8.8 g/dL    Alkaline Phosphatase 235 (H) 40 - 150 U/L    ALT 40 0 - 50 U/L    AST 38 0 - 45 U/L   Lipid Profile    Collection Time: 08/12/19  1:14 PM   Result Value Ref Range    Cholesterol 288 (H) <200 mg/dL    Triglycerides 96 <150 mg/dL    HDL Cholesterol 113 >49 mg/dL    LDL Cholesterol Calculated 155 (H) <100 mg/dL    Non HDL Cholesterol 174 (H) <130 mg/dL   Protein  random urine with Creat Ratio    Collection Time: 08/12/19  1:28 PM   Result Value Ref Range    Protein Random Urine 0.07 g/L    Protein  Total Urine g/gr Creatinine 0.27 (H) 0 - 0.2 g/g Cr   UA with Microscopic    Collection Time: 08/12/19  1:28 PM   Result Value Ref Range    Color Urine Straw     Appearance Urine Clear     Glucose Urine Negative NEG^Negative mg/dL    Bilirubin Urine Negative NEG^Negative    Ketones Urine Negative NEG^Negative mg/dL    Specific Gravity Urine 1.008 1.003 - 1.035    Blood Urine Negative NEG^Negative    pH Urine 6.0 5.0 - 7.0 pH    Protein Albumin Urine Negative NEG^Negative mg/dL    Urobilinogen mg/dL 0.0 0.0 - 2.0 mg/dL    Nitrite Urine Negative NEG^Negative    Leukocyte Esterase Urine Negative NEG^Negative    Source Midstream Urine     WBC Urine <1 0 - 5 /HPF    RBC Urine 1 0 - 2 /HPF    Mucous Urine Present (A) NEG^Negative /LPF   Creatinine urine calculation only    Collection Time: 08/12/19  1:28 PM   Result Value Ref Range    Creatinine Urine 28 mg/dL      IMPRESSION:  My impression is that Ms. Nails is almost 35 years status post liver transplantation.  She is our longest living patient who was transplanted as an adult.  She is on sirolimus-based immunosuppression as she has really done well.  Her creatinine is a bit up today, but most of it seems to be related to dehydration.  I have recommended that she drink water before she gets her blood work.  She has minimal proteinuria.  She has a normal triglyceride level.  She has an elevated LDL cholesterol which I think reflects some mild recurrence of PBC.  She certainly has no evidence of portal hypertension.  She is otherwise up to date with other cancer screening and vaccinations, and my plan will be to see the patient back in the clinic again in 1 year.      She does have osteoporosis and has stage III chronic kidney disease which has been stable.      Thank you very much for allowing me to participate in the care of this patient.  If you have any questions regarding my recommendations, please do not hesitate to contact me.       MD Kitty Marr  of Medicine  [facility] [employer]      Executive Medical Director, Solid Organ Transplant Program  [facility]    Luis Daniel Lomeli MD

## 2019-08-13 LAB
SIROLIMUS BLD-MCNC: 10.3 UG/L (ref 5–15)
TME LAST DOSE: NORMAL H

## 2019-09-03 ENCOUNTER — TELEPHONE (OUTPATIENT)
Dept: FAMILY MEDICINE | Facility: CLINIC | Age: 79
End: 2019-09-03

## 2019-09-03 DIAGNOSIS — I10 ESSENTIAL HYPERTENSION: ICD-10-CM

## 2019-09-03 DIAGNOSIS — M10.072 ACUTE IDIOPATHIC GOUT OF LEFT FOOT: ICD-10-CM

## 2019-09-03 DIAGNOSIS — M79.89 RIGHT LEG SWELLING: ICD-10-CM

## 2019-09-03 DIAGNOSIS — Z94.4 LIVER REPLACED BY TRANSPLANT (H): ICD-10-CM

## 2019-09-03 RX ORDER — PREDNISONE 1 MG/1
TABLET ORAL
Qty: 270 TABLET | Refills: 3 | Status: SHIPPED | OUTPATIENT
Start: 2019-09-03 | End: 2020-08-14

## 2019-09-03 NOTE — TELEPHONE ENCOUNTER
"Requested Prescriptions   Pending Prescriptions Disp Refills     carvedilol (COREG) 6.25 MG tablet [Pharmacy Med Name: CARVEDILOL 6.25MG TABS] 180 tablet 1     Sig: TAKE ONE TABLET BY MOUTH TWICE A DAY WITH MEALS  Last Written Prescription Date:  3/26/2019  Last Fill Quantity: 180,  # refills: 1   Last office visit: 10/24/2018 with prescribing provider:  liam    Future Office Visit:           Beta-Blockers Protocol Failed - 9/3/2019 10:58 AM        Failed - Blood pressure under 140/90 in past 12 months     BP Readings from Last 3 Encounters:   08/12/19 (!) 176/82   10/24/18 134/68   07/13/18 136/69                 Passed - Patient is age 6 or older        Passed - Recent (12 mo) or future (30 days) visit within the authorizing provider's specialty     Patient had office visit in the last 12 months or has a visit in the next 30 days with authorizing provider or within the authorizing provider's specialty.  See \"Patient Info\" tab in inbasket, or \"Choose Columns\" in Meds & Orders section of the refill encounter.              Passed - Medication is active on med list        allopurinol (ZYLOPRIM) 100 MG tablet [Pharmacy Med Name: ALLOPURINOL 100MG TABS] 180 tablet 3     Sig: TAKE TWO TABLETS BY MOUTH EVERY DAY  Last Written Prescription Date:  11/12/2018  Last Fill Quantity: 180,  # refills: 3   Last office visit: 10/24/2018 with prescribing provider:  Liam  Future Office Visit:           Gout Agents Protocol Failed - 9/3/2019 10:58 AM        Failed - Has Uric Acid on file in past 12 months and value is less than 6     Recent Labs   Lab Test 08/14/17  1005   URIC 4.5     If level is 6mg/dL or greater, ok to refill one time and refer to provider.           Failed - Normal serum creatinine on file in the past 12 months     Recent Labs   Lab Test 08/12/19  1314   CR 1.32*             Passed - CBC on file in past 12 months     Recent Labs   Lab Test 08/12/19  1314   WBC 5.6   RBC 3.62*   HGB 11.1*   HCT 35.5   PLT " "220                 Passed - ALT on file in past 12 months     Recent Labs   Lab Test 08/12/19  1314   ALT 40             Passed - Recent (12 mo) or future (30 days) visit within the authorizing provider's specialty     Patient had office visit in the last 12 months or has a visit in the next 30 days with authorizing provider or within the authorizing provider's specialty.  See \"Patient Info\" tab in inbasket, or \"Choose Columns\" in Meds & Orders section of the refill encounter.              Passed - Medication is active on med list        Passed - Patient is age 18 or older        Passed - No active pregnancy on record        Passed - No positive pregnancy test in past year        furosemide (LASIX) 20 MG tablet [Pharmacy Med Name: FUROSEMIDE 20MG TABS] 90 tablet 3     Sig: TAKE ONE TABLET BY MOUTH EVERY DAY  Last Written Prescription Date:  11/12/2018  Last Fill Quantity: 90,  # refills: 3   Last office visit: 10/24/2018 with prescribing provider:  Liam    Future Office Visit:           Diuretics (Including Combos) Protocol Failed - 9/3/2019 10:58 AM        Failed - Blood pressure under 140/90 in past 12 months     BP Readings from Last 3 Encounters:   08/12/19 (!) 176/82   10/24/18 134/68   07/13/18 136/69                 Failed - Normal serum creatinine on file in past 12 months     Recent Labs   Lab Test 08/12/19  1314   CR 1.32*              Passed - Recent (12 mo) or future (30 days) visit within the authorizing provider's specialty     Patient had office visit in the last 12 months or has a visit in the next 30 days with authorizing provider or within the authorizing provider's specialty.  See \"Patient Info\" tab in inbasket, or \"Choose Columns\" in Meds & Orders section of the refill encounter.              Passed - Medication is active on med list        Passed - Patient is age 18 or older        Passed - No active pregancy on record        Passed - Normal serum potassium on file in past 12 months     " Recent Labs   Lab Test 08/12/19  1314   POTASSIUM 3.7                    Passed - Normal serum sodium on file in past 12 months     Recent Labs   Lab Test 08/12/19  1314                 Passed - No positive pregnancy test in past 12 months           no

## 2019-09-04 RX ORDER — ALLOPURINOL 100 MG/1
TABLET ORAL
Qty: 180 TABLET | Refills: 3 | OUTPATIENT
Start: 2019-09-04

## 2019-09-04 RX ORDER — FUROSEMIDE 20 MG
TABLET ORAL
Qty: 90 TABLET | Refills: 3 | OUTPATIENT
Start: 2019-09-04

## 2019-09-04 RX ORDER — CARVEDILOL 6.25 MG/1
TABLET ORAL
Qty: 180 TABLET | Refills: 1 | OUTPATIENT
Start: 2019-09-04

## 2019-09-04 RX ORDER — CARVEDILOL 6.25 MG/1
6.25 TABLET ORAL 2 TIMES DAILY WITH MEALS
Qty: 180 TABLET | Refills: 1 | Status: CANCELLED | OUTPATIENT
Start: 2019-09-04

## 2019-09-04 NOTE — TELEPHONE ENCOUNTER
Routing refill request to provider for review/approval because:  Labs out of range:  bp high    Furosemide and allopurinol - too early.  Refilled 1 yr 11-12-18.  Jessica CEE RN

## 2019-09-05 RX ORDER — CARVEDILOL 6.25 MG/1
6.25 TABLET ORAL 2 TIMES DAILY WITH MEALS
Qty: 60 TABLET | Refills: 0 | Status: SHIPPED | OUTPATIENT
Start: 2019-09-05 | End: 2019-11-20

## 2019-09-05 RX ORDER — ALLOPURINOL 100 MG/1
200 TABLET ORAL DAILY
Qty: 60 TABLET | Refills: 0 | Status: SHIPPED | OUTPATIENT
Start: 2019-09-05 | End: 2019-09-09

## 2019-09-05 NOTE — TELEPHONE ENCOUNTER
Patient needs Uric acid level done and blood pressure check- I will send in small amount of refill until these are taken care of.

## 2019-09-05 NOTE — TELEPHONE ENCOUNTER
Patient is contacted about the small refill and the need for bp ck and lab.  Lab is ordered. Barbara RODRIGUEZ RN

## 2019-09-09 ENCOUNTER — OFFICE VISIT (OUTPATIENT)
Dept: FAMILY MEDICINE | Facility: CLINIC | Age: 79
End: 2019-09-09
Payer: COMMERCIAL

## 2019-09-09 VITALS
HEART RATE: 63 BPM | WEIGHT: 116 LBS | OXYGEN SATURATION: 99 % | DIASTOLIC BLOOD PRESSURE: 64 MMHG | SYSTOLIC BLOOD PRESSURE: 130 MMHG | HEIGHT: 58 IN | RESPIRATION RATE: 13 BRPM | BODY MASS INDEX: 24.35 KG/M2

## 2019-09-09 DIAGNOSIS — M10.072 ACUTE IDIOPATHIC GOUT OF LEFT FOOT: ICD-10-CM

## 2019-09-09 DIAGNOSIS — M79.89 RIGHT LEG SWELLING: ICD-10-CM

## 2019-09-09 DIAGNOSIS — I10 ESSENTIAL HYPERTENSION WITH GOAL BLOOD PRESSURE LESS THAN 140/90: ICD-10-CM

## 2019-09-09 DIAGNOSIS — Z94.4 LIVER REPLACED BY TRANSPLANT (H): ICD-10-CM

## 2019-09-09 DIAGNOSIS — M81.0 OSTEOPOROSIS, UNSPECIFIED OSTEOPOROSIS TYPE, UNSPECIFIED PATHOLOGICAL FRACTURE PRESENCE: Primary | ICD-10-CM

## 2019-09-09 DIAGNOSIS — Z79.52 CURRENT CHRONIC USE OF SYSTEMIC STEROIDS: ICD-10-CM

## 2019-09-09 DIAGNOSIS — K74.3 PRIMARY BILIARY CIRRHOSIS (H): ICD-10-CM

## 2019-09-09 DIAGNOSIS — N18.4 ANEMIA IN STAGE 4 CHRONIC KIDNEY DISEASE (H): ICD-10-CM

## 2019-09-09 DIAGNOSIS — D63.1 ANEMIA IN STAGE 4 CHRONIC KIDNEY DISEASE (H): ICD-10-CM

## 2019-09-09 PROCEDURE — 99214 OFFICE O/P EST MOD 30 MIN: CPT | Performed by: NURSE PRACTITIONER

## 2019-09-09 RX ORDER — FUROSEMIDE 20 MG
20 TABLET ORAL DAILY
Qty: 90 TABLET | Refills: 3 | Status: SHIPPED | OUTPATIENT
Start: 2019-09-09 | End: 2020-08-17

## 2019-09-09 RX ORDER — AMLODIPINE BESYLATE 5 MG/1
5 TABLET ORAL DAILY
Qty: 90 TABLET | Refills: 3 | Status: SHIPPED | OUTPATIENT
Start: 2019-09-09 | End: 2020-06-19

## 2019-09-09 RX ORDER — ALLOPURINOL 100 MG/1
200 TABLET ORAL DAILY
Qty: 180 TABLET | Refills: 3 | Status: SHIPPED | OUTPATIENT
Start: 2019-09-09 | End: 2020-08-17

## 2019-09-09 ASSESSMENT — MIFFLIN-ST. JEOR: SCORE: 900.92

## 2019-09-09 NOTE — PROGRESS NOTES
Subjective     Amanda Nails is a 79 year old female who presents to clinic today for the following health issues:    HPI   Hypertension Follow-up      Do you check your blood pressure regularly outside of the clinic? No     Are you following a low salt diet? No    Are your blood pressures ever more than 140 on the top number (systolic) OR more   than 90 on the bottom number (diastolic), for example 140/90? No      How many servings of fruits and vegetables do you eat daily?  0-1    On average, how many sweetened beverages do you drink each day (soda, juice, sweet tea, etc)?   0    How many days per week do you miss taking your medication? 0    History of primary biliary cirrhosis post liver transplant 35 yrs ago- followed by Dr. Lomeli on 3 mg prednisone daily.    Anemia stage 4 kidney disease- HGB and creatinine are stable.     Osteoporosis: last DEXA done in 2013- patient taking daily prednisone due to transplant 35 yrs ago.      Medication Followup of Allopurinol    Taking Medication as prescribed: yes    Side Effects:  None    Medication Helping Symptoms:  yes       Patient Active Problem List   Diagnosis     Chronic kidney disease, stage III (moderate) (H)     Liver replaced by transplant (H)     Primary biliary cirrhosis (H)     CARDIOVASCULAR SCREENING; LDL GOAL LESS THAN 130     Mixed hyperlipidemia     Iron deficiency anemia     Skin cancer     Osteoporosis     Advanced directives, counseling/discussion     Proteinuria     Anemia in stage 4 chronic kidney disease (H)     Past Surgical History:   Procedure Laterality Date     cholecytectomy       COLONOSCOPY  4/9/2013    Procedure: COLONOSCOPY;  Colonoscopy;  Surgeon: Kendra Archer MD;  Location: [facility]     ESOPHAGOSCOPY, GASTROSCOPY, DUODENOSCOPY (EGD), COMBINED  8/23/2013    Procedure: COMBINED ESOPHAGOSCOPY, GASTROSCOPY, DUODENOSCOPY (EGD), BIOPSY SINGLE OR MULTIPLE;  Gastroscopy       SPLENECTOMY       TRANSPLANT  1983    liver        Social History     Tobacco Use     Smoking status: Former Smoker     Years: 3.00     Smokeless tobacco: Never Used     Tobacco comment: Years ago.   Substance Use Topics     Alcohol use: Yes     Comment: very rarely     Family History   Problem Relation Age of Onset     Respiratory Mother      Gastrointestinal Disease Sister         celiac     Gastrointestinal Disease Son         celiac     Gastrointestinal Disease Daughter         celiac     Gastrointestinal Disease Sister      Gastrointestinal Disease Son         celiac     Gastrointestinal Disease Daughter         PBC     Endocrine Disease Daughter         Graves disease     Endocrine Disease Daughter         hypothyroidism           -------------------------------------  Reviewed and updated as needed this visit by Provider         Review of Systems   ROS COMP: Constitutional, HEENT, cardiovascular, pulmonary, GI, , musculoskeletal, neuro, skin, endocrine and psych systems are negative, except as otherwise noted.      Objective    There were no vitals taken for this visit.  There is no height or weight on file to calculate BMI.  Physical Exam   GENERAL: healthy, alert and no distress  RESP: lungs clear to auscultation - no rales, rhonchi or wheezes  CV: regular rate and rhythm, normal S1 S2, no S3 or S4, no murmur, click or rub, no peripheral edema and peripheral pulses strong  MS: no gross musculoskeletal defects noted, no edema    Diagnostic Test Results:  Labs reviewed in Epic        Assessment & Plan     1. Primary biliary cirrhosis (H)      2. Liver replaced by transplant (H)  Post 35 yrs ago- followed by Hepatology     3. Anemia in stage 4 chronic kidney disease  - HGB and Creatine stable     4.Acute idiopathic gout of left foot    - allopurinol (ZYLOPRIM) 100 MG tablet; Take 2 tablets (200 mg) by mouth daily  Dispense: 180 tablet; Refill: 3    5. Essential hypertension with goal blood pressure less than 140/90  controlled  - amLODIPine (NORVASC) 5 MG  tablet; Take 1 tablet (5 mg) by mouth daily  Dispense: 90 tablet; Refill: 3    6. Right leg swelling  stable  - furosemide (LASIX) 20 MG tablet; Take 1 tablet (20 mg) by mouth daily  Dispense: 90 tablet; Refill: 3    7. Osteoporosis, unspecified osteoporosis type, unspecified pathological fracture presence    - DX Hip/Pelvis/Spine; Future- consider Reclast  - start taking Calcium 1500 mg and VIt D 1000 units daily  - consider stopping prednisone     8. Current chronic use of systemic steroids  Recommend stopping due to osteoporosis           No follow-ups on file.    JEMAL Ferrari [facility]

## 2019-09-09 NOTE — PATIENT INSTRUCTIONS
Thank you for choosing [facility].  You may be receiving an email and/or telephone survey request from Counts include 234 beds at the [facility] Customer Experience regarding your visit today.  Please take a few minutes to respond to the survey to let us know how we are doing.      If you have questions or concerns, please contact us via [employer] or you can contact your care team at [phone].    Our Clinic hours are:  Monday 6:40 am  to 7:00 pm  Tuesday -Friday 6:40 am to 5:00 pm    The [place] outpatient lab hours are:  Monday - Friday 6:10 am to 4:45 pm  Saturdays 7:00 am to 11:00 am  Appointments are required, call [phone]    If you have clinical questions after hours or would like to schedule an appointment,  call the clinic at [phone].

## 2019-09-11 ENCOUNTER — DOCUMENTATION ONLY (OUTPATIENT)
Dept: TRANSPLANT | Facility: CLINIC | Age: 79
End: 2019-09-11

## 2019-11-02 ENCOUNTER — HEALTH MAINTENANCE LETTER (OUTPATIENT)
Age: 79
End: 2019-11-02

## 2019-11-20 DIAGNOSIS — I10 ESSENTIAL HYPERTENSION: ICD-10-CM

## 2019-11-20 DIAGNOSIS — Z94.4 LIVER REPLACED BY TRANSPLANT (H): ICD-10-CM

## 2019-11-20 RX ORDER — URSODIOL 300 MG/1
CAPSULE ORAL
Qty: 270 CAPSULE | Refills: 3 | Status: SHIPPED | OUTPATIENT
Start: 2019-11-20 | End: 2020-11-09

## 2019-11-20 RX ORDER — SIROLIMUS 0.5 MG/1
TABLET, FILM COATED ORAL
Qty: 180 TABLET | Refills: 3 | Status: SHIPPED | OUTPATIENT
Start: 2019-11-20 | End: 2020-11-09

## 2019-11-20 NOTE — TELEPHONE ENCOUNTER
"Requested Prescriptions   Pending Prescriptions Disp Refills     carvedilol (COREG) 6.25 MG tablet [Pharmacy Med Name: CARVEDILOL 6.25MG TABS] 60 tablet 0     Sig: TAKE ONE TABLET BY MOUTH TWICE A DAY WITH MEALS       Beta-Blockers Protocol Passed - 11/20/2019  8:42 AM        Passed - Blood pressure under 140/90 in past 12 months     BP Readings from Last 3 Encounters:   09/09/19 130/64   08/12/19 (!) 176/82   10/24/18 134/68                 Passed - Patient is age 6 or older        Passed - Recent (12 mo) or future (30 days) visit within the authorizing provider's specialty     Patient has had an office visit with the authorizing provider or a provider within the authorizing providers department within the previous 12 mos or has a future within next 30 days. See \"Patient Info\" tab in inbasket, or \"Choose Columns\" in Meds & Orders section of the refill encounter.              Passed - Medication is active on med list        Last Written Prescription Date:  9/5/19  Last Fill Quantity: 60,  # refills: 0   Last office visit: 9/9/2019 with prescribing provider:  Chang   Future Office Visit:      "

## 2019-11-21 NOTE — TELEPHONE ENCOUNTER
Routing refill request to provider for review/approval because:  A break in medication, last refilled 9/5/19 for 60 tabs, takes 2 tabs daily.

## 2019-11-25 RX ORDER — CARVEDILOL 6.25 MG/1
TABLET ORAL
Qty: 60 TABLET | Refills: 0 | Status: SHIPPED | OUTPATIENT
Start: 2019-11-25 | End: 2019-11-27

## 2019-11-27 DIAGNOSIS — I10 ESSENTIAL HYPERTENSION: ICD-10-CM

## 2019-11-27 NOTE — TELEPHONE ENCOUNTER
"Requested Prescriptions   Pending Prescriptions Disp Refills     carvedilol (COREG) 6.25 MG tablet [Pharmacy Med Name: CARVEDILOL 6.25MG TABS] 60 tablet 0     Sig: TAKE ONE TABLET BY MOUTH TWICE A DAY WITH MEALS   Last Written Prescription Date:  11/25/19  Last Fill Quantity: 60 tab,  # refills: 0   Last office visit: 9/9/2019 with prescribing provider:  Aby Downing     Future Office Visit:        Beta-Blockers Protocol Passed - 11/27/2019  9:31 AM        Passed - Blood pressure under 140/90 in past 12 months     BP Readings from Last 3 Encounters:   09/09/19 130/64   08/12/19 (!) 176/82   10/24/18 134/68                 Passed - Patient is age 6 or older        Passed - Recent (12 mo) or future (30 days) visit within the authorizing provider's specialty     Patient has had an office visit with the authorizing provider or a provider within the authorizing providers department within the previous 12 mos or has a future within next 30 days. See \"Patient Info\" tab in inbasket, or \"Choose Columns\" in Meds & Orders section of the refill encounter.              Passed - Medication is active on med list          "

## 2019-11-29 NOTE — TELEPHONE ENCOUNTER
I left a message for the patient on her home phone to return call to [facility]. Need to clarify, is she taking 1 or 2 tabs per day?  Also, this was just refilled 4 days ago.    Jaycee JACKSON RN

## 2019-12-02 RX ORDER — CARVEDILOL 6.25 MG/1
TABLET ORAL
Qty: 180 TABLET | Refills: 1 | Status: SHIPPED | OUTPATIENT
Start: 2019-12-02 | End: 2020-05-19

## 2019-12-02 NOTE — TELEPHONE ENCOUNTER
Prescription approved per [facility] [facility].  Uses mail order, needed 3 months supplies, it is ok per protocol.   Gaye Harris RNC

## 2019-12-26 ENCOUNTER — TELEPHONE (OUTPATIENT)
Dept: TRANSPLANT | Facility: CLINIC | Age: 79
End: 2019-12-26

## 2019-12-26 NOTE — TELEPHONE ENCOUNTER
Transplant Social Work Services Phone Call      Data: Referral received from Candace Crowder, Liver Transplant Coordinator re: patient's questions about [facility].  Intervention/education: Called Saeed and discussed [facility].  Assessment: [facility] is closed for immunosuppression treatment for solid organ transplantation.  Saeed does not know the amount of her copay for sirolimus, and she denies any financial difficulty affording her medications.  Plan: I will remain available to assist with outpatient psychosocial needs.      KAYLEE Tidwell, [facility]  Liver Transplant   Phone [phone]  Pager [phone]

## 2020-01-06 DIAGNOSIS — M10.072 ACUTE IDIOPATHIC GOUT OF LEFT FOOT: ICD-10-CM

## 2020-01-06 DIAGNOSIS — Z13.220 LIPID SCREENING: ICD-10-CM

## 2020-01-06 DIAGNOSIS — Z94.4 LIVER REPLACED BY TRANSPLANT (H): ICD-10-CM

## 2020-01-06 LAB
ALBUMIN SERPL-MCNC: 3.4 G/DL (ref 3.4–5)
ALP SERPL-CCNC: 285 U/L (ref 40–150)
ALT SERPL W P-5'-P-CCNC: 35 U/L (ref 0–50)
ANION GAP SERPL CALCULATED.3IONS-SCNC: 6 MMOL/L (ref 3–14)
AST SERPL W P-5'-P-CCNC: 38 U/L (ref 0–45)
BILIRUB DIRECT SERPL-MCNC: 0.1 MG/DL (ref 0–0.2)
BILIRUB SERPL-MCNC: 0.5 MG/DL (ref 0.2–1.3)
BUN SERPL-MCNC: 39 MG/DL (ref 7–30)
CALCIUM SERPL-MCNC: 9.3 MG/DL (ref 8.5–10.1)
CHLORIDE SERPL-SCNC: 106 MMOL/L (ref 94–109)
CO2 SERPL-SCNC: 28 MMOL/L (ref 20–32)
CREAT SERPL-MCNC: 1.38 MG/DL (ref 0.52–1.04)
ERYTHROCYTE [DISTWIDTH] IN BLOOD BY AUTOMATED COUNT: 13.8 % (ref 10–15)
GFR SERPL CREATININE-BSD FRML MDRD: 37 ML/MIN/{1.73_M2}
GLUCOSE SERPL-MCNC: 92 MG/DL (ref 70–99)
HCT VFR BLD AUTO: 33.2 % (ref 35–47)
HGB BLD-MCNC: 10.2 G/DL (ref 11.7–15.7)
MCH RBC QN AUTO: 29.1 PG (ref 26.5–33)
MCHC RBC AUTO-ENTMCNC: 30.7 G/DL (ref 31.5–36.5)
MCV RBC AUTO: 95 FL (ref 78–100)
PLATELET # BLD AUTO: 332 10E9/L (ref 150–450)
POTASSIUM SERPL-SCNC: 3.7 MMOL/L (ref 3.4–5.3)
PROT SERPL-MCNC: 7.5 G/DL (ref 6.8–8.8)
RBC # BLD AUTO: 3.51 10E12/L (ref 3.8–5.2)
SODIUM SERPL-SCNC: 140 MMOL/L (ref 133–144)
URATE SERPL-MCNC: 2.8 MG/DL (ref 2.6–6)
WBC # BLD AUTO: 5.7 10E9/L (ref 4–11)

## 2020-01-06 PROCEDURE — 36415 COLL VENOUS BLD VENIPUNCTURE: CPT | Performed by: NURSE PRACTITIONER

## 2020-01-06 PROCEDURE — 80076 HEPATIC FUNCTION PANEL: CPT | Performed by: INTERNAL MEDICINE

## 2020-01-06 PROCEDURE — 85027 COMPLETE CBC AUTOMATED: CPT | Performed by: INTERNAL MEDICINE

## 2020-01-06 PROCEDURE — 84550 ASSAY OF BLOOD/URIC ACID: CPT | Performed by: NURSE PRACTITIONER

## 2020-01-06 PROCEDURE — 80048 BASIC METABOLIC PNL TOTAL CA: CPT | Performed by: INTERNAL MEDICINE

## 2020-02-10 ENCOUNTER — HEALTH MAINTENANCE LETTER (OUTPATIENT)
Age: 80
End: 2020-02-10

## 2020-04-06 ENCOUNTER — TELEPHONE (OUTPATIENT)
Dept: TRANSPLANT | Facility: CLINIC | Age: 80
End: 2020-04-06

## 2020-04-06 NOTE — TELEPHONE ENCOUNTER
Call back to Christa.  She is distancing from her family, she moved into an appt - her family does drop stuff off for her.  She is writing a lot about her experience related to liver transplant and is trying locate Dr. Mirza Swartz.  I offered info I could.

## 2020-05-14 ENCOUNTER — TELEPHONE (OUTPATIENT)
Dept: FAMILY MEDICINE | Facility: CLINIC | Age: 80
End: 2020-05-14

## 2020-05-14 DIAGNOSIS — Z94.4 LIVER REPLACED BY TRANSPLANT (H): ICD-10-CM

## 2020-05-14 DIAGNOSIS — I10 ESSENTIAL HYPERTENSION: ICD-10-CM

## 2020-05-15 RX ORDER — CARVEDILOL 6.25 MG/1
TABLET ORAL
Qty: 180 TABLET | Refills: 1 | OUTPATIENT
Start: 2020-05-15

## 2020-05-15 NOTE — TELEPHONE ENCOUNTER
Last physical 1 year ago. S/p liver transplant. I would recommend virtual visit to address medications.

## 2020-05-19 ENCOUNTER — VIRTUAL VISIT (OUTPATIENT)
Dept: FAMILY MEDICINE | Facility: CLINIC | Age: 80
End: 2020-05-19
Payer: COMMERCIAL

## 2020-05-19 DIAGNOSIS — I10 ESSENTIAL HYPERTENSION: ICD-10-CM

## 2020-05-19 DIAGNOSIS — Z94.4 LIVER REPLACED BY TRANSPLANT (H): ICD-10-CM

## 2020-05-19 DIAGNOSIS — L29.9 PRURITIC DISORDER: Primary | ICD-10-CM

## 2020-05-19 PROCEDURE — 99213 OFFICE O/P EST LOW 20 MIN: CPT | Mod: 95 | Performed by: NURSE PRACTITIONER

## 2020-05-19 RX ORDER — CARVEDILOL 6.25 MG/1
6.25 TABLET ORAL 2 TIMES DAILY WITH MEALS
Qty: 180 TABLET | Refills: 3 | Status: SHIPPED | OUTPATIENT
Start: 2020-05-19 | End: 2021-03-05

## 2020-05-19 NOTE — PROGRESS NOTES
"Amanda Nails is a 80 year old female who is being evaluated via a billable telephone visit.      The patient has been notified of following:     \"This telephone visit will be conducted via a call between you and your physician/provider. We have found that certain health care needs can be provided without the need for a physical exam.  This service lets us provide the care you need with a short phone conversation.  If a prescription is necessary we can send it directly to your pharmacy.  If lab work is needed we can place an order for that and you can then stop by our lab to have the test done at a later time.    Telephone visits are billed at different rates depending on your insurance coverage. During this emergency period, for some insurers they may be billed the same as an in-person visit.  Please reach out to your insurance provider with any questions.    If during the course of the call the physician/provider feels a telephone visit is not appropriate, you will not be charged for this service.\"    Patient has given verbal consent for Telephone visit?  Yes    What phone number would you like to be contacted at? [phone]    How would you like to obtain your AVS? Mail a copy    Subjective     Amanda Nails is a 80 year old female who presents via phone visit today for the following health issues:    HPI  Hypertension Follow-up      Do you check your blood pressure regularly outside of the clinic? No     Are you following a low salt diet? No    Are your blood pressures ever more than 140 on the top number (systolic) OR more   than 90 on the bottom number (diastolic), for example 140/90? Yes      How many servings of fruits and vegetables do you eat daily?  0-1    On average, how many sweetened beverages do you drink each day (Examples: soda, juice, sweet tea, etc.  Do NOT count diet or artificially sweetened beverages)?   0    How many days per week do you exercise enough to make your heart beat faster? " 3 or less    How many minutes a day do you exercise enough to make your heart beat faster? 20 - 29    How many days per week do you miss taking your medication? 0    No flowsheet data found.             -------------------------------------    Patient Active Problem List   Diagnosis     Chronic kidney disease, stage III (moderate) (H)     Liver replaced by transplant (H)     Primary biliary cirrhosis (H)     CARDIOVASCULAR SCREENING; LDL GOAL LESS THAN 130     Mixed hyperlipidemia     Iron deficiency anemia     Skin cancer     Osteoporosis     Advanced directives, counseling/discussion     Proteinuria     Anemia in stage 4 chronic kidney disease (H)     Past Surgical History:   Procedure Laterality Date     cholecytectomy       COLONOSCOPY  4/9/2013    Procedure: COLONOSCOPY;  Colonoscopy;  Surgeon: Kendra Archer MD;  Location: [facility]     ESOPHAGOSCOPY, GASTROSCOPY, DUODENOSCOPY (EGD), COMBINED  8/23/2013    Procedure: COMBINED ESOPHAGOSCOPY, GASTROSCOPY, DUODENOSCOPY (EGD), BIOPSY SINGLE OR MULTIPLE;  Gastroscopy       SPLENECTOMY       TRANSPLANT  1983    liver       Social History     Tobacco Use     Smoking status: Former Smoker     Years: 3.00     Smokeless tobacco: Never Used     Tobacco comment: Years ago.   Substance Use Topics     Alcohol use: Yes     Comment: very rarely     Family History   Problem Relation Age of Onset     Respiratory Mother      Gastrointestinal Disease Sister         celiac     Gastrointestinal Disease Son         celiac     Gastrointestinal Disease Daughter         celiac     Gastrointestinal Disease Sister      Gastrointestinal Disease Son         celiac     Gastrointestinal Disease Daughter         PBC     Endocrine Disease Daughter         Graves disease     Endocrine Disease Daughter         hypothyroidism           Reviewed and updated as needed this visit by Provider         Review of Systems   Constitutional, HEENT, cardiovascular, pulmonary, GI, ,  musculoskeletal, neuro, skin, endocrine and psych systems are negative, except as otherwise noted.       Objective   Reported vitals:  There were no vitals taken for this visit.   alert and no distress  PSYCH: Alert and oriented times 3; coherent speech, normal   rate and volume, able to articulate logical thoughts, able   to abstract reason, no tangential thoughts, no hallucinations   or delusions  Her affect is normal  RESP: No cough, no audible wheezing, able to talk in full sentences  Remainder of exam unable to be completed due to telephone visits    Diagnostic Test Results:  Labs reviewed in Epic        Assessment/Plan:  1. Essential hypertension  Stable/ well controlled  - carvedilol (COREG) 6.25 MG tablet; Take 1 tablet (6.25 mg) by mouth 2 times daily (with meals)  Dispense: 180 tablet; Refill: 3    2. Liver replaced by transplant (H)    3. Pruritic disorder  - Refilled Sertraline-       No follow-ups on file.      Phone call duration:  8 minutes    JEMAL Ferrari CNP

## 2020-06-02 ENCOUNTER — TELEPHONE (OUTPATIENT)
Dept: TRANSPLANT | Facility: CLINIC | Age: 80
End: 2020-06-02

## 2020-06-02 NOTE — TELEPHONE ENCOUNTER
Called Christa back.  She wants to know if she can leave her home, see family.  I encouraged her to get out of the house, go for walks, enjoy the outdoors. - recommended masking, great handwashing practices, social distancing, staying away from anyone that 's sick.  Told her if she sees someone indoors she should not touch them, hug them, wear a mask and handwash.    Hasn't had labs since January, suggested she use [place] [facility] if she does.  She is feeling well.

## 2020-06-02 NOTE — TELEPHONE ENCOUNTER
Patient Call: Voicemail  Date/Time: 6/2/2020-10:33am  Reason for call: PT wanted to touch base with coordinator

## 2020-06-12 ENCOUNTER — TELEPHONE (OUTPATIENT)
Dept: FAMILY MEDICINE | Facility: CLINIC | Age: 80
End: 2020-06-12

## 2020-06-12 NOTE — TELEPHONE ENCOUNTER
Patient reports she had esophageal stretching done years ago due to scarring from a lesion.  She reports she should start again because she has noticed food sometimes getting stuck - or feels stuck (does not currently have this issue).  RN recommended she have a virtual visit with Ellen to discuss this and then she will determine appropriate next step.  Patient states she will call [facility] because she thought maybe they had recommended years ago?  If not then she will make appt with Ellne.  Jessica CEE RN BSN

## 2020-06-12 NOTE — TELEPHONE ENCOUNTER
Reason for Call: Request for an order or referral:    Order or referral being requested: Esophogus stretching    Date needed: as soon as possible    Has the patient been seen by the PCP for this problem? YES    Additional comments: Previous GI referral is from 2018. Patient says she goes to [facility] for this.    Phone number Patient can be reached at:  Home number on file [phone] (home)    Best Time:  Any    Can we leave a detailed message on this number?  YES    Call taken on 6/12/2020 at 9:30 AM by Cathy Jacobson

## 2020-06-18 ENCOUNTER — TELEPHONE (OUTPATIENT)
Dept: TRANSPLANT | Facility: CLINIC | Age: 80
End: 2020-06-18

## 2020-06-18 ENCOUNTER — ALLIED HEALTH/NURSE VISIT (OUTPATIENT)
Dept: FAMILY MEDICINE | Facility: CLINIC | Age: 80
End: 2020-06-18
Payer: COMMERCIAL

## 2020-06-18 ENCOUNTER — TELEPHONE (OUTPATIENT)
Dept: FAMILY MEDICINE | Facility: CLINIC | Age: 80
End: 2020-06-18

## 2020-06-18 VITALS
DIASTOLIC BLOOD PRESSURE: 70 MMHG | OXYGEN SATURATION: 97 % | SYSTOLIC BLOOD PRESSURE: 132 MMHG | RESPIRATION RATE: 14 BRPM | HEART RATE: 72 BPM

## 2020-06-18 DIAGNOSIS — L29.9 PRURITIC DISORDER: ICD-10-CM

## 2020-06-18 DIAGNOSIS — I10 ESSENTIAL HYPERTENSION WITH GOAL BLOOD PRESSURE LESS THAN 140/90: ICD-10-CM

## 2020-06-18 DIAGNOSIS — I10 ESSENTIAL HYPERTENSION: ICD-10-CM

## 2020-06-18 DIAGNOSIS — Z94.4 LIVER REPLACED BY TRANSPLANT (H): Primary | Chronic | ICD-10-CM

## 2020-06-18 DIAGNOSIS — Z94.4 LIVER REPLACED BY TRANSPLANT (H): ICD-10-CM

## 2020-06-18 DIAGNOSIS — N18.30 CHRONIC KIDNEY DISEASE, STAGE III (MODERATE) (H): Chronic | ICD-10-CM

## 2020-06-18 DIAGNOSIS — Z13.220 LIPID SCREENING: ICD-10-CM

## 2020-06-18 LAB
ALBUMIN SERPL-MCNC: 3 G/DL (ref 3.4–5)
ALBUMIN UR-MCNC: NEGATIVE MG/DL
ALP SERPL-CCNC: 223 U/L (ref 40–150)
ALT SERPL W P-5'-P-CCNC: 37 U/L (ref 0–50)
ANION GAP SERPL CALCULATED.3IONS-SCNC: 4 MMOL/L (ref 3–14)
APPEARANCE UR: CLEAR
AST SERPL W P-5'-P-CCNC: 39 U/L (ref 0–45)
BILIRUB DIRECT SERPL-MCNC: 0.1 MG/DL (ref 0–0.2)
BILIRUB SERPL-MCNC: 0.3 MG/DL (ref 0.2–1.3)
BILIRUB UR QL STRIP: NEGATIVE
BUN SERPL-MCNC: 40 MG/DL (ref 7–30)
CALCIUM SERPL-MCNC: 9.5 MG/DL (ref 8.5–10.1)
CHLORIDE SERPL-SCNC: 103 MMOL/L (ref 94–109)
CHOLEST SERPL-MCNC: 271 MG/DL
CO2 SERPL-SCNC: 30 MMOL/L (ref 20–32)
COLOR UR AUTO: YELLOW
CREAT SERPL-MCNC: 1.26 MG/DL (ref 0.52–1.04)
CREAT UR-MCNC: 26 MG/DL
ERYTHROCYTE [DISTWIDTH] IN BLOOD BY AUTOMATED COUNT: 17.2 % (ref 10–15)
FERRITIN SERPL-MCNC: 6 NG/ML (ref 8–252)
FOLATE SERPL-MCNC: 55.6 NG/ML
GFR SERPL CREATININE-BSD FRML MDRD: 41 ML/MIN/{1.73_M2}
GLUCOSE SERPL-MCNC: 87 MG/DL (ref 70–99)
GLUCOSE UR STRIP-MCNC: NEGATIVE MG/DL
HCT VFR BLD AUTO: 25.8 % (ref 35–47)
HDLC SERPL-MCNC: 105 MG/DL
HGB BLD-MCNC: 7.6 G/DL (ref 11.7–15.7)
HGB UR QL STRIP: NEGATIVE
IRON SERPL-MCNC: 21 UG/DL (ref 35–180)
KETONES UR STRIP-MCNC: NEGATIVE MG/DL
LDLC SERPL CALC-MCNC: 141 MG/DL
LEUKOCYTE ESTERASE UR QL STRIP: ABNORMAL
MCH RBC QN AUTO: 22.4 PG (ref 26.5–33)
MCHC RBC AUTO-ENTMCNC: 29.5 G/DL (ref 31.5–36.5)
MCV RBC AUTO: 76 FL (ref 78–100)
NITRATE UR QL: NEGATIVE
NONHDLC SERPL-MCNC: 166 MG/DL
PH UR STRIP: 5.5 PH (ref 5–7)
PLATELET # BLD AUTO: 337 10E9/L (ref 150–450)
POTASSIUM SERPL-SCNC: 3.5 MMOL/L (ref 3.4–5.3)
PROT SERPL-MCNC: 7.2 G/DL (ref 6.8–8.8)
PROT UR-MCNC: <0.05 G/L
PROT/CREAT 24H UR: NORMAL G/G CR (ref 0–0.2)
RBC # BLD AUTO: 3.39 10E12/L (ref 3.8–5.2)
RBC #/AREA URNS AUTO: ABNORMAL /HPF
SODIUM SERPL-SCNC: 137 MMOL/L (ref 133–144)
SOURCE: ABNORMAL
SP GR UR STRIP: 1.01 (ref 1–1.03)
TRIGL SERPL-MCNC: 126 MG/DL
TSH SERPL DL<=0.005 MIU/L-ACNC: 3.61 MU/L (ref 0.4–4)
UROBILINOGEN UR STRIP-ACNC: 0.2 EU/DL (ref 0.2–1)
VIT B12 SERPL-MCNC: 579 PG/ML (ref 193–986)
WBC # BLD AUTO: 4.9 10E9/L (ref 4–11)
WBC #/AREA URNS AUTO: ABNORMAL /HPF

## 2020-06-18 PROCEDURE — 82607 VITAMIN B-12: CPT | Performed by: NURSE PRACTITIONER

## 2020-06-18 PROCEDURE — 84156 ASSAY OF PROTEIN URINE: CPT | Performed by: INTERNAL MEDICINE

## 2020-06-18 PROCEDURE — 83540 ASSAY OF IRON: CPT | Performed by: NURSE PRACTITIONER

## 2020-06-18 PROCEDURE — 99207 ZZC NO CHARGE NURSE ONLY: CPT

## 2020-06-18 PROCEDURE — 82728 ASSAY OF FERRITIN: CPT | Performed by: NURSE PRACTITIONER

## 2020-06-18 PROCEDURE — 82746 ASSAY OF FOLIC ACID SERUM: CPT | Performed by: NURSE PRACTITIONER

## 2020-06-18 PROCEDURE — 84443 ASSAY THYROID STIM HORMONE: CPT | Performed by: NURSE PRACTITIONER

## 2020-06-18 PROCEDURE — 81001 URINALYSIS AUTO W/SCOPE: CPT | Performed by: INTERNAL MEDICINE

## 2020-06-18 PROCEDURE — 36415 COLL VENOUS BLD VENIPUNCTURE: CPT | Performed by: NURSE PRACTITIONER

## 2020-06-18 PROCEDURE — 80061 LIPID PANEL: CPT | Performed by: INTERNAL MEDICINE

## 2020-06-18 PROCEDURE — 80048 BASIC METABOLIC PNL TOTAL CA: CPT | Performed by: INTERNAL MEDICINE

## 2020-06-18 PROCEDURE — 80076 HEPATIC FUNCTION PANEL: CPT | Performed by: INTERNAL MEDICINE

## 2020-06-18 PROCEDURE — 85027 COMPLETE CBC AUTOMATED: CPT | Performed by: INTERNAL MEDICINE

## 2020-06-18 NOTE — PROGRESS NOTES
Amanda Nails is a 80 year old year old patient who comes in today for a Blood Pressure check because of ongoing blood pressure monitoring and patient reports home BP have been high.   readings in the 150 s/ 70s.  Vital Signs as repeated by RN Twice.   Patient is taking medication as prescribed  Patient is tolerating medications well.  Patient is monitoring Blood Pressure at home.  Average readings if yes are 150 s/70s  Current complaints: slight Shortness of breath with activity only.     Vital Signs 6/18/2020 6/18/2020   Systolic 148 132   Diastolic 72 70   Pulse 72    Temperature     Respirations 14    Weight (LB)     Height     BMI (Calculated)     Pain     O2 97      Disposition:  Routed to provider in telephone encounter.

## 2020-06-18 NOTE — TELEPHONE ENCOUNTER
REcieved notification of Hgb 7.6.  Previous Hgb in January was 10.2.    Spoke to Anali.  Says that she has noticed recent shortness of breath for the past few weeks.  Denies black or bloody stools,belly pain, light headed ness. Tells me she has had iron infusions in the past.  Message to Dr. Lomeli.

## 2020-06-18 NOTE — TELEPHONE ENCOUNTER
DATE:  6/18/2020   TIME OF RECEIPT FROM LAB:  1046  LAB TEST:  Hgb  LAB VALUE:  7.7  RESULTS GIVEN WITH READ-BACK TO (PROVIDER):  Candace Crowder RN   TIME LAB VALUE REPORTED TO PROVIDER:   1047    JUAN Resendiz, LPN   Solid Organ Transplant

## 2020-06-19 ENCOUNTER — TELEPHONE (OUTPATIENT)
Dept: TRANSPLANT | Facility: CLINIC | Age: 80
End: 2020-06-19

## 2020-06-19 DIAGNOSIS — Z94.4 LIVER REPLACED BY TRANSPLANT (H): Primary | Chronic | ICD-10-CM

## 2020-06-19 RX ORDER — AMLODIPINE BESYLATE 10 MG/1
10 TABLET ORAL DAILY
Qty: 90 TABLET | Refills: 3 | Status: SHIPPED | OUTPATIENT
Start: 2020-06-19 | End: 2020-08-14

## 2020-06-19 NOTE — TELEPHONE ENCOUNTER
Christa called back to report that [facility] is unable to get her in for a couple of weeks. She will call [facility] and see if she can get in sooner.

## 2020-06-19 NOTE — TELEPHONE ENCOUNTER
Spoke with Christa. She did have a recent episode where her food gets stuck. She thinks she needs another EDG and dilation. Per review it appears as last one was in 2018 EDG. Patient denies any blood in stools or dark colored stools. Patient does not have any pain. Message to DR Lomeli.

## 2020-06-19 NOTE — TELEPHONE ENCOUNTER
Patient Call: Christa regarding recent Hgb         Call back needed? Yes    Return Call Needed  Same as documented in contacts section  When to return call?: Same day: [facility]

## 2020-06-19 NOTE — TELEPHONE ENCOUNTER
Per DR Lomeli patient to have EDG and Iron infusion weekly x 4 and then monthly.     Spoke with patient. Orders placed. Patient prefers to call [facility] to get set up for infusions and EDG on her own. Patient will call back with any issues or concerns.

## 2020-06-22 ENCOUNTER — INFUSION THERAPY VISIT (OUTPATIENT)
Dept: INFUSION THERAPY | Facility: CLINIC | Age: 80
End: 2020-06-22
Attending: INTERNAL MEDICINE
Payer: COMMERCIAL

## 2020-06-22 ENCOUNTER — TELEPHONE (OUTPATIENT)
Dept: TRANSPLANT | Facility: CLINIC | Age: 80
End: 2020-06-22

## 2020-06-22 VITALS
HEART RATE: 78 BPM | DIASTOLIC BLOOD PRESSURE: 64 MMHG | SYSTOLIC BLOOD PRESSURE: 169 MMHG | TEMPERATURE: 96.6 F | RESPIRATION RATE: 18 BRPM

## 2020-06-22 DIAGNOSIS — N18.4 ANEMIA IN STAGE 4 CHRONIC KIDNEY DISEASE (H): Primary | ICD-10-CM

## 2020-06-22 DIAGNOSIS — D63.1 ANEMIA IN STAGE 4 CHRONIC KIDNEY DISEASE (H): Primary | ICD-10-CM

## 2020-06-22 DIAGNOSIS — N18.30 CHRONIC KIDNEY DISEASE, STAGE III (MODERATE) (H): ICD-10-CM

## 2020-06-22 DIAGNOSIS — Z11.59 ENCOUNTER FOR SCREENING FOR OTHER VIRAL DISEASES: Primary | ICD-10-CM

## 2020-06-22 RX ORDER — HEPARIN SODIUM (PORCINE) LOCK FLUSH IV SOLN 100 UNIT/ML 100 UNIT/ML
5 SOLUTION INTRAVENOUS
Status: CANCELLED | OUTPATIENT
Start: 2020-06-23

## 2020-06-22 RX ORDER — HEPARIN SODIUM,PORCINE 10 UNIT/ML
5 VIAL (ML) INTRAVENOUS
Status: CANCELLED | OUTPATIENT
Start: 2020-06-23

## 2020-06-22 ASSESSMENT — PAIN SCALES - GENERAL: PAINLEVEL: NO PAIN (0)

## 2020-06-22 NOTE — TELEPHONE ENCOUNTER
Spoke to Anali.  She will be getting iron infusions.   I also see a note from a provider last week where she was asked to change one of her BP meds.  She told me she won't be doing that.    Encouraged her to make contact with a local internist for primary care management.

## 2020-06-22 NOTE — TELEPHONE ENCOUNTER
Patient Call: Voicemail  Date/Time: 11:39am- 6/22/2020  Reason for call: Pt is requesting a call back from coordinator please connect

## 2020-06-22 NOTE — TELEPHONE ENCOUNTER
Patient Call: Voicemail  Date/Time: 6/22/20 at 8:44 am  Reason for call: would like to speak to coordinator no details left.

## 2020-06-22 NOTE — TELEPHONE ENCOUNTER
Call from Anali who is concerned about her ferrlicet dose.  Reviewed w/ Dr. Lomeli, he recommends that we order ferrlicet 125 mg iv weekly x 8.  Therapy plan changed.  Anali aware.

## 2020-06-22 NOTE — PROGRESS NOTES
Infusion Nursing Note:  Amanda Nails presents today for Ferrlecit.    Patient seen by provider today: No   present during visit today: Not Applicable.    Note: Patient called this morning concerned her provider did not order the correct iron because in the past her infusions have only been one hour. She received ferrlecit in [id number] (only 125mg vs 250mg ordered now) and injectafer in 2018.  Instructed patient to contact ordering provider.     Pharmacy did NOT have ferrlecit in stock. Patient will return tomorrow to receive 1 of 8 infusion.    Intravenous Access:  Peripheral IV placed.    Treatment Conditions:  Not Applicable.    Post Infusion Assessment:  N/A.     Discharge Plan:   Discharge instructions reviewed with: Patient.  Patient and/or family verbalized understanding of discharge instructions and all questions answered.  AVS to patient via [employer].  Patient will return 6/23/2020 for next appointment.   Patient discharged in stable condition accompanied by: self.  Departure Mode: Ambulatory.    Anna Lovelace RN

## 2020-06-23 ENCOUNTER — INFUSION THERAPY VISIT (OUTPATIENT)
Dept: INFUSION THERAPY | Facility: CLINIC | Age: 80
End: 2020-06-23
Attending: NURSE PRACTITIONER
Payer: COMMERCIAL

## 2020-06-23 VITALS — TEMPERATURE: 97.6 F | DIASTOLIC BLOOD PRESSURE: 74 MMHG | SYSTOLIC BLOOD PRESSURE: 146 MMHG | HEART RATE: 78 BPM

## 2020-06-23 DIAGNOSIS — D63.1 ANEMIA IN STAGE 4 CHRONIC KIDNEY DISEASE (H): Primary | ICD-10-CM

## 2020-06-23 DIAGNOSIS — N18.30 CHRONIC KIDNEY DISEASE, STAGE III (MODERATE) (H): ICD-10-CM

## 2020-06-23 DIAGNOSIS — N18.4 ANEMIA IN STAGE 4 CHRONIC KIDNEY DISEASE (H): Primary | ICD-10-CM

## 2020-06-23 PROCEDURE — 96365 THER/PROPH/DIAG IV INF INIT: CPT

## 2020-06-23 PROCEDURE — 25800030 ZZH RX IP 258 OP 636: Performed by: INTERNAL MEDICINE

## 2020-06-23 PROCEDURE — 25000128 H RX IP 250 OP 636: Performed by: INTERNAL MEDICINE

## 2020-06-23 RX ORDER — HEPARIN SODIUM,PORCINE 10 UNIT/ML
5 VIAL (ML) INTRAVENOUS
Status: CANCELLED | OUTPATIENT
Start: 2020-06-25

## 2020-06-23 RX ORDER — HEPARIN SODIUM,PORCINE 10 UNIT/ML
5 VIAL (ML) INTRAVENOUS
Status: DISCONTINUED | OUTPATIENT
Start: 2020-06-23 | End: 2020-06-23 | Stop reason: HOSPADM

## 2020-06-23 RX ORDER — HEPARIN SODIUM (PORCINE) LOCK FLUSH IV SOLN 100 UNIT/ML 100 UNIT/ML
5 SOLUTION INTRAVENOUS
Status: CANCELLED | OUTPATIENT
Start: 2020-06-25

## 2020-06-23 RX ORDER — HEPARIN SODIUM (PORCINE) LOCK FLUSH IV SOLN 100 UNIT/ML 100 UNIT/ML
5 SOLUTION INTRAVENOUS
Status: DISCONTINUED | OUTPATIENT
Start: 2020-06-23 | End: 2020-06-23 | Stop reason: HOSPADM

## 2020-06-23 RX ADMIN — SODIUM CHLORIDE 125 MG: 9 INJECTION, SOLUTION INTRAVENOUS at 13:50

## 2020-06-23 NOTE — PROGRESS NOTES
Infusion Nursing Note:  Amanda Nails presents today for Ferrlecit infusion.    Patient seen by provider today: No   present during visit today: Not Applicable.    Note: N/A.    Intravenous Access:  Peripheral IV placed.    Treatment Conditions:  Not Applicable.    Post Infusion Assessment:  Patient tolerated infusion without incident.  Patient observed for 30 minutes post iron infusion per protocol.       Discharge Plan:   Patient discharged in stable condition accompanied by: self.  Departure Mode: Ambulatory.  Pt has appt to return on 6/30 for next dose.  We gave her a schedule for 6 more doses beyond that date for a total of 8.    Nichole Saeed RN

## 2020-06-29 ENCOUNTER — OFFICE VISIT (OUTPATIENT)
Dept: FAMILY MEDICINE | Facility: CLINIC | Age: 80
End: 2020-06-29
Payer: COMMERCIAL

## 2020-06-29 VITALS
WEIGHT: 118.4 LBS | HEART RATE: 80 BPM | OXYGEN SATURATION: 97 % | BODY MASS INDEX: 23.87 KG/M2 | HEIGHT: 59 IN | TEMPERATURE: 98.2 F | DIASTOLIC BLOOD PRESSURE: 82 MMHG | SYSTOLIC BLOOD PRESSURE: 134 MMHG | RESPIRATION RATE: 16 BRPM

## 2020-06-29 DIAGNOSIS — L03.011 PARONYCHIA OF FINGER OF RIGHT HAND: Primary | ICD-10-CM

## 2020-06-29 DIAGNOSIS — Z11.59 ENCOUNTER FOR SCREENING FOR OTHER VIRAL DISEASES: ICD-10-CM

## 2020-06-29 PROCEDURE — 99207 ZZC NO CHARGE NURSE ONLY: CPT

## 2020-06-29 PROCEDURE — U0003 INFECTIOUS AGENT DETECTION BY NUCLEIC ACID (DNA OR RNA); SEVERE ACUTE RESPIRATORY SYNDROME CORONAVIRUS 2 (SARS-COV-2) (CORONAVIRUS DISEASE [COVID-19]), AMPLIFIED PROBE TECHNIQUE, MAKING USE OF HIGH THROUGHPUT TECHNOLOGIES AS DESCRIBED BY CMS-2020-01-R: HCPCS | Performed by: SURGERY

## 2020-06-29 PROCEDURE — 99213 OFFICE O/P EST LOW 20 MIN: CPT | Performed by: NURSE PRACTITIONER

## 2020-06-29 RX ORDER — CEPHALEXIN 500 MG/1
500 CAPSULE ORAL 2 TIMES DAILY
Qty: 14 CAPSULE | Refills: 0 | Status: SHIPPED | OUTPATIENT
Start: 2020-06-29 | End: 2020-07-06

## 2020-06-29 ASSESSMENT — MIFFLIN-ST. JEOR: SCORE: 922.69

## 2020-06-29 NOTE — PROGRESS NOTES
Faustina Nails is a 80 year old female who presents to clinic today for the following health issues:    HPI   Derm problem       Duration: 1 week    Description (location/character/radiation):  Patient noticed a puncture to distal 5th finger and then it proceeded to swell and get red.    Intensity:  mild    Accompanying signs and symptoms: painful     History (similar episodes/previous evaluation): None    Precipitating or alleviating factors: None    Therapies tried and outcome: None       Patient Active Problem List   Diagnosis     Chronic kidney disease, stage III (moderate) (H)     Liver replaced by transplant (H)     Primary biliary cirrhosis (H)     CARDIOVASCULAR SCREENING; LDL GOAL LESS THAN 130     Mixed hyperlipidemia     Iron deficiency anemia     Skin cancer     Osteoporosis     Advanced directives, counseling/discussion     Proteinuria     Anemia in stage 4 chronic kidney disease (H)     Past Surgical History:   Procedure Laterality Date     cholecytectomy       COLONOSCOPY  4/9/2013    Procedure: COLONOSCOPY;  Colonoscopy;  Surgeon: Kendra Archer MD;  Location: [facility]     ESOPHAGOSCOPY, GASTROSCOPY, DUODENOSCOPY (EGD), COMBINED  8/23/2013    Procedure: COMBINED ESOPHAGOSCOPY, GASTROSCOPY, DUODENOSCOPY (EGD), BIOPSY SINGLE OR MULTIPLE;  Gastroscopy       SPLENECTOMY       TRANSPLANT  1983    liver       Social History     Tobacco Use     Smoking status: Former Smoker     Years: 3.00     Smokeless tobacco: Never Used     Tobacco comment: Years ago.   Substance Use Topics     Alcohol use: Yes     Comment: very rarely     Family History   Problem Relation Age of Onset     Respiratory Mother      Gastrointestinal Disease Sister         celiac     Gastrointestinal Disease Son         celiac     Gastrointestinal Disease Daughter         celiac     Gastrointestinal Disease Sister      Gastrointestinal Disease Son         celiac     Gastrointestinal Disease Daughter         PBC      "Endocrine Disease Daughter         Graves disease     Endocrine Disease Daughter         hypothyroidism             Reviewed and updated as needed this visit by Provider         Review of Systems   Constitutional, HEENT, cardiovascular, pulmonary, gi and gu systems are negative, except as otherwise noted.      Objective    /82   Pulse 80   Temp 98.2  F (36.8  C) (Tympanic)   Resp 16   Ht 1.499 m (4' 11\")   Wt 53.7 kg (118 lb 6.4 oz)   SpO2 97%   BMI 23.91 kg/m    Body mass index is 23.91 kg/m .  Physical Exam   GENERAL: healthy, alert and no distress  SKIN: just proximal to right 5th fingernail area of swelling, tender to palpation, erythematous and warm.  NEURO: Normal strength and tone, mentation intact and speech normal    Diagnostic Test Results:  none         Assessment & Plan       ICD-10-CM    1. Paronychia of finger of right hand  L03.011 cephALEXin (KEFLEX) 500 MG capsule          FUTURE APPOINTMENTS:       - Follow up in 3-5 days for symptoms that are not improving, sooner for new or worsening sx.     Patient Instructions     Patient Education     Paronychia of the Finger or Toe  Paronychia is an infection near a fingernail or toenail. It usually occurs when an opening in the cuticle or an ingrown toenail lets bacteria under the skin.  The infection will need to be drained if pus is present. If the infection has been caught early, you may need only antibiotic treatment. Healing will take about 1 to 2 weeks.  Home care  Follow these guidelines when caring for yourself at home:    Clean and soak the toe or finger. Do this 2 times a day for the first 3 days. To do so:  ? Soak your foot or hand in a tub of warm water for 5 minutes. Or hold your toe or finger under a faucet of warm running water for 5 minutes.  ? Clean any crust away with soap and water using a cotton swab.  ? Put antibiotic ointment on the infected area.    Change the dressing daily or any time it gets dirty.    If you were given " antibiotics, take them as directed until they are all gone.    If your infection is on a toe, wear comfortable shoes with a lot of toe room. You can also wear open-toed sandals while your toe heals.    You may use over-the-counter medicine (acetaminophen or ibuprofen to help with pain, unless another medicine was prescribed. If you have chronic liver or kidney disease, talk with your healthcare provider before using these medicines. Also talk with your provider if you've had a stomach ulcer or GI (gastrointestinal) bleeding.  Prevention  The following can prevent paronychia:    Avoid cutting or playing with your cuticles at home.    Don't bite your nails.    Don't suck on your thumbs or fingers.  Follow-up care  Follow up with your healthcare provider, or as advised.  When to seek medical advice  Call your healthcare provider right away if any of these occur:    Redness, pain, or swelling of the finger or toe gets worse    Red streaks in the skin leading away from the wound    Pus or fluid draining from the nail area    Fever of 100.4 F (38 C) or higher, or as directed by your provider  Date Last Reviewed: 8/1/2016 2000-2019 [employer]. [address] [place] [address], [place] [address]. All rights reserved. This information is not intended as a substitute for professional medical care. Always follow your healthcare professional's instructions.               No follow-ups on file.    JEMAL Almaraz [facility]

## 2020-06-29 NOTE — PATIENT INSTRUCTIONS
Patient Education     Paronychia of the Finger or Toe  Paronychia is an infection near a fingernail or toenail. It usually occurs when an opening in the cuticle or an ingrown toenail lets bacteria under the skin.  The infection will need to be drained if pus is present. If the infection has been caught early, you may need only antibiotic treatment. Healing will take about 1 to 2 weeks.  Home care  Follow these guidelines when caring for yourself at home:    Clean and soak the toe or finger. Do this 2 times a day for the first 3 days. To do so:  ? Soak your foot or hand in a tub of warm water for 5 minutes. Or hold your toe or finger under a faucet of warm running water for 5 minutes.  ? Clean any crust away with soap and water using a cotton swab.  ? Put antibiotic ointment on the infected area.    Change the dressing daily or any time it gets dirty.    If you were given antibiotics, take them as directed until they are all gone.    If your infection is on a toe, wear comfortable shoes with a lot of toe room. You can also wear open-toed sandals while your toe heals.    You may use over-the-counter medicine (acetaminophen or ibuprofen to help with pain, unless another medicine was prescribed. If you have chronic liver or kidney disease, talk with your healthcare provider before using these medicines. Also talk with your provider if you've had a stomach ulcer or GI (gastrointestinal) bleeding.  Prevention  The following can prevent paronychia:    Avoid cutting or playing with your cuticles at home.    Don't bite your nails.    Don't suck on your thumbs or fingers.  Follow-up care  Follow up with your healthcare provider, or as advised.  When to seek medical advice  Call your healthcare provider right away if any of these occur:    Redness, pain, or swelling of the finger or toe gets worse    Red streaks in the skin leading away from the wound    Pus or fluid draining from the nail area    Fever of 100.4 F (38 C) or  higher, or as directed by your provider  Date Last Reviewed: 8/1/2016 2000-2019 [employer]. [address] [place] [address], [place] [address]. All rights reserved. This information is not intended as a substitute for professional medical care. Always follow your healthcare professional's instructions.

## 2020-06-30 ENCOUNTER — INFUSION THERAPY VISIT (OUTPATIENT)
Dept: INFUSION THERAPY | Facility: CLINIC | Age: 80
End: 2020-06-30
Attending: INTERNAL MEDICINE
Payer: COMMERCIAL

## 2020-06-30 VITALS
DIASTOLIC BLOOD PRESSURE: 60 MMHG | OXYGEN SATURATION: 18 % | SYSTOLIC BLOOD PRESSURE: 138 MMHG | HEART RATE: 67 BPM | TEMPERATURE: 96.2 F

## 2020-06-30 DIAGNOSIS — N18.30 CHRONIC KIDNEY DISEASE, STAGE III (MODERATE) (H): ICD-10-CM

## 2020-06-30 DIAGNOSIS — D63.1 ANEMIA IN STAGE 4 CHRONIC KIDNEY DISEASE (H): Primary | ICD-10-CM

## 2020-06-30 DIAGNOSIS — N18.4 ANEMIA IN STAGE 4 CHRONIC KIDNEY DISEASE (H): Primary | ICD-10-CM

## 2020-06-30 LAB
SARS-COV-2 RNA SPEC QL NAA+PROBE: NOT DETECTED
SPECIMEN SOURCE: 1355

## 2020-06-30 PROCEDURE — 25000128 H RX IP 250 OP 636: Performed by: INTERNAL MEDICINE

## 2020-06-30 PROCEDURE — 96365 THER/PROPH/DIAG IV INF INIT: CPT

## 2020-06-30 PROCEDURE — 25800030 ZZH RX IP 258 OP 636: Performed by: INTERNAL MEDICINE

## 2020-06-30 RX ORDER — HEPARIN SODIUM,PORCINE 10 UNIT/ML
5 VIAL (ML) INTRAVENOUS
Status: CANCELLED | OUTPATIENT
Start: 2020-07-01

## 2020-06-30 RX ORDER — HEPARIN SODIUM (PORCINE) LOCK FLUSH IV SOLN 100 UNIT/ML 100 UNIT/ML
5 SOLUTION INTRAVENOUS
Status: CANCELLED | OUTPATIENT
Start: 2020-07-01

## 2020-06-30 RX ADMIN — SODIUM CHLORIDE 125 MG: 9 INJECTION, SOLUTION INTRAVENOUS at 14:20

## 2020-06-30 RX ADMIN — SODIUM CHLORIDE 250 ML: 9 INJECTION, SOLUTION INTRAVENOUS at 14:20

## 2020-06-30 NOTE — PROGRESS NOTES
Infusion Nursing Note:  Amanda Nails presents today for Ferrlecit.    Patient seen by provider today: No   present during visit today: Not Applicable.    Note: N/A.    Intravenous Access:  Peripheral IV placed.    Treatment Conditions:  Not Applicable.    Post Infusion Assessment:  Patient tolerated infusion without incident.  Patient observed for 30 minutes post Ferrlecit per protocol.  Site patent and intact, free from redness, edema or discomfort.  No evidence of extravasations.  Access discontinued per protocol.     Discharge Plan:   Discharge instructions reviewed with: Patient.  Patient and/or family verbalized understanding of discharge instructions and all questions answered.  AVS to patient via [employer].  Patient will return 7/8/2020 for next appointment.   Patient discharged in stable condition accompanied by: self.  Departure Mode: Ambulatory.    Anna Lovelace RN

## 2020-07-01 ENCOUNTER — ANESTHESIA EVENT (OUTPATIENT)
Dept: GASTROENTEROLOGY | Facility: CLINIC | Age: 80
End: 2020-07-01
Payer: COMMERCIAL
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
## 2020-07-02 ENCOUNTER — HOSPITAL ENCOUNTER (OUTPATIENT)
Facility: CLINIC | Age: 80
Discharge: HOME | End: 2020-07-02
Attending: SURGERY | Admitting: SURGERY
Payer: COMMERCIAL

## 2020-07-02 ENCOUNTER — ANESTHESIA (OUTPATIENT)
Dept: GASTROENTEROLOGY | Facility: CLINIC | Age: 80
End: 2020-07-02
Payer: COMMERCIAL

## 2020-07-02 VITALS
OXYGEN SATURATION: 96 % | BODY MASS INDEX: 23.79 KG/M2 | RESPIRATION RATE: 16 BRPM | HEART RATE: 89 BPM | HEIGHT: 59 IN | WEIGHT: 118 LBS | TEMPERATURE: 97.7 F | DIASTOLIC BLOOD PRESSURE: 86 MMHG | SYSTOLIC BLOOD PRESSURE: 150 MMHG

## 2020-07-02 LAB — UPPER GI ENDOSCOPY: NORMAL

## 2020-07-02 PROCEDURE — 25000125 ZZHC RX 250: Performed by: NURSE ANESTHETIST, CERTIFIED REGISTERED

## 2020-07-02 PROCEDURE — 43245 EGD DILATE STRICTURE: CPT | Performed by: SURGERY

## 2020-07-02 PROCEDURE — 88305 TISSUE EXAM BY PATHOLOGIST: CPT | Mod: 26 | Performed by: SURGERY

## 2020-07-02 PROCEDURE — 88342 IMHCHEM/IMCYTCHM 1ST ANTB: CPT | Performed by: SURGERY

## 2020-07-02 PROCEDURE — 25800030 ZZH RX IP 258 OP 636: Performed by: SURGERY

## 2020-07-02 PROCEDURE — 43239 EGD BIOPSY SINGLE/MULTIPLE: CPT | Mod: 59 | Performed by: SURGERY

## 2020-07-02 PROCEDURE — 43249 ESOPH EGD DILATION <30 MM: CPT | Performed by: SURGERY

## 2020-07-02 PROCEDURE — 88342 IMHCHEM/IMCYTCHM 1ST ANTB: CPT | Mod: 26 | Performed by: PATHOLOGY

## 2020-07-02 PROCEDURE — 88305 TISSUE EXAM BY PATHOLOGIST: CPT | Performed by: SURGERY

## 2020-07-02 PROCEDURE — 37000008 ZZH ANESTHESIA TECHNICAL FEE, 1ST 30 MIN: Performed by: SURGERY

## 2020-07-02 PROCEDURE — 88342 IMHCHEM/IMCYTCHM 1ST ANTB: CPT | Mod: 26 | Performed by: SURGERY

## 2020-07-02 PROCEDURE — 88305 TISSUE EXAM BY PATHOLOGIST: CPT | Mod: 26 | Performed by: PATHOLOGY

## 2020-07-02 PROCEDURE — 25000125 ZZHC RX 250: Performed by: SURGERY

## 2020-07-02 RX ORDER — SODIUM CHLORIDE, SODIUM LACTATE, POTASSIUM CHLORIDE, CALCIUM CHLORIDE 600; 310; 30; 20 MG/100ML; MG/100ML; MG/100ML; MG/100ML
INJECTION, SOLUTION INTRAVENOUS CONTINUOUS
Status: DISCONTINUED | OUTPATIENT
Start: 2020-07-02 | End: 2020-07-02 | Stop reason: HOSPADM

## 2020-07-02 RX ORDER — LIDOCAINE 40 MG/G
CREAM TOPICAL
Status: DISCONTINUED | OUTPATIENT
Start: 2020-07-02 | End: 2020-07-02 | Stop reason: HOSPADM

## 2020-07-02 RX ORDER — ONDANSETRON 2 MG/ML
4 INJECTION INTRAMUSCULAR; INTRAVENOUS
Status: DISCONTINUED | OUTPATIENT
Start: 2020-07-02 | End: 2020-07-02 | Stop reason: HOSPADM

## 2020-07-02 RX ORDER — LIDOCAINE HYDROCHLORIDE 10 MG/ML
INJECTION, SOLUTION INFILTRATION; PERINEURAL PRN
Status: DISCONTINUED | OUTPATIENT
Start: 2020-07-02 | End: 2020-07-02

## 2020-07-02 RX ORDER — GLYCOPYRROLATE 0.2 MG/ML
INJECTION, SOLUTION INTRAMUSCULAR; INTRAVENOUS PRN
Status: DISCONTINUED | OUTPATIENT
Start: 2020-07-02 | End: 2020-07-02

## 2020-07-02 RX ADMIN — GLYCOPYRROLATE 0.3 MG: 0.2 INJECTION, SOLUTION INTRAMUSCULAR; INTRAVENOUS at 09:10

## 2020-07-02 RX ADMIN — LIDOCAINE HYDROCHLORIDE 100 MG: 10 INJECTION, SOLUTION INFILTRATION; PERINEURAL at 09:10

## 2020-07-02 RX ADMIN — LIDOCAINE HYDROCHLORIDE 0.2 ML: 10 INJECTION, SOLUTION EPIDURAL; INFILTRATION; INTRACAUDAL; PERINEURAL at 09:03

## 2020-07-02 RX ADMIN — SODIUM CHLORIDE, POTASSIUM CHLORIDE, SODIUM LACTATE AND CALCIUM CHLORIDE 1000 ML: 600; 310; 30; 20 INJECTION, SOLUTION INTRAVENOUS at 09:02

## 2020-07-02 RX ADMIN — TOPICAL ANESTHETIC 1 SPRAY: 200 SPRAY DENTAL; PERIODONTAL at 09:10

## 2020-07-02 ASSESSMENT — MIFFLIN-ST. JEOR: SCORE: 920.87

## 2020-07-02 ASSESSMENT — LIFESTYLE VARIABLES: TOBACCO_USE: 1

## 2020-07-02 NOTE — ANESTHESIA CARE TRANSFER NOTE
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    Anesthesia Type:   MAC     Note:  Airway :Nasal Cannula  Patient transferred to:Phase II  Handoff Report: Identifed the Patient, Identified the Reponsible Provider, Reviewed the pertinent medical history, Discussed the surgical course, Reviewed Intra-OP anesthesia mangement and issues during anesthesia, Set expectations for post-procedure period and Allowed opportunity for questions and acknowledgement of understanding      Vitals: (Last set prior to Anesthesia Care Transfer)    CRNA VITALS  7/2/2020 0849 - 7/2/2020 0920      7/2/2020             Pulse:  96    Ht Rate:  96    SpO2:  99 %                Electronically Signed By: JEMAL Soni CRNA  July 2, 2020  9:20 AM

## 2020-07-02 NOTE — OP NOTE
EGD - Schatzki ring dilated at LES.  Gastritis biopsied for H Pylori.  Gastric mucosa biopsied for histology.  Duodenum normal.

## 2020-07-02 NOTE — ANESTHESIA POSTPROCEDURE EVALUATION
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    Anesthesia Type:  MAC    Note:  Anesthesia Post Evaluation    Patient location during evaluation: Bedside  Patient participation: Able to fully participate in evaluation  Level of consciousness: awake and alert  Pain management: adequate  Airway patency: patent  Cardiovascular status: acceptable  Respiratory status: acceptable  Hydration status: acceptable  PONV: none     Anesthetic complications: None          Last vitals:  Vitals:    07/02/20 0828   BP: (!) 163/84   Pulse: 76   Resp: 16   Temp: 36.5  C (97.7  F)   SpO2: 98%         Electronically Signed By: JEMAL Soni CRNA  July 2, 2020  9:20 AM
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
## 2020-07-06 LAB — COPATH REPORT: NORMAL

## 2020-07-08 ENCOUNTER — INFUSION THERAPY VISIT (OUTPATIENT)
Dept: INFUSION THERAPY | Facility: CLINIC | Age: 80
End: 2020-07-08
Attending: NURSE PRACTITIONER
Payer: COMMERCIAL

## 2020-07-08 VITALS
HEART RATE: 77 BPM | SYSTOLIC BLOOD PRESSURE: 140 MMHG | DIASTOLIC BLOOD PRESSURE: 63 MMHG | RESPIRATION RATE: 16 BRPM | TEMPERATURE: 97.9 F

## 2020-07-08 DIAGNOSIS — D63.1 ANEMIA IN STAGE 4 CHRONIC KIDNEY DISEASE (H): Primary | ICD-10-CM

## 2020-07-08 DIAGNOSIS — N18.30 CHRONIC KIDNEY DISEASE, STAGE III (MODERATE) (H): ICD-10-CM

## 2020-07-08 DIAGNOSIS — N18.4 ANEMIA IN STAGE 4 CHRONIC KIDNEY DISEASE (H): Primary | ICD-10-CM

## 2020-07-08 PROCEDURE — 25800030 ZZH RX IP 258 OP 636: Performed by: INTERNAL MEDICINE

## 2020-07-08 PROCEDURE — 96365 THER/PROPH/DIAG IV INF INIT: CPT

## 2020-07-08 PROCEDURE — 25000128 H RX IP 250 OP 636: Performed by: INTERNAL MEDICINE

## 2020-07-08 RX ADMIN — SODIUM CHLORIDE 125 MG: 9 INJECTION, SOLUTION INTRAVENOUS at 09:53

## 2020-07-08 RX ADMIN — SODIUM CHLORIDE 250 ML: 9 INJECTION, SOLUTION INTRAVENOUS at 09:50

## 2020-07-08 NOTE — PROGRESS NOTES
Infusion Nursing Note:  Amanda Nails presents today for Ferrlecit.    Patient seen by provider today: No   present during visit today: Not Applicable.    Note: Infusion ran longer due to pump pulling more from the primary bag then the secondary.    Intravenous Access:  Peripheral IV placed.    Treatment Conditions:  Pt stayed 30 min for observastion.      Post Infusion Assessment:  Patient tolerated infusion without incident.  Blood return noted pre and post infusion.  Site patent and intact, free from redness, edema or discomfort.  No evidence of extravasations.  Access discontinued per protocol.       Discharge Plan:   Discharge instructions reviewed with: Patient.  Patient discharged in stable condition accompanied by: self.  Departure Mode: Ambulatory.    Yoanna Zuluaga RN

## 2020-07-13 ENCOUNTER — TELEPHONE (OUTPATIENT)
Dept: TRANSPLANT | Facility: CLINIC | Age: 80
End: 2020-07-13

## 2020-07-13 ENCOUNTER — INFUSION THERAPY VISIT (OUTPATIENT)
Dept: INFUSION THERAPY | Facility: CLINIC | Age: 80
End: 2020-07-13
Attending: NURSE PRACTITIONER
Payer: COMMERCIAL

## 2020-07-13 VITALS
TEMPERATURE: 97 F | SYSTOLIC BLOOD PRESSURE: 149 MMHG | HEART RATE: 57 BPM | DIASTOLIC BLOOD PRESSURE: 61 MMHG | RESPIRATION RATE: 18 BRPM

## 2020-07-13 DIAGNOSIS — N18.30 CHRONIC KIDNEY DISEASE, STAGE III (MODERATE) (H): ICD-10-CM

## 2020-07-13 DIAGNOSIS — D63.1 ANEMIA IN STAGE 4 CHRONIC KIDNEY DISEASE (H): Primary | ICD-10-CM

## 2020-07-13 DIAGNOSIS — N18.4 ANEMIA IN STAGE 4 CHRONIC KIDNEY DISEASE (H): Primary | ICD-10-CM

## 2020-07-13 PROCEDURE — 96366 THER/PROPH/DIAG IV INF ADDON: CPT

## 2020-07-13 PROCEDURE — 25000128 H RX IP 250 OP 636: Performed by: INTERNAL MEDICINE

## 2020-07-13 PROCEDURE — 25800030 ZZH RX IP 258 OP 636: Performed by: INTERNAL MEDICINE

## 2020-07-13 PROCEDURE — 96365 THER/PROPH/DIAG IV INF INIT: CPT

## 2020-07-13 RX ADMIN — SODIUM CHLORIDE 125 MG: 9 INJECTION, SOLUTION INTRAVENOUS at 13:31

## 2020-07-13 RX ADMIN — SODIUM CHLORIDE 250 ML: 9 INJECTION, SOLUTION INTRAVENOUS at 13:33

## 2020-07-13 NOTE — TELEPHONE ENCOUNTER
Patient Call: Voicemail  Date/Time: 7/13/2020-8:36am  Reason for call: pt is looking for a return call from coordinator

## 2020-07-13 NOTE — PROGRESS NOTES
Infusion Nursing Note:  Amanda Nails presents today for Ferrlicit.    Patient seen by provider today: No   present during visit today: Not Applicable.    Note: N/A.    Intravenous Access:  Peripheral IV placed.    Treatment Conditions:  Not Applicable.      Post Infusion Assessment:  Patient tolerated infusion without incident.  Patient observed for 30 minutes post Ferrlicit. per protocol.  Site patent and intact, free from redness, edema or discomfort.  No evidence of extravasations.  Access discontinued per protocol.       Discharge Plan:   Discharge instructions reviewed with: Patient.  Patient and/or family verbalized understanding of discharge instructions and all questions answered.  Patient discharged in stable condition accompanied by: self.  Departure Mode: Ambulatory.    Yoanna Zuluaga RN

## 2020-07-13 NOTE — TELEPHONE ENCOUNTER
Had upper endos on 7/2 which showed mild gastritis.  The doctor who did the procedure suggested she talk w/ dr. Lomeli to see if she needs to be on something. Anali is asymptomatic.  Told her she can use tums / pepcid prn if she wants.  Message to dr. Lomeli.

## 2020-07-20 ENCOUNTER — INFUSION THERAPY VISIT (OUTPATIENT)
Dept: INFUSION THERAPY | Facility: CLINIC | Age: 80
End: 2020-07-20
Attending: NURSE PRACTITIONER
Payer: COMMERCIAL

## 2020-07-20 VITALS — DIASTOLIC BLOOD PRESSURE: 65 MMHG | TEMPERATURE: 96.4 F | SYSTOLIC BLOOD PRESSURE: 141 MMHG | HEART RATE: 61 BPM

## 2020-07-20 DIAGNOSIS — D63.1 ANEMIA IN STAGE 4 CHRONIC KIDNEY DISEASE (H): Primary | ICD-10-CM

## 2020-07-20 DIAGNOSIS — N18.4 ANEMIA IN STAGE 4 CHRONIC KIDNEY DISEASE (H): Primary | ICD-10-CM

## 2020-07-20 DIAGNOSIS — N18.30 CHRONIC KIDNEY DISEASE, STAGE III (MODERATE) (H): ICD-10-CM

## 2020-07-20 PROCEDURE — 25800030 ZZH RX IP 258 OP 636: Performed by: INTERNAL MEDICINE

## 2020-07-20 PROCEDURE — 25000128 H RX IP 250 OP 636: Performed by: INTERNAL MEDICINE

## 2020-07-20 PROCEDURE — 96365 THER/PROPH/DIAG IV INF INIT: CPT

## 2020-07-20 RX ADMIN — SODIUM CHLORIDE 125 MG: 9 INJECTION, SOLUTION INTRAVENOUS at 13:42

## 2020-07-20 RX ADMIN — SODIUM CHLORIDE 250 ML: 9 INJECTION, SOLUTION INTRAVENOUS at 13:41

## 2020-07-20 NOTE — PROGRESS NOTES
Infusion Nursing Note:  Amanda Nails presents today for Ferrlecit    Patient seen by provider today: No   present during visit today: Not Applicable.    Note: N/A.    Intravenous Access:  Labs drawn without difficulty.    Treatment Conditions:  Not Applicable.      Post Infusion Assessment:  Patient tolerated infusion without incident.  Patient observed for 30 minutes post Ferrlecit per protocol.  Blood return noted pre and post infusion.  Site patent and intact, free from redness, edema or discomfort.  No evidence of extravasations.  Access discontinued per protocol.       Discharge Plan:   Patient discharged in stable condition accompanied by: self.  Departure Mode: Ambulatory.  Pt to return on 7/23/20 at 1:00 pm for next Ferrlecit    Bree Rubin RN

## 2020-07-23 ENCOUNTER — INFUSION THERAPY VISIT (OUTPATIENT)
Dept: INFUSION THERAPY | Facility: CLINIC | Age: 80
End: 2020-07-23
Attending: NURSE PRACTITIONER
Payer: COMMERCIAL

## 2020-07-23 VITALS — SYSTOLIC BLOOD PRESSURE: 150 MMHG | RESPIRATION RATE: 16 BRPM | HEART RATE: 67 BPM | DIASTOLIC BLOOD PRESSURE: 65 MMHG

## 2020-07-23 DIAGNOSIS — N18.4 ANEMIA IN STAGE 4 CHRONIC KIDNEY DISEASE (H): Primary | ICD-10-CM

## 2020-07-23 DIAGNOSIS — N18.30 CHRONIC KIDNEY DISEASE, STAGE III (MODERATE) (H): ICD-10-CM

## 2020-07-23 DIAGNOSIS — D63.1 ANEMIA IN STAGE 4 CHRONIC KIDNEY DISEASE (H): Primary | ICD-10-CM

## 2020-07-23 PROCEDURE — 25000128 H RX IP 250 OP 636: Performed by: INTERNAL MEDICINE

## 2020-07-23 PROCEDURE — 25800030 ZZH RX IP 258 OP 636: Performed by: INTERNAL MEDICINE

## 2020-07-23 PROCEDURE — 96365 THER/PROPH/DIAG IV INF INIT: CPT

## 2020-07-23 RX ADMIN — SODIUM CHLORIDE 125 MG: 9 INJECTION, SOLUTION INTRAVENOUS at 13:13

## 2020-07-23 RX ADMIN — SODIUM CHLORIDE 250 ML: 9 INJECTION, SOLUTION INTRAVENOUS at 13:09

## 2020-07-23 ASSESSMENT — PAIN SCALES - GENERAL: PAINLEVEL: NO PAIN (0)

## 2020-07-23 NOTE — PROGRESS NOTES
Infusion Nursing Note:  Amanda Nails presents today for Ferrlicit.    Patient seen by provider today: No   present during visit today: Not Applicable.    Note: N/A.    Intravenous Access:  Peripheral IV placed.    Treatment Conditions:  Results reviewed, labs MET treatment parameters, ok to proceed with treatment.      Post Infusion Assessment:  Patient tolerated infusion without incident.  Pt. Observed for 30 minutes post Ferlicit per protocol.  Blood return noted pre and post infusion.  Site patent and intact, free from redness, edema or discomfort.  No evidence of extravasations.  Access discontinued per protocol.       Discharge Plan:   Patient discharged in stable condition accompanied by: self.  Departure Mode: Ambulatory.    Deja Talley RN

## 2020-07-27 ENCOUNTER — INFUSION THERAPY VISIT (OUTPATIENT)
Dept: INFUSION THERAPY | Facility: CLINIC | Age: 80
End: 2020-07-27
Attending: NURSE PRACTITIONER
Payer: COMMERCIAL

## 2020-07-27 VITALS — SYSTOLIC BLOOD PRESSURE: 131 MMHG | TEMPERATURE: 95.8 F | DIASTOLIC BLOOD PRESSURE: 58 MMHG | HEART RATE: 64 BPM

## 2020-07-27 DIAGNOSIS — D63.1 ANEMIA IN STAGE 4 CHRONIC KIDNEY DISEASE (H): Primary | ICD-10-CM

## 2020-07-27 DIAGNOSIS — N18.4 ANEMIA IN STAGE 4 CHRONIC KIDNEY DISEASE (H): Primary | ICD-10-CM

## 2020-07-27 DIAGNOSIS — N18.30 CHRONIC KIDNEY DISEASE, STAGE III (MODERATE) (H): ICD-10-CM

## 2020-07-27 PROCEDURE — 25000128 H RX IP 250 OP 636: Performed by: INTERNAL MEDICINE

## 2020-07-27 PROCEDURE — 25800030 ZZH RX IP 258 OP 636: Performed by: INTERNAL MEDICINE

## 2020-07-27 PROCEDURE — 96365 THER/PROPH/DIAG IV INF INIT: CPT

## 2020-07-27 PROCEDURE — 99207 ZZC NO CHARGE NURSE ONLY: CPT

## 2020-07-27 RX ADMIN — SODIUM CHLORIDE 125 MG: 9 INJECTION, SOLUTION INTRAVENOUS at 14:02

## 2020-07-27 RX ADMIN — SODIUM CHLORIDE 250 ML: 9 INJECTION, SOLUTION INTRAVENOUS at 13:41

## 2020-07-27 NOTE — PROGRESS NOTES
Infusion Nursing Note:  Amanda Nails presents today for Dose 8/8 of Ferrlicit.    Patient seen by provider today: No   present during visit today: Not Applicable.    Note: N/A.    Intravenous Access:  Peripheral IV placed.    Treatment Conditions:  Not Applicable.      Post Infusion Assessment:  Patient tolerated infusion without incident.  Pt observed for 15 minutes post Ferrlicit.  Blood return noted pre and post infusion.  Site patent and intact, free from redness, edema or discomfort.  No evidence of extravasations.  Access discontinued per protocol.       Discharge Plan:   AVS to patient via [employer].  Patient will follow-up with her physician to set up a lab draw.  Patient discharged in stable condition accompanied by: self.  Departure Mode: Ambulatory.    Carlita Briones RN

## 2020-08-03 ENCOUNTER — HOSPITAL ENCOUNTER (EMERGENCY)
Facility: CLINIC | Age: 80
Discharge: HOME | End: 2020-08-03
Attending: EMERGENCY MEDICINE | Admitting: FAMILY MEDICINE
Payer: COMMERCIAL

## 2020-08-03 ENCOUNTER — APPOINTMENT (OUTPATIENT)
Dept: GENERAL RADIOLOGY | Facility: CLINIC | Age: 80
End: 2020-08-03
Attending: FAMILY MEDICINE
Payer: COMMERCIAL

## 2020-08-03 VITALS
SYSTOLIC BLOOD PRESSURE: 172 MMHG | RESPIRATION RATE: 16 BRPM | DIASTOLIC BLOOD PRESSURE: 72 MMHG | TEMPERATURE: 98.7 F | OXYGEN SATURATION: 97 %

## 2020-08-03 DIAGNOSIS — S69.92XA WRIST INJURY, LEFT, INITIAL ENCOUNTER: ICD-10-CM

## 2020-08-03 PROCEDURE — 73110 X-RAY EXAM OF WRIST: CPT | Mod: LT

## 2020-08-03 PROCEDURE — 29125 APPL SHORT ARM SPLINT STATIC: CPT | Mod: LT | Performed by: FAMILY MEDICINE

## 2020-08-03 PROCEDURE — 99284 EMERGENCY DEPT VISIT MOD MDM: CPT | Mod: 25 | Performed by: FAMILY MEDICINE

## 2020-08-03 PROCEDURE — 99282 EMERGENCY DEPT VISIT SF MDM: CPT | Mod: Z6 | Performed by: FAMILY MEDICINE

## 2020-08-03 NOTE — ED AVS SNAPSHOT
[facility]  [address] [address]  Phone:  [phone]  Fax:  [phone]                                    Amanda Nails   MRN: [id number]    Department:  [facility]   Date of Visit:  8/3/2020           After Visit Summary Signature Page    I have received my discharge instructions, and my questions have been answered. I have discussed any challenges I see with this plan with the nurse or doctor.    ..........................................................................................................................................  Patient/Patient Representative Signature      ..........................................................................................................................................  Patient Representative Print Name and Relationship to Patient    ..................................................               ................................................  Date                                   Time    ..........................................................................................................................................  Reviewed by Signature/Title    ...................................................              ..............................................  Date                                               Time          22EPIC Rev 08/18
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
## 2020-08-04 NOTE — DISCHARGE INSTRUCTIONS
Return to the Emergency Room if the following occurs:     Severely worsened pain, or for any concern at anytime.    Or, follow-up with the following provider as we discussed:     Return to your primary doctor or Sports Medicine later this week or next for reevaluation.    Medications discussed:    Ibuprofen / tylenol alternating every three hours for comfort, as needed.    If you received pain-relieving or sedating medication during your time in the ER, avoid alcohol, driving automobiles, or working with machinery.  Also, a responsible adult must stay with you.        Call the Nurse Advice Line at [phone] or [phone] for any concern at anytime.

## 2020-08-06 ENCOUNTER — OFFICE VISIT (OUTPATIENT)
Dept: FAMILY MEDICINE | Facility: CLINIC | Age: 80
End: 2020-08-06
Payer: COMMERCIAL

## 2020-08-06 VITALS
TEMPERATURE: 98.6 F | OXYGEN SATURATION: 95 % | SYSTOLIC BLOOD PRESSURE: 132 MMHG | WEIGHT: 116.2 LBS | HEIGHT: 59 IN | BODY MASS INDEX: 23.43 KG/M2 | DIASTOLIC BLOOD PRESSURE: 72 MMHG | RESPIRATION RATE: 16 BRPM | HEART RATE: 80 BPM

## 2020-08-06 DIAGNOSIS — Z13.220 LIPID SCREENING: ICD-10-CM

## 2020-08-06 DIAGNOSIS — S52.502A CLOSED FRACTURE OF DISTAL END OF LEFT RADIUS, UNSPECIFIED FRACTURE MORPHOLOGY, INITIAL ENCOUNTER: ICD-10-CM

## 2020-08-06 DIAGNOSIS — S69.92XA WRIST INJURY, LEFT, INITIAL ENCOUNTER: Primary | ICD-10-CM

## 2020-08-06 DIAGNOSIS — Z94.4 LIVER REPLACED BY TRANSPLANT (H): ICD-10-CM

## 2020-08-06 LAB
ALBUMIN SERPL-MCNC: 3 G/DL (ref 3.4–5)
ALP SERPL-CCNC: 289 U/L (ref 40–150)
ALT SERPL W P-5'-P-CCNC: 52 U/L (ref 0–50)
ANION GAP SERPL CALCULATED.3IONS-SCNC: 4 MMOL/L (ref 3–14)
AST SERPL W P-5'-P-CCNC: 49 U/L (ref 0–45)
BILIRUB DIRECT SERPL-MCNC: 0.3 MG/DL (ref 0–0.2)
BILIRUB SERPL-MCNC: 0.5 MG/DL (ref 0.2–1.3)
BUN SERPL-MCNC: 34 MG/DL (ref 7–30)
CALCIUM SERPL-MCNC: 9.4 MG/DL (ref 8.5–10.1)
CHLORIDE SERPL-SCNC: 102 MMOL/L (ref 94–109)
CO2 SERPL-SCNC: 31 MMOL/L (ref 20–32)
CREAT SERPL-MCNC: 1.34 MG/DL (ref 0.52–1.04)
ERYTHROCYTE [DISTWIDTH] IN BLOOD BY AUTOMATED COUNT: ABNORMAL % (ref 10–15)
GFR SERPL CREATININE-BSD FRML MDRD: 38 ML/MIN/{1.73_M2}
GLUCOSE SERPL-MCNC: 128 MG/DL (ref 70–99)
HCT VFR BLD AUTO: 30.9 % (ref 35–47)
HGB BLD-MCNC: 9.7 G/DL (ref 11.7–15.7)
MCH RBC QN AUTO: 27.3 PG (ref 26.5–33)
MCHC RBC AUTO-ENTMCNC: 31.4 G/DL (ref 31.5–36.5)
MCV RBC AUTO: 87 FL (ref 78–100)
PLATELET # BLD AUTO: 242 10E9/L (ref 150–450)
POTASSIUM SERPL-SCNC: 3.4 MMOL/L (ref 3.4–5.3)
PROT SERPL-MCNC: 7.2 G/DL (ref 6.8–8.8)
RBC # BLD AUTO: 3.55 10E12/L (ref 3.8–5.2)
SODIUM SERPL-SCNC: 137 MMOL/L (ref 133–144)
WBC # BLD AUTO: 6 10E9/L (ref 4–11)

## 2020-08-06 PROCEDURE — 85027 COMPLETE CBC AUTOMATED: CPT | Performed by: INTERNAL MEDICINE

## 2020-08-06 PROCEDURE — 80048 BASIC METABOLIC PNL TOTAL CA: CPT | Performed by: INTERNAL MEDICINE

## 2020-08-06 PROCEDURE — 36415 COLL VENOUS BLD VENIPUNCTURE: CPT | Performed by: INTERNAL MEDICINE

## 2020-08-06 PROCEDURE — 99213 OFFICE O/P EST LOW 20 MIN: CPT | Performed by: NURSE PRACTITIONER

## 2020-08-06 PROCEDURE — 80076 HEPATIC FUNCTION PANEL: CPT | Performed by: INTERNAL MEDICINE

## 2020-08-06 ASSESSMENT — MIFFLIN-ST. JEOR: SCORE: 912.71

## 2020-08-06 NOTE — PATIENT INSTRUCTIONS
Keep left wrist in splint most of the day for the next 2-3 weeks.  May remove after 2 weeks and if still painful with movement would recommend coming in for repeat xray.  This has already been ordered.  Call [phone] for scheduling this.    May remove 2-3 X daily to ice, keep elevated as much as possible.  May remove to shower.    Call if you have increase in pain, or new numbness/tingling in fingers/hand on left.    HAO Bains    Patient Education     Understanding a Distal Radius Fracture      A fracture is a broken bone. A fracture in the distal radius is a break in the lower end of the radius. This is the larger bone in the forearm. Because the break occurs near the wrist, it is often called a wrist fracture.    The bone may be cracked, or it may be broken into 2 or more pieces. The pieces of bone may be lined up or they may have moved out of place. Sometimes, the bone may break through the skin. Nearby nerves, tissues, and joints also may be damaged. Depending on the severity of the fracture, healing may take several months or longer.  What causes a distal radius fracture?  This type of fracture is most often caused from a fall on an outstretched hand. It can also be caused from a blow, accident, or sports injury.  Symptoms of a distal radius fracture  Symptoms can include pain, swelling, and bruising. If the bone breaks through the skin, external bleeding can also occur. The wrist may look crooked, deformed, or bent. It may be hard to move or use the arm, wrist, and hand for normal tasks and activities.  Treating a distal radius fracture  Treatment depends on how serious the fracture is. If needed, the bone is put back into place. This may be done with or without surgery. If surgery is needed, the surgeon may use devices such as pins, plates, or screws to hold the bone together. You may need to wear a splint or cast for a month or longer to protect the bone and keep it in place during healing.  Other treatments may be also used to help reduce symptoms or regain function. These include:    Cold packs. Putting an ice pack on the injured area may help reduce swelling and pain.    Raising the arm and wrist. Keeping the arm and wrist raised above heart level may help reduce swelling.    Pain medicines. Taking prescription or over-the-counter pain medicines may help reduce pain and swelling.    Exercises. Doing certain exercises at home or with a physical therapist can help restore strength, flexibility, and range of motion in your arm, wrist, and hand. In general, exercises are not started until after the splint or cast is removed.  Possible complications of a distal radius fracture  These can include:    Poor healing of the bone    Weakness, stiffness, or loss of range of motion in the arm, wrist, or hand    Osteoarthritis in the wrist joint  When to call your healthcare provider  Call your healthcare provider right away if you have any of these:    Fever of 100.4 F (38 C) or higher, or as directed    Symptoms that don t get better with treatment, or get worse    Numbness, coldness, or swelling in your arm, hand, or fingers    Fingernails that turn blue or gray in color    A splint or cast that is damaged or feels too tight or loose    New symptoms   Date Last Reviewed: 3/10/2016    5591-7310 [employer]. [address] [place] [address], [place] [address]. All rights reserved. This information is not intended as a substitute for professional medical care. Always follow your healthcare professional's instructions.

## 2020-08-06 NOTE — PROGRESS NOTES
Subjective     Amanda Nails is a 80 year old female who presents to clinic today for the following health issues:    HPI       ED/UC Followup:    Facility:  [facility]   Date of visit: 08/03/2020  Reason for visit: Wrist injury after a fall. Xray completed and was concerning for possible fracture. Velcro splint applied in ER and advised to follow up with pcp or sports medicine.  Current Status: Patient is taking ibu for pain, called sports medicine, however could not be seen until after 08/27/2020 so she made an appt to be seen in clinic instead.      Reports fell onto outstretched hand/wrist Monday.  Came into Ed and was told she may have a fracture. Given splint to wear - has had on 24/7 since ED visit on 8/3  Denies worsening pain, worsening swelling.      No numbness/tingling in hand/fingers left.    Patient Active Problem List   Diagnosis     Chronic kidney disease, stage III (moderate) (H)     Liver replaced by transplant (H)     Primary biliary cirrhosis (H)     CARDIOVASCULAR SCREENING; LDL GOAL LESS THAN 130     Mixed hyperlipidemia     Iron deficiency anemia     Skin cancer     Osteoporosis     Advanced directives, counseling/discussion     Proteinuria     Anemia in stage 4 chronic kidney disease (H)     Past Surgical History:   Procedure Laterality Date     cholecytectomy       COLONOSCOPY  4/9/2013    Procedure: COLONOSCOPY;  Colonoscopy;  Surgeon: Kendra Archer MD;  Location: [facility]     ESOPHAGOSCOPY, GASTROSCOPY, DUODENOSCOPY (EGD), COMBINED  8/23/2013    Procedure: COMBINED ESOPHAGOSCOPY, GASTROSCOPY, DUODENOSCOPY (EGD), BIOPSY SINGLE OR MULTIPLE;  Gastroscopy       ESOPHAGOSCOPY, GASTROSCOPY, DUODENOSCOPY (EGD), DILATATION, COMBINED N/A 7/2/2020    Procedure: ESOPHAGOGASTRODUODENOSCOPY, WITH DILATION and biopsy;  Surgeon: Doe Wallace MD;  Location: [facility]     SPLENECTOMY       TRANSPLANT  1983    liver       Social History     Tobacco Use     Smoking status: Former Smoker     Years:  3.00     Smokeless tobacco: Never Used     Tobacco comment: Years ago.   Substance Use Topics     Alcohol use: Yes     Comment: very rarely     Family History   Problem Relation Age of Onset     Respiratory Mother      Gastrointestinal Disease Sister         celiac     Gastrointestinal Disease Son         celiac     Gastrointestinal Disease Daughter         celiac     Gastrointestinal Disease Sister      Gastrointestinal Disease Son         celiac     Gastrointestinal Disease Daughter         PBC     Endocrine Disease Daughter         Graves disease     Endocrine Disease Daughter         hypothyroidism         Current Outpatient Medications   Medication Sig Dispense Refill     Acetaminophen (TYLENOL PO) Take 500 mg by mouth every 6 hours as needed for mild pain or fever       allopurinol (ZYLOPRIM) 100 MG tablet Take 2 tablets (200 mg) by mouth daily 180 tablet 3     amLODIPine (NORVASC) 10 MG tablet Take 1 tablet (10 mg) by mouth daily 90 tablet 3     Ascorbic Acid (VITAMIN C PO) Take by mouth daily       CALCIUM-VITAMIN D PO Take by mouth daily       carvedilol (COREG) 6.25 MG tablet Take 1 tablet (6.25 mg) by mouth 2 times daily (with meals) 180 tablet 3     furosemide (LASIX) 20 MG tablet Take 1 tablet (20 mg) by mouth daily 90 tablet 3     Multiple Vitamins-Minerals (PRESERVISION AREDS 2 PO) Take 1 tablet by mouth 2 times daily       Omega-3 Fatty Acids (FISH OIL PO) Take by mouth daily       predniSONE (DELTASONE) 1 MG tablet TAKE THREE TABLETS BY MOUTH EVERY  tablet 3     sertraline (ZOLOFT) 50 MG tablet Take 1 tablet (50 mg) by mouth daily 90 tablet 0     sirolimus (GENERIC EQUIVALENT) 0.5 MG tablet TAKE TWO TABLETS BY MOUTH EVERY  tablet 3     ursodiol (ACTIGALL) 300 MG capsule Take 2 capsules (600 mg) by mouth every morning AND 1 capsule (300 mg) every evening. 600 mg in AM and 300 mg  capsule 3     Allergies   Allergen Reactions     Golytely [Colyte] Swelling     Pt states her tongue  "swelled      Lisinopril      Per patient she is allergic to this medication too     Nsaids Other (See Comments)     Can't take because of liver     Recent Labs   Lab Test 08/06/20  1113 06/18/20  0944 06/18/20  0941 01/06/20  1234 08/12/19  1314  11/17/16  0757  08/13/14  1027   LDL  --  141*  --   --  155*  --   --   --  171*   HDL  --  105  --   --  113  --   --   --  125   TRIG  --  126  --   --  96  --   --   --  110   ALT  --  37  --  35 40   < >  --    < >  --    CR PENDING 1.26*  --  1.38* 1.32*   < >  --    < > 1.70*   GFRESTIMATED PENDING 41*  --  37* 39*   < >  --    < > 30*   GFRESTBLACK PENDING 47*  --  42* 45*   < >  --    < > 36*   POTASSIUM 3.4 3.5  --  3.7 3.7   < >  --    < > 4.4   TSH  --   --  3.61  --   --   --  3.78   < >  --     < > = values in this interval not displayed.      BP Readings from Last 3 Encounters:   08/03/20 (!) 172/72   07/27/20 131/58   07/23/20 (!) 150/65    Wt Readings from Last 3 Encounters:   08/06/20 52.7 kg (116 lb 3.2 oz)   07/02/20 53.5 kg (118 lb)   06/29/20 53.7 kg (118 lb 6.4 oz)                    Reviewed and updated as needed this visit by Provider         Review of Systems   Constitutional, HEENT, cardiovascular, pulmonary, GI, , musculoskeletal, neuro, skin, endocrine and psych systems are negative, except as otherwise noted.      Objective    Pulse 80   Temp 98.6  F (37  C) (Tympanic)   Resp 16   Ht 1.499 m (4' 11\")   Wt 52.7 kg (116 lb 3.2 oz)   SpO2 95%   BMI 23.47 kg/m    Body mass index is 23.47 kg/m .  Physical Exam   GENERAL: healthy, alert and no distress  MS: Left wrist with mild swelling wrist into hand/fingers.  Tenderness radial wrist on exam.  Some bruising at wrist radius.  ROM limited of wrist due to pain with flexion and extension.  No pain in fingers on movement or upper arm.  SKIN: no suspicious lesions or rashes    Diagnostic Test Results:  Labs reviewed in Epic        Assessment & Plan     1. Wrist injury, left, initial encounter   "   - XR Wrist Left G/E 3 Views; Future    2. Closed fracture of distal end of left radius, unspecified fracture morphology, initial encounter     - XR Wrist Left G/E 3 Views; Future       Patient Instructions     Keep left wrist in splint most of the day for the next 2-3 weeks.  May remove after 2 weeks and if still painful with movement would recommend coming in for repeat xray.  This has already been ordered.  Call [phone] for scheduling this.    May remove 2-3 X daily to ice, keep elevated as much as possible.  May remove to shower.    Call if you have increase in pain, or new numbness/tingling in fingers/hand on left.    HAO Bains    Patient Education     Understanding a Distal Radius Fracture      A fracture is a broken bone. A fracture in the distal radius is a break in the lower end of the radius. This is the larger bone in the forearm. Because the break occurs near the wrist, it is often called a wrist fracture.    The bone may be cracked, or it may be broken into 2 or more pieces. The pieces of bone may be lined up or they may have moved out of place. Sometimes, the bone may break through the skin. Nearby nerves, tissues, and joints also may be damaged. Depending on the severity of the fracture, healing may take several months or longer.  What causes a distal radius fracture?  This type of fracture is most often caused from a fall on an outstretched hand. It can also be caused from a blow, accident, or sports injury.  Symptoms of a distal radius fracture  Symptoms can include pain, swelling, and bruising. If the bone breaks through the skin, external bleeding can also occur. The wrist may look crooked, deformed, or bent. It may be hard to move or use the arm, wrist, and hand for normal tasks and activities.  Treating a distal radius fracture  Treatment depends on how serious the fracture is. If needed, the bone is put back into place. This may be done with or without surgery. If surgery is  needed, the surgeon may use devices such as pins, plates, or screws to hold the bone together. You may need to wear a splint or cast for a month or longer to protect the bone and keep it in place during healing. Other treatments may be also used to help reduce symptoms or regain function. These include:    Cold packs. Putting an ice pack on the injured area may help reduce swelling and pain.    Raising the arm and wrist. Keeping the arm and wrist raised above heart level may help reduce swelling.    Pain medicines. Taking prescription or over-the-counter pain medicines may help reduce pain and swelling.    Exercises. Doing certain exercises at home or with a physical therapist can help restore strength, flexibility, and range of motion in your arm, wrist, and hand. In general, exercises are not started until after the splint or cast is removed.  Possible complications of a distal radius fracture  These can include:    Poor healing of the bone    Weakness, stiffness, or loss of range of motion in the arm, wrist, or hand    Osteoarthritis in the wrist joint  When to call your healthcare provider  Call your healthcare provider right away if you have any of these:    Fever of 100.4 F (38 C) or higher, or as directed    Symptoms that don t get better with treatment, or get worse    Numbness, coldness, or swelling in your arm, hand, or fingers    Fingernails that turn blue or gray in color    A splint or cast that is damaged or feels too tight or loose    New symptoms   Date Last Reviewed: 3/10/2016    2527-1854 [employer]. [address] [place] [address]. All rights reserved. This information is not intended as a substitute for professional medical care. Always follow your healthcare professional's instructions.               Return in about 4 weeks (around 9/3/2020) for Recheck symptoms.    Bharati Mitchell NP  [facility]

## 2020-08-14 ENCOUNTER — TELEPHONE (OUTPATIENT)
Dept: FAMILY MEDICINE | Facility: CLINIC | Age: 80
End: 2020-08-14

## 2020-08-14 DIAGNOSIS — M10.072 ACUTE IDIOPATHIC GOUT OF LEFT FOOT: ICD-10-CM

## 2020-08-14 DIAGNOSIS — Z94.4 LIVER REPLACED BY TRANSPLANT (H): ICD-10-CM

## 2020-08-14 DIAGNOSIS — I10 ESSENTIAL HYPERTENSION WITH GOAL BLOOD PRESSURE LESS THAN 140/90: ICD-10-CM

## 2020-08-14 DIAGNOSIS — M79.89 RIGHT LEG SWELLING: ICD-10-CM

## 2020-08-14 RX ORDER — PREDNISONE 1 MG/1
TABLET ORAL
Qty: 270 TABLET | Refills: 3 | Status: SHIPPED | OUTPATIENT
Start: 2020-08-14 | End: 2021-09-03

## 2020-08-14 RX ORDER — AMLODIPINE BESYLATE 5 MG/1
5 TABLET ORAL DAILY
Qty: 90 TABLET | Refills: 3 | Status: SHIPPED | OUTPATIENT
Start: 2020-08-14 | End: 2021-01-26

## 2020-08-14 NOTE — TELEPHONE ENCOUNTER
Looks like home BPs had been 150s/70s in June, hence why the increase was recommended, but if BPs have been better recently, okay to continue amlodipine 5mg. Prescription changed.     Thanks,  Pernell Byrd MD

## 2020-08-14 NOTE — TELEPHONE ENCOUNTER
Dr. Byrd,    Patient is contacted.  She never did increase her amlodipine to 10 mg daily.  Her last bp reading was 131/58.  Patient is asymptomatic.  Would like the medication changed back to amlodipine 5 mg. Barbara RODRIGUEZ RN

## 2020-08-14 NOTE — TELEPHONE ENCOUNTER
Left message for the patient to call the clinic.  Looks like the patient was changed to Amlodipine 10 mg.  What have her BP readings been?  Why does she want to change back? Barbara RODRIGUEZ RN

## 2020-08-14 NOTE — TELEPHONE ENCOUNTER
She said she has not been tracking them she said the last time she thinks it was check was in infusions 7/27/2020 it was 131/58. She said she broke her wrist and that she hasn't thought about it. Patient said that she has not taken the 10mg yet. She said the pharmacy called her and stated that they have not change the mgs, that's what made her to call us. She feels her BPs have been to low.     Iveth Díaz on 8/14/2020 at 10:03 AM

## 2020-08-14 NOTE — TELEPHONE ENCOUNTER
Routing refill request to provider for review/approval because:  Labs out of range:    Creatinine   Date Value Ref Range Status   08/06/2020 1.34 (H) 0.52 - 1.04 mg/dL Final     Last office visit: 08/06/20 Stephen.      JUAN MarinN, RN

## 2020-08-14 NOTE — TELEPHONE ENCOUNTER
Reason for Call:  Other prescription    Detailed comments: Patient is calling asking if she can go back to Amlodipine 5 mg. She said she was doing fine with that dose and then saw Dr Byrd and she changed it.    Phone Number Patient can be reached at: Home number on file [phone] (home)    Best Time: any    Can we leave a detailed message on this number? YES    Call taken on 8/14/2020 at 9:46 AM by Iveth Díaz

## 2020-08-17 RX ORDER — FUROSEMIDE 20 MG
TABLET ORAL
Qty: 90 TABLET | Refills: 2 | Status: SHIPPED | OUTPATIENT
Start: 2020-08-17 | End: 2021-06-03

## 2020-08-17 RX ORDER — ALLOPURINOL 100 MG/1
TABLET ORAL
Qty: 180 TABLET | Refills: 2 | Status: SHIPPED | OUTPATIENT
Start: 2020-08-17 | End: 2021-06-03

## 2020-10-20 ENCOUNTER — DOCUMENTATION ONLY (OUTPATIENT)
Dept: TRANSPLANT | Facility: CLINIC | Age: 80
End: 2020-10-20

## 2020-10-23 ENCOUNTER — OFFICE VISIT (OUTPATIENT)
Dept: ORTHOPEDICS | Facility: CLINIC | Age: 80
End: 2020-10-23
Payer: COMMERCIAL

## 2020-10-23 ENCOUNTER — ANCILLARY PROCEDURE (OUTPATIENT)
Dept: GENERAL RADIOLOGY | Facility: CLINIC | Age: 80
End: 2020-10-23
Attending: PEDIATRICS
Payer: COMMERCIAL

## 2020-10-23 VITALS
BODY MASS INDEX: 23.39 KG/M2 | WEIGHT: 116 LBS | SYSTOLIC BLOOD PRESSURE: 159 MMHG | DIASTOLIC BLOOD PRESSURE: 84 MMHG | HEIGHT: 59 IN

## 2020-10-23 DIAGNOSIS — G89.29 CHRONIC PAIN OF RIGHT KNEE: Primary | ICD-10-CM

## 2020-10-23 DIAGNOSIS — G89.29 CHRONIC PAIN OF RIGHT KNEE: ICD-10-CM

## 2020-10-23 DIAGNOSIS — M79.89 RIGHT LEG SWELLING: ICD-10-CM

## 2020-10-23 DIAGNOSIS — M25.561 CHRONIC PAIN OF RIGHT KNEE: Primary | ICD-10-CM

## 2020-10-23 DIAGNOSIS — M25.561 CHRONIC PAIN OF RIGHT KNEE: ICD-10-CM

## 2020-10-23 DIAGNOSIS — M17.11 ARTHRITIS OF RIGHT KNEE: ICD-10-CM

## 2020-10-23 PROCEDURE — 73562 X-RAY EXAM OF KNEE 3: CPT | Performed by: RADIOLOGY

## 2020-10-23 PROCEDURE — 99204 OFFICE O/P NEW MOD 45 MIN: CPT | Performed by: PEDIATRICS

## 2020-10-23 ASSESSMENT — MIFFLIN-ST. JEOR: SCORE: 911.8

## 2020-10-23 NOTE — RESULT ENCOUNTER NOTE
These results were discussed during office visit.    Radha Chen MD, CAQ  Primary Care Sports Medicine  [facility]
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
## 2020-11-02 ENCOUNTER — HOSPITAL ENCOUNTER (OUTPATIENT)
Dept: ULTRASOUND IMAGING | Facility: CLINIC | Age: 80
Discharge: HOME | End: 2020-11-02
Attending: PEDIATRICS | Admitting: PEDIATRICS
Payer: COMMERCIAL

## 2020-11-02 DIAGNOSIS — M79.89 RIGHT LEG SWELLING: ICD-10-CM

## 2020-11-02 PROCEDURE — 93971 EXTREMITY STUDY: CPT | Mod: RT

## 2020-11-09 DIAGNOSIS — Z94.4 LIVER REPLACED BY TRANSPLANT (H): ICD-10-CM

## 2020-11-09 RX ORDER — SIROLIMUS 0.5 MG/1
TABLET, FILM COATED ORAL
Qty: 180 TABLET | Refills: 3 | Status: SHIPPED | OUTPATIENT
Start: 2020-11-09 | End: 2021-11-23

## 2020-11-09 RX ORDER — URSODIOL 300 MG/1
CAPSULE ORAL
Qty: 270 CAPSULE | Refills: 3 | Status: SHIPPED | OUTPATIENT
Start: 2020-11-09 | End: 2021-11-23

## 2020-11-14 ENCOUNTER — HEALTH MAINTENANCE LETTER (OUTPATIENT)
Age: 80
End: 2020-11-14

## 2021-01-13 DIAGNOSIS — Z94.4 LIVER REPLACED BY TRANSPLANT (H): ICD-10-CM

## 2021-01-13 DIAGNOSIS — Z13.220 LIPID SCREENING: ICD-10-CM

## 2021-01-13 LAB
ALBUMIN SERPL-MCNC: 3.4 G/DL (ref 3.4–5)
ALP SERPL-CCNC: 286 U/L (ref 40–150)
ALT SERPL W P-5'-P-CCNC: 41 U/L (ref 0–50)
ANION GAP SERPL CALCULATED.3IONS-SCNC: 4 MMOL/L (ref 3–14)
AST SERPL W P-5'-P-CCNC: 43 U/L (ref 0–45)
BILIRUB DIRECT SERPL-MCNC: 0.2 MG/DL (ref 0–0.2)
BILIRUB SERPL-MCNC: 0.3 MG/DL (ref 0.2–1.3)
BUN SERPL-MCNC: 42 MG/DL (ref 7–30)
CALCIUM SERPL-MCNC: 9.8 MG/DL (ref 8.5–10.1)
CHLORIDE SERPL-SCNC: 102 MMOL/L (ref 94–109)
CO2 SERPL-SCNC: 34 MMOL/L (ref 20–32)
CREAT SERPL-MCNC: 1.35 MG/DL (ref 0.52–1.04)
ERYTHROCYTE [DISTWIDTH] IN BLOOD BY AUTOMATED COUNT: 15.8 % (ref 10–15)
GFR SERPL CREATININE-BSD FRML MDRD: 37 ML/MIN/{1.73_M2}
GLUCOSE SERPL-MCNC: 123 MG/DL (ref 70–99)
HCT VFR BLD AUTO: 36.8 % (ref 35–47)
HGB BLD-MCNC: 11.8 G/DL (ref 11.7–15.7)
MCH RBC QN AUTO: 30.6 PG (ref 26.5–33)
MCHC RBC AUTO-ENTMCNC: 32.1 G/DL (ref 31.5–36.5)
MCV RBC AUTO: 95 FL (ref 78–100)
PLATELET # BLD AUTO: 273 10E9/L (ref 150–450)
POTASSIUM SERPL-SCNC: 3.5 MMOL/L (ref 3.4–5.3)
PROT SERPL-MCNC: 7.3 G/DL (ref 6.8–8.8)
RBC # BLD AUTO: 3.86 10E12/L (ref 3.8–5.2)
SODIUM SERPL-SCNC: 140 MMOL/L (ref 133–144)
WBC # BLD AUTO: 5.3 10E9/L (ref 4–11)

## 2021-01-13 PROCEDURE — 80048 BASIC METABOLIC PNL TOTAL CA: CPT | Performed by: INTERNAL MEDICINE

## 2021-01-13 PROCEDURE — 36415 COLL VENOUS BLD VENIPUNCTURE: CPT | Performed by: INTERNAL MEDICINE

## 2021-01-13 PROCEDURE — 85027 COMPLETE CBC AUTOMATED: CPT | Performed by: INTERNAL MEDICINE

## 2021-01-13 PROCEDURE — 80076 HEPATIC FUNCTION PANEL: CPT | Performed by: INTERNAL MEDICINE

## 2021-01-20 ENCOUNTER — TELEPHONE (OUTPATIENT)
Dept: TRANSPLANT | Facility: CLINIC | Age: 81
End: 2021-01-20

## 2021-01-20 NOTE — TELEPHONE ENCOUNTER
Call to Christa.  She is reluctant to get COVID vaccine because she has had reactions to the flu vaccine (just not feeling well after).  I told her that the Transplant team endorses getting the vaccine.

## 2021-01-26 ENCOUNTER — OFFICE VISIT (OUTPATIENT)
Dept: FAMILY MEDICINE | Facility: CLINIC | Age: 81
End: 2021-01-26
Payer: COMMERCIAL

## 2021-01-26 VITALS
DIASTOLIC BLOOD PRESSURE: 98 MMHG | WEIGHT: 115.9 LBS | HEART RATE: 73 BPM | TEMPERATURE: 97.9 F | OXYGEN SATURATION: 95 % | HEIGHT: 57 IN | SYSTOLIC BLOOD PRESSURE: 170 MMHG | BODY MASS INDEX: 25 KG/M2

## 2021-01-26 DIAGNOSIS — K74.3 PRIMARY BILIARY CIRRHOSIS (H): ICD-10-CM

## 2021-01-26 DIAGNOSIS — Z13.6 CARDIOVASCULAR SCREENING; LDL GOAL LESS THAN 130: Chronic | ICD-10-CM

## 2021-01-26 DIAGNOSIS — Z94.4 LIVER REPLACED BY TRANSPLANT (H): Chronic | ICD-10-CM

## 2021-01-26 DIAGNOSIS — D64.3 OTHER SIDEROBLASTIC ANEMIA (H): ICD-10-CM

## 2021-01-26 DIAGNOSIS — M25.561 RIGHT KNEE PAIN, UNSPECIFIED CHRONICITY: ICD-10-CM

## 2021-01-26 DIAGNOSIS — I10 ESSENTIAL HYPERTENSION WITH GOAL BLOOD PRESSURE LESS THAN 140/90: ICD-10-CM

## 2021-01-26 DIAGNOSIS — D63.1 ANEMIA IN STAGE 4 CHRONIC KIDNEY DISEASE (H): ICD-10-CM

## 2021-01-26 DIAGNOSIS — N18.4 ANEMIA IN STAGE 4 CHRONIC KIDNEY DISEASE (H): ICD-10-CM

## 2021-01-26 DIAGNOSIS — N18.32 STAGE 3B CHRONIC KIDNEY DISEASE (H): Chronic | ICD-10-CM

## 2021-01-26 DIAGNOSIS — Z00.00 ENCOUNTER FOR MEDICARE ANNUAL WELLNESS EXAM: Primary | ICD-10-CM

## 2021-01-26 PROCEDURE — 99397 PER PM REEVAL EST PAT 65+ YR: CPT | Performed by: NURSE PRACTITIONER

## 2021-01-26 PROCEDURE — 99213 OFFICE O/P EST LOW 20 MIN: CPT | Mod: 25 | Performed by: NURSE PRACTITIONER

## 2021-01-26 RX ORDER — AMLODIPINE BESYLATE 10 MG/1
10 TABLET ORAL DAILY
Qty: 90 TABLET | Refills: 3 | Status: SHIPPED | OUTPATIENT
Start: 2021-01-26 | End: 2021-02-25

## 2021-01-26 ASSESSMENT — ACTIVITIES OF DAILY LIVING (ADL): CURRENT_FUNCTION: NO ASSISTANCE NEEDED

## 2021-01-26 ASSESSMENT — MIFFLIN-ST. JEOR: SCORE: 879.6

## 2021-01-26 NOTE — PATIENT INSTRUCTIONS
Patient Education   Personalized Prevention Plan  You are due for the preventive services outlined below.  Your care team is available to assist you in scheduling these services.  If you have already completed any of these items, please share that information with your care team to update in your medical record.  Health Maintenance Due   Topic Date Due     Hepatitis C Screening  05/07/1958     Hepatitis B Vaccine (1 of 3 - Risk 3-dose series) 05/07/1961     Zoster (Shingles) Vaccine (1 of 2) 05/07/1992     Annual Wellness Visit  10/24/2019     FALL RISK ASSESSMENT  10/24/2019     PHQ-2  01/01/2021        Patient Education   Personalized Prevention Plan  You are due for the preventive services outlined below.  Your care team is available to assist you in scheduling these services.  If you have already completed any of these items, please share that information with your care team to update in your medical record.  Health Maintenance Due   Topic Date Due     Hepatitis C Screening  [birth year]07/1958     Hepatitis B Vaccine (1 of 3 - Risk 3-dose series) 05/07/1961     Zoster (Shingles) Vaccine (1 of 2) 05/07/1992     FALL RISK ASSESSMENT  10/24/2019     PHQ-2  01/01/2021           Increased Amlodipine 10 mg once daily - keep blood pressure < 140/90.  Recheck with RN in 3-4 weeks, along with labs.    Call transplant team to discuss getting COVID vaccine.    1. Do not use Qtips to clean ears.  2. May use debrox (over the counter) once per week. Or may apply 1-2 drops of baby oil in each ear once per week to soften the wax.  3. Return to clinic for repeat irrigation if your ears begin to feel plugged again      HAO Bains

## 2021-01-26 NOTE — PROGRESS NOTES
"SUBJECTIVE:   Amanda Nails is a 80 year old female who presents for Preventive Visit.    Chief Complaint   Patient presents with     Physical     Musculoskeletal Problem     Right Leg pain        Patient has been advised of split billing requirements and indicates understanding: Yes   Are you in the first 12 months of your Medicare coverage?  No    Healthy Habits:     In general, how would you rate your overall health?  Very good    Frequency of exercise:  None    Duration of exercise:  Other    Do you usually eat at least 4 servings of fruit and vegetables a day, include whole grains    & fiber and avoid regularly eating high fat or \"junk\" foods?  No    Taking medications regularly:  Yes    Barriers to taking medications:  None    Medication side effects:  None    Ability to successfully perform activities of daily living:  No assistance needed    Home Safety:  No safety concerns identified    Hearing Impairment:  No hearing concerns    In the past 6 months, have you been bothered by leaking of urine?  No    In general, how would you rate your overall mental or emotional health?  Very good      PHQ-2 Total Score: 0    Additional concerns today:  Yes    Do you feel safe in your environment? Yes    Have you ever done Advance Care Planning? (For example, a Health Directive, POLST, or a discussion with a medical provider or your loved ones about your wishes): No, advance care planning information given to patient to review.  Patient plans to discuss their wishes with loved ones or provider.        Fall risk  Fallen 2 or more times in the past year?: No  Any fall with injury in the past year?: Yes  Timed Up and Go Test (>13.5 is fall risk; contact physician) : 8    Cognitive Screening   1) Repeat 3 items (Leader, Season, Table)    2) Clock draw: ABNORMAL did not put all #'s in clock, or hand   3) 3 item recall: Recalls 3 objects  Results: 3 items recalled: COGNITIVE IMPAIRMENT LESS LIKELY    Mini-CogTM Copyright S " Juan Miguel. Licensed by the author for use in [facility]; reprinted with permission (mercedes@[facility]). All rights reserved.      Do you have sleep apnea, excessive snoring or daytime drowsiness?: no    Reviewed and updated as needed this visit by clinical staff  Tobacco  Allergies  Meds   Med Hx  Surg Hx  Fam Hx  Soc Hx        Reviewed and updated as needed this visit by Provider   Allergies              Social History     Tobacco Use     Smoking status: Former Smoker     Years: 3.00     Smokeless tobacco: Never Used     Tobacco comment: Years ago.   Substance Use Topics     Alcohol use: Yes     Comment: very rarely     If you drink alcohol do you typically have >3 drinks per day or >7 drinks per week? No    Alcohol Use 1/26/2021   Prescreen: >3 drinks/day or >7 drinks/week? No       Joint Pain    Onset:  2 yrs ( was last seen in 10/2020)     Description:   Location: Right Leg, Back of Knee   Character: Dull ache    Intensity: moderate    Progression of Symptoms: intermittent    Accompanying Signs & Symptoms:  Other symptoms: none     History:   Previous similar pain: YES      Precipitating factors:   Trauma or overuse: no     Alleviating factors:  Improved by: massage    Therapies Tried and outcome: massage - helps, was seen In October by Sports Medicine - was suggested to have PT or Injection- patient declined.  - Had Knee X rays , and Ultrasound     US done to rule out DVT.  Recommended PT and follow up for steroid injection.  Likely pain caused by arthritis.    KNEE STANDING AP BILATERAL SUNRISE BILATERAL LATERAL RIGHT 10/23/2020  1:55 PM      HISTORY: Chronic pain of right knee.                                                                  IMPRESSION: Right knee lateral radiograph is indeterminate for small  joint effusion. Right knee lateral compartment mild joint space  narrowing. Left knee medial compartment mild-to-moderate joint space  narrowing. Meniscal chondrocalcinosis.     MARCELA  MD MILKA    Current providers sharing in care for this patient include:    Patient Care Team:  Aby Downing APRN CNP as PCP - General (Nurse Practitioner)  Sarai Abrams MD as MD (Nephrology)  Aby Downing APRN CNP as Assigned PCP  Luis Daniel Lomeli MD as Assigned Gastroenterology Provider  Radha Chen MD as Assigned Musculoskeletal Provider    The following health maintenance items are reviewed in Epic and correct as of today:  Health Maintenance   Topic Date Due     HEPATITIS C SCREENING  05/07/1960     HEPATITIS B IMMUNIZATION (1 of 3 - Risk 3-dose series) 05/07/1961     ZOSTER IMMUNIZATION (1 of 2) 05/07/1992     FALL RISK ASSESSMENT  10/24/2019     LIPID  06/18/2021     MICROALBUMIN  06/18/2021     ADVANCE CARE PLANNING  11/10/2021     BMP  01/13/2022     MEDICARE ANNUAL WELLNESS VISIT  01/26/2022     DEXA  04/15/2023     DTAP/TDAP/TD IMMUNIZATION (2 - Td) 07/10/2023     PHQ-2  Completed     INFLUENZA VACCINE  Completed     Pneumococcal Vaccine: Pediatrics (0 to 5 Years) and At-Risk Patients (6 to 64 Years)  Completed     Pneumococcal Vaccine: 65+ Years  Completed     IPV IMMUNIZATION  Aged Out     MENINGITIS IMMUNIZATION  Aged Out     Lab work is in process  Labs reviewed in EPIC  BP Readings from Last 3 Encounters:   01/26/21 (!) 170/98   10/23/20 (!) 159/84   08/06/20 132/72    Wt Readings from Last 3 Encounters:   01/26/21 52.6 kg (115 lb 14.4 oz)   10/23/20 52.6 kg (116 lb)   08/06/20 52.7 kg (116 lb 3.2 oz)                  Patient Active Problem List   Diagnosis     Chronic kidney disease, stage III (moderate)     Liver replaced by transplant (H)     Primary biliary cirrhosis (H)     CARDIOVASCULAR SCREENING; LDL GOAL LESS THAN 130     Mixed hyperlipidemia     Iron deficiency anemia     Skin cancer     Osteoporosis     Advanced directives, counseling/discussion     Proteinuria     Anemia in stage 4 chronic kidney disease (H)     Other sideroblastic anemia (H)     Past Surgical History:    Procedure Laterality Date     cholecytectomy       COLONOSCOPY  4/9/2013    Procedure: COLONOSCOPY;  Colonoscopy;  Surgeon: Kendra Archer MD;  Location: [facility]     ESOPHAGOSCOPY, GASTROSCOPY, DUODENOSCOPY (EGD), COMBINED  8/23/2013    Procedure: COMBINED ESOPHAGOSCOPY, GASTROSCOPY, DUODENOSCOPY (EGD), BIOPSY SINGLE OR MULTIPLE;  Gastroscopy       ESOPHAGOSCOPY, GASTROSCOPY, DUODENOSCOPY (EGD), DILATATION, COMBINED N/A 7/2/2020    Procedure: ESOPHAGOGASTRODUODENOSCOPY, WITH DILATION and biopsy;  Surgeon: Doe Wallace MD;  Location: [facility]     SPLENECTOMY       TRANSPLANT  1983    liver       Social History     Tobacco Use     Smoking status: Former Smoker     Years: 3.00     Smokeless tobacco: Never Used     Tobacco comment: Years ago.   Substance Use Topics     Alcohol use: Yes     Comment: very rarely     Family History   Problem Relation Age of Onset     Respiratory Mother      Gastrointestinal Disease Sister         celiac     Gastrointestinal Disease Son         celiac     Gastrointestinal Disease Daughter         celiac     Gastrointestinal Disease Sister      Gastrointestinal Disease Son         celiac     Gastrointestinal Disease Daughter         PBC     Endocrine Disease Daughter         Graves disease     Endocrine Disease Daughter         hypothyroidism         Current Outpatient Medications   Medication Sig Dispense Refill     Acetaminophen (TYLENOL PO) Take 500 mg by mouth every 6 hours as needed for mild pain or fever       allopurinol (ZYLOPRIM) 100 MG tablet TAKE TWO TABLETS BY MOUTH ONCE DAILY 180 tablet 2     amLODIPine (NORVASC) 10 MG tablet Take 1 tablet (10 mg) by mouth daily 90 tablet 3     Ascorbic Acid (VITAMIN C PO) Take by mouth daily       CALCIUM-VITAMIN D PO Take by mouth daily       carvedilol (COREG) 6.25 MG tablet Take 1 tablet (6.25 mg) by mouth 2 times daily (with meals) 180 tablet 3     furosemide (LASIX) 20 MG tablet TAKE ONE TABLET BY MOUTH ONCE DAILY 90 tablet 2      Multiple Vitamins-Minerals (PRESERVISION AREDS 2 PO) Take 1 tablet by mouth 2 times daily       Omega-3 Fatty Acids (FISH OIL PO) Take by mouth daily       predniSONE (DELTASONE) 1 MG tablet TAKE THREE TABLETS BY MOUTH EVERY  tablet 3     sertraline (ZOLOFT) 50 MG tablet TAKE ONE TABLET BY MOUTH ONCE DAILY 90 tablet 3     sirolimus (GENERIC EQUIVALENT) 0.5 MG tablet TAKE TWO TABLETS BY MOUTH EVERY  tablet 3     ursodiol (ACTIGALL) 300 MG capsule TAKE TWO CAPSULES BY MOUTH EVERY MORNING & TAKE ONE CAPSULE BY MOUTH EVERY EVENING 270 capsule 3     Allergies   Allergen Reactions     Golytely [Colyte] Swelling     Pt states her tongue swelled      Lisinopril      Per patient she is allergic to this medication too     Nsaids Other (See Comments)     Can't take because of liver     Recent Labs   Lab Test 01/13/21  1507 08/06/20  1113 06/18/20  0944 06/18/20  0941 08/12/19  1314 08/12/19  1314 11/17/16  0757 11/17/16  0757 08/13/14  1027 08/13/14  1027   LDL  --   --  141*  --   --  155*  --   --   --  171*   HDL  --   --  105  --   --  113  --   --   --  125   TRIG  --   --  126  --   --  96  --   --   --  110   ALT 41 52* 37  --    < > 40   < >  --    < >  --    CR 1.35* 1.34* 1.26*  --    < > 1.32*   < >  --    < > 1.70*   GFRESTIMATED 37* 38* 41*  --    < > 39*   < >  --    < > 30*   GFRESTBLACK 43* 44* 47*  --    < > 45*   < >  --    < > 36*   POTASSIUM 3.5 3.4 3.5  --    < > 3.7   < >  --    < > 4.4   TSH  --   --   --  3.61  --   --   --  3.78   < >  --     < > = values in this interval not displayed.      Mammogram Screening - Patient over age 75, has elected to discontinue screenings.    Review of Systems   Heart murmur - per patient this has been present since childhood. Denies SOB, palpitations, chest pain, or significant edema in lower extremities.  Decreased hearing bilaterally, slightly worsened, ears feel plugged but no pain, drainage, or tinnitus.   Constitutional, HEENT, cardiovascular,  "pulmonary, GI, , musculoskeletal, neuro, skin, endocrine and psych systems are negative, except as otherwise noted.    OBJECTIVE:   BP (!) 170/98 (BP Location: Left arm, Patient Position: Chair, Cuff Size: Adult Regular)   Pulse 73   Temp 97.9  F (36.6  C) (Tympanic)   Ht 1.448 m (4' 9\")   Wt 52.6 kg (115 lb 14.4 oz)   SpO2 95%   BMI 25.08 kg/m   Estimated body mass index is 25.08 kg/m  as calculated from the following:    Height as of this encounter: 1.448 m (4' 9\").    Weight as of this encounter: 52.6 kg (115 lb 14.4 oz).  Physical Exam  GENERAL: healthy, alert and no distress  EYES: Eyes grossly normal to inspection, PERRL and conjunctivae and sclerae normal  HENT: ear canals with large amount of cerumen. nose and mouth without ulcers or lesions  NECK: no adenopathy, no asymmetry, masses, or scars and thyroid normal to palpation  RESP: lungs clear to auscultation - no rales, rhonchi or wheezes  CV: +3 systolic murmur. Regular rate and rhythm, click or rub, no peripheral edema and peripheral pulses strong.  ABDOMEN: soft, nontender, no hepatosplenomegaly, no masses and bowel sounds normal  MS: + mild edema in right knee/lower leg. no gross musculoskeletal defects noted, no other edema  SKIN: dry, ecchymosis in some areas, no suspicious lesions or rashes  NEURO: Normal strength and tone, mentation intact and speech normal  PSYCH: mentation appears normal, affect normal/bright    Diagnostic Test Results:  Labs reviewed in Epic    ASSESSMENT / PLAN:   Amanda was seen today for physical and musculoskeletal problem.    Diagnoses and all orders for this visit:    Encounter for Medicare annual wellness exam    Primary biliary cirrhosis (H)    Anemia in stage 4 chronic kidney disease (H)    Other sideroblastic anemia (H)    Right knee pain, unspecified chronicity    Liver replaced by transplant (H)    CARDIOVASCULAR SCREENING; LDL GOAL LESS THAN 130    Stage 3b chronic kidney disease  -     amLODIPine (NORVASC) " "10 MG tablet; Take 1 tablet (10 mg) by mouth daily    Essential hypertension with goal blood pressure less than 140/90  -     Basic metabolic panel; Future  -     amLODIPine (NORVASC) 10 MG tablet; Take 1 tablet (10 mg) by mouth daily    Overall normal exam today. She has not had any significant health changes in past year and her chronic illnesses have been stable.     Increased amlodipine to 10 MG today. Recommended recheck labs in 4-6 weeks to check kidney function and BP check with RN.    Discussed past knee workup and recommendations. Patient not interested in PT or injection at this time, provided information for sports med follow up if she decides pain becomes bothersome enough to pursue further.     Information given regarding shingles vaccine. Offered ear canal irrigation today, patient declined.     Patient has been advised of split billing requirements and indicates understanding: Yes  COUNSELING:  Reviewed preventive health counseling, as reflected in patient instructions       Regular exercise       Healthy diet/nutrition    Estimated body mass index is 25.08 kg/m  as calculated from the following:    Height as of this encounter: 1.448 m (4' 9\").    Weight as of this encounter: 52.6 kg (115 lb 14.4 oz).    Weight management plan: Discussed healthy diet and exercise guidelines    She reports that she has quit smoking. She quit after 3.00 years of use. She has never used smokeless tobacco.      Appropriate preventive services were discussed with this patient, including applicable screening as appropriate for cardiovascular disease, diabetes, osteopenia/osteoporosis, and glaucoma.  As appropriate for age/gender, discussed screening for colorectal cancer, prostate cancer, breast cancer, and cervical cancer. Checklist reviewing preventive services available has been given to the patient.    Reviewed patients plan of care and provided an AVS. The Intermediate Care Plan ( asthma action plan, low back pain " action plan, and migraine action plan) for Amanda meets the Care Plan requirement. This Care Plan has been established and reviewed with the Patient.    Counseling Resources:  ATP IV Guidelines  Pooled Cohorts Equation Calculator  Breast Cancer Risk Calculator  Breast Cancer: Medication to Reduce Risk  FRAX Risk Assessment  ICSI Preventive Guidelines  Dietary Guidelines for Americans, 2010  USDA's MyPlate  ASA Prophylaxis  Lung CA Screening    Bharati Mitchell NP  [facility]    Identified Health Risks:

## 2021-01-27 ENCOUNTER — TELEPHONE (OUTPATIENT)
Dept: GASTROENTEROLOGY | Facility: CLINIC | Age: 81
End: 2021-01-27

## 2021-01-27 NOTE — TELEPHONE ENCOUNTER
Spoke with patient about Golytely (polyethylene Glycol) allergy. This ingredient is also in the COVID vaccination. The allergy was severe per EMR with tongue swelling, although patient does not specifically remember that.    If she has an allergy to polyethylene glycol, she should not use to the 2 vaccinations available as they contain this ingredient.

## 2021-01-27 NOTE — TELEPHONE ENCOUNTER
Call back to Christa.  She went to a wellness check w/ a provider yesterday.  This person reviewed her allergies - one of them being golytely.  She took golytely in the past - this provider told her that some of the ingredients in the vaccine are similar w/ those in golytely.  She spoke to a pharmacist and he agreed that some of the ingredients may be the same as that are in the vaccinations.  Message to Jase Mace to give Anali a call to advise.  Anali also wanted to be sure that Dr. Lomeli is OK w/ going to [place] and [place] on Monday.  She has flight and accomodations arranged.  She admitted that she would go either way.  Advised her to wear KN95 mask, wash hands, even wear gloves on the plane.  She said she will be careful.

## 2021-01-27 NOTE — TELEPHONE ENCOUNTER
[facility] Call Center    Phone Message    May a detailed message be left on voicemail: yes     Reason for Call: Patient has questions regarding covid vaccine that she'd like to speak with Dr. Lomeli.  She is also going on a trip to [place] and had questions regarding that also.    Action Taken: Message routed to:  [facility] ([facility] Hepatology    Travel Screening: Not Applicable

## 2021-02-24 ENCOUNTER — TELEPHONE (OUTPATIENT)
Dept: FAMILY MEDICINE | Facility: CLINIC | Age: 81
End: 2021-02-24

## 2021-02-24 NOTE — TELEPHONE ENCOUNTER
Appt made for tomorrow the rt leg and foot is more swollen than when she was seen. Difficulty with putting on shoe or sock.  Denies cough or coughing up blood. No pain , looks like a normal color. She has elevated and bp was 144/76 .Rekha Matthews RN

## 2021-02-24 NOTE — TELEPHONE ENCOUNTER
Reason for Call:  Medication question:    Do you use a  [facility]?  Name of the pharmacy and phone number for the current request:  [facility]    Name of the medication requested: Amlodipine    Other request: Patient would like to figure out dose of Amlodipine so she can help bp and swelling.     Can we leave a detailed message on this number? YES    Phone number patient can be reached at: Home number on file [phone] (home)    Best Time: Any    Call taken on 2/24/2021 at 12:27 PM by Cathy Jacobson

## 2021-02-25 ENCOUNTER — TELEPHONE (OUTPATIENT)
Dept: FAMILY MEDICINE | Facility: CLINIC | Age: 81
End: 2021-02-25

## 2021-02-25 ENCOUNTER — OFFICE VISIT (OUTPATIENT)
Dept: FAMILY MEDICINE | Facility: CLINIC | Age: 81
End: 2021-02-25
Payer: COMMERCIAL

## 2021-02-25 VITALS
SYSTOLIC BLOOD PRESSURE: 126 MMHG | WEIGHT: 119 LBS | TEMPERATURE: 98.6 F | BODY MASS INDEX: 25.75 KG/M2 | HEART RATE: 61 BPM | OXYGEN SATURATION: 99 % | DIASTOLIC BLOOD PRESSURE: 70 MMHG

## 2021-02-25 DIAGNOSIS — M79.89 RIGHT LEG SWELLING: ICD-10-CM

## 2021-02-25 DIAGNOSIS — I10 ESSENTIAL HYPERTENSION WITH GOAL BLOOD PRESSURE LESS THAN 140/90: ICD-10-CM

## 2021-02-25 DIAGNOSIS — N18.32 STAGE 3B CHRONIC KIDNEY DISEASE (H): Chronic | ICD-10-CM

## 2021-02-25 DIAGNOSIS — R60.0 PERIPHERAL EDEMA: Primary | ICD-10-CM

## 2021-02-25 DIAGNOSIS — I10 ESSENTIAL HYPERTENSION WITH GOAL BLOOD PRESSURE LESS THAN 140/90: Primary | ICD-10-CM

## 2021-02-25 DIAGNOSIS — R01.1 HEART MURMUR: ICD-10-CM

## 2021-02-25 PROCEDURE — 99214 OFFICE O/P EST MOD 30 MIN: CPT | Performed by: NURSE PRACTITIONER

## 2021-02-25 RX ORDER — AMLODIPINE BESYLATE 10 MG/1
5 TABLET ORAL DAILY
Qty: 45 TABLET | Refills: 3 | Status: SHIPPED | OUTPATIENT
Start: 2021-02-25 | End: 2021-03-12

## 2021-02-25 NOTE — PROGRESS NOTES
Assessment & Plan     Essential hypertension with goal blood pressure less than 140/90   Controlled since increasing Amlodipine - but now having side effects from increase in medication.    Had angioedema to Lisinopril - contraindicated to use ACE or ARB.  Already on diuretic - may be able to increase this but ? With CKD.  Will recheck BMP.    - amLODIPine (NORVASC) 10 MG tablet; Take 0.5 tablets (5 mg) by mouth daily  - Echocardiogram Complete; Future    Right leg swelling   Likely due to increase in Amlodipine - no pain or other symptoms.  Echocardiogram ordered due to + murmur  Last echocardiogram showed mild Aortic stenosis and left ventricular dysfunction    - Echocardiogram Complete; Future    Stage 3b chronic kidney disease   Monitor - weight checks.  RN to call and follow up on BP in 1-2 weeks.    - amLODIPine (NORVASC) 10 MG tablet; Take 0.5 tablets (5 mg) by mouth daily    Heart murmur   Not new - noted at last visit.       Patient Instructions   Increase Furosemide (Lasix) dosing to 40 mg (2 X 20 mg) for the next 3-5 days and may return to normal dosing when weight is back to baseline and swelling has returned to normal.    Monitor blood pressures at home - RN call you in 1-2 weeks to get some home readings.  Continue Amlodipine lower dose - 5 mg (1/2 tablet).    Continue all other medications as prescribed.  Check kidney function today.    HAO Bains        Return in about 4 weeks (around 3/25/2021).    Bharati Mitchell NP  [facility]ANE[facility]win is a 80 year old who presents for the following health issues     HPI       Concern - Extremity swelling   Onset: about a month  Description: Patient was in last month for a wellness exam.  Had just lower right leg swelling.  Now has been worsening and swollen right foot and also possibly right hand swelling.   Intensity: mild  Progression of Symptoms:  worsening  Accompanying Signs & Symptoms: Has noticed some  weight gain.  Up ~4 lbs since last month   Previous history of similar problem: No  Precipitating factors:        Worsened by: hasn't noticed moving more/ less to make a difference  Alleviating factors:        Improved by: nothing   Therapies tried and outcome: Amlodipine was increased last month and patient thinks that has made swelling worse.     Hypertension Follow-up      Do you check your blood pressure regularly outside of the clinic? Yes     Are you following a low salt diet? Yes    Are your blood pressures ever more than 140 on the top number (systolic) OR more   than 90 on the bottom number (diastolic), for example 140/90? Yes      How many servings of fruits and vegetables do you eat daily?  4 or more    On average, how many sweetened beverages do you drink each day (Examples: soda, juice, sweet tea, etc.  Do NOT count diet or artificially sweetened beverages)?   0    How many days per week do you exercise enough to make your heart beat faster? 4    How many minutes a day do you exercise enough to make your heart beat faster? 10 - 19    How many days per week do you miss taking your medication? 0    BP Readings from Last 3 Encounters:   02/25/21 126/70   01/26/21 (!) 170/98   10/23/20 (!) 159/84       Had angioedema to Lisinopril in the past.  On Carvedilol 6.25 mg twice daily HR in the 60's    Review of Systems   Constitutional, HEENT, cardiovascular, pulmonary, GI, , musculoskeletal, neuro, skin, endocrine and psych systems are negative, except as otherwise noted.      Objective    /70   Pulse 61   Temp 98.6  F (37  C) (Tympanic)   Wt 54 kg (119 lb)   SpO2 99%   BMI 25.75 kg/m    Body mass index is 25.75 kg/m .  Physical Exam   GENERAL: healthy, alert and no distress  NECK: no adenopathy, no asymmetry, masses, or scars and thyroid normal to palpation  RESP: lungs clear to auscultation - no rales, rhonchi or wheezes  CV: regular rates and rhythm, normal S1 S2, no S3 or S4, 3+ systolic murmur  heard best at 5th ICS and midsternal, click or rub, peripheral pulses strong and 2+ right lower extremity pitting edema to knee, trace left lower extremity edema.     ABDOMEN: soft, nontender, no hepatosplenomegaly, no masses and bowel sounds normal  MS: no gross musculoskeletal defects noted, bilateral hands with mild swelling throughout - no joint erythema.

## 2021-02-25 NOTE — TELEPHONE ENCOUNTER
Call patient and notify her that I was reviewing her chart and given her leg swelling and history of aortic stenosis (although mild) and left ventricular hypertrophy (enlarged left ventricular) on previous Echo years prior I think we should repeat this given new symptoms.  I have ordered this - she can call to set this up or assist in scheduling this.    Bharati Mitchell, HAO

## 2021-02-25 NOTE — PATIENT INSTRUCTIONS
Increase Furosemide (Lasix) dosing to 40 mg (2 X 20 mg) for the next 3-5 days and may return to normal dosing when weight is back to baseline and swelling has returned to normal.    Monitor blood pressures at home - RN call you in 1-2 weeks to get some home readings.  Continue Amlodipine lower dose - 5 mg (1/2 tablet).    Continue all other medications as prescribed.  Check kidney function today.    HAO Bains

## 2021-02-25 NOTE — NURSING NOTE
"Chief Complaint   Patient presents with     Swelling       Initial /70   Pulse 61   Temp 98.6  F (37  C) (Tympanic)   Wt 54 kg (119 lb)   SpO2 99%   BMI 25.75 kg/m   Estimated body mass index is 25.75 kg/m  as calculated from the following:    Height as of 1/26/21: 1.448 m (4' 9\").    Weight as of this encounter: 54 kg (119 lb).    Patient presents to the clinic using No DME    Health Maintenance that is potentially due pending provider review:  NONE    n/a    Is there anyone who you would like to be able to receive your results? No  If yes have patient fill out SHIREEN  Stefani Barahona M.A.    "

## 2021-02-26 ENCOUNTER — TELEPHONE (OUTPATIENT)
Dept: TRANSPLANT | Facility: CLINIC | Age: 81
End: 2021-02-26

## 2021-02-26 NOTE — TELEPHONE ENCOUNTER
----- Message from Jeffry Leal, [facility] sent at 2/25/2021  5:08 PM CST -----  Christa last got 90 days of meds on 11/12/20 which makes her 2 weeks late and she has not returned reminder calls.    Jeffry Leal [facility]  Specialty Pharmacist [phone]    
Spoke to pt and asked she contact [facility] specialty as they have been trying to reach her. Pt states she has not received any messages from them. Pt will make sure they have her correct phone number.   
[phone]

## 2021-03-04 ENCOUNTER — TELEPHONE (OUTPATIENT)
Dept: FAMILY MEDICINE | Facility: CLINIC | Age: 81
End: 2021-03-04

## 2021-03-04 NOTE — TELEPHONE ENCOUNTER
She is in a meeting right now so she couldn't say. She will call us or Dr. Tariff in a couple hours. Rekha Matthews RN

## 2021-03-05 ENCOUNTER — HOSPITAL ENCOUNTER (OUTPATIENT)
Dept: CARDIOLOGY | Facility: CLINIC | Age: 81
Discharge: HOME | End: 2021-03-05
Attending: NURSE PRACTITIONER | Admitting: NURSE PRACTITIONER
Payer: COMMERCIAL

## 2021-03-05 DIAGNOSIS — I10 ESSENTIAL HYPERTENSION: ICD-10-CM

## 2021-03-05 DIAGNOSIS — Z13.220 LIPID SCREENING: ICD-10-CM

## 2021-03-05 DIAGNOSIS — Z94.4 LIVER REPLACED BY TRANSPLANT (H): ICD-10-CM

## 2021-03-05 DIAGNOSIS — M79.89 RIGHT LEG SWELLING: ICD-10-CM

## 2021-03-05 DIAGNOSIS — I10 ESSENTIAL HYPERTENSION WITH GOAL BLOOD PRESSURE LESS THAN 140/90: ICD-10-CM

## 2021-03-05 DIAGNOSIS — I35.0 MODERATE AORTIC STENOSIS: Primary | ICD-10-CM

## 2021-03-05 LAB
ALBUMIN SERPL-MCNC: 3.3 G/DL (ref 3.4–5)
ALBUMIN UR-MCNC: ABNORMAL MG/DL
ALP SERPL-CCNC: 330 U/L (ref 40–150)
ALT SERPL W P-5'-P-CCNC: 44 U/L (ref 0–50)
ANION GAP SERPL CALCULATED.3IONS-SCNC: 6 MMOL/L (ref 3–14)
APPEARANCE UR: CLEAR
AST SERPL W P-5'-P-CCNC: 43 U/L (ref 0–45)
BILIRUB DIRECT SERPL-MCNC: <0.1 MG/DL (ref 0–0.2)
BILIRUB SERPL-MCNC: 0.4 MG/DL (ref 0.2–1.3)
BILIRUB UR QL STRIP: NEGATIVE
BUN SERPL-MCNC: 42 MG/DL (ref 7–30)
CALCIUM SERPL-MCNC: 9.9 MG/DL (ref 8.5–10.1)
CHLORIDE SERPL-SCNC: 104 MMOL/L (ref 94–109)
CO2 SERPL-SCNC: 32 MMOL/L (ref 20–32)
COLOR UR AUTO: YELLOW
CREAT SERPL-MCNC: 1.31 MG/DL (ref 0.52–1.04)
CREAT UR-MCNC: 62 MG/DL
ERYTHROCYTE [DISTWIDTH] IN BLOOD BY AUTOMATED COUNT: 14.9 % (ref 10–15)
GFR SERPL CREATININE-BSD FRML MDRD: 39 ML/MIN/{1.73_M2}
GLUCOSE SERPL-MCNC: 88 MG/DL (ref 70–99)
GLUCOSE UR STRIP-MCNC: NEGATIVE MG/DL
HCT VFR BLD AUTO: 38.6 % (ref 35–47)
HGB BLD-MCNC: 12.1 G/DL (ref 11.7–15.7)
HGB UR QL STRIP: NEGATIVE
KETONES UR STRIP-MCNC: NEGATIVE MG/DL
LEUKOCYTE ESTERASE UR QL STRIP: ABNORMAL
MCH RBC QN AUTO: 30.8 PG (ref 26.5–33)
MCHC RBC AUTO-ENTMCNC: 31.3 G/DL (ref 31.5–36.5)
MCV RBC AUTO: 98 FL (ref 78–100)
NITRATE UR QL: NEGATIVE
NON-SQ EPI CELLS #/AREA URNS LPF: ABNORMAL /LPF
PH UR STRIP: 7.5 PH (ref 5–7)
PLATELET # BLD AUTO: 290 10E9/L (ref 150–450)
POTASSIUM SERPL-SCNC: 3.6 MMOL/L (ref 3.4–5.3)
PROT SERPL-MCNC: 7.7 G/DL (ref 6.8–8.8)
PROT UR-MCNC: 0.44 G/L
PROT/CREAT 24H UR: 0.71 G/G CR (ref 0–0.2)
RBC # BLD AUTO: 3.93 10E12/L (ref 3.8–5.2)
RBC #/AREA URNS AUTO: ABNORMAL /HPF
SIROLIMUS BLD-MCNC: 5.6 UG/L (ref 5–15)
SODIUM SERPL-SCNC: 142 MMOL/L (ref 133–144)
SOURCE: ABNORMAL
SP GR UR STRIP: 1.01 (ref 1–1.03)
TME LAST DOSE: 2100 H
UROBILINOGEN UR STRIP-ACNC: 0.2 EU/DL (ref 0.2–1)
WBC # BLD AUTO: 5.8 10E9/L (ref 4–11)
WBC #/AREA URNS AUTO: ABNORMAL /HPF

## 2021-03-05 PROCEDURE — 80076 HEPATIC FUNCTION PANEL: CPT | Performed by: INTERNAL MEDICINE

## 2021-03-05 PROCEDURE — 80048 BASIC METABOLIC PNL TOTAL CA: CPT | Performed by: INTERNAL MEDICINE

## 2021-03-05 PROCEDURE — 93306 TTE W/DOPPLER COMPLETE: CPT

## 2021-03-05 PROCEDURE — 85027 COMPLETE CBC AUTOMATED: CPT | Performed by: INTERNAL MEDICINE

## 2021-03-05 PROCEDURE — 80195 ASSAY OF SIROLIMUS: CPT | Performed by: INTERNAL MEDICINE

## 2021-03-05 PROCEDURE — 36415 COLL VENOUS BLD VENIPUNCTURE: CPT | Performed by: INTERNAL MEDICINE

## 2021-03-05 PROCEDURE — 93306 TTE W/DOPPLER COMPLETE: CPT | Mod: 26 | Performed by: INTERNAL MEDICINE

## 2021-03-05 PROCEDURE — 81001 URINALYSIS AUTO W/SCOPE: CPT | Performed by: INTERNAL MEDICINE

## 2021-03-05 PROCEDURE — 84156 ASSAY OF PROTEIN URINE: CPT | Performed by: INTERNAL MEDICINE

## 2021-03-05 RX ORDER — CARVEDILOL 12.5 MG/1
12.5 TABLET ORAL 2 TIMES DAILY WITH MEALS
Qty: 60 TABLET | Refills: 1 | Status: SHIPPED | OUTPATIENT
Start: 2021-03-05 | End: 2021-03-16

## 2021-03-11 ENCOUNTER — TELEPHONE (OUTPATIENT)
Dept: FAMILY MEDICINE | Facility: CLINIC | Age: 81
End: 2021-03-11

## 2021-03-11 ENCOUNTER — HOSPITAL ENCOUNTER (EMERGENCY)
Facility: CLINIC | Age: 81
Discharge: HOME | End: 2021-03-11
Attending: NURSE PRACTITIONER | Admitting: NURSE PRACTITIONER
Payer: COMMERCIAL

## 2021-03-11 VITALS
SYSTOLIC BLOOD PRESSURE: 192 MMHG | DIASTOLIC BLOOD PRESSURE: 90 MMHG | TEMPERATURE: 97.6 F | OXYGEN SATURATION: 97 % | RESPIRATION RATE: 22 BRPM | BODY MASS INDEX: 25.75 KG/M2 | WEIGHT: 119 LBS | HEART RATE: 64 BPM

## 2021-03-11 DIAGNOSIS — S81.811A LEG LACERATION, RIGHT, INITIAL ENCOUNTER: ICD-10-CM

## 2021-03-11 PROCEDURE — G0463 HOSPITAL OUTPT CLINIC VISIT: HCPCS | Performed by: NURSE PRACTITIONER

## 2021-03-11 PROCEDURE — 99213 OFFICE O/P EST LOW 20 MIN: CPT | Performed by: NURSE PRACTITIONER

## 2021-03-11 ASSESSMENT — ENCOUNTER SYMPTOMS
RESPIRATORY NEGATIVE: 1
CONSTITUTIONAL NEGATIVE: 1
WOUND: 1
CARDIOVASCULAR NEGATIVE: 1
NEUROLOGICAL NEGATIVE: 1

## 2021-03-11 NOTE — TELEPHONE ENCOUNTER
Reason for Call:  Other call back    Detailed comments: Pt is calling with questions about the Juan Cralos and Juan Carlos vaccine and if it would be a good choice for her, please advise.    Phone Number Patient can be reached at: Home number on file [phone] (home)    Best Time: any    Can we leave a detailed message on this number? YES    Call taken on 3/11/2021 at 3:54 PM by Anna Stover
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
## 2021-03-12 ENCOUNTER — MYC MEDICAL ADVICE (OUTPATIENT)
Dept: FAMILY MEDICINE | Facility: CLINIC | Age: 81
End: 2021-03-12

## 2021-03-12 DIAGNOSIS — I10 ESSENTIAL HYPERTENSION WITH GOAL BLOOD PRESSURE LESS THAN 140/90: Primary | ICD-10-CM

## 2021-03-12 DIAGNOSIS — N18.32 STAGE 3B CHRONIC KIDNEY DISEASE (H): ICD-10-CM

## 2021-03-12 RX ORDER — AMLODIPINE BESYLATE 5 MG/1
5 TABLET ORAL DAILY
Qty: 90 TABLET | Refills: 3 | Status: SHIPPED | OUTPATIENT
Start: 2021-03-12 | End: 2022-05-17

## 2021-03-12 NOTE — TELEPHONE ENCOUNTER
Patient is requesting a Amlodipine 5 mg tablet daily as it is hard to cut the 10 mg tabs in half.     Pended for provider consideration.           Awaiting patient response on pharmacy then send to provider.

## 2021-03-16 ENCOUNTER — OFFICE VISIT (OUTPATIENT)
Dept: FAMILY MEDICINE | Facility: CLINIC | Age: 81
End: 2021-03-16
Payer: COMMERCIAL

## 2021-03-16 VITALS
WEIGHT: 116 LBS | HEIGHT: 57 IN | TEMPERATURE: 97.4 F | OXYGEN SATURATION: 98 % | RESPIRATION RATE: 13 BRPM | BODY MASS INDEX: 25.03 KG/M2 | DIASTOLIC BLOOD PRESSURE: 76 MMHG | SYSTOLIC BLOOD PRESSURE: 128 MMHG | HEART RATE: 60 BPM

## 2021-03-16 DIAGNOSIS — I10 ESSENTIAL HYPERTENSION: Primary | ICD-10-CM

## 2021-03-16 DIAGNOSIS — S81.001A OPEN WOUND OF KNEE, LEG, AND ANKLE, RIGHT, INITIAL ENCOUNTER: ICD-10-CM

## 2021-03-16 DIAGNOSIS — S81.801A OPEN WOUND OF KNEE, LEG, AND ANKLE, RIGHT, INITIAL ENCOUNTER: ICD-10-CM

## 2021-03-16 DIAGNOSIS — S91.001A OPEN WOUND OF KNEE, LEG, AND ANKLE, RIGHT, INITIAL ENCOUNTER: ICD-10-CM

## 2021-03-16 DIAGNOSIS — I35.0 MODERATE AORTIC STENOSIS: ICD-10-CM

## 2021-03-16 PROCEDURE — 99213 OFFICE O/P EST LOW 20 MIN: CPT | Performed by: NURSE PRACTITIONER

## 2021-03-16 RX ORDER — CARVEDILOL 12.5 MG/1
12.5 TABLET ORAL 2 TIMES DAILY WITH MEALS
Qty: 180 TABLET | Refills: 1 | Status: SHIPPED | OUTPATIENT
Start: 2021-03-16 | End: 2021-06-18

## 2021-03-16 ASSESSMENT — MIFFLIN-ST. JEOR: SCORE: 880.05

## 2021-03-16 NOTE — PROGRESS NOTES
Assessment & Plan     Essential hypertension  Improved - continue medications as prescribed.  Reviewed BMP - stable.  Monitor Cr+  Printed med list for patient.    - carvedilol (COREG) 12.5 MG tablet; Take 1 tablet (12.5 mg) by mouth 2 times daily (with meals)    Moderate aortic stenosis     - carvedilol (COREG) 12.5 MG tablet; Take 1 tablet (12.5 mg) by mouth 2 times daily (with meals)    Open wound of knee, leg, and ankle, right, initial encounter   Wound care discussed and Visco paste given to patient today along with other supplies.            See Patient Instructions    Return in about 3 months (around 6/16/2021) for Medication Follow up, Routine Visit.    Bharati Mitchell NP  [facility] is a 80 year old who presents for the following health issues     HPI     Hypertension Follow-up      Do you check your blood pressure regularly outside of the clinic? Yes     Are you following a low salt diet? Yes    Are your blood pressures ever more than 140 on the top number (systolic) OR more   than 90 on the bottom number (diastolic), for example 140/90? Yes   At last visit -furosemide was increased due to edema.   Amlodipine was decreased from 10mg to 5mg.   And on 03/05/21 Carvedilol was increased to 12.5mg BID   She was confused about her doses and what medications she should be taking and so this appt is just to clarify what she should be doing.       How many servings of fruits and vegetables do you eat daily?  0-1    On average, how many sweetened beverages do you drink each day (Examples: soda, juice, sweet tea, etc.  Do NOT count diet or artificially sweetened beverages)?   0    How many days per week do you exercise enough to make your heart beat faster? 3 or less    How many minutes a day do you exercise enough to make your heart beat faster? 9 or less    How many days per week do you miss taking your medication? 0    Was seen in the ED 3/11 for a laceration  "on right lower leg.  Has been doing dressing changes on it and was started on keflex for possible infection.  She denies any new redness, drainage or pain.    NO fever/chills   Area has improved.  Changing dressings daily per family.  Family involved and very supportive.    Review of Systems   Constitutional, HEENT, cardiovascular, pulmonary, GI, , musculoskeletal, neuro, skin, endocrine and psych systems are negative, except as otherwise noted.      Objective    /76 (BP Location: Right arm, Patient Position: Sitting, Cuff Size: Adult Regular)   Pulse 60   Temp 97.4  F (36.3  C) (Tympanic)   Resp 13   Ht 1.448 m (4' 9\")   Wt 52.6 kg (116 lb)   SpO2 98%   BMI 25.10 kg/m    Body mass index is 25.1 kg/m .  Physical Exam   GENERAL: healthy, alert and no distress  NECK: no adenopathy, no asymmetry, masses, or scars and thyroid normal to palpation  RESP: lungs clear to auscultation - no rales, rhonchi or wheezes  CV: regular rate and rhythm, normal S1 S2, no S3 or S4, no murmur, click or rub, no peripheral edema and peripheral pulses strong  ABDOMEN: soft, nontender, no hepatosplenomegaly, no masses and bowel sounds normal  MS: no gross musculoskeletal defects noted, no edema  SKIN: Right lower extremity with 4-5 mm long laceration with some scant drainage present on dressing. No surrounding erythema. Trace right SERGEI. None in LLE. +pp.             "

## 2021-04-22 DIAGNOSIS — Z94.4 LIVER REPLACED BY TRANSPLANT (H): ICD-10-CM

## 2021-04-22 DIAGNOSIS — Z13.220 LIPID SCREENING: ICD-10-CM

## 2021-06-02 DIAGNOSIS — M79.89 RIGHT LEG SWELLING: ICD-10-CM

## 2021-06-02 DIAGNOSIS — M10.072 ACUTE IDIOPATHIC GOUT OF LEFT FOOT: ICD-10-CM

## 2021-06-03 RX ORDER — FUROSEMIDE 20 MG
TABLET ORAL
Qty: 90 TABLET | Refills: 2 | Status: SHIPPED | OUTPATIENT
Start: 2021-06-03 | End: 2021-06-18

## 2021-06-03 RX ORDER — ALLOPURINOL 100 MG/1
TABLET ORAL
Qty: 180 TABLET | Refills: 2 | Status: SHIPPED | OUTPATIENT
Start: 2021-06-03 | End: 2022-02-28

## 2021-06-03 NOTE — TELEPHONE ENCOUNTER
Creatinine   Date Value Ref Range Status   03/05/2021 1.31 (H) 0.52 - 1.04 mg/dL Final     Uric Acid   Date Value Ref Range Status   01/06/2020 2.8 2.6 - 6.0 mg/dL Final

## 2021-06-11 ENCOUNTER — TELEPHONE (OUTPATIENT)
Dept: FAMILY MEDICINE | Facility: CLINIC | Age: 81
End: 2021-06-11

## 2021-06-14 DIAGNOSIS — Z13.220 LIPID SCREENING: ICD-10-CM

## 2021-06-14 DIAGNOSIS — Z94.4 LIVER REPLACED BY TRANSPLANT (H): ICD-10-CM

## 2021-06-14 LAB
ALBUMIN SERPL-MCNC: 3.2 G/DL (ref 3.4–5)
ALBUMIN UR-MCNC: NEGATIVE MG/DL
ALP SERPL-CCNC: 230 U/L (ref 40–150)
ALT SERPL W P-5'-P-CCNC: 37 U/L (ref 0–50)
ANION GAP SERPL CALCULATED.3IONS-SCNC: 8 MMOL/L (ref 3–14)
APPEARANCE UR: CLEAR
AST SERPL W P-5'-P-CCNC: 40 U/L (ref 0–45)
BACTERIA #/AREA URNS HPF: ABNORMAL /HPF
BILIRUB DIRECT SERPL-MCNC: 0.1 MG/DL (ref 0–0.2)
BILIRUB SERPL-MCNC: 0.4 MG/DL (ref 0.2–1.3)
BILIRUB UR QL STRIP: NEGATIVE
BUN SERPL-MCNC: 38 MG/DL (ref 7–30)
CALCIUM SERPL-MCNC: 9.3 MG/DL (ref 8.5–10.1)
CHLORIDE SERPL-SCNC: 103 MMOL/L (ref 94–109)
CHOLEST SERPL-MCNC: 291 MG/DL
CO2 SERPL-SCNC: 28 MMOL/L (ref 20–32)
COLOR UR AUTO: YELLOW
CREAT SERPL-MCNC: 1.43 MG/DL (ref 0.52–1.04)
CREAT UR-MCNC: 159 MG/DL
ERYTHROCYTE [DISTWIDTH] IN BLOOD BY AUTOMATED COUNT: 14.4 % (ref 10–15)
GFR SERPL CREATININE-BSD FRML MDRD: 35 ML/MIN/{1.73_M2}
GLUCOSE SERPL-MCNC: 115 MG/DL (ref 70–99)
GLUCOSE UR STRIP-MCNC: NEGATIVE MG/DL
HCT VFR BLD AUTO: 36.2 % (ref 35–47)
HDLC SERPL-MCNC: 104 MG/DL
HGB BLD-MCNC: 11.5 G/DL (ref 11.7–15.7)
HGB UR QL STRIP: ABNORMAL
KETONES UR STRIP-MCNC: NEGATIVE MG/DL
LDLC SERPL CALC-MCNC: 159 MG/DL
LEUKOCYTE ESTERASE UR QL STRIP: ABNORMAL
MCH RBC QN AUTO: 30.4 PG (ref 26.5–33)
MCHC RBC AUTO-ENTMCNC: 31.8 G/DL (ref 31.5–36.5)
MCV RBC AUTO: 96 FL (ref 78–100)
NITRATE UR QL: NEGATIVE
NON-SQ EPI CELLS #/AREA URNS LPF: ABNORMAL /LPF
NONHDLC SERPL-MCNC: 187 MG/DL
PH UR STRIP: 5.5 PH (ref 5–7)
PLATELET # BLD AUTO: 241 10E9/L (ref 150–450)
POTASSIUM SERPL-SCNC: 3.6 MMOL/L (ref 3.4–5.3)
PROT SERPL-MCNC: 7.2 G/DL (ref 6.8–8.8)
PROT UR-MCNC: 0.17 G/L
PROT/CREAT 24H UR: 0.11 G/G CR (ref 0–0.2)
RBC # BLD AUTO: 3.78 10E12/L (ref 3.8–5.2)
RBC #/AREA URNS AUTO: ABNORMAL /HPF
SODIUM SERPL-SCNC: 139 MMOL/L (ref 133–144)
SOURCE: ABNORMAL
SP GR UR STRIP: 1.01 (ref 1–1.03)
TRIGL SERPL-MCNC: 140 MG/DL
UROBILINOGEN UR STRIP-ACNC: 0.2 EU/DL (ref 0.2–1)
WBC # BLD AUTO: 5.7 10E9/L (ref 4–11)
WBC #/AREA URNS AUTO: ABNORMAL /HPF

## 2021-06-14 PROCEDURE — 80076 HEPATIC FUNCTION PANEL: CPT | Performed by: INTERNAL MEDICINE

## 2021-06-14 PROCEDURE — 84156 ASSAY OF PROTEIN URINE: CPT | Performed by: INTERNAL MEDICINE

## 2021-06-14 PROCEDURE — 36415 COLL VENOUS BLD VENIPUNCTURE: CPT | Performed by: INTERNAL MEDICINE

## 2021-06-14 PROCEDURE — 85027 COMPLETE CBC AUTOMATED: CPT | Performed by: INTERNAL MEDICINE

## 2021-06-14 PROCEDURE — 80061 LIPID PANEL: CPT | Performed by: INTERNAL MEDICINE

## 2021-06-14 PROCEDURE — 80195 ASSAY OF SIROLIMUS: CPT | Performed by: INTERNAL MEDICINE

## 2021-06-14 PROCEDURE — 80048 BASIC METABOLIC PNL TOTAL CA: CPT | Performed by: INTERNAL MEDICINE

## 2021-06-14 PROCEDURE — 81001 URINALYSIS AUTO W/SCOPE: CPT | Performed by: INTERNAL MEDICINE

## 2021-06-15 LAB
SIROLIMUS BLD-MCNC: 5.7 UG/L (ref 5–15)
TME LAST DOSE: NORMAL H

## 2021-06-17 ENCOUNTER — OFFICE VISIT (OUTPATIENT)
Dept: GASTROENTEROLOGY | Facility: CLINIC | Age: 81
End: 2021-06-17
Attending: INTERNAL MEDICINE
Payer: COMMERCIAL

## 2021-06-17 VITALS
TEMPERATURE: 97.6 F | BODY MASS INDEX: 25.38 KG/M2 | SYSTOLIC BLOOD PRESSURE: 160 MMHG | WEIGHT: 117.3 LBS | DIASTOLIC BLOOD PRESSURE: 84 MMHG | OXYGEN SATURATION: 98 % | HEART RATE: 75 BPM

## 2021-06-17 DIAGNOSIS — M79.89 RIGHT LEG SWELLING: ICD-10-CM

## 2021-06-17 DIAGNOSIS — M79.644 PAIN OF FINGER OF RIGHT HAND: ICD-10-CM

## 2021-06-17 DIAGNOSIS — Z94.4 LIVER REPLACED BY TRANSPLANT (H): Primary | ICD-10-CM

## 2021-06-17 PROCEDURE — 99214 OFFICE O/P EST MOD 30 MIN: CPT | Mod: GC | Performed by: INTERNAL MEDICINE

## 2021-06-17 ASSESSMENT — PAIN SCALES - GENERAL: PAINLEVEL: NO PAIN (0)

## 2021-06-17 NOTE — NURSING NOTE
"Chief Complaint   Patient presents with     RECHECK     Follow up Liver TX     Vital signs:  Temp: 97.6  F (36.4  C)   BP: (!) 160/84 Pulse: 75     SpO2: 98 %       Weight: 53.2 kg (117 lb 4.8 oz)  Estimated body mass index is 25.38 kg/m  as calculated from the following:    Height as of 3/16/21: 1.448 m (4' 9\").    Weight as of this encounter: 53.2 kg (117 lb 4.8 oz).      Kenyetta Cain, CMA    "
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
## 2021-06-18 ENCOUNTER — OFFICE VISIT (OUTPATIENT)
Dept: FAMILY MEDICINE | Facility: CLINIC | Age: 81
End: 2021-06-18
Payer: COMMERCIAL

## 2021-06-18 ENCOUNTER — ANCILLARY PROCEDURE (OUTPATIENT)
Dept: GENERAL RADIOLOGY | Facility: CLINIC | Age: 81
End: 2021-06-18
Attending: NURSE PRACTITIONER
Payer: COMMERCIAL

## 2021-06-18 VITALS
BODY MASS INDEX: 25.33 KG/M2 | OXYGEN SATURATION: 96 % | TEMPERATURE: 97.5 F | SYSTOLIC BLOOD PRESSURE: 146 MMHG | WEIGHT: 117.4 LBS | HEIGHT: 57 IN | DIASTOLIC BLOOD PRESSURE: 78 MMHG | HEART RATE: 76 BPM

## 2021-06-18 DIAGNOSIS — N18.32 STAGE 3B CHRONIC KIDNEY DISEASE (H): Chronic | ICD-10-CM

## 2021-06-18 DIAGNOSIS — M25.441 FINGER JOINT SWELLING, RIGHT: ICD-10-CM

## 2021-06-18 DIAGNOSIS — Z79.52 CURRENT CHRONIC USE OF SYSTEMIC STEROIDS: ICD-10-CM

## 2021-06-18 DIAGNOSIS — Z94.4 LIVER REPLACED BY TRANSPLANT (H): Chronic | ICD-10-CM

## 2021-06-18 DIAGNOSIS — I10 ESSENTIAL HYPERTENSION WITH GOAL BLOOD PRESSURE LESS THAN 140/90: ICD-10-CM

## 2021-06-18 DIAGNOSIS — M79.89 RIGHT LEG SWELLING: Primary | ICD-10-CM

## 2021-06-18 LAB
CRP SERPL-MCNC: 11.7 MG/L (ref 0–8)
ERYTHROCYTE [SEDIMENTATION RATE] IN BLOOD BY WESTERGREN METHOD: 46 MM/H (ref 0–30)
URATE SERPL-MCNC: 3.6 MG/DL (ref 2.6–6)

## 2021-06-18 PROCEDURE — 99214 OFFICE O/P EST MOD 30 MIN: CPT | Performed by: NURSE PRACTITIONER

## 2021-06-18 PROCEDURE — 36415 COLL VENOUS BLD VENIPUNCTURE: CPT | Performed by: NURSE PRACTITIONER

## 2021-06-18 PROCEDURE — 85652 RBC SED RATE AUTOMATED: CPT | Performed by: NURSE PRACTITIONER

## 2021-06-18 PROCEDURE — 73140 X-RAY EXAM OF FINGER(S): CPT | Mod: RT | Performed by: RADIOLOGY

## 2021-06-18 PROCEDURE — 86140 C-REACTIVE PROTEIN: CPT | Performed by: NURSE PRACTITIONER

## 2021-06-18 PROCEDURE — 84550 ASSAY OF BLOOD/URIC ACID: CPT | Performed by: NURSE PRACTITIONER

## 2021-06-18 RX ORDER — CARVEDILOL 25 MG/1
25 TABLET ORAL 2 TIMES DAILY WITH MEALS
Qty: 180 TABLET | Refills: 3 | Status: SHIPPED | OUTPATIENT
Start: 2021-06-18 | End: 2021-11-24

## 2021-06-18 RX ORDER — FUROSEMIDE 20 MG
10 TABLET ORAL DAILY
Qty: 90 TABLET | Refills: 3 | COMMUNITY
Start: 2021-06-18 | End: 2022-02-28

## 2021-06-18 ASSESSMENT — MIFFLIN-ST. JEOR: SCORE: 881.4

## 2021-06-18 NOTE — PATIENT INSTRUCTIONS
Decreased Lasix to 10 mg once daily.    Increased Coreg to 25 mg twice daily.  Continue all other medications.    Schedule US right lower leg.    Follow up with me in 6 months or sooner if needed.    COVID vaccine discussed.    HAO Bains

## 2021-06-18 NOTE — PROGRESS NOTES
Assessment & Plan     Right leg swelling  S/p trauma/injury.  US ordered to rule out DVT.    - US Lower Extremity Venous Duplex Right    Finger joint swelling, right  Uncertain etiology - labs ordered.    - Uric acid  - CRP, inflammation  - ESR: Erythrocyte sedimentation rate  - XR Finger Right G/E 2 Views    Liver replaced by transplant (H)  Follows with Hepatology.  Stable.     Stage 3b chronic kidney disease  Stable. Avoid NSAIDs.    Essential hypertension with goal blood pressure less than 140/90   Not controlled - increased Coreg - decreased Lasix to 10 mg once daily due to side effects.  Recheck blood pressure with RN in 2 weeks.    - carvedilol (COREG) 25 MG tablet  Dispense: 180 tablet; Refill: 3    Current chronic use of systemic steroids   Low dose Prednisone - discussed risks and long term effects.     Patient Instructions   Decreased Lasix to 10 mg once daily.    Increased Coreg to 25 mg twice daily.  Continue all other medications.    Schedule US right lower leg.    Follow up with me in 6 months or sooner if needed.    COVID vaccine discussed.    HAO Bains        Return in about 3 months (around 9/18/2021) for Routine Visit, Medication Follow up, Lab Work.    Bharati Mitchell NP  [facility] CenterSHANELL Goodwin is a 81 year old who presents for the following health issues     HPI     Hypertension Follow-up      Do you check your blood pressure regularly outside of the clinic? No not regularly     Are you following a low salt diet? No    Are your blood pressures ever more than 140 on the top number (systolic) OR more   than 90 on the bottom number (diastolic), for example 140/90? Yes      How many servings of fruits and vegetables do you eat daily?  0-1    On average, how many sweetened beverages do you drink each day (Examples: soda, juice, sweet tea, etc.  Do NOT count diet or artificially sweetened beverages)?   2    How many days per week do you exercise  enough to make your heart beat faster? 3 or less    How many minutes a day do you exercise enough to make your heart beat faster? 9 or less  How many days per week do you miss taking your medication? 1    What makes it hard for you to take your medications?  remembering to take    Follow up right lower extremity wound       Duration: saw patient 03/16/21     Description (location/character/radiation): right lower extremity wound, presents today for recheck. It looks much better.     Intensity:  mild    Accompanying signs and symptoms: None.     History (similar episodes/previous evaluation): None    Precipitating or alleviating factors: None    Therapies tried and outcome: was wrapped with Visco. She states that the [facility] suggested that she have US of right lower extremity due to edema and worried about clotting.      Right hand/finger swelling - was cooking and overusing.  Noticed swelling over the past week.  Was having pain and difficulty making a fist with 2nd MC.    Notes from GI/Hepatology 6/17 was:  Event on right side may be related to trauma, we did discuss getting an ultrasound to rule out DVTs.  We will also obtain an x-ray of her right hand to look for any evidence of bone damage, no joint swelling.    Hypertension Follow-up      Do you check your blood pressure regularly outside of the clinic? No     Are you following a low salt diet? Yes    Are your blood pressures ever more than 140 on the top number (systolic) OR more   than 90 on the bottom number (diastolic), for example 140/90? Yes      How many servings of fruits and vegetables do you eat daily?  2-3    On average, how many sweetened beverages do you drink each day (Examples: soda, juice, sweet tea, etc.  Do NOT count diet or artificially sweetened beverages)?   0    How many days per week do you exercise enough to make your heart beat faster? 4    How many minutes a day do you exercise enough to make your heart beat faster? 10 - 19    How many  "days per week do you miss taking your medication? 0      Review of Systems   Constitutional, HEENT, cardiovascular, pulmonary, GI, , musculoskeletal, neuro, skin, endocrine and psych systems are negative, except as otherwise noted.      Objective    BP (!) 146/78 (BP Location: Left arm, Patient Position: Sitting, Cuff Size: Adult Regular)   Pulse 76   Temp 97.5  F (36.4  C) (Tympanic)   Ht 1.448 m (4' 9\")   Wt 53.3 kg (117 lb 6.4 oz)   SpO2 96%   BMI 25.41 kg/m    Body mass index is 25.41 kg/m .  Physical Exam   GENERAL: healthy, alert and no distress  NECK: no adenopathy, no asymmetry, masses, or scars and thyroid normal to palpation  RESP: lungs clear to auscultation - no rales, rhonchi or wheezes  CV: regular rates and rhythm, normal S1 S2, no S3 or S4, no murmur, click or rub, peripheral pulses strong and trace + right lower extremity pitting edema to ankles    ABDOMEN: soft, nontender, no hepatosplenomegaly, no masses and bowel sounds normal  MS: extremities normal- no gross deformities noted; right hand pointer finger DIP/PIP joint with mild swelling and erythema.  Unable to fully make fist on right, limited ROM pointer finger ROM>  SKIN: no suspicious lesions or rashes            "

## 2021-06-22 ENCOUNTER — IMMUNIZATION (OUTPATIENT)
Dept: FAMILY MEDICINE | Facility: CLINIC | Age: 81
End: 2021-06-22
Payer: COMMERCIAL

## 2021-06-22 PROCEDURE — 91300 PR COVID VAC PFIZER DIL RECON 30 MCG/0.3 ML IM: CPT

## 2021-06-22 PROCEDURE — 0001A PR COVID VAC PFIZER DIL RECON 30 MCG/0.3 ML IM: CPT

## 2021-08-12 ENCOUNTER — IMMUNIZATION (OUTPATIENT)
Dept: FAMILY MEDICINE | Facility: CLINIC | Age: 81
End: 2021-08-12
Attending: FAMILY MEDICINE
Payer: COMMERCIAL

## 2021-08-12 PROCEDURE — 91300 PR COVID VAC PFIZER DIL RECON 30 MCG/0.3 ML IM: CPT

## 2021-08-12 PROCEDURE — 0002A PR COVID VAC PFIZER DIL RECON 30 MCG/0.3 ML IM: CPT

## 2021-09-03 DIAGNOSIS — Z94.4 LIVER REPLACED BY TRANSPLANT (H): ICD-10-CM

## 2021-09-03 RX ORDER — PREDNISONE 1 MG/1
TABLET ORAL
Qty: 270 TABLET | Refills: 3 | Status: SHIPPED | OUTPATIENT
Start: 2021-09-03 | End: 2022-07-15

## 2021-09-12 ENCOUNTER — HEALTH MAINTENANCE LETTER (OUTPATIENT)
Age: 81
End: 2021-09-12

## 2021-09-20 ENCOUNTER — TELEPHONE (OUTPATIENT)
Dept: TRANSPLANT | Facility: CLINIC | Age: 81
End: 2021-09-20

## 2021-09-20 ENCOUNTER — TELEPHONE (OUTPATIENT)
Dept: NURSING | Facility: CLINIC | Age: 81
End: 2021-09-20

## 2021-09-20 NOTE — TELEPHONE ENCOUNTER
Telephone call    Daughter called to inquire about the patient receiving the 3rd booster shot. She stated that her Mother was a transplant recipient. Informed her that her Mother should have received a letter in the mail or in my chart that would authorize her for the 3rd shot if she qualifies.  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  Daughter denies that her mother had any symptoms    Lesa Gonzalez RN   [facility] Nurse Advisor  6:20 AM 9/20/2021

## 2021-09-20 NOTE — TELEPHONE ENCOUNTER
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  Christa had close contact w/ him and did not wear a mask.  She has no symptoms yet.  She had her second vaccine about a month ago.  She will do a rapid covid test today, if neg, repeat if she develops any symptoms.    I asked her to stay in touch.   "

## 2021-09-20 NOTE — TELEPHONE ENCOUNTER
Per Dr. Lomeli, completed [employer] form asking for administration of Regen Cov.  Christa aware.

## 2021-10-06 ENCOUNTER — TELEPHONE (OUTPATIENT)
Dept: TRANSPLANT | Facility: CLINIC | Age: 81
End: 2021-10-06

## 2021-10-06 NOTE — TELEPHONE ENCOUNTER
"Call to Anali to make sure she is feeling OK after testing positive for COVId and receiving monoclonal Ab. She is doing well, no sickness or symptoms of COVID though her daughter's family is still ill and \"coughing their head off\".  Told her we are happy to hear this.   "

## 2021-10-15 ENCOUNTER — TELEPHONE (OUTPATIENT)
Dept: FAMILY MEDICINE | Facility: CLINIC | Age: 81
End: 2021-10-15

## 2021-10-15 ENCOUNTER — TELEPHONE (OUTPATIENT)
Dept: TRANSPLANT | Facility: CLINIC | Age: 81
End: 2021-10-15

## 2021-10-15 NOTE — TELEPHONE ENCOUNTER
"Call from Anali asking me if I would send a referral to [facility] for upper endoscopy for possible dilation of esophagus.  \"I know when this needs to be done\".  Message to dr. Lomeli asking if this is OK.   "

## 2021-10-15 NOTE — TELEPHONE ENCOUNTER
"Pt calls with request:  Pt requesting referral to GI.  Pt states she has had issues with food getting stuck in throat in past & has seen GI to have esophageal stretching, & has had this procedure \"quite a few times in the past\".   Pt states she has had few episodes over past 2 months of food occasionally getting stuck in throat & feels its time to see GI.   Looks like previous referrals were placed by [facility]. Pt sees Dr Lomeli (transplant).   Pt will call Dr Ale Elmore office to request referral.    Anastacia Shirley RN    "

## 2021-10-18 DIAGNOSIS — R13.10 DYSPHAGIA: Primary | ICD-10-CM

## 2021-11-02 ENCOUNTER — OFFICE VISIT (OUTPATIENT)
Dept: FAMILY MEDICINE | Facility: CLINIC | Age: 81
End: 2021-11-02
Payer: COMMERCIAL

## 2021-11-02 ENCOUNTER — DOCUMENTATION ONLY (OUTPATIENT)
Dept: TRANSPLANT | Facility: CLINIC | Age: 81
End: 2021-11-02

## 2021-11-02 VITALS
HEIGHT: 58 IN | HEART RATE: 60 BPM | WEIGHT: 115.8 LBS | BODY MASS INDEX: 24.31 KG/M2 | SYSTOLIC BLOOD PRESSURE: 136 MMHG | DIASTOLIC BLOOD PRESSURE: 64 MMHG | TEMPERATURE: 97.5 F | RESPIRATION RATE: 16 BRPM | OXYGEN SATURATION: 97 %

## 2021-11-02 DIAGNOSIS — D63.1 ANEMIA IN STAGE 4 CHRONIC KIDNEY DISEASE (H): ICD-10-CM

## 2021-11-02 DIAGNOSIS — R35.0 URINARY FREQUENCY: ICD-10-CM

## 2021-11-02 DIAGNOSIS — N18.32 STAGE 3B CHRONIC KIDNEY DISEASE (H): Chronic | ICD-10-CM

## 2021-11-02 DIAGNOSIS — Z23 NEED FOR PROPHYLACTIC VACCINATION AND INOCULATION AGAINST INFLUENZA: ICD-10-CM

## 2021-11-02 DIAGNOSIS — N18.4 ANEMIA IN STAGE 4 CHRONIC KIDNEY DISEASE (H): ICD-10-CM

## 2021-11-02 DIAGNOSIS — Z13.220 LIPID SCREENING: ICD-10-CM

## 2021-11-02 DIAGNOSIS — I10 ESSENTIAL HYPERTENSION: Primary | ICD-10-CM

## 2021-11-02 DIAGNOSIS — R13.10 DYSPHAGIA, UNSPECIFIED TYPE: ICD-10-CM

## 2021-11-02 DIAGNOSIS — Z94.4 LIVER REPLACED BY TRANSPLANT (H): ICD-10-CM

## 2021-11-02 LAB
ALBUMIN SERPL-MCNC: 3 G/DL (ref 3.4–5)
ALBUMIN UR-MCNC: 30 MG/DL
ALP SERPL-CCNC: 231 U/L (ref 40–150)
ALT SERPL W P-5'-P-CCNC: 45 U/L (ref 0–50)
ANION GAP SERPL CALCULATED.3IONS-SCNC: 5 MMOL/L (ref 3–14)
APPEARANCE UR: CLEAR
AST SERPL W P-5'-P-CCNC: 37 U/L (ref 0–45)
BILIRUB DIRECT SERPL-MCNC: 0.1 MG/DL (ref 0–0.2)
BILIRUB SERPL-MCNC: 0.4 MG/DL (ref 0.2–1.3)
BILIRUB UR QL STRIP: NEGATIVE
BUN SERPL-MCNC: 32 MG/DL (ref 7–30)
CALCIUM SERPL-MCNC: 9.7 MG/DL (ref 8.5–10.1)
CHLORIDE BLD-SCNC: 105 MMOL/L (ref 94–109)
CHOLEST SERPL-MCNC: 277 MG/DL
CO2 SERPL-SCNC: 30 MMOL/L (ref 20–32)
COLOR UR AUTO: YELLOW
CREAT SERPL-MCNC: 1.19 MG/DL (ref 0.52–1.04)
ERYTHROCYTE [DISTWIDTH] IN BLOOD BY AUTOMATED COUNT: 14.6 % (ref 10–15)
FASTING STATUS PATIENT QL REPORTED: NO
GFR SERPL CREATININE-BSD FRML MDRD: 44 ML/MIN/1.73M2
GLUCOSE BLD-MCNC: 95 MG/DL (ref 70–99)
GLUCOSE UR STRIP-MCNC: NEGATIVE MG/DL
HCT VFR BLD AUTO: 35.2 % (ref 35–47)
HDLC SERPL-MCNC: 102 MG/DL
HGB BLD-MCNC: 11 G/DL (ref 11.7–15.7)
HGB UR QL STRIP: NEGATIVE
HYALINE CASTS #/AREA URNS LPF: ABNORMAL /LPF
KETONES UR STRIP-MCNC: NEGATIVE MG/DL
LDLC SERPL CALC-MCNC: 143 MG/DL
LEUKOCYTE ESTERASE UR QL STRIP: NEGATIVE
MCH RBC QN AUTO: 30.6 PG (ref 26.5–33)
MCHC RBC AUTO-ENTMCNC: 31.3 G/DL (ref 31.5–36.5)
MCV RBC AUTO: 98 FL (ref 78–100)
NITRATE UR QL: NEGATIVE
NONHDLC SERPL-MCNC: 175 MG/DL
PH UR STRIP: 5.5 [PH] (ref 5–7)
PLATELET # BLD AUTO: 224 10E3/UL (ref 150–450)
POTASSIUM BLD-SCNC: 3.9 MMOL/L (ref 3.4–5.3)
PROT SERPL-MCNC: 7.2 G/DL (ref 6.8–8.8)
RBC # BLD AUTO: 3.6 10E6/UL (ref 3.8–5.2)
RBC #/AREA URNS AUTO: ABNORMAL /HPF
SODIUM SERPL-SCNC: 140 MMOL/L (ref 133–144)
SP GR UR STRIP: 1.01 (ref 1–1.03)
SQUAMOUS #/AREA URNS AUTO: ABNORMAL /LPF
TRIGL SERPL-MCNC: 160 MG/DL
UROBILINOGEN UR STRIP-ACNC: 0.2 E.U./DL
WBC # BLD AUTO: 6.2 10E3/UL (ref 4–11)
WBC #/AREA URNS AUTO: ABNORMAL /HPF

## 2021-11-02 PROCEDURE — 80061 LIPID PANEL: CPT | Performed by: NURSE PRACTITIONER

## 2021-11-02 PROCEDURE — 36415 COLL VENOUS BLD VENIPUNCTURE: CPT | Performed by: NURSE PRACTITIONER

## 2021-11-02 PROCEDURE — 90662 IIV NO PRSV INCREASED AG IM: CPT | Performed by: NURSE PRACTITIONER

## 2021-11-02 PROCEDURE — 82248 BILIRUBIN DIRECT: CPT | Performed by: NURSE PRACTITIONER

## 2021-11-02 PROCEDURE — 80053 COMPREHEN METABOLIC PANEL: CPT | Performed by: NURSE PRACTITIONER

## 2021-11-02 PROCEDURE — G0008 ADMIN INFLUENZA VIRUS VAC: HCPCS | Performed by: NURSE PRACTITIONER

## 2021-11-02 PROCEDURE — 99214 OFFICE O/P EST MOD 30 MIN: CPT | Mod: 25 | Performed by: NURSE PRACTITIONER

## 2021-11-02 PROCEDURE — 85027 COMPLETE CBC AUTOMATED: CPT | Performed by: NURSE PRACTITIONER

## 2021-11-02 PROCEDURE — 81001 URINALYSIS AUTO W/SCOPE: CPT | Performed by: NURSE PRACTITIONER

## 2021-11-02 ASSESSMENT — MIFFLIN-ST. JEOR: SCORE: 893.99

## 2021-11-02 NOTE — PROGRESS NOTES
Assessment & Plan     Essential hypertension  Improved since increasing Coreg - takes 25 BID  Tolerating well no side effects.    Dysphagia, unspecified type  Ongoing - history of Schatzki ring with dilation in the past - UGI done in 2020 and 2018.    - Adult Gastro Ref - Procedure Only    Urinary frequency   Uncertain etiology - diuretic may be making daytime symptoms worse.  UA today to rule out bacterial causes.  Change from daily diuretic to prn.  Printed information on when to take Lasix for patient/daughter.    - UA macro with reflex to Microscopic and Culture - Clinc Collect  - Urine Microscopic    Anemia in stage 4 chronic kidney disease (H)   Stable - follows with GI/Hepatology s/p liver transplant.    Stage 3b chronic kidney disease (H)   Stable. - avoid NSAIDs.    - Basic metabolic panel  (Ca, Cl, CO2, Creat, Gluc, K, Na, BUN)  - Basic metabolic panel  (Ca, Cl, CO2, Creat, Gluc, K, Na, BUN)    Liver replaced by transplant (H)     - Basic metabolic panel  - CBC with platelets  - Hepatic panel    Need for prophylactic vaccination and inoculation against influenza     - INFLUENZA, QUAD, HIGH DOSE, PF, 65YR + (FLUZONE HD)    Lipid screening     - Basic metabolic panel  - CBC with platelets  - Hepatic panel  - Lipid Profile (Chol, Trig, HDL, LDL calc)            See Patient Instructions    Return in about 4 weeks (around 11/30/2021) for Recheck symptoms, BP Recheck, Medication Follow up.    Bharati Mitchell NP  [facility] BRENDEN Goodwin is a 81 year old who presents for the following health issues;     HPI   Chief Complaint   Patient presents with     Refill Request     Medication issues with carvedilol and furosemide dosing and where it is going to.      Referral     Would like a referral for an esophageal stretch     Hypertension     Imm/Inj     Flu Shot       Hypertension Follow-up      Do you check your blood pressure regularly outside of the clinic? No     Are you  "following a low salt diet? No    Are your blood pressures ever more than 140 on the top number (systolic) OR more   than 90 on the bottom number (diastolic), for example 140/90? Yes      How many servings of fruits and vegetables do you eat daily?  0-1    On average, how many sweetened beverages do you drink each day (Examples: soda, juice, sweet tea, etc.  Do NOT count diet or artificially sweetened beverages)?   0    How many days per week do you exercise enough to make your heart beat faster? 3 or less    How many minutes a day do you exercise enough to make your heart beat faster? 20 - 29  How many days per week do you miss taking your medication? 1    What makes it hard for you to take your medications?  side effects -  From furosemide, she only takes half dose     Per last visit with me in June recommendations on dose changes to blood pressure and Lasix were:  Not controlled - increased Coreg - decreased Lasix to 10 mg once daily due to side effects.  Recheck blood pressure with RN in 2 weeks.    Reports chronic swelling in right lower leg - no changes.  Shortness of breath with exertion.    No recent exposure or illness.    Reports history of dysphagia requiring dilation per UGI - last done 07/2020.  Reports foods such as steak and meats are sticking causing vomiting and choking.  No dysphagia reported with liquids or soft foods or medications.  NO coughing with eating.       Review of Systems   Constitutional, HEENT, cardiovascular, pulmonary, GI, , musculoskeletal, neuro, skin, endocrine and psych systems are negative, except as otherwise noted.      Objective    /64   Pulse 60   Temp 97.5  F (36.4  C) (Tympanic)   Resp 16   Ht 1.48 m (4' 10.25\")   Wt 52.5 kg (115 lb 12.8 oz)   SpO2 97%   BMI 23.99 kg/m    Body mass index is 23.99 kg/m .  Physical Exam   GENERAL: alert, no distress, frail and elderly  NECK: no adenopathy, no asymmetry, masses, or scars and thyroid normal to palpation  RESP: " lungs clear to auscultation - no rales, rhonchi or wheezes  CV: regular rates and rhythm, normal S1 S2, no S3 or S4, grade 4/6 systolic murmur heard best over the 5th ICS, peripheral pulses strong and  1+ right lower extremity pitting edema to ankle    ABDOMEN: soft, nontender, no hepatosplenomegaly, no masses and bowel sounds normal  MS: no gross musculoskeletal defects noted, no edema

## 2021-11-02 NOTE — LETTER
My Heart Failure Action Plan   Name: Amanda Nails    YOB: 1940   Date: 11/2/2021    My doctor: Bharati Mitchell [facility]     [address] [place] [id number] [phone]  My Diagnosis: Preserved (HFp)- EF:Over 50%   My Exercise Goal: 30 minutes daily  .     My Weight Plan:   Wt Readings from Last 2 Encounters:   11/02/21 52.5 kg (115 lb 12.8 oz)   06/18/21 53.3 kg (117 lb 6.4 oz)     Weigh yourself daily using the same scale. If you gain more than 2 pounds in 24 hours or 5 pounds in a week increase your Lasix (Furosemide) to 10 mg daily    My Diet Goal: No added salt and 2,000 mg of sodium    Emergency Room Visits:    Our goal is to improve your quality of life and help you avoid a visit to the emergency room or hospital.  If we work together, we can achieve this goal. But, if you feel you need to call 911 or go to the emergency room, please do so.  If you go to the emergency room, please bring your list of medicines and your daily weight chart with you.       GREEN ZONE     Doing well today    Weight gained is no more than 2 pounds a day or 5 pounds a week.    No swelling in feet, ankles, legs or stomach.    No more swelling than usual.    No more trouble breathing than usual.    No change in my sleep.    No other problems. Actions:    I am doing fine.  I will take my medicine, follow my diet, see my doctor, exercise, and watch for symptoms.           YELLOW ZONE         Having a bad day or flare up    Weight gain of more than 2 pounds in one day or 5 pounds in one week.    New swelling in ankle, leg, knee or thigh.    Bloating in belly, pants feel tighter.    Swelling in hands or face.    Coughing or trouble breathing while walking or talking.    Harder to breathe last night.    Have trouble sleeping, wake up short of breath.    Much more tired than usual.    Not eating.    Pain in my chest or bad leg cramps.    Feel weak or dizzy. Actions:    I  need to take action and call my doctor or nurse today.                 RED ZONE         Need medical care now    Weight gain of 5 pounds overnight.    Chest pain or pressure that does not go away.    Feel less alert.    Wheezing or have trouble breathing when at rest.    Cannot sleep lying down.    Cannot take my water pill.    Pass out or faint. Actions:    I need to call my doctor or nurse now!    Call 911 if I have chest pain or cannot breathe.

## 2021-11-23 DIAGNOSIS — I10 ESSENTIAL HYPERTENSION WITH GOAL BLOOD PRESSURE LESS THAN 140/90: ICD-10-CM

## 2021-11-23 DIAGNOSIS — Z94.4 LIVER REPLACED BY TRANSPLANT (H): ICD-10-CM

## 2021-11-23 RX ORDER — SIROLIMUS 0.5 MG/1
TABLET, FILM COATED ORAL
Qty: 180 TABLET | Refills: 3 | Status: SHIPPED | OUTPATIENT
Start: 2021-11-23 | End: 2022-04-19

## 2021-11-23 RX ORDER — URSODIOL 300 MG/1
CAPSULE ORAL
Qty: 270 CAPSULE | Refills: 3 | Status: SHIPPED | OUTPATIENT
Start: 2021-11-23 | End: 2022-12-28

## 2021-11-24 NOTE — TELEPHONE ENCOUNTER
Pending Prescriptions:                       Disp   Refills    carvedilol (COREG) 25 MG tablet            180 ta*3        Sig: Take 1 tablet (25 mg) by mouth 2 times daily (with           meals)    Signed Prescriptions:                        Disp   Refills    sertraline (ZOLOFT) 50 MG tablet           90 tab*2        Sig: TAKE ONE TABLET BY MOUTH ONCE DAILY  Authorizing Provider: BAYLEE FLOR  Ordering User: JACKIE YUNG    Routing refill request to provider for review/approval because:  Drug interaction warning between carvedolol and sirolimus.    Jackie Yung RN

## 2021-11-26 RX ORDER — CARVEDILOL 25 MG/1
25 TABLET ORAL 2 TIMES DAILY WITH MEALS
Qty: 180 TABLET | Refills: 3 | Status: SHIPPED | OUTPATIENT
Start: 2021-11-26 | End: 2022-09-09

## 2021-12-03 ENCOUNTER — TELEPHONE (OUTPATIENT)
Dept: FAMILY MEDICINE | Facility: CLINIC | Age: 81
End: 2021-12-03
Payer: COMMERCIAL

## 2022-01-11 ENCOUNTER — LAB (OUTPATIENT)
Dept: LAB | Facility: CLINIC | Age: 82
End: 2022-01-11
Payer: COMMERCIAL

## 2022-01-11 ENCOUNTER — OFFICE VISIT (OUTPATIENT)
Dept: FAMILY MEDICINE | Facility: CLINIC | Age: 82
End: 2022-01-11
Payer: COMMERCIAL

## 2022-01-11 VITALS
WEIGHT: 110 LBS | DIASTOLIC BLOOD PRESSURE: 66 MMHG | SYSTOLIC BLOOD PRESSURE: 136 MMHG | HEART RATE: 66 BPM | TEMPERATURE: 98.1 F | RESPIRATION RATE: 16 BRPM | OXYGEN SATURATION: 98 % | BODY MASS INDEX: 22.79 KG/M2

## 2022-01-11 DIAGNOSIS — M25.531 RIGHT WRIST PAIN: Primary | ICD-10-CM

## 2022-01-11 DIAGNOSIS — Z13.220 LIPID SCREENING: ICD-10-CM

## 2022-01-11 DIAGNOSIS — M67.431 GANGLION CYST OF WRIST, RIGHT: ICD-10-CM

## 2022-01-11 DIAGNOSIS — R60.0 PERIPHERAL EDEMA: ICD-10-CM

## 2022-01-11 DIAGNOSIS — Z94.4 LIVER REPLACED BY TRANSPLANT (H): ICD-10-CM

## 2022-01-11 PROBLEM — R13.10 DYSPHAGIA, UNSPECIFIED TYPE: Status: RESOLVED | Noted: 2021-11-02 | Resolved: 2022-01-11

## 2022-01-11 LAB
ALBUMIN SERPL-MCNC: 3.1 G/DL (ref 3.4–5)
ALP SERPL-CCNC: 219 U/L (ref 40–150)
ALT SERPL W P-5'-P-CCNC: 36 U/L (ref 0–50)
ANION GAP SERPL CALCULATED.3IONS-SCNC: 3 MMOL/L (ref 3–14)
AST SERPL W P-5'-P-CCNC: 34 U/L (ref 0–45)
BILIRUB DIRECT SERPL-MCNC: 0.1 MG/DL (ref 0–0.2)
BILIRUB SERPL-MCNC: 0.4 MG/DL (ref 0.2–1.3)
BUN SERPL-MCNC: 32 MG/DL (ref 7–30)
CALCIUM SERPL-MCNC: 9.7 MG/DL (ref 8.5–10.1)
CHLORIDE BLD-SCNC: 105 MMOL/L (ref 94–109)
CO2 SERPL-SCNC: 32 MMOL/L (ref 20–32)
CREAT SERPL-MCNC: 1.3 MG/DL (ref 0.52–1.04)
ERYTHROCYTE [DISTWIDTH] IN BLOOD BY AUTOMATED COUNT: 15 % (ref 10–15)
GFR SERPL CREATININE-BSD FRML MDRD: 42 ML/MIN/1.73M2
GLUCOSE BLD-MCNC: 95 MG/DL (ref 70–99)
HCT VFR BLD AUTO: 33.8 % (ref 35–47)
HGB BLD-MCNC: 10.3 G/DL (ref 11.7–15.7)
MCH RBC QN AUTO: 29.7 PG (ref 26.5–33)
MCHC RBC AUTO-ENTMCNC: 30.5 G/DL (ref 31.5–36.5)
MCV RBC AUTO: 97 FL (ref 78–100)
PLATELET # BLD AUTO: 324 10E3/UL (ref 150–450)
POTASSIUM BLD-SCNC: 3.6 MMOL/L (ref 3.4–5.3)
PROT SERPL-MCNC: 7.2 G/DL (ref 6.8–8.8)
RBC # BLD AUTO: 3.47 10E6/UL (ref 3.8–5.2)
SODIUM SERPL-SCNC: 140 MMOL/L (ref 133–144)
WBC # BLD AUTO: 5.8 10E3/UL (ref 4–11)

## 2022-01-11 PROCEDURE — 36415 COLL VENOUS BLD VENIPUNCTURE: CPT

## 2022-01-11 PROCEDURE — 99214 OFFICE O/P EST MOD 30 MIN: CPT | Performed by: NURSE PRACTITIONER

## 2022-01-11 PROCEDURE — 82248 BILIRUBIN DIRECT: CPT

## 2022-01-11 PROCEDURE — 80053 COMPREHEN METABOLIC PANEL: CPT

## 2022-01-11 PROCEDURE — 85027 COMPLETE CBC AUTOMATED: CPT

## 2022-01-11 RX ORDER — PREDNISONE 10 MG/1
10 TABLET ORAL DAILY
Qty: 7 TABLET | Refills: 0 | Status: SHIPPED | OUTPATIENT
Start: 2022-01-11 | End: 2022-09-20

## 2022-01-11 ASSESSMENT — PAIN SCALES - GENERAL: PAINLEVEL: MODERATE PAIN (5)

## 2022-01-11 NOTE — PATIENT INSTRUCTIONS
Patient Education     Ganglion Cyst: Hand  A ganglion cyst is a firm, fluid-filled lump that can suddenly appear on the front or back of the wrist or at the base of a finger. They are the most common type of mass or lump on the hand. These cysts grow from normal tissue in the wrist and fingers and range in size from a pea to a peach pit. Although ganglion cysts are common, they don t spread, and they don t become cancerous. They can occur after an injury, but many times it isn t known why they grow. Ganglion cysts can change in size, and may go away on their own.     What are the symptoms of a ganglion cyst?   A ganglion cyst is sometimes painful, especially when it first occurs. Constantly using your hand or wrist can make the cyst enlarge and hurt more. Some hand and wrist movements, such as grasping things, may also be difficult.   How does a ganglion cyst develop?  Your wrist and hand are made up of many small bones that meet at joints. Tendons attach muscles to the bones at the joints. The tendons allow the joints to bend and straighten. Both tendons and joints are lined with tissue called synovium. This tissue makes a thick fluid that keeps the joints and tendons moving easily. Sometimes the tissue balloons out from the joint or tendons and forms a cyst. As the cyst fills with fluid and grows, it appears as a lump you can feel.   Where do ganglion cysts occur?  A ganglion cyst can occur anywhere on the hand near a joint. Cysts most commonly appear on the back or palm side of the wrist, or on the palm at the base of a finger. Your doctor can usually diagnose a cyst by examining the lump. He or she may draw off a little fluid and order an X-ray to rule out other problems.   How is a ganglion cyst treated?  Your healthcare provider may just watch your ganglion cyst. Many shrink and become painless without treatment. Some disappear altogether. If the cyst is unsightly or painful, or makes it hard for you to use  your hand, your healthcare provider can treat it or, if needed, remove it surgically.   Nonsurgical treatment  To shrink the cyst, your provider may remove (aspirate) the fluid with a needle. If the cyst hurts, your provider may also give you an injection of an anti-inflammatory, such as cortisone, to relieve the irritation. Your hand may then be wrapped to help keep the cyst from recurring.   Surgery  If the cyst reappears after treatment, your healthcare provider may remove it surgically. A section of the tissue that lines the joint or tendon is removed along with the cyst. This helps prevent another cyst from forming, although recurrence of the cyst is still possible after surgery. Usually, only your hand or arm is numbed, and you can go home a few hours after surgery. Your hand may be in a splint for several days. You can usually go back to your normal activities 2 to6 weeks after surgery.   Daphne last reviewed this educational content on 10/1/2019    9364-0755 [employer]. All rights reserved. This information is not intended as a substitute for professional medical care. Always follow your healthcare professional's instructions.

## 2022-01-11 NOTE — PROGRESS NOTES
Assessment & Plan     Right wrist pain  Due to a combination of overuse and OA and ganglion cyst  Prednisone added to help with inflammation and pain - NSAIDs contraindicated due to post transplant patient.    - predniSONE (DELTASONE) 10 MG tablet  Dispense: 7 tablet; Refill: 0  - Orthopedic  Referral    Ganglion cyst of wrist, right  Referred to Ortho for intervention    - Orthopedic  Referral    Peripheral edema  Discussed management including compression stockings, elevation, diet.  May increase Furosemide to 20 mg once days that are severe  Patient states understanding.          Patient Instructions     Patient Education     Ganglion Cyst: Hand  A ganglion cyst is a firm, fluid-filled lump that can suddenly appear on the front or back of the wrist or at the base of a finger. They are the most common type of mass or lump on the hand. These cysts grow from normal tissue in the wrist and fingers and range in size from a pea to a peach pit. Although ganglion cysts are common, they don t spread, and they don t become cancerous. They can occur after an injury, but many times it isn t known why they grow. Ganglion cysts can change in size, and may go away on their own.     What are the symptoms of a ganglion cyst?   A ganglion cyst is sometimes painful, especially when it first occurs. Constantly using your hand or wrist can make the cyst enlarge and hurt more. Some hand and wrist movements, such as grasping things, may also be difficult.   How does a ganglion cyst develop?  Your wrist and hand are made up of many small bones that meet at joints. Tendons attach muscles to the bones at the joints. The tendons allow the joints to bend and straighten. Both tendons and joints are lined with tissue called synovium. This tissue makes a thick fluid that keeps the joints and tendons moving easily. Sometimes the tissue balloons out from the joint or tendons and forms a cyst. As the cyst fills with fluid and  grows, it appears as a lump you can feel.   Where do ganglion cysts occur?  A ganglion cyst can occur anywhere on the hand near a joint. Cysts most commonly appear on the back or palm side of the wrist, or on the palm at the base of a finger. Your doctor can usually diagnose a cyst by examining the lump. He or she may draw off a little fluid and order an X-ray to rule out other problems.   How is a ganglion cyst treated?  Your healthcare provider may just watch your ganglion cyst. Many shrink and become painless without treatment. Some disappear altogether. If the cyst is unsightly or painful, or makes it hard for you to use your hand, your healthcare provider can treat it or, if needed, remove it surgically.   Nonsurgical treatment  To shrink the cyst, your provider may remove (aspirate) the fluid with a needle. If the cyst hurts, your provider may also give you an injection of an anti-inflammatory, such as cortisone, to relieve the irritation. Your hand may then be wrapped to help keep the cyst from recurring.   Surgery  If the cyst reappears after treatment, your healthcare provider may remove it surgically. A section of the tissue that lines the joint or tendon is removed along with the cyst. This helps prevent another cyst from forming, although recurrence of the cyst is still possible after surgery. Usually, only your hand or arm is numbed, and you can go home a few hours after surgery. Your hand may be in a splint for several days. You can usually go back to your normal activities 2 to6 weeks after surgery.   [employer] last reviewed this educational content on 10/1/2019    3925-3037 [employer]. All rights reserved. This information is not intended as a substitute for professional medical care. Always follow your healthcare professional's instructions.               Return in about 2 weeks (around 1/25/2022) for Recheck symptoms, Medication Follow up.    Bharati Mitchell NP  [facility]  [facility] BRENDEN Goodwin is a 81 year old who presents for the following health issues     HPI     Concern - lump on right wrist   Onset: started in december  Description: started out small gradually got bigger and painful  Intensity: moderate  Progression of Symptoms:  worsening  Accompanying Signs & Symptoms: none  Previous history of similar problem: no  Precipitating factors:        Worsened by: using it   Alleviating factors:        Improved by: none  Therapies tried and outcome: icy hot,    Takes Prednisone 3 mg once daily for     Also reports chronic peripheral edema - no acute changes.  Worse as the day goes on - better in the morning.  Does not wear compression stockings - reports they are too hard to get on.  Sits most of the day doing things and keeps legs dependent. Does not elevate.    Denies change in shortness of breath or chest pain   history of hypertension - blood pressure controlled.  History of  CKD, liver transplant 10+ years, [facility].    Review of Systems   Constitutional, HEENT, cardiovascular, pulmonary, GI, , musculoskeletal, neuro, skin, endocrine and psych systems are negative, except as otherwise noted.      Objective    /66 (BP Location: Right arm, Patient Position: Chair, Cuff Size: Adult Regular)   Pulse 66   Temp 98.1  F (36.7  C) (Tympanic)   Resp 16   Wt 49.9 kg (110 lb)   SpO2 98%   BMI 22.79 kg/m    Body mass index is 22.79 kg/m .  Physical Exam   GENERAL: alert, no distress, frail and elderly  NECK: no adenopathy, no asymmetry, masses, or scars and thyroid normal to palpation  RESP: lungs clear to auscultation - no rales, rhonchi or wheezes  CV: regular rates and rhythm, normal S1 S2, no S3 or S4, no murmur, click or rub, decreased pulse 2+ and 2+ bilateral lower extremity pitting edema to shin    ABDOMEN: soft, nontender, no hepatosplenomegaly, no masses and bowel sounds normal  MS: Right anterior wrist with moderate sized non erythematous firm non  mobile mass measuring approximately 2 cm x 2-3 cm in size.  Tender on palpation.  Difficulty with wrist mobility due to mass.

## 2022-01-24 ENCOUNTER — TRANSFERRED RECORDS (OUTPATIENT)
Dept: HEALTH INFORMATION MANAGEMENT | Facility: CLINIC | Age: 82
End: 2022-01-24
Payer: COMMERCIAL

## 2022-02-22 NOTE — TELEPHONE ENCOUNTER
Covering for PCP (out of clinic today):  Would recommend increasing amlodipine to 10mg but let us know if she develops leg swelling from this (looks like this may have been an issue in the past).  Follow-up if blood pressures remain above 130/80.    Pernell Byrd MD   
Patient notified.  Jessica CEE RN BSN    
S-(situation): Patient walked into clinic reporting high home BP readings and just not feeling right.    B-(background): HX liver transplant.     A-(assessment):       Amanda Nails is a 80 year old year old patient who comes in today for a Blood Pressure check because of ongoing blood pressure monitoring and patient reports home BP have been high.   readings in the 150 s/ 70s.  Vital Signs as repeated by RN Twice.   Patient is taking medication as prescribed  Patient is tolerating medications well.  Patient is monitoring Blood Pressure at home.  Average readings if yes are 150 s/70s  Current complaints: slight Shortness of breath with activity only.  Patient reports this has been ongoing for weeks since the [facility] closed due to [facility]_19. Encouraged daily walks to build cardiovascular endurance back up, start with small amounts of time and build your way up.    Awaiting lab results.     Vital Signs 6/18/2020 6/18/2020   Systolic 148 132   Diastolic 72 70   Pulse 72    Temperature     Respirations 14    Weight (LB)     Height     BMI (Calculated)     Pain     O2 97      R-(recommendations):  Routed to provider in telephone encounter.   
Statement Selected

## 2022-02-27 ENCOUNTER — HEALTH MAINTENANCE LETTER (OUTPATIENT)
Age: 82
End: 2022-02-27

## 2022-03-31 ENCOUNTER — OFFICE VISIT (OUTPATIENT)
Dept: FAMILY MEDICINE | Facility: CLINIC | Age: 82
End: 2022-03-31
Payer: COMMERCIAL

## 2022-03-31 VITALS
DIASTOLIC BLOOD PRESSURE: 78 MMHG | RESPIRATION RATE: 12 BRPM | HEIGHT: 57 IN | BODY MASS INDEX: 23.87 KG/M2 | WEIGHT: 110.65 LBS | HEART RATE: 61 BPM | OXYGEN SATURATION: 98 % | TEMPERATURE: 96.8 F | SYSTOLIC BLOOD PRESSURE: 142 MMHG

## 2022-03-31 DIAGNOSIS — M67.431 GANGLION CYST OF DORSUM OF RIGHT WRIST: ICD-10-CM

## 2022-03-31 DIAGNOSIS — Z01.818 PRE-OPERATIVE GENERAL PHYSICAL EXAMINATION: Primary | ICD-10-CM

## 2022-03-31 LAB — SARS-COV-2 RNA RESP QL NAA+PROBE: NEGATIVE

## 2022-03-31 PROCEDURE — U0003 INFECTIOUS AGENT DETECTION BY NUCLEIC ACID (DNA OR RNA); SEVERE ACUTE RESPIRATORY SYNDROME CORONAVIRUS 2 (SARS-COV-2) (CORONAVIRUS DISEASE [COVID-19]), AMPLIFIED PROBE TECHNIQUE, MAKING USE OF HIGH THROUGHPUT TECHNOLOGIES AS DESCRIBED BY CMS-2020-01-R: HCPCS | Performed by: NURSE PRACTITIONER

## 2022-03-31 PROCEDURE — 99214 OFFICE O/P EST MOD 30 MIN: CPT | Performed by: NURSE PRACTITIONER

## 2022-03-31 PROCEDURE — U0005 INFEC AGEN DETEC AMPLI PROBE: HCPCS | Performed by: NURSE PRACTITIONER

## 2022-03-31 ASSESSMENT — PAIN SCALES - GENERAL: PAINLEVEL: NO PAIN (0)

## 2022-03-31 NOTE — PATIENT INSTRUCTIONS
Patient Education   Preparing for Your Surgery  Getting started  A nurse will call you to review your health history and instructions. They will give you an arrival time based on your scheduled surgery time. Please be ready to share:    Your doctor's clinic name and phone number    Your medical, surgical and anesthesia history    A list of allergies and sensitivities    A list of medicines, including herbal treatments and over-the-counter drugs    Whether the patient has a legal guardian (ask how to send us the papers in advance)  Please tell us if you're pregnant--or if there's any chance you might be pregnant. Some surgeries may injure a fetus (unborn baby), so they require a pregnancy test. Surgeries that are safe for a fetus don't always need a test, and you can choose whether to have one.   If you have a child who's having surgery, please ask for a copy of Preparing for Your Child's Surgery.    Preparing for surgery    Within 30 days of surgery: Have a pre-op exam (sometimes called an H&P, or History and Physical). This can be done at a clinic or pre-operative center.  ? If you're having a , you may not need this exam. Talk to your care team.    At your pre-op exam, talk to your care team about all medicines you take. If you need to stop any medicines before surgery, ask when to start taking them again.  ? We do this for your safety. Many medicines can make you bleed too much during surgery. Some change how well surgery (anesthesia) drugs work.    Call your insurance company to let them know you're having surgery. (If you don't have insurance, call [phone].)    Call your clinic if there's any change in your health. This includes signs of a cold or flu (sore throat, runny nose, cough, rash, fever). It also includes a scrape or scratch near the surgery site.    If you have questions on the day of surgery, call your hospital or surgery center.  COVID testing  You may need to be tested for COVID-19  before having surgery. If so, your surgical team will give you instructions for scheduling this test, separate from your preoperative history and physical.  Eating and drinking guidelines  For your safety: Unless your surgeon tells you otherwise, follow the guidelines below.    Eat and drink as usual until 8 hours before surgery. After that, no food or milk.    Drink clear liquids until 2 hours before surgery. These are liquids you can see through, like water, Gatorade and Propel Water. You may also have black coffee and tea (no cream or milk).    Nothing by mouth within 2 hours of surgery. This includes gum, candy and breath mints.    If you drink alcohol: Stop drinking it the night before surgery.    If your care team tells you to take medicine on the morning of surgery, it's okay to take it with a sip of water.  Preventing infection    Shower or bathe the night before and morning of your surgery. Follow the instructions your clinic gave you. (If no instructions, use regular soap.)    Don't shave or clip hair near your surgery site. We'll remove the hair if needed.    Don't smoke or vape the morning of surgery. You may chew nicotine gum up to 2 hours before surgery. A nicotine patch is okay.  ? Note: Some surgeries require you to completely quit smoking and nicotine. Check with your surgeon.    Your care team will make every effort to keep you safe from infection. We will:  ? Clean our hands often with soap and water (or an alcohol-based hand rub).  ? Clean the skin at your surgery site with a special soap that kills germs.  ? Give you a special gown to keep you warm. (Cold raises the risk of infection.)  ? Wear special hair covers, masks, gowns and gloves during surgery.  ? Give antibiotic medicine, if prescribed. Not all surgeries need antibiotics.  What to bring on the day of surgery    Photo ID and insurance card    Copy of your health care directive, if you have one    Glasses and hearing aides (bring  cases)  ? You can't wear contacts during surgery    Inhaler and eye drops, if you use them (tell us about these when you arrive)    CPAP machine or breathing device, if you use them    A few personal items, if spending the night    If you have . . .  ? A pacemaker, ICD (cardiac defibrillator) or other implant: Bring the ID card.  ? An implanted stimulator: Bring the remote control.  ? A legal guardian: Bring a copy of the certified (court-stamped) guardianship papers.  Please remove any jewelry, including body piercings. Leave jewelry and other valuables at home.  If you're going home the day of surgery    You must have a responsible adult drive you home. They should stay with you overnight as well.    If you don't have someone to stay with you, and you aren't safe to go home alone, we may keep you overnight. Insurance often won't pay for this.  After surgery  If it's hard to control your pain or you need more pain medicine, please call your surgeon's office.  Questions?   If you have any questions for your care team, list them here: _________________________________________________________________________________________________________________________________________________________________________ ____________________________________ ____________________________________ ____________________________________  For informational purposes only. Not to replace the advice of your health care provider. Copyright   2003, 2019 [facility]. All rights reserved. Clinically reviewed by Skylar Guadarrama MD. [employer] [id number] - REV 07/21.     Hold Furosemide morning of surgery.  May take all blood pressure medications morning of surgery.    HAO Bains
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
## 2022-04-05 ENCOUNTER — LAB REQUISITION (OUTPATIENT)
Dept: LAB | Facility: CLINIC | Age: 82
End: 2022-04-05
Payer: COMMERCIAL

## 2022-04-05 DIAGNOSIS — M67.431 GANGLION, RIGHT WRIST: ICD-10-CM

## 2022-04-05 PROCEDURE — 87206 SMEAR FLUORESCENT/ACID STAI: CPT | Mod: ORL | Performed by: ORTHOPAEDIC SURGERY

## 2022-04-05 PROCEDURE — 87070 CULTURE OTHR SPECIMN AEROBIC: CPT | Mod: ORL | Performed by: ORTHOPAEDIC SURGERY

## 2022-04-05 PROCEDURE — 87075 CULTR BACTERIA EXCEPT BLOOD: CPT | Mod: ORL | Performed by: ORTHOPAEDIC SURGERY

## 2022-04-05 PROCEDURE — 87205 SMEAR GRAM STAIN: CPT | Mod: ORL | Performed by: ORTHOPAEDIC SURGERY

## 2022-04-05 PROCEDURE — 88304 TISSUE EXAM BY PATHOLOGIST: CPT | Mod: TC,ORL | Performed by: ORTHOPAEDIC SURGERY

## 2022-04-05 PROCEDURE — 87158 CULTURE TYPING ADDED METHOD: CPT | Mod: ORL | Performed by: ORTHOPAEDIC SURGERY

## 2022-04-06 ENCOUNTER — LAB REQUISITION (OUTPATIENT)
Dept: LAB | Facility: CLINIC | Age: 82
End: 2022-04-06
Payer: COMMERCIAL

## 2022-04-06 DIAGNOSIS — M67.431 GANGLION, RIGHT WRIST: ICD-10-CM

## 2022-04-06 LAB
GRAM STAIN RESULT: NORMAL
GRAM STAIN RESULT: NORMAL

## 2022-04-07 LAB
PATH REPORT.COMMENTS IMP SPEC: NORMAL
PATH REPORT.COMMENTS IMP SPEC: NORMAL
PATH REPORT.FINAL DX SPEC: NORMAL
PATH REPORT.GROSS SPEC: NORMAL
PATH REPORT.MICROSCOPIC SPEC OTHER STN: NORMAL
PATH REPORT.RELEVANT HX SPEC: NORMAL
PHOTO IMAGE: NORMAL

## 2022-04-07 PROCEDURE — 88304 TISSUE EXAM BY PATHOLOGIST: CPT | Mod: 26 | Performed by: PATHOLOGY

## 2022-04-10 LAB — BACTERIA FLD CULT: NO GROWTH

## 2022-04-12 ENCOUNTER — TRANSFERRED RECORDS (OUTPATIENT)
Dept: HEALTH INFORMATION MANAGEMENT | Facility: CLINIC | Age: 82
End: 2022-04-12
Payer: COMMERCIAL

## 2022-04-12 LAB — BACTERIA FLD CULT: ABNORMAL

## 2022-04-19 ENCOUNTER — TELEPHONE (OUTPATIENT)
Dept: INFECTIOUS DISEASES | Facility: CLINIC | Age: 82
End: 2022-04-19
Payer: COMMERCIAL

## 2022-04-19 ENCOUNTER — TELEPHONE (OUTPATIENT)
Dept: TRANSPLANT | Facility: CLINIC | Age: 82
End: 2022-04-19
Payer: COMMERCIAL

## 2022-04-19 DIAGNOSIS — Z94.4 LIVER REPLACED BY TRANSPLANT (H): Primary | ICD-10-CM

## 2022-04-19 DIAGNOSIS — Z94.4 LIVER REPLACED BY TRANSPLANT (H): ICD-10-CM

## 2022-04-19 DIAGNOSIS — Z94.4 LIVER TRANSPLANTED (H): Primary | ICD-10-CM

## 2022-04-19 DIAGNOSIS — Z13.220 LIPID SCREENING: ICD-10-CM

## 2022-04-19 DIAGNOSIS — A31.9 ACID FAST BACILLUS: ICD-10-CM

## 2022-04-19 DIAGNOSIS — Z94.4 LIVER TRANSPLANTED (H): ICD-10-CM

## 2022-04-19 RX ORDER — TACROLIMUS 1 MG/1
1 CAPSULE ORAL EVERY 12 HOURS
Qty: 180 CAPSULE | Refills: 3 | Status: SHIPPED | OUTPATIENT
Start: 2022-04-19 | End: 2022-09-30 | Stop reason: DRUGHIGH

## 2022-04-19 RX ORDER — TACROLIMUS 1 MG/1
1 CAPSULE ORAL EVERY 12 HOURS
Qty: 90 CAPSULE | Refills: 3 | Status: SHIPPED | OUTPATIENT
Start: 2022-04-19 | End: 2022-04-19

## 2022-04-19 NOTE — TELEPHONE ENCOUNTER
RECORDS RECEIVED FROM: Internal   DATE RECEIVED: 05.05.2022   NOTES (Gather within 2 years) STATUS DETAILS   OFFICE NOTE from referring provider   Internal 04.19.2022 Luis Daniel Lomeli MD   LABS (any labs) Internal    MEDICATION LIST Internal

## 2022-04-19 NOTE — TELEPHONE ENCOUNTER
Call to Christa to let her know that we are working on getting her an appt to be seen by infectious disease team asap.  She is perplexed by this finding.  She has no other symptoms.  She has no respiratory symptoms.   Call from Dr. Lomeli as I was talking to Christa.  He would like to stop RAPA (she has a 6 inch incision on her wrist - concern is wound healing) and start tac 1 mg po bid.  Christa wants this to be mailed to her asap. Christa given instructions on med change, knows she will need labs 4-6 days after taking first dose.   I called to schedule asap appt w/ transplant ID.  They are only able to give me 6/1 at 0800 but will send a note to team asking for sooner appt ( I requested in 1-2 weeks).  Christa also requesting to see Dr. Lomeli in June. Referral placed.

## 2022-04-19 NOTE — TELEPHONE ENCOUNTER
"Call from Gayathri, nurse at [facility] where Christa had a large multiloculated ganglion cyst removed from her right wrist on 4/5. Path below.   Per Gayathri the cyst when excised \"poured out\" \"rice bodies\", now positive for AFB.  She requested phone number for Dr. Lomeli so that Dr. Williamson can speak w/ him about this.    Requesting appt w/ ID.  REferral order placed for ASAP appt.       Surgical Pathology Exam: [id number]  Order: [id number]   Collected 4/5/2022 12:00 AM     Status: Final result     Visible to patient: Yes (seen)     Dx: Ganglion, right wrist     0 Result Notes    Component  Resulting Agency   Case Report   Surgical Pathology Report                         Case: [id number]                                    Authorizing Provider:  Macario Williamson MD      Collected:           04/05/2022 12:00 AM           Ordering Location:      [facility]      Received:            04/06/2022 09:44 AM                                  [facility]                                                                                    Laboratory                                                                    Pathologist:           Mirza Rahman MD                                                       Specimen:    Wrist, Right                                                                               Final Diagnosis   SOFT TISSUE MASS FROM RIGHT WRIST, EXCISION:        -  MULTILOCULAR GANGLION CYST        -  DIFFUSE CHRONIC SYNOVITIS WITH PANNUS FORMATION, SECONDARY TO ABOVE        -  NEGATIVE FOR ACUTE OR PURULENT INFLAMMATION               "

## 2022-04-19 NOTE — TELEPHONE ENCOUNTER
[facility] Call Center    Phone Message    May a detailed message be left on voicemail: yes     Reason for Call: Appointment Intake    Referring Provider Name: Armani Luis Daniel LÓPEZ  Diagnosis and/or Symptoms:   Acid fast bacillus   Per Shikha, liver transplant coordinator, possible TB, and AFB. Per Shikha, patient needs to be seen in 1-2 weeks. Shikha requesting call back if sooner appointment available.    Action Taken: Message routed to:  [facility] ID    Travel Screening: Not Applicable

## 2022-04-20 ENCOUNTER — TELEPHONE (OUTPATIENT)
Dept: TRANSPLANT | Facility: CLINIC | Age: 82
End: 2022-04-20
Payer: COMMERCIAL

## 2022-04-20 DIAGNOSIS — A31.9 ACID FAST BACILLUS: Primary | ICD-10-CM

## 2022-04-20 NOTE — TELEPHONE ENCOUNTER
Message from Dr. Limon:      Eladio Sun MD sent to Candace Crowder RN  Good morning,     Thank you for reaching out     A few questions   - How is she doing post cyst excision? Any systemic symptoms at all - fevers, cough, weight loss, night sweats, skin lesions elsewhere?   - Does she have any risk factors for TB? (Residence or prolonged stay in endemic area, direct exposure, incarceration, previous history of treatment?)     - I think if she is asymptomatic - we can afford to wait until we have species identification at the very minimum (hopefully susceptibility testing as well)   - I think switching from Sirolimus to Tacro is a really good idea as well   - In the meantime, can check an AFB blood culture, Quant TB test and a CXR (if any respiratory symptoms at all). If she does have a cough or respiratory symptoms, would be in favor of obtaining sputum cultures   - However, based on the history provided, I suspect this will more likely be a local infection     Regards   Eladio             Call to Anali.  She is planning an 82th birthday party for herself on 5/8 and she firmly told me that she will not be thinking about this or coming for any appts/ testing until after this party.  She has family coming from out of town the week before.    I will ask that the appt be rescheduled and I will order the AFB blood culture and quantiferon for the week of 5/9. She has had no known exposure to TB, absolutely no symptoms, respiratory or skin.      Message to  asking them to reschedule appt w/ ID to any date as soon after 5/8 as possible.

## 2022-04-28 ENCOUNTER — LAB (OUTPATIENT)
Dept: LAB | Facility: CLINIC | Age: 82
End: 2022-04-28
Payer: COMMERCIAL

## 2022-04-28 DIAGNOSIS — N18.30 CHRONIC KIDNEY DISEASE, STAGE III (MODERATE) (H): ICD-10-CM

## 2022-04-28 DIAGNOSIS — Z11.59 NEED FOR HEPATITIS C SCREENING TEST: ICD-10-CM

## 2022-04-28 DIAGNOSIS — R22.30: Primary | ICD-10-CM

## 2022-04-28 DIAGNOSIS — A31.9 ACID FAST BACILLUS: ICD-10-CM

## 2022-04-28 DIAGNOSIS — Z94.4 LIVER REPLACED BY TRANSPLANT (H): ICD-10-CM

## 2022-04-28 DIAGNOSIS — Z13.220 LIPID SCREENING: ICD-10-CM

## 2022-04-28 LAB
ALBUMIN SERPL-MCNC: 2.9 G/DL (ref 3.4–5)
ALP SERPL-CCNC: 243 U/L (ref 40–150)
ALT SERPL W P-5'-P-CCNC: 40 U/L (ref 0–50)
ANION GAP SERPL CALCULATED.3IONS-SCNC: 6 MMOL/L (ref 3–14)
AST SERPL W P-5'-P-CCNC: 38 U/L (ref 0–45)
B BURGDOR IGG+IGM SER QL: 0.12
BILIRUB DIRECT SERPL-MCNC: 0.1 MG/DL (ref 0–0.2)
BILIRUB SERPL-MCNC: 0.4 MG/DL (ref 0.2–1.3)
BUN SERPL-MCNC: 29 MG/DL (ref 7–30)
CALCIUM SERPL-MCNC: 9 MG/DL (ref 8.5–10.1)
CHLORIDE BLD-SCNC: 107 MMOL/L (ref 94–109)
CO2 SERPL-SCNC: 28 MMOL/L (ref 20–32)
CREAT SERPL-MCNC: 1.08 MG/DL (ref 0.52–1.04)
CRP SERPL-MCNC: 3.5 MG/L (ref 0–8)
ERYTHROCYTE [DISTWIDTH] IN BLOOD BY AUTOMATED COUNT: 15.5 % (ref 10–15)
ERYTHROCYTE [SEDIMENTATION RATE] IN BLOOD BY WESTERGREN METHOD: 36 MM/HR (ref 0–30)
GFR SERPL CREATININE-BSD FRML MDRD: 52 ML/MIN/1.73M2
GLUCOSE BLD-MCNC: 107 MG/DL (ref 70–99)
HCT VFR BLD AUTO: 36.1 % (ref 35–47)
HGB BLD-MCNC: 11.1 G/DL (ref 11.7–15.7)
MCH RBC QN AUTO: 29.4 PG (ref 26.5–33)
MCHC RBC AUTO-ENTMCNC: 30.7 G/DL (ref 31.5–36.5)
MCV RBC AUTO: 96 FL (ref 78–100)
PLATELET # BLD AUTO: 281 10E3/UL (ref 150–450)
POTASSIUM BLD-SCNC: 3.6 MMOL/L (ref 3.4–5.3)
PROT SERPL-MCNC: 7.1 G/DL (ref 6.8–8.8)
RBC # BLD AUTO: 3.77 10E6/UL (ref 3.8–5.2)
SODIUM SERPL-SCNC: 141 MMOL/L (ref 133–144)
URATE SERPL-MCNC: 3.5 MG/DL (ref 2.6–6)
WBC # BLD AUTO: 4.2 10E3/UL (ref 4–11)

## 2022-04-28 PROCEDURE — 86618 LYME DISEASE ANTIBODY: CPT

## 2022-04-28 PROCEDURE — 82248 BILIRUBIN DIRECT: CPT

## 2022-04-28 PROCEDURE — 86481 TB AG RESPONSE T-CELL SUSP: CPT

## 2022-04-28 PROCEDURE — 36415 COLL VENOUS BLD VENIPUNCTURE: CPT

## 2022-04-28 PROCEDURE — 80053 COMPREHEN METABOLIC PANEL: CPT

## 2022-04-28 PROCEDURE — 85027 COMPLETE CBC AUTOMATED: CPT

## 2022-04-28 PROCEDURE — 86038 ANTINUCLEAR ANTIBODIES: CPT

## 2022-04-28 PROCEDURE — 87040 BLOOD CULTURE FOR BACTERIA: CPT

## 2022-04-28 PROCEDURE — 86140 C-REACTIVE PROTEIN: CPT

## 2022-04-28 PROCEDURE — 85652 RBC SED RATE AUTOMATED: CPT

## 2022-04-28 PROCEDURE — 84550 ASSAY OF BLOOD/URIC ACID: CPT

## 2022-04-28 PROCEDURE — 86803 HEPATITIS C AB TEST: CPT

## 2022-04-29 ENCOUNTER — DOCUMENTATION ONLY (OUTPATIENT)
Dept: LAB | Facility: CLINIC | Age: 82
End: 2022-04-29
Payer: COMMERCIAL

## 2022-04-29 LAB
ANA SER QL IF: NEGATIVE
HCV AB SERPL QL IA: NONREACTIVE
QUANTIFERON MITOGEN: 10 IU/ML
QUANTIFERON NIL TUBE: 0.05 IU/ML
QUANTIFERON TB1 TUBE: 0.07 IU/ML
QUANTIFERON TB2 TUBE: 0.05

## 2022-04-30 LAB
GAMMA INTERFERON BACKGROUND BLD IA-ACNC: 0.05 IU/ML
M TB IFN-G BLD-IMP: NEGATIVE
M TB IFN-G CD4+ BCKGRND COR BLD-ACNC: 9.95 IU/ML
MITOGEN IGNF BCKGRD COR BLD-ACNC: 0 IU/ML
MITOGEN IGNF BCKGRD COR BLD-ACNC: 0.02 IU/ML

## 2022-05-03 LAB — BACTERIA BLD CULT: NO GROWTH

## 2022-05-05 ENCOUNTER — PRE VISIT (OUTPATIENT)
Dept: INFECTIOUS DISEASES | Facility: CLINIC | Age: 82
End: 2022-05-05
Payer: COMMERCIAL

## 2022-05-05 LAB
ACID FAST STAIN (ARUP): ABNORMAL
ORGANISM (ARUP): ABNORMAL

## 2022-05-15 DIAGNOSIS — I10 ESSENTIAL HYPERTENSION WITH GOAL BLOOD PRESSURE LESS THAN 140/90: ICD-10-CM

## 2022-05-15 DIAGNOSIS — N18.32 STAGE 3B CHRONIC KIDNEY DISEASE (H): ICD-10-CM

## 2022-05-16 ENCOUNTER — VIRTUAL VISIT (OUTPATIENT)
Dept: INFECTIOUS DISEASES | Facility: CLINIC | Age: 82
End: 2022-05-16
Attending: INTERNAL MEDICINE
Payer: COMMERCIAL

## 2022-05-16 DIAGNOSIS — Z94.4 LIVER REPLACED BY TRANSPLANT (H): ICD-10-CM

## 2022-05-16 DIAGNOSIS — M67.431 GANGLION CYST OF VOLAR ASPECT OF RIGHT WRIST: ICD-10-CM

## 2022-05-16 DIAGNOSIS — A31.9 ACID FAST BACILLUS: ICD-10-CM

## 2022-05-16 DIAGNOSIS — M65.90 TENOSYNOVITIS: ICD-10-CM

## 2022-05-16 DIAGNOSIS — A31.0 MYCOBACTERIUM AVIUM INFECTION (H): Primary | ICD-10-CM

## 2022-05-16 PROCEDURE — 99417 PROLNG OP E/M EACH 15 MIN: CPT | Performed by: STUDENT IN AN ORGANIZED HEALTH CARE EDUCATION/TRAINING PROGRAM

## 2022-05-16 PROCEDURE — 99205 OFFICE O/P NEW HI 60 MIN: CPT | Mod: 95 | Performed by: STUDENT IN AN ORGANIZED HEALTH CARE EDUCATION/TRAINING PROGRAM
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
## 2022-05-16 NOTE — PATIENT INSTRUCTIONS
- Have reached out to specialists at [facility] on your behalf  - Will update you with their feedback next week and can have a discussion regarding treatment then  - Please call with questions or concerns

## 2022-05-16 NOTE — NURSING NOTE
Patient reports taking sirolimus instead of the tacrolimus. Mediation list/allergies reviewed. Patient reports medication list/allergies are accurate. Pharmacy updated in chart.    Desiree Longo, Virtual Visit Facilitator 12:48 PM May 16, 2022

## 2022-05-17 RX ORDER — AMLODIPINE BESYLATE 5 MG/1
TABLET ORAL
Qty: 90 TABLET | Refills: 0 | Status: SHIPPED | OUTPATIENT
Start: 2022-05-17 | End: 2022-07-28

## 2022-06-09 ENCOUNTER — TELEPHONE (OUTPATIENT)
Dept: TRANSPLANT | Facility: CLINIC | Age: 82
End: 2022-06-09
Payer: COMMERCIAL

## 2022-06-16 ENCOUNTER — TELEPHONE (OUTPATIENT)
Dept: TRANSPLANT | Facility: CLINIC | Age: 82
End: 2022-06-16
Payer: COMMERCIAL

## 2022-06-16 NOTE — TELEPHONE ENCOUNTER
"Call from Christa.  She is concerned about \"possible\" plan from ID - she is concerned about \"possibly\" having to take multiple medications for an extended period of time and \"this could ruin my life!\"  I suggested she wait to hear his plan, when she does we can review it w/  Dr. Lomeli.  She was ok w/ this.    "

## 2022-06-17 ENCOUNTER — VIRTUAL VISIT (OUTPATIENT)
Dept: INFECTIOUS DISEASES | Facility: CLINIC | Age: 82
End: 2022-06-17
Attending: STUDENT IN AN ORGANIZED HEALTH CARE EDUCATION/TRAINING PROGRAM
Payer: COMMERCIAL

## 2022-06-17 DIAGNOSIS — M67.431 GANGLION CYST OF VOLAR ASPECT OF RIGHT WRIST: ICD-10-CM

## 2022-06-17 DIAGNOSIS — Z94.4 LIVER REPLACED BY TRANSPLANT (H): ICD-10-CM

## 2022-06-17 DIAGNOSIS — M65.90 TENOSYNOVITIS: ICD-10-CM

## 2022-06-17 DIAGNOSIS — A31.0 MYCOBACTERIUM AVIUM INFECTION (H): Primary | ICD-10-CM

## 2022-06-17 PROCEDURE — 99417 PROLNG OP E/M EACH 15 MIN: CPT | Performed by: STUDENT IN AN ORGANIZED HEALTH CARE EDUCATION/TRAINING PROGRAM

## 2022-06-17 PROCEDURE — 99215 OFFICE O/P EST HI 40 MIN: CPT | Mod: 95 | Performed by: STUDENT IN AN ORGANIZED HEALTH CARE EDUCATION/TRAINING PROGRAM
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
## 2022-06-17 NOTE — PATIENT INSTRUCTIONS
- Plan to obtain 3 sets of blood cultures next week and the week after  - Plan to obtain CT scan of chest  - Will have clinic RN help schedule the same  - Will discuss your surgical findings with your Orthopedic surgeon this coming week and update you  - Plan to follow up in person next visit  - Please contact us if you have fevers, night sweats, cough, worsening wrist swelling or new swelling elsewhere

## 2022-06-23 DIAGNOSIS — M67.431 GANGLION CYST OF VOLAR ASPECT OF RIGHT WRIST: ICD-10-CM

## 2022-06-23 DIAGNOSIS — A31.0 MYCOBACTERIUM AVIUM INFECTION (H): Primary | ICD-10-CM

## 2022-06-23 DIAGNOSIS — M65.90 TENOSYNOVITIS: ICD-10-CM

## 2022-06-23 NOTE — CONFIDENTIAL NOTE
Discussed case with patient's Orthopedic surgeon, Dr. Sigala regarding intra-op findings  Although initially described as a ganglion cyst, Dr sigala mentioned that the mass was a complex, large mass with the appearance of diffuse tenosynovitis rather than a simple cyst that was excised    He mentioned having to scrape the mass off local tissues and is therefore rightfully concerned about recurrence without therapy    Given patient's reluctance to start treatment, will obtain an MRI to evaluate for ongoing inflammation  If MRI findings s/o tenosynovitis, will strongly recommend treatment  Will also encourage patient to follow up with Dr. Sigala in the next couple of months    Have placed an order for MRI  Called Christa but unable to reach her, have sent a [facility] message    TALAT Wadsworth  Staff Physician, Infectious Diseases  Pager [phone]

## 2022-06-30 ENCOUNTER — HOSPITAL ENCOUNTER (OUTPATIENT)
Dept: MRI IMAGING | Facility: CLINIC | Age: 82
Discharge: HOME | End: 2022-06-30
Attending: STUDENT IN AN ORGANIZED HEALTH CARE EDUCATION/TRAINING PROGRAM | Admitting: STUDENT IN AN ORGANIZED HEALTH CARE EDUCATION/TRAINING PROGRAM
Payer: COMMERCIAL

## 2022-06-30 DIAGNOSIS — A31.0 MYCOBACTERIUM AVIUM INFECTION (H): ICD-10-CM

## 2022-06-30 DIAGNOSIS — M67.431 GANGLION CYST OF VOLAR ASPECT OF RIGHT WRIST: ICD-10-CM

## 2022-06-30 DIAGNOSIS — M65.90 TENOSYNOVITIS: ICD-10-CM

## 2022-06-30 PROCEDURE — 73223 MRI JOINT UPR EXTR W/O&W/DYE: CPT | Mod: 26 | Performed by: RADIOLOGY

## 2022-06-30 PROCEDURE — A9585 GADOBUTROL INJECTION: HCPCS | Performed by: STUDENT IN AN ORGANIZED HEALTH CARE EDUCATION/TRAINING PROGRAM

## 2022-06-30 PROCEDURE — 73223 MRI JOINT UPR EXTR W/O&W/DYE: CPT | Mod: RT

## 2022-06-30 PROCEDURE — 255N000002 HC RX 255 OP 636: Performed by: STUDENT IN AN ORGANIZED HEALTH CARE EDUCATION/TRAINING PROGRAM

## 2022-06-30 RX ORDER — GADOBUTROL 604.72 MG/ML
5 INJECTION INTRAVENOUS ONCE
Status: COMPLETED | OUTPATIENT
Start: 2022-06-30 | End: 2022-06-30

## 2022-06-30 RX ADMIN — GADOBUTROL 5 ML: 604.72 INJECTION INTRAVENOUS at 11:30

## 2022-07-13 ENCOUNTER — TELEPHONE (OUTPATIENT)
Dept: TRANSPLANT | Facility: CLINIC | Age: 82
End: 2022-07-13

## 2022-07-13 NOTE — TELEPHONE ENCOUNTER
Call from Christa's daughter, Yola.  Christa spent time w/ a friend this past weekend who tested positive for COVID yesterday.  Christa is asymptomatic.  Yola wondering if Christa needs treatment.  Told her know, told her to test if develops symptoms, let me know if positive, would then treat.

## 2022-07-14 ENCOUNTER — VIRTUAL VISIT (OUTPATIENT)
Dept: INFECTIOUS DISEASES | Facility: CLINIC | Age: 82
End: 2022-07-14
Attending: STUDENT IN AN ORGANIZED HEALTH CARE EDUCATION/TRAINING PROGRAM
Payer: COMMERCIAL

## 2022-07-14 DIAGNOSIS — M65.931 TENOSYNOVITIS OF RIGHT WRIST: Primary | ICD-10-CM

## 2022-07-14 DIAGNOSIS — Z94.4 LIVER REPLACED BY TRANSPLANT (H): ICD-10-CM

## 2022-07-14 DIAGNOSIS — A31.0 MYCOBACTERIUM AVIUM INFECTION (H): ICD-10-CM

## 2022-07-14 DIAGNOSIS — M65.10 INFECTIOUS TENOSYNOVITIS: ICD-10-CM

## 2022-07-14 PROCEDURE — G0463 HOSPITAL OUTPT CLINIC VISIT: HCPCS | Mod: PN,RTG | Performed by: STUDENT IN AN ORGANIZED HEALTH CARE EDUCATION/TRAINING PROGRAM

## 2022-07-14 PROCEDURE — 99215 OFFICE O/P EST HI 40 MIN: CPT | Mod: 95 | Performed by: STUDENT IN AN ORGANIZED HEALTH CARE EDUCATION/TRAINING PROGRAM

## 2022-07-14 PROCEDURE — 99417 PROLNG OP E/M EACH 15 MIN: CPT | Performed by: STUDENT IN AN ORGANIZED HEALTH CARE EDUCATION/TRAINING PROGRAM

## 2022-07-14 NOTE — PATIENT INSTRUCTIONS
- Would recommend treatment for M.avium tenosynovitis with a 3 medication regimen - Rifabutin, Azithromycin, Ethambutol  - Will communicate with your Orthopedic surgeon and with your liver transplant team  - Will send you information on antibiotics  - Once you review them and come to a decision, please reach out to me  - Follow up 8/18/22
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
## 2022-07-15 ENCOUNTER — TELEPHONE (OUTPATIENT)
Dept: TRANSPLANT | Facility: CLINIC | Age: 82
End: 2022-07-15

## 2022-07-15 DIAGNOSIS — Z94.4 LIVER REPLACED BY TRANSPLANT (H): ICD-10-CM

## 2022-07-15 RX ORDER — PREDNISONE 1 MG/1
TABLET ORAL
Qty: 270 TABLET | Refills: 3 | Status: SHIPPED | OUTPATIENT
Start: 2022-07-15 | End: 2023-08-17

## 2022-07-15 NOTE — TELEPHONE ENCOUNTER
Anali was recently seen by ID for recommendation re: M.avium tenosynovitis of right wrist.  She really wants to speak to Dr. Lomeli prior to agreeing to start treatment.  Message to Dr. Lomeli asking if he could do a virtual visit w/ her in the next week or 2.

## 2022-07-28 ENCOUNTER — MYC REFILL (OUTPATIENT)
Dept: FAMILY MEDICINE | Facility: CLINIC | Age: 82
End: 2022-07-28

## 2022-07-28 DIAGNOSIS — I10 ESSENTIAL HYPERTENSION WITH GOAL BLOOD PRESSURE LESS THAN 140/90: ICD-10-CM

## 2022-07-28 DIAGNOSIS — N18.32 STAGE 3B CHRONIC KIDNEY DISEASE (H): ICD-10-CM

## 2022-07-29 RX ORDER — AMLODIPINE BESYLATE 5 MG/1
5 TABLET ORAL DAILY
Qty: 90 TABLET | Refills: 0 | Status: SHIPPED | OUTPATIENT
Start: 2022-07-29 | End: 2022-08-12

## 2022-07-29 NOTE — TELEPHONE ENCOUNTER
Covering for primary/ordering provider  Short refill given.  Needs an appointment with RN for blood pressure check for ongoing refills

## 2022-07-29 NOTE — TELEPHONE ENCOUNTER
Pending Prescriptions:                       Disp   Refills    amLODIPine (NORVASC) 5 MG tablet           90 tab*0        Sig: Take 1 tablet (5 mg) by mouth daily    Routing refill request to provider for review/approval because:  Labs out of range:  Creatinine  BP out of range for refill protocol. Routing to provider for review.    Creatinine   Date Value Ref Range Status   04/28/2022 1.08 (H) 0.52 - 1.04 mg/dL Final     Comment:     This result was previously suppressed from the chart.   06/14/2021 1.43 (H) 0.52 - 1.04 mg/dL Final     BP Readings from Last 3 Encounters:   03/31/22 (!) 142/78   01/11/22 136/66   11/02/21 136/64     Nelda Edwards RN  [facility]

## 2022-08-12 ENCOUNTER — MYC REFILL (OUTPATIENT)
Dept: FAMILY MEDICINE | Facility: CLINIC | Age: 82
End: 2022-08-12

## 2022-08-12 DIAGNOSIS — I10 ESSENTIAL HYPERTENSION WITH GOAL BLOOD PRESSURE LESS THAN 140/90: ICD-10-CM

## 2022-08-12 DIAGNOSIS — N18.32 STAGE 3B CHRONIC KIDNEY DISEASE (H): ICD-10-CM

## 2022-08-12 NOTE — TELEPHONE ENCOUNTER
Routing refill request to provider for review/approval because:  Pt needs a blood pressure check and labs.  I will message patient.

## 2022-08-15 ASSESSMENT — ENCOUNTER SYMPTOMS
BACK PAIN: 1
JOINT SWELLING: 0
STIFFNESS: 0
MYALGIAS: 1
MUSCLE WEAKNESS: 1
MUSCLE CRAMPS: 0
ARTHRALGIAS: 1
NECK PAIN: 0

## 2022-08-16 RX ORDER — AMLODIPINE BESYLATE 5 MG/1
5 TABLET ORAL DAILY
Qty: 90 TABLET | Refills: 0 | Status: SHIPPED | OUTPATIENT
Start: 2022-08-16 | End: 2022-09-09

## 2022-08-17 ENCOUNTER — TELEPHONE (OUTPATIENT)
Dept: TRANSPLANT | Facility: CLINIC | Age: 82
End: 2022-08-17

## 2022-08-17 ENCOUNTER — LAB (OUTPATIENT)
Dept: LAB | Facility: CLINIC | Age: 82
End: 2022-08-17
Payer: COMMERCIAL

## 2022-08-17 DIAGNOSIS — A31.0 MYCOBACTERIUM AVIUM INFECTION (H): ICD-10-CM

## 2022-08-17 DIAGNOSIS — M67.431 GANGLION CYST OF VOLAR ASPECT OF RIGHT WRIST: ICD-10-CM

## 2022-08-17 DIAGNOSIS — Z13.220 LIPID SCREENING: ICD-10-CM

## 2022-08-17 DIAGNOSIS — M65.90 TENOSYNOVITIS: ICD-10-CM

## 2022-08-17 DIAGNOSIS — Z94.4 LIVER REPLACED BY TRANSPLANT (H): ICD-10-CM

## 2022-08-17 LAB
ALBUMIN SERPL-MCNC: 3 G/DL (ref 3.4–5)
ALP SERPL-CCNC: 311 U/L (ref 40–150)
ALT SERPL W P-5'-P-CCNC: 45 U/L (ref 0–50)
ANION GAP SERPL CALCULATED.3IONS-SCNC: 7 MMOL/L (ref 3–14)
AST SERPL W P-5'-P-CCNC: 37 U/L (ref 0–45)
BILIRUB DIRECT SERPL-MCNC: 0.2 MG/DL (ref 0–0.2)
BILIRUB SERPL-MCNC: 0.4 MG/DL (ref 0.2–1.3)
BUN SERPL-MCNC: 42 MG/DL (ref 7–30)
CALCIUM SERPL-MCNC: 9.2 MG/DL (ref 8.5–10.1)
CHLORIDE BLD-SCNC: 105 MMOL/L (ref 94–109)
CO2 SERPL-SCNC: 28 MMOL/L (ref 20–32)
CREAT SERPL-MCNC: 1.37 MG/DL (ref 0.52–1.04)
CRP SERPL-MCNC: 5.1 MG/L (ref 0–8)
ERYTHROCYTE [DISTWIDTH] IN BLOOD BY AUTOMATED COUNT: 16.6 % (ref 10–15)
GFR SERPL CREATININE-BSD FRML MDRD: 39 ML/MIN/1.73M2
GLUCOSE BLD-MCNC: 174 MG/DL (ref 70–99)
HCT VFR BLD AUTO: 37.6 % (ref 35–47)
HGB BLD-MCNC: 12 G/DL (ref 11.7–15.7)
MCH RBC QN AUTO: 31.3 PG (ref 26.5–33)
MCHC RBC AUTO-ENTMCNC: 31.9 G/DL (ref 31.5–36.5)
MCV RBC AUTO: 98 FL (ref 78–100)
PLATELET # BLD AUTO: 270 10E3/UL (ref 150–450)
POTASSIUM BLD-SCNC: 4.2 MMOL/L (ref 3.4–5.3)
PROT SERPL-MCNC: 7 G/DL (ref 6.8–8.8)
RBC # BLD AUTO: 3.84 10E6/UL (ref 3.8–5.2)
SODIUM SERPL-SCNC: 140 MMOL/L (ref 133–144)
WBC # BLD AUTO: 6.7 10E3/UL (ref 4–11)

## 2022-08-17 PROCEDURE — 36415 COLL VENOUS BLD VENIPUNCTURE: CPT

## 2022-08-17 PROCEDURE — 86140 C-REACTIVE PROTEIN: CPT

## 2022-08-17 PROCEDURE — 85027 COMPLETE CBC AUTOMATED: CPT

## 2022-08-17 PROCEDURE — 87116 MYCOBACTERIA CULTURE: CPT

## 2022-08-17 PROCEDURE — 80053 COMPREHEN METABOLIC PANEL: CPT

## 2022-08-17 PROCEDURE — 82248 BILIRUBIN DIRECT: CPT

## 2022-08-18 ENCOUNTER — OFFICE VISIT (OUTPATIENT)
Dept: INFECTIOUS DISEASES | Facility: CLINIC | Age: 82
End: 2022-08-18
Attending: STUDENT IN AN ORGANIZED HEALTH CARE EDUCATION/TRAINING PROGRAM
Payer: COMMERCIAL

## 2022-08-18 ENCOUNTER — TELEPHONE (OUTPATIENT)
Dept: GASTROENTEROLOGY | Facility: CLINIC | Age: 82
End: 2022-08-18

## 2022-08-18 VITALS
HEART RATE: 57 BPM | OXYGEN SATURATION: 95 % | DIASTOLIC BLOOD PRESSURE: 71 MMHG | BODY MASS INDEX: 24.26 KG/M2 | SYSTOLIC BLOOD PRESSURE: 127 MMHG | WEIGHT: 112.1 LBS

## 2022-08-18 DIAGNOSIS — M65.931 TENOSYNOVITIS OF RIGHT WRIST: Primary | ICD-10-CM

## 2022-08-18 DIAGNOSIS — Z94.4 LIVER REPLACED BY TRANSPLANT (H): ICD-10-CM

## 2022-08-18 DIAGNOSIS — M65.10 INFECTIOUS TENOSYNOVITIS: ICD-10-CM

## 2022-08-18 DIAGNOSIS — M10.072 ACUTE IDIOPATHIC GOUT OF LEFT FOOT: ICD-10-CM

## 2022-08-18 DIAGNOSIS — A31.0 MYCOBACTERIUM AVIUM INFECTION (H): ICD-10-CM

## 2022-08-18 PROCEDURE — 99215 OFFICE O/P EST HI 40 MIN: CPT | Performed by: STUDENT IN AN ORGANIZED HEALTH CARE EDUCATION/TRAINING PROGRAM

## 2022-08-18 PROCEDURE — G0463 HOSPITAL OUTPT CLINIC VISIT: HCPCS

## 2022-08-18 ASSESSMENT — PAIN SCALES - GENERAL: PAINLEVEL: NO PAIN (0)

## 2022-08-18 NOTE — NURSING NOTE
Chief Complaint   Patient presents with     RECHECK     Follow up     Blood pressure 127/71, pulse 57, weight 50.8 kg (112 lb 1.6 oz), SpO2 95 %, not currently breastfeeding.    Bridgett Epstein
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
## 2022-08-18 NOTE — PATIENT INSTRUCTIONS
- Have reached out to Dr Lomeli's team to see if clinic visit can potentially be moved up   - Please reach out once you see Dr Lomeli  - We will discuss starting treatment after you see him  - Please inform me if you develop fevers, chills, night sweats or worsening pain/swelling in right wrist
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
## 2022-08-22 RX ORDER — ALLOPURINOL 100 MG/1
TABLET ORAL
Qty: 180 TABLET | Refills: 1 | Status: SHIPPED | OUTPATIENT
Start: 2022-08-22 | End: 2023-03-14

## 2022-08-30 ENCOUNTER — TELEPHONE (OUTPATIENT)
Dept: PHARMACY | Facility: CLINIC | Age: 82
End: 2022-08-30

## 2022-08-30 NOTE — TELEPHONE ENCOUNTER
Called patient to see if she's ready to discuss starting antibiotics for M.avium complex tensynovitis.    Patient reports she wants to wait until after getting her tooth pulled on Friday to discuss starting treatment. She has not been successful getting in touch with Dr. Lomeli yet to discuss treatment. She will try contacting again.     Per patient preference, she will call me next week to discuss starting antibiotics. Provided her my direct line.     Mirian Garrett, PharmD   Medication Therapy Management Pharmacist   August 30, 2022

## 2022-09-09 ENCOUNTER — LAB (OUTPATIENT)
Dept: LAB | Facility: CLINIC | Age: 82
End: 2022-09-09
Payer: COMMERCIAL

## 2022-09-09 ENCOUNTER — OFFICE VISIT (OUTPATIENT)
Dept: FAMILY MEDICINE | Facility: CLINIC | Age: 82
End: 2022-09-09
Payer: COMMERCIAL

## 2022-09-09 ENCOUNTER — TELEPHONE (OUTPATIENT)
Dept: TRANSPLANT | Facility: CLINIC | Age: 82
End: 2022-09-09

## 2022-09-09 VITALS
SYSTOLIC BLOOD PRESSURE: 138 MMHG | DIASTOLIC BLOOD PRESSURE: 88 MMHG | WEIGHT: 111.1 LBS | TEMPERATURE: 97.8 F | OXYGEN SATURATION: 96 % | HEART RATE: 54 BPM | RESPIRATION RATE: 16 BRPM | BODY MASS INDEX: 24.04 KG/M2

## 2022-09-09 DIAGNOSIS — M65.131: ICD-10-CM

## 2022-09-09 DIAGNOSIS — D63.1 ANEMIA IN STAGE 4 CHRONIC KIDNEY DISEASE (H): ICD-10-CM

## 2022-09-09 DIAGNOSIS — Z94.4 LIVER REPLACED BY TRANSPLANT (H): ICD-10-CM

## 2022-09-09 DIAGNOSIS — Z00.00 ROUTINE HISTORY AND PHYSICAL EXAMINATION OF ADULT: Primary | ICD-10-CM

## 2022-09-09 DIAGNOSIS — A31.0 MYCOBACTERIUM AVIUM INFECTION (H): ICD-10-CM

## 2022-09-09 DIAGNOSIS — N18.32 STAGE 3B CHRONIC KIDNEY DISEASE (H): ICD-10-CM

## 2022-09-09 DIAGNOSIS — I10 ESSENTIAL HYPERTENSION WITH GOAL BLOOD PRESSURE LESS THAN 140/90: ICD-10-CM

## 2022-09-09 DIAGNOSIS — R53.83 OTHER FATIGUE: ICD-10-CM

## 2022-09-09 DIAGNOSIS — A31.0 MYCOBACTERIUM AVIUM COMPLEX (H): ICD-10-CM

## 2022-09-09 DIAGNOSIS — Z23 NEED FOR PROPHYLACTIC VACCINATION AND INOCULATION AGAINST INFLUENZA: ICD-10-CM

## 2022-09-09 DIAGNOSIS — R32 URINARY INCONTINENCE, UNSPECIFIED TYPE: ICD-10-CM

## 2022-09-09 DIAGNOSIS — K74.3 PRIMARY BILIARY CIRRHOSIS (H): ICD-10-CM

## 2022-09-09 DIAGNOSIS — Z13.220 LIPID SCREENING: ICD-10-CM

## 2022-09-09 DIAGNOSIS — N18.4 ANEMIA IN STAGE 4 CHRONIC KIDNEY DISEASE (H): ICD-10-CM

## 2022-09-09 PROBLEM — D64.3: Status: RESOLVED | Noted: 2021-01-26 | Resolved: 2022-09-09

## 2022-09-09 LAB
ALBUMIN SERPL BCG-MCNC: 3.8 G/DL (ref 3.5–5.2)
ALBUMIN UR-MCNC: NEGATIVE MG/DL
ALP SERPL-CCNC: 333 U/L (ref 35–104)
ALT SERPL W P-5'-P-CCNC: 44 U/L (ref 10–35)
ANION GAP SERPL CALCULATED.3IONS-SCNC: 11 MMOL/L (ref 7–15)
APPEARANCE UR: CLEAR
AST SERPL W P-5'-P-CCNC: 39 U/L (ref 10–35)
BILIRUB DIRECT SERPL-MCNC: <0.2 MG/DL (ref 0–0.3)
BILIRUB SERPL-MCNC: 0.4 MG/DL
BILIRUB UR QL STRIP: NEGATIVE
BUN SERPL-MCNC: 38.8 MG/DL (ref 8–23)
CALCIUM SERPL-MCNC: 9.7 MG/DL (ref 8.8–10.2)
CHLORIDE SERPL-SCNC: 101 MMOL/L (ref 98–107)
COLOR UR AUTO: YELLOW
CREAT SERPL-MCNC: 1.22 MG/DL (ref 0.51–0.95)
DEPRECATED HCO3 PLAS-SCNC: 29 MMOL/L (ref 22–29)
ERYTHROCYTE [DISTWIDTH] IN BLOOD BY AUTOMATED COUNT: 16.8 % (ref 10–15)
GFR SERPL CREATININE-BSD FRML MDRD: 45 ML/MIN/1.73M2
GLUCOSE SERPL-MCNC: 98 MG/DL (ref 70–99)
GLUCOSE UR STRIP-MCNC: NEGATIVE MG/DL
HCT VFR BLD AUTO: 39.4 % (ref 35–47)
HGB BLD-MCNC: 12.4 G/DL (ref 11.7–15.7)
HGB UR QL STRIP: NEGATIVE
KETONES UR STRIP-MCNC: NEGATIVE MG/DL
LEUKOCYTE ESTERASE UR QL STRIP: NEGATIVE
MCH RBC QN AUTO: 31 PG (ref 26.5–33)
MCHC RBC AUTO-ENTMCNC: 31.5 G/DL (ref 31.5–36.5)
MCV RBC AUTO: 99 FL (ref 78–100)
NITRATE UR QL: NEGATIVE
PH UR STRIP: 5.5 [PH] (ref 5–7)
PLATELET # BLD AUTO: 258 10E3/UL (ref 150–450)
POTASSIUM SERPL-SCNC: 4.1 MMOL/L (ref 3.4–5.3)
PROT SERPL-MCNC: 6.7 G/DL (ref 6.4–8.3)
RBC # BLD AUTO: 4 10E6/UL (ref 3.8–5.2)
SODIUM SERPL-SCNC: 141 MMOL/L (ref 136–145)
SP GR UR STRIP: 1.02 (ref 1–1.03)
T4 FREE SERPL-MCNC: 1.06 NG/DL (ref 0.9–1.7)
TACROLIMUS BLD-MCNC: 22.2 UG/L (ref 5–15)
TME LAST DOSE: ABNORMAL H
TME LAST DOSE: ABNORMAL H
TSH SERPL DL<=0.005 MIU/L-ACNC: 4.89 UIU/ML (ref 0.3–4.2)
UROBILINOGEN UR STRIP-ACNC: 0.2 E.U./DL
VIT B12 SERPL-MCNC: 634 PG/ML (ref 232–1245)
WBC # BLD AUTO: 7.1 10E3/UL (ref 4–11)

## 2022-09-09 PROCEDURE — 82607 VITAMIN B-12: CPT | Performed by: NURSE PRACTITIONER

## 2022-09-09 PROCEDURE — 87116 MYCOBACTERIA CULTURE: CPT

## 2022-09-09 PROCEDURE — 36415 COLL VENOUS BLD VENIPUNCTURE: CPT

## 2022-09-09 PROCEDURE — 85027 COMPLETE CBC AUTOMATED: CPT

## 2022-09-09 PROCEDURE — G0439 PPPS, SUBSEQ VISIT: HCPCS | Performed by: NURSE PRACTITIONER

## 2022-09-09 PROCEDURE — 80197 ASSAY OF TACROLIMUS: CPT

## 2022-09-09 PROCEDURE — 81003 URINALYSIS AUTO W/O SCOPE: CPT | Performed by: NURSE PRACTITIONER

## 2022-09-09 PROCEDURE — 90662 IIV NO PRSV INCREASED AG IM: CPT | Performed by: NURSE PRACTITIONER

## 2022-09-09 PROCEDURE — 82248 BILIRUBIN DIRECT: CPT

## 2022-09-09 PROCEDURE — G0008 ADMIN INFLUENZA VIRUS VAC: HCPCS | Performed by: NURSE PRACTITIONER

## 2022-09-09 PROCEDURE — 84443 ASSAY THYROID STIM HORMONE: CPT | Performed by: NURSE PRACTITIONER

## 2022-09-09 PROCEDURE — 99214 OFFICE O/P EST MOD 30 MIN: CPT | Mod: 25 | Performed by: NURSE PRACTITIONER

## 2022-09-09 PROCEDURE — 84439 ASSAY OF FREE THYROXINE: CPT | Performed by: NURSE PRACTITIONER

## 2022-09-09 PROCEDURE — 80053 COMPREHEN METABOLIC PANEL: CPT

## 2022-09-09 RX ORDER — CARVEDILOL 25 MG/1
25 TABLET ORAL 2 TIMES DAILY WITH MEALS
Qty: 180 TABLET | Refills: 3 | Status: SHIPPED | OUTPATIENT
Start: 2022-09-09 | End: 2023-08-07

## 2022-09-09 RX ORDER — AMLODIPINE BESYLATE 5 MG/1
5 TABLET ORAL DAILY
Qty: 90 TABLET | Refills: 3 | Status: SHIPPED | OUTPATIENT
Start: 2022-09-09 | End: 2023-01-01

## 2022-09-09 ASSESSMENT — ENCOUNTER SYMPTOMS
NERVOUS/ANXIOUS: 0
SHORTNESS OF BREATH: 0
PARESTHESIAS: 0
CHILLS: 0
FEVER: 0
DYSURIA: 0
CONSTIPATION: 0
ABDOMINAL PAIN: 0
ARTHRALGIAS: 1
DIZZINESS: 0
EYE PAIN: 0
SORE THROAT: 0
HEMATOCHEZIA: 0
DIARRHEA: 0
JOINT SWELLING: 0
HEMATURIA: 0
HEARTBURN: 0
HEADACHES: 0
PALPITATIONS: 0
NAUSEA: 0
COUGH: 0
MYALGIAS: 0
BREAST MASS: 0
WEAKNESS: 0
FREQUENCY: 1

## 2022-09-09 ASSESSMENT — ACTIVITIES OF DAILY LIVING (ADL): CURRENT_FUNCTION: NO ASSISTANCE NEEDED

## 2022-09-09 ASSESSMENT — PAIN SCALES - GENERAL: PAINLEVEL: NO PAIN (0)

## 2022-09-09 NOTE — PROGRESS NOTES
"SUBJECTIVE:   Amanda Nails is a 82 year old female who presents for Preventive Visit.       Patient has been advised of split billing requirements and indicates understanding: Yes  Are you in the first 12 months of your Medicare coverage?  No    Healthy Habits:     In general, how would you rate your overall health?  Good    Frequency of exercise:  1 day/week    Duration of exercise:  15-30 minutes    Do you usually eat at least 4 servings of fruit and vegetables a day, include whole grains    & fiber and avoid regularly eating high fat or \"junk\" foods?  Yes    Taking medications regularly:  Yes    Barriers to taking medications:  None    Medication side effects:  Other    Ability to successfully perform activities of daily living:  No assistance needed    Home Safety:  No safety concerns identified    Hearing Impairment:  No hearing concerns    In the past 6 months, have you been bothered by leaking of urine? Yes    In general, how would you rate your overall mental or emotional health?  Good      PHQ-2 Total Score: 0    Additional concerns today:  No     Recent cystic lesion of right wrist s/p excision.  Concern for infectious tenosynovitis of right wrist.  AFB cultures with M.avium complex    Recommended treatment per Dr. Sun.  Likely regimen to consist of daily administration of following, for 12 months at least  - Rifabutin 300mg  - Azithromycin 500mg  - Ethambutol 15mg/kg (~750mg for patient)  3. Have introduced patient to the ID clinic outpatient MTM pharmacist  4. Plan to start in a staggered/step-wise manner - Azithro -> Ethambutol -> Rifampin    Do you feel safe in your environment? Yes    Have you ever done Advance Care Planning? (For example, a Health Directive, POLST, or a discussion with a medical provider or your loved ones about your wishes): Yes, patient states has an Advance Care Planning document and will bring a copy to the clinic.       Fall risk  Fallen 2 or more times in the past " year?: No  Any fall with injury in the past year?: No    Cognitive Screening   1) Repeat 3 items (Leader, Season, Table)     2) Clock draw:   ABNORMAL clock hands in the wrong place  3) 3 item recall:   Recalls 3 objects  Results: 3 items recalled: COGNITIVE IMPAIRMENT LESS LIKELY    Mini-CogTM Copyright MARIFER Bullard. Licensed by the author for use in [facility]; reprinted with permission ([email or web address]). All rights reserved.      Do you have sleep apnea, excessive snoring or daytime drowsiness?: no    Reviewed and updated as needed this visit by clinical staff   Tobacco  Allergies  Meds   Med Hx  Surg Hx  Fam Hx  Soc Hx          Reviewed and updated as needed this visit by Provider                   Social History     Tobacco Use     Smoking status: Former Smoker     Years: 3.00     Smokeless tobacco: Never Used     Tobacco comment: Years ago.   Substance Use Topics     Alcohol use: Yes     Comment: very rarely     If you drink alcohol do you typically have >3 drinks per day or >7 drinks per week? No    Alcohol Use 9/9/2022   Prescreen: >3 drinks/day or >7 drinks/week? No   Prescreen: >3 drinks/day or >7 drinks/week? -         Current providers sharing in care for this patient include:   Patient Care Team:  Bharati Mitchell NP as PCP - General (Nurse Practitioner - Family)  Sarai Abrams MD as MD (Nephrology)  Jase Mace [facility] as Pharmacist (Pharmacist)  Bharati Mitchell NP as Assigned PCP  Luis Daniel Lomeli MD as Assigned Surgical Provider  Benjy Kate MD as Hospitalist (Infectious Diseases)  Eladio Sun MD as Assigned Infectious Disease Provider  Mirian Garrett [facility] as Pharmacist (Pharmacist Ambulatory Care)    The following health maintenance items are reviewed in Epic and correct as of today:  Health Maintenance Due   Topic Date Due     MENINGITIS IMMUNIZATION (1 - Risk start 2-23 months series) Never done     ZOSTER IMMUNIZATION (1 of 2) Never done      HEPATITIS B IMMUNIZATION (1 of 3 - Risk 3-dose series) Never done     COVID-19 Vaccine (3 - Pfizer risk series) 09/09/2021     MICROALBUMIN  09/14/2021     INFLUENZA VACCINE (1) 09/01/2022     Lab work is in process  Labs reviewed in EPIC  BP Readings from Last 3 Encounters:   09/09/22 (!) 142/80   08/18/22 127/71   03/31/22 (!) 142/78    Wt Readings from Last 3 Encounters:   09/09/22 50.4 kg (111 lb 1.6 oz)   08/18/22 50.8 kg (112 lb 1.6 oz)   03/31/22 50.2 kg (110 lb 10.4 oz)                  Patient Active Problem List   Diagnosis     Chronic kidney disease, stage III (moderate) (H)     Liver replaced by transplant (H)     Primary biliary cirrhosis (H)     Essential hypertension     CARDIOVASCULAR SCREENING; LDL GOAL LESS THAN 130     Mixed hyperlipidemia     Iron deficiency anemia     Skin cancer     Osteoporosis     Advanced directives, counseling/discussion     Proteinuria     Anemia in stage 4 chronic kidney disease (H)     Urinary frequency     Past Surgical History:   Procedure Laterality Date     cholecytectomy       COLONOSCOPY  4/9/2013    Procedure: COLONOSCOPY;  Colonoscopy;  Surgeon: Kendra Archer MD;  Location: [facility]     ESOPHAGOSCOPY, GASTROSCOPY, DUODENOSCOPY (EGD), COMBINED  8/23/2013    Procedure: COMBINED ESOPHAGOSCOPY, GASTROSCOPY, DUODENOSCOPY (EGD), BIOPSY SINGLE OR MULTIPLE;  Gastroscopy       ESOPHAGOSCOPY, GASTROSCOPY, DUODENOSCOPY (EGD), DILATATION, COMBINED N/A 7/2/2020    Procedure: ESOPHAGOGASTRODUODENOSCOPY, WITH DILATION and biopsy;  Surgeon: Doe Wallace MD;  Location: [facility]     SPLENECTOMY       TRANSPLANT  1983    liver       Social History     Tobacco Use     Smoking status: Former Smoker     Years: 3.00     Smokeless tobacco: Never Used     Tobacco comment: Years ago.   Substance Use Topics     Alcohol use: Yes     Comment: very rarely     Family History   Problem Relation Age of Onset     Respiratory Mother      Gastrointestinal Disease Sister         celiac      Gastrointestinal Disease Son         celiac     Gastrointestinal Disease Daughter         celiac     Gastrointestinal Disease Sister      Gastrointestinal Disease Son         celiac     Gastrointestinal Disease Daughter         PBC     Endocrine Disease Daughter         Graves disease     Endocrine Disease Daughter         hypothyroidism         Current Outpatient Medications   Medication Sig Dispense Refill     Acetaminophen (TYLENOL PO) Take 500 mg by mouth every 6 hours as needed for mild pain or fever       allopurinol (ZYLOPRIM) 100 MG tablet TAKE TWO TABLETS BY MOUTH ONCE DAILY 180 tablet 1     amLODIPine (NORVASC) 5 MG tablet Take 1 tablet (5 mg) by mouth daily 90 tablet 3     Ascorbic Acid (VITAMIN C PO) Take by mouth daily       CALCIUM-VITAMIN D PO Take by mouth daily       carvedilol (COREG) 25 MG tablet Take 1 tablet (25 mg) by mouth 2 times daily (with meals) 180 tablet 3     furosemide (LASIX) 20 MG tablet Take 0.5 tablets (10 mg) by mouth daily 90 tablet 1     Multiple Vitamins-Minerals (PRESERVISION AREDS 2 PO) Take 1 tablet by mouth 2 times daily       Omega-3 Fatty Acids (FISH OIL PO) Take by mouth daily       predniSONE (DELTASONE) 1 MG tablet TAKE THREE TABLETS BY MOUTH ONCE DAILY 270 tablet 3     sertraline (ZOLOFT) 50 MG tablet Take 1 tablet (50 mg) by mouth daily 90 tablet 3     tacrolimus (GENERIC EQUIVALENT) 1 MG capsule Take 1 capsule (1 mg) by mouth every 12 hours 180 capsule 3     ursodiol (ACTIGALL) 300 MG capsule TAKE TWO CAPSULES BY MOUTH EVERY MORNING & TAKE ONE CAPSULE BY MOUTH EVERY EVENING 270 capsule 3     predniSONE (DELTASONE) 10 MG tablet Take 1 tablet (10 mg) by mouth daily for 7 days (Patient not taking: Reported on 5/16/2022) 7 tablet 0     Allergies   Allergen Reactions     Golytely [Colyte] Swelling     Pt states her tongue swelled      Lisinopril      Per patient she is allergic to this medication too     Nsaids Other (See Comments)     Can't take because of liver      Recent Labs   Lab Test 09/09/22  1101 08/17/22  1349 04/28/22  1149 01/11/22  0930 11/02/21  1050 06/14/21  1027 03/05/21  0833 08/06/20  1113 06/18/20  0944 06/18/20  0941   LDL  --   --   --   --  143* 159*  --   --  141*  --    HDL  --   --   --   --  102 104  --   --  105  --    TRIG  --   --   --   --  160* 140  --   --  126  --    ALT 44* 45 40   < > 45 37 44   < > 37  --    CR 1.22* 1.37* 1.08*   < > 1.19* 1.43* 1.31*   < > 1.26*  --    GFRESTIMATED 45* 39* 52*   < > 44* 35* 39*   < > 41*  --    GFRESTBLACK  --   --   --   --   --  40* 45*   < > 47*  --    POTASSIUM 4.1 4.2 3.6   < > 3.9 3.6 3.6   < > 3.5  --    TSH 4.89*  --   --   --   --   --   --   --   --  3.61    < > = values in this interval not displayed.      Pneumonia Vaccine:For adults 65 years or older who do not have an immunocompromising condition, cerebrospinal fluid leak, or cochlear implant and want to receive PPSV23 ONLY: Administer 1 dose of PPSV23. Anyone who received any doses of PPSV23 before age 65 should receive 1 final dose of the vaccine at age 65 or older. Administer this last dose at least 5 years after the prior PPSV23 dose.         Review of Systems   Constitutional: Negative for chills and fever.   HENT: Positive for ear pain. Negative for congestion, hearing loss and sore throat.    Eyes: Positive for visual disturbance. Negative for pain.   Respiratory: Negative for cough and shortness of breath.    Cardiovascular: Negative for chest pain, palpitations and peripheral edema.   Gastrointestinal: Negative for abdominal pain, constipation, diarrhea, heartburn, hematochezia and nausea.   Breasts:  Negative for tenderness, breast mass and discharge.   Genitourinary: Positive for frequency and urgency. Negative for dysuria, genital sores, hematuria, pelvic pain, vaginal bleeding and vaginal discharge.   Musculoskeletal: Positive for arthralgias. Negative for joint swelling and myalgias.   Skin: Negative for rash.   Neurological:  "Negative for dizziness, weakness, headaches and paresthesias.   Psychiatric/Behavioral: Negative for mood changes. The patient is not nervous/anxious.           OBJECTIVE:   BP (!) 142/80 (BP Location: Right arm, Patient Position: Sitting, Cuff Size: Adult Regular)   Pulse 54   Temp 97.8  F (36.6  C) (Tympanic)   Resp 16   Wt 50.4 kg (111 lb 1.6 oz)   LMP  (LMP Unknown)   SpO2 96%   Breastfeeding No   BMI 24.04 kg/m   Estimated body mass index is 24.04 kg/m  as calculated from the following:    Height as of 3/31/22: 1.448 m (4' 9\").    Weight as of this encounter: 50.4 kg (111 lb 1.6 oz).     Wt Readings from Last 4 Encounters:   09/09/22 50.4 kg (111 lb 1.6 oz)   08/18/22 50.8 kg (112 lb 1.6 oz)   03/31/22 50.2 kg (110 lb 10.4 oz)   01/11/22 49.9 kg (110 lb)       Physical Exam  GENERAL: alert, no distress, frail and elderly  NECK: no adenopathy, no asymmetry, masses, or scars and thyroid normal to palpation  RESP: no rales , no rhonchi, no wheezes and decreased breath sounds bibasilar  CV: regular rates and rhythm, normal S1 S2, no S3 or S4, grade 4/6 holosystolic murmur heard best over the 5th ICS, peripheral pulses strong and no peripheral edema  ABDOMEN: soft, nontender, no hepatosplenomegaly, no masses and bowel sounds normal  MS: no gross musculoskeletal defects noted, no edema  SKIN: Right wrist with moderate swelling, tenderness with flexion, and mass 2 cm volar surface, soft, mobile.  NEURO: Normal strength and tone, mentation intact and speech normal  PSYCH: mentation appears normal, affect normal/bright    Diagnostic Test Results:  Labs reviewed in Epic    ASSESSMENT / PLAN:   (Z00.00) Routine history and physical examination of adult  (primary encounter diagnosis)  Comment:    Plan:      (Z94.4) Liver replaced by transplant (H)  Comment:    Plan: sertraline (ZOLOFT) 50 MG tablet        Following with Dr. Lomeli - switched transplant medications post infection  On Tacrolimus and Ursodiol  Reports " increase in fatigue and urinary symptoms.  Will get UA and some additional labs.    (K74.3) Primary biliary cirrhosis (H)  Comment:    Plan: see above    (N18.32) Stage 3b chronic kidney disease (H)  Comment:    Plan: amLODIPine (NORVASC) 5 MG tablet        Stable - continue blood pressure management.  Avoid NSAIDs    (I10) Essential hypertension with goal blood pressure less than 140/90  Comment:    Plan: carvedilol (COREG) 25 MG tablet, amLODIPine         (NORVASC) 5 MG tablet           Controlled    (R53.83) Other fatigue  Comment:    Plan: TSH with free T4 reflex, Vitamin B12, T4 free        Check labs.  History of aortic stenosis per echo 2021  Patient agreed to not pursue further work up or Cardiology referral.  This could be the cause vs other etiology  Patient not wanting further testing other than labs    (R32) Urinary incontinence, unspecified type  Comment:    Plan: UA Macro with Reflex to Micro and Culture - lab        collect           UA today.    (N18.4,  D63.1) Anemia in stage 4 chronic kidney disease (H)  Comment:    Plan:  Stable - last Hgb > 12    (Z23) Need for prophylactic vaccination and inoculation against influenza  Comment:    Plan: INFLUENZA, QUAD, HIGH DOSE, PF, 65YR + (FLUZONE        HD)    Mycobacterium Avium Complex infection  Working with ID - recommended triple antibiotic therapy for 12 months.  Patient wanting to discuss this today and starting therapy vs not.  Discussed concerns and fears today with patient.  She will contact ID on Monday to start therapy and monitor SE very closely.    Infectious tenosynovitis of wrist extensor, right  Having pain and symptoms - some swelling and worsening.  Denies fever/chills or other symptoms.  Continue follow up with ID                Patient has been advised of split billing requirements and indicates understanding: Yes    COUNSELING:  Reviewed preventive health counseling, as reflected in patient instructions       Regular exercise        "Healthy diet/nutrition       Vision screening       Osteoporosis prevention/bone health       Advanced Planning     Estimated body mass index is 24.04 kg/m  as calculated from the following:    Height as of 3/31/22: 1.448 m (4' 9\").    Weight as of this encounter: 50.4 kg (111 lb 1.6 oz).        She reports that she has quit smoking. She quit after 3.00 years of use. She has never used smokeless tobacco.      Appropriate preventive services were discussed with this patient, including applicable screening as appropriate for cardiovascular disease, diabetes, osteopenia/osteoporosis, and glaucoma.  As appropriate for age/gender, discussed screening for colorectal cancer, prostate cancer, breast cancer, and cervical cancer. Checklist reviewing preventive services available has been given to the patient.    Reviewed patients plan of care and provided an AVS. The Complex Care Plan (for patients with higher acuity and needing more deliberate coordination of services) for Amanda meets the Care Plan requirement. This Care Plan has been established and reviewed with the Patient.    Counseling Resources:  ATP IV Guidelines  Pooled Cohorts Equation Calculator  Breast Cancer Risk Calculator  Breast Cancer: Medication to Reduce Risk  FRAX Risk Assessment  ICSI Preventive Guidelines  Dietary Guidelines for Americans, 2010  USDA's MyPlate  ASA Prophylaxis  Lung CA Screening    Bharati Mitchell NP  [facility]    Identified Health Risks:    She is at risk for lack of exercise and has been provided with information to increase physical activity for the benefit of her well-being.  Information on urinary incontinence and treatment options given to patient.  "

## 2022-09-09 NOTE — PATIENT INSTRUCTIONS
Patient Education   Personalized Prevention Plan  You are due for the preventive services outlined below.  Your care team is available to assist you in scheduling these services.  If you have already completed any of these items, please share that information with your care team to update in your medical record.  Health Maintenance Due   Topic Date Due     Meningitis A Vaccine (1 - Risk start 2-23 months series) Never done     Zoster (Shingles) Vaccine (1 of 2) Never done     Hepatitis B Vaccine (1 of 3 - Risk 3-dose series) Never done     COVID-19 Vaccine (3 - Pfizer risk series) 09/09/2021     Kidney Microalbumin Urine Test  09/14/2021       Exercise for a Healthier Heart  You may wonder how you can improve the health of your heart. If you re thinking about exercise, you re on the right track. You don t need to become an athlete. But you do need a certain amount of brisk exercise to help strengthen your heart. If you have been diagnosed with a heart condition, your healthcare provider may advise exercise to help stabilize your condition. To help make exercise a habit, choose safe, fun activities.      Exercise with a friend. When activity is fun, you're more likely to stick with it.   Before you start  Check with your healthcare provider before starting an exercise program. This is especially important if you have not been active for a while. It's also important if you have a long-term (chronic) health problem such as heart disease, diabetes, or obesity. Or if you are at high risk for having these problems.   Why exercise?  Exercising regularly offers many healthy rewards. It can help you do all of the following:     Improve your blood cholesterol level to help prevent further heart trouble    Lower your blood pressure to help prevent a stroke or heart attack    Control diabetes, or reduce your risk of getting this disease    Improve your heart and lung function    Reach and stay at a healthy weight    Make your  muscles stronger so you can stay active    Prevent falls and fractures by slowing the loss of bone mass (osteoporosis)    Manage stress better    Reduce your blood pressure    Improve your sense of self and your body image  Exercise tips      Ease into your routine. Set small goals. Then build on them. If you are not sure what your activity level should be, talk with your healthcare provider first before starting an exercise routine.    Exercise on most days. Aim for a total of 150 minutes (2 hours and 30 minutes) or more of moderate-intensity aerobic activity each week. Or 75 minutes (1 hour and 15 minutes) or more of vigorous-intensity aerobic activity each week. Or try for a combination of both. Moderate activity means that you breathe heavier and your heart rate increases but you can still talk. Think about doing 40 minutes of moderate exercise, 3 to 4 times a week. For best results, activity should last for about 40 minutes to lower blood pressure and cholesterol. It's OK to work up to the 40-minute period over time. Examples of moderate-intensity activity are walking 1 mile in 15 minutes. Or doing 30 to 45 minutes of yard work.    Step up your daily activity level.  Along with your exercise program, try being more active the whole day. Walk instead of drive. Or park further away so that you take more steps each day. Do more household tasks or yard work. You may not be able to meet the advised mount of physical activity. But doing some moderate- or vigorous-intensity aerobic activity can help reduce your risk for heart disease. Your healthcare provider can help you figure out what is best for you.    Choose 1 or more activities you enjoy.  Walking is one of the easiest things you can do. You can also try swimming, riding a bike, dancing, or taking an exercise class.    When to call your healthcare provider  Call your healthcare provider if you have any of these:     Chest pain or feel dizzy or  lightheaded    Burning, tightness, pressure, or heaviness in your chest, neck, shoulders, back, or arms    Abnormal shortness of breath    More joint or muscle pain    A very fast or irregular heartbeat (palpitations)  Daphne last reviewed this educational content on 7/1/2019 2000-2021 [employer]. All rights reserved. This information is not intended as a substitute for professional medical care. Always follow your healthcare professional's instructions.          Urinary Incontinence, Female (Adult)   Urinary incontinence means loss of bladder control. This problem affects many women, especially as they get older. If you have incontinence, you may be embarrassed to ask for help. But know that this problem can be treated.   Types of Incontinence  There are different types of incontinence. Two of the main types are described here. You can have more than one type.     Stress incontinence. With this type, urine leaks when pressure (stress) is put on the bladder. This may happen when you cough, sneeze, or laugh. Stress incontinence most often occurs because the pelvic floor muscles that support the bladder and urethra are weak. This can happen after pregnancy and vaginal childbirth or a hysterectomy. It can also be due to excess body weight or hormone changes.    Urge incontinence (also called overactive bladder). With this type, a sudden urge to urinate is felt often. This may happen even though there may not be much urine in the bladder. The need to urinate often during the night is common. Urge incontinence most often occurs because of bladder spasms. This may be due to bladder irritation or infection. Damage to bladder nerves or pelvic muscles, constipation, and certain medicines can also lead to urge incontinence.  Treatment depends on the cause. Further evaluation is needed to find the type you have. This will likely include an exam and certain tests. Based on the results, you and your  healthcare provider can then plan treatment. Until a diagnosis is made, the home care tips below can help ease symptoms.   Home care    Do pelvic floor muscle exercises, if they are prescribed. The pelvic floor muscles help support the bladder and urethra. Many women find that their symptoms improve when doing special exercises that strengthen these muscles. To do the exercises, contract the muscles you would use to stop your stream of urine. But do this when you re not urinating. Hold for 10 seconds, then relax. Repeat 10 to 20 times in a row, at least 3 times a day. Your healthcare provider may give you other instructions for how to do the exercises and how often.    Keep a bladder diary. This helps track how often and how much you urinate over a set period of time. Bring this diary with you to your next visit with the provider. The information can help your provider learn more about your bladder problem.    Lose weight, if advised to by your provider. Extra weight puts pressure on the bladder. Your provider can help you create a weight-loss plan that s right for you. This may include exercising more and making certain diet changes.    Don't have foods and drinks that may irritate the bladder. These can include alcohol and caffeinated drinks.    Quit smoking. Smoking and other tobacco use can lead to a long-term (chronic) cough that strains the pelvic floor muscles. Smoking may also damage the bladder and urethra. Talk with your provider about treatments or methods you can use to quit smoking.    If drinking large amounts of fluid makes you have symptoms, you may be advised to limit your fluid intake. You may also be advised to drink most of your fluids during the day and to limit fluids at night.    If you re worried about urine leakage or accidents, you may wear absorbent pads to catch urine. Change the pads often. This helps reduce discomfort. It may also reduce the risk of skin or bladder  infections.    Follow-up care  Follow up with your healthcare provider, or as directed. It may take some to find the right treatment for your problem. But healthy lifestyle changes can be made right away. These include such things as exercising on a regular basis, eating a healthy diet, losing weight (if needed), and quitting smoking. Your treatment plan may include special therapies or medicines. Certain procedures or surgery may also be options. Talk about any questions you have with your provider.   When to seek medical advice  Call the healthcare provider right away if any of these occur:    Fever of 100.4 F (38 C) or higher, or as directed by your provider    Bladder pain or fullness    Belly swelling    Nausea or vomiting    Back pain    Weakness, dizziness, or fainting  Daphne last reviewed this educational content on 1/1/2020 2000-2021 [employer]. All rights reserved. This information is not intended as a substitute for professional medical care. Always follow your healthcare professional's instructions.

## 2022-09-09 NOTE — LETTER
September 16, 2022      Christa Nails  [address]   [place] [address]        Dear ,    We are writing to inform you of your test results.    All of your lab results are normal.     Resulted Orders   TSH with free T4 reflex   Result Value Ref Range    TSH 4.89 (H) 0.30 - 4.20 uIU/mL   Vitamin B12   Result Value Ref Range    Vitamin B12 634 232 - 1,245 pg/mL   UA Macro with Reflex to Micro and Culture - lab collect   Result Value Ref Range    Color Urine Yellow Colorless, Straw, Light Yellow, Yellow    Appearance Urine Clear Clear    Glucose Urine Negative Negative mg/dL    Bilirubin Urine Negative Negative    Ketones Urine Negative Negative mg/dL    Specific Gravity Urine 1.020 1.003 - 1.035    Blood Urine Negative Negative    pH Urine 5.5 5.0 - 7.0    Protein Albumin Urine Negative Negative mg/dL    Urobilinogen Urine 0.2 0.2, 1.0 E.U./dL    Nitrite Urine Negative Negative    Leukocyte Esterase Urine Negative Negative    Narrative    Microscopic not indicated   T4 free   Result Value Ref Range    Free T4 1.06 0.90 - 1.70 ng/dL       If you have any questions or concerns, please call the clinic at the number listed above.       Sincerely,      Bharati Mitchell NP

## 2022-09-10 NOTE — TELEPHONE ENCOUNTER
Received page that Amanda burt 22.2.     Spoke with Christa. She did take tacro about 30 mins before the blood draw. She is feeling ok. She will do a 12 hour trough with next blood draw. She is thinking she will do 10 am and 10pm. She will do blood draw around 10 am and take medicine after blood draw.     Christa will call back with any changes.

## 2022-09-20 ENCOUNTER — TELEPHONE (OUTPATIENT)
Dept: INFECTIOUS DISEASES | Facility: CLINIC | Age: 82
End: 2022-09-20

## 2022-09-20 ENCOUNTER — MYC MEDICAL ADVICE (OUTPATIENT)
Dept: PHARMACY | Facility: CLINIC | Age: 82
End: 2022-09-20

## 2022-09-20 ENCOUNTER — VIRTUAL VISIT (OUTPATIENT)
Dept: PHARMACY | Facility: CLINIC | Age: 82
End: 2022-09-20
Payer: COMMERCIAL

## 2022-09-20 DIAGNOSIS — N18.32 STAGE 3B CHRONIC KIDNEY DISEASE (H): ICD-10-CM

## 2022-09-20 DIAGNOSIS — I10 ESSENTIAL HYPERTENSION: ICD-10-CM

## 2022-09-20 DIAGNOSIS — A31.9 MYCOBACTERIUM INFECTION: Primary | ICD-10-CM

## 2022-09-20 DIAGNOSIS — M65.10 INFECTIOUS TENOSYNOVITIS: ICD-10-CM

## 2022-09-20 DIAGNOSIS — D63.1 ANEMIA IN STAGE 4 CHRONIC KIDNEY DISEASE (H): ICD-10-CM

## 2022-09-20 DIAGNOSIS — L29.9 ITCHING: ICD-10-CM

## 2022-09-20 DIAGNOSIS — A31.0 MYCOBACTERIUM AVIUM INFECTION (H): Primary | ICD-10-CM

## 2022-09-20 DIAGNOSIS — N18.4 ANEMIA IN STAGE 4 CHRONIC KIDNEY DISEASE (H): ICD-10-CM

## 2022-09-20 DIAGNOSIS — Z94.4 LIVER REPLACED BY TRANSPLANT (H): ICD-10-CM

## 2022-09-20 PROCEDURE — 99607 MTMS BY PHARM ADDL 15 MIN: CPT | Performed by: PHARMACIST

## 2022-09-20 PROCEDURE — 99605 MTMS BY PHARM NP 15 MIN: CPT | Performed by: PHARMACIST

## 2022-09-20 RX ORDER — ETHAMBUTOL HYDROCHLORIDE 400 MG/1
TABLET, FILM COATED ORAL
Qty: 72 TABLET | Refills: 3 | Status: SHIPPED | OUTPATIENT
Start: 2022-09-20 | End: 2023-02-07

## 2022-09-20 RX ORDER — RIFABUTIN 150 MG/1
300 CAPSULE ORAL DAILY
Qty: 180 CAPSULE | Refills: 3 | Status: SHIPPED | OUTPATIENT
Start: 2022-09-20 | End: 2023-02-07

## 2022-09-20 RX ORDER — RIFABUTIN 150 MG/1
300 CAPSULE ORAL DAILY
Qty: 60 CAPSULE | Refills: 11 | Status: CANCELLED | OUTPATIENT
Start: 2022-09-20

## 2022-09-20 RX ORDER — ETHAMBUTOL HYDROCHLORIDE 400 MG/1
TABLET, FILM COATED ORAL
Qty: 24 TABLET | Refills: 11 | Status: CANCELLED | OUTPATIENT
Start: 2022-09-20

## 2022-09-20 RX ORDER — AZITHROMYCIN 500 MG/1
500 TABLET, FILM COATED ORAL DAILY
Qty: 30 TABLET | Refills: 11 | Status: CANCELLED | OUTPATIENT
Start: 2022-09-20

## 2022-09-20 RX ORDER — AZITHROMYCIN 500 MG/1
500 TABLET, FILM COATED ORAL DAILY
Qty: 90 TABLET | Refills: 3 | Status: SHIPPED | OUTPATIENT
Start: 2022-09-20 | End: 2023-02-07

## 2022-09-20 NOTE — PROGRESS NOTES
Medication Therapy Management (MTM) Encounter    ASSESSMENT:                            Medication Adherence/Access: patient prefers to get all medications through [facility]    M. Avium complex infectious tenosynovitis:   Planning to treat for 12 months. Avoiding aminoglycosides due to age and potential nephro/ototoxicity. Due to age and estimated CrCl <30 ml/min, recommend 15 mg/kg 3x weekly dosing for ethambutol instead of daily dosing.     Dosing:     Azithromycin 500 mg/day    Ethambutol 15 mg/kg (round up to 800 mg) 3x per week (ex: M/W/F)*can consider therapeutic drug monitoring due to CKD    Rifabutin 300 mg/day      Side effects: GI upset, rash    Azithromycin: hearing loss, QTc prolongation    Ethambutol: vision changes (unilateral or bilateral, decreased acuity, color blindness)    Rifabutin: orange/brown urine discoloration, flu-like symptoms    Drug interactions:   Rifabutin:   - Decrease levels of amlodipine - monitor blood pressure at clinic appts  - Decrease levels of tacrolimus and prednisone - monitor, discuss with transplant   Discussed importance of being consistent about taking tacrolimus with/without food  - Decrease levels of sertraline - monitor for increased itching    Azithromycin:   - Increase levels of tacrolimus - monitor, discuss with transplant     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 Will monitor. See above/below.     CKD stage 3/anemia: stable. Will monitor. See above/below.     Hypertension: stable. Will monitor. See above.    Itching: stable. Will monitor. See above.    **Did not have time to review all medications today, will plan to address at next visit.     PLAN:                            1. Will enter referrals for eye exam and audiology   2. Call the [place] clinic to schedule EKG, eye exam, and audiology exam  3. Start antibiotics for MAC infectious tenosynovitis in a step-wise approach   Day 1: start  azithromycin 500 mg once daily    Day 8: start ethambutol 800 mg (2 tabs) 3 times per week (M/W/F)    Day 15: start rifabutin 300 mg (2 caps) once daily    Plan to treat for 12 months   4. Go to lab 4 weeks after starting antibiotics (CBC, BMP, hepatic panel, tacrolimus level)   Plan to get labs before taking morning dose of tacrolimus  5. Plan to get monthly vision checks with your eye doctor   6. Call or MyChart if you have the following symptoms:    Fever, cough, night sweats or local recurrence of swelling     Rash    Vision or hearing changes changes    New onset diarrhea (C.diff concern)    Follow-up: 4 weeks; phone call     SUBJECTIVE/OBJECTIVE:                          Amanda Nails is a 82 year old female coming in for an initial visit. She was referred to me from Dr. Sun. Patient was accompanied by her daughter, Yola.     Reason for visit: Discuss/start treatment for infectious tenosynovitis of right wrist (M. Avium complex)     Allergies/ADRs: Reviewed in chart  Past Medical History: Reviewed in chart  Tobacco: She reports that she has quit smoking. She quit after 3.00 years of use. She has never used smokeless tobacco.  Alcohol: rare use    Medication Adherence/Access: no issues reported, prefers to get medications through [facility]    M. Avium complex infectious tenosynovitis:   Patient was seen by ID provider Dr. Sun who recommends treatment of active/ongoing infectious tenosynovitis. Patient was hesitant to start treatment but is ready today after discussing with family and providers.   Last eye exam: does follow with an outside eye doctor but prefers to establish with [facility] for easier sharing of records, doesn't use glasses or contacts.   No use of hearing aids  Weight: 50.4 kg on 9/9/22  ALT/AST: 39/44, 9/9/22, slightly elevated, will monitor  H/H: wnl 9/9/22  Platelets: wnl 9/9/22  QTc: no recent EKG on file    Primary biliary cirrhosis/liver transplant  (1984) 2/2 autoimmune hepatitis:   Currently taking tacrolimus 1 mg 2 times daily and prednisone 1 mg 3 times daily.   Component      Latest Ref Rng & Units 9/9/2022   Tacrolimus by Tandem Mass Spectrometry      5.0 - 15.0 ug/L 22.2 (HH)   *reports taking tacrolimus right before level     CKD stage 3/anemia: Currently taking furosemide 20 mg 1/2 tablet daily. Scr has fluctuated between 1.08-1.37 in the last year. CrCl fluctuating between 23-30 ml/min in the last year using IBW. Not following with a nephrologist.     Component      Latest Ref Rng & Units 11/2/2021 1/11/2022 4/28/2022 8/17/2022   Creatinine      0.51 - 0.95 mg/dL 1.19 (H) 1.30 (H) 1.08 (H) 1.37 (H)     Component      Latest Ref Rng & Units 9/9/2022   Creatinine      0.51 - 0.95 mg/dL 1.22 (H)     Last Comprehensive Metabolic Panel:  Sodium   Date Value Ref Range Status   09/09/2022 141 136 - 145 mmol/L Final   06/14/2021 139 133 - 144 mmol/L Final     Potassium   Date Value Ref Range Status   09/09/2022 4.1 3.4 - 5.3 mmol/L Final   08/17/2022 4.2 3.4 - 5.3 mmol/L Final   06/14/2021 3.6 3.4 - 5.3 mmol/L Final     Chloride   Date Value Ref Range Status   09/09/2022 101 98 - 107 mmol/L Final   08/17/2022 105 94 - 109 mmol/L Final   06/14/2021 103 94 - 109 mmol/L Final     Carbon Dioxide   Date Value Ref Range Status   06/14/2021 28 20 - 32 mmol/L Final     Carbon Dioxide (CO2)   Date Value Ref Range Status   09/09/2022 29 22 - 29 mmol/L Final   08/17/2022 28 20 - 32 mmol/L Final     Anion Gap   Date Value Ref Range Status   09/09/2022 11 7 - 15 mmol/L Final   08/17/2022 7 3 - 14 mmol/L Final   06/14/2021 8 3 - 14 mmol/L Final     Glucose   Date Value Ref Range Status   09/09/2022 98 70 - 99 mg/dL Final   08/17/2022 174 (H) 70 - 99 mg/dL Final   06/14/2021 115 (H) 70 - 99 mg/dL Final     Urea Nitrogen   Date Value Ref Range Status   09/09/2022 38.8 (H) 8.0 - 23.0 mg/dL Final   08/17/2022 42 (H) 7 - 30 mg/dL Final   06/14/2021 38 (H) 7 - 30 mg/dL Final      Creatinine   Date Value Ref Range Status   09/09/2022 1.22 (H) 0.51 - 0.95 mg/dL Final   06/14/2021 1.43 (H) 0.52 - 1.04 mg/dL Final     GFR Estimate   Date Value Ref Range Status   09/09/2022 45 (L) >60 mL/min/1.73m2 Final     Comment:     Effective December 21, 2021 eGFRcr in adults is calculated using the 2021 CKD-EPI creatinine equation which includes age and gender (Holden et al., NE, DOI: 10.[facility]6/QKMMou2202969)   06/14/2021 35 (L) >60 mL/min/[1.73_m2] Final     Comment:     Non  GFR Calc  Starting 12/18/2018, serum creatinine based estimated GFR (eGFR) will be   calculated using the Chronic Kidney Disease Epidemiology Collaboration   (CKD-EPI) equation.       Calcium   Date Value Ref Range Status   09/09/2022 9.7 8.8 - 10.2 mg/dL Final   06/14/2021 9.3 8.5 - 10.1 mg/dL Final     Bilirubin Total   Date Value Ref Range Status   09/09/2022 0.4 <=1.2 mg/dL Final   06/14/2021 0.4 0.2 - 1.3 mg/dL Final     Alkaline Phosphatase   Date Value Ref Range Status   09/09/2022 333 (H) 35 - 104 U/L Final   06/14/2021 230 (H) 40 - 150 U/L Final     ALT   Date Value Ref Range Status   09/09/2022 44 (H) 10 - 35 U/L Final   06/14/2021 37 0 - 50 U/L Final     AST   Date Value Ref Range Status   09/09/2022 39 (H) 10 - 35 U/L Final   06/14/2021 40 0 - 45 U/L Final     CBC RESULTS: Recent Labs   Lab Test 09/09/22  1101   WBC 7.1   RBC 4.00   HGB 12.4   HCT 39.4   MCV 99   MCH 31.0   MCHC 31.5   RDW 16.8*        Hypertension: currently taking amlodipine 5 mg once daily, carvedilol 25 mg 2 times daily and furosemide 10 mg once daily. No concerns today.  BP Readings from Last 3 Encounters:   09/09/22 138/88   08/18/22 127/71   03/31/22 (!) 142/78     Itching: taking sertraline 50 mg once daily. Finds this effective currently.    Today's Vitals: LMP  (LMP Unknown)   ----------------    I spent 42 minutes with this patient today. All changes were made via collaborative practice agreement with Dr. Sun. A  copy of the visit note was provided to the patient's provider(s).    The patient was given a summary of these recommendations.     Telemedicine Visit Details  Type of service:  Telephone visit  Start Time: 8:33 AM  End Time: 9:15 AM  Originating Location (patient location): Home  Distant Location (provider location):  [facility] AND INFECTIOUS DISEAS[facility]     Medication Therapy Recommendations  Essential hypertension    Current Medication: amLODIPine (NORVASC) 5 MG tablet   Rationale: Medication requires monitoring - Needs additional monitoring   Recommendation: Continue to Monitor   Status: Accepted - no CPA Needed         Itching    Current Medication: sertraline (ZOLOFT) 50 MG tablet   Rationale: Medication requires monitoring - Needs additional monitoring   Recommendation: Self-Monitoring   Status: Accepted - no CPA Needed         Liver replaced by transplant (H)    Current Medication: tacrolimus (GENERIC EQUIVALENT) 1 MG capsule   Rationale: Medication requires monitoring - Needs additional monitoring   Recommendation: Order Lab   Status: Accepted per CPA

## 2022-09-20 NOTE — Clinical Note
Hi, Christa is planning to start treatment for her MAC tenosynovitis this week. Rifabutin may decrease tacrolimus and prednisone. Azithromycin may increase tacrolimus. I ordered a tacrolimus level for 1 week after she starts rifabutin. Please let me know if you'd like something different.   Mirian Garrett, PharmD  [facility] pharmacist

## 2022-09-20 NOTE — TELEPHONE ENCOUNTER
Entered orders for audiology and ophthalmology referrals and antibiotics to start treatment for MAC tenosynovitis. Verbal approval given by Dr. Sun.     Mirian Garrett, PharmD   Medication Therapy Management Pharmacist   September 20, 2022  [phone]

## 2022-09-20 NOTE — PATIENT INSTRUCTIONS
"Recommendations from today's MTM visit:                                                    MTM (medication therapy management) is a service provided by a clinical pharmacist designed to help you get the most of out of your medicines.      1. Will enter referrals for eye exam and audiology   2. Call the [facility] to schedule EKG, eye exam, and audiology exam  3. Start antibiotics for MAC infectious tenosynovitis in a step-wise approach   Day 1: start azithromycin 500 mg once daily    Day 8: start ethambutol 800 mg (2 tabs) 3 times per week (M/W/F)    Day 15: start rifabutin 300 mg (2 caps) once daily    Plan to treat for 12 months   4. Go to lab 4 weeks after starting antibiotics (CBC, BMP, hepatic panel, tacrolimus level)   Plan to get labs before taking morning dose of tacrolimus  5. Plan to get monthly vision checks with your eye doctor   6. Call or MyChart if you have the following symptoms:  Fever, cough, night sweats or local recurrence of swelling   Rash  Vision or hearing changes changes  New onset diarrhea (C.diff concern)    Follow-up: 4 weeks; phone call     It was great speaking with you today.  I value your experience and would be very thankful for your time in providing feedback in our clinic survey. In the next few days, you may receive an email or text message from [employer] with a link to a survey related to your  clinical pharmacist.\"     To schedule another MTM appointment, please call the clinic directly or you may call the MTM scheduling line at [phone] or toll-free at [phone].     My Clinical Pharmacist's contact information:                                                      Please feel free to contact me with any questions or concerns you have.     Mirian Garrett, PharmD   Medication Therapy Management Pharmacist   September 20, 2022  [phone]    "

## 2022-09-23 ENCOUNTER — TELEPHONE (OUTPATIENT)
Dept: TRANSPLANT | Facility: CLINIC | Age: 82
End: 2022-09-23

## 2022-09-23 DIAGNOSIS — Z94.4 LIVER TRANSPLANTED (H): Primary | ICD-10-CM

## 2022-09-23 NOTE — TELEPHONE ENCOUNTER
Staff message from Christa Nails's daughter who is again asking if she can get in sooner to see Dr. Lomeli.  We have booked her in Dr. Lomeli's clinic on October 28 at 11:00am..   Christa has been working with a pharmacist (Tiffany Garrett ph [phone]) and was thinking about starting the antifungal meds on Monday. I was not aware of this.  Christa's last tac level was 22 - but this is not an accurate level and she has not rechecked.  Her current dose is 1 mg po bid..  I called Tiffany.  No answer, sent staff message asking about her plan (if she had one for tac dose change if it's necessary) and or tac dose monitoring as last level was inaccurate.   Also sent message to Jase Mace as he is transplant pharmacist.

## 2022-09-23 NOTE — TELEPHONE ENCOUNTER
Call back from Page re: starting treatment for mycobacterium.    She had been sending communication to Dr. Lomeli.    We haven't had a good tac level in a long time.  Last level of 22 was not a 12 hour trough level.  Confirmed that Christa is taking 1 mg bid. She will hold off on starting these meds until we have a 12 hour tac level result.   Christa will go in for 12 hour trough level on wednesday next week, order placed. Per Page, plan will be to start azithromycin week 1, ethambutol week 2, rifabutin week 3 if Anali tolerates.  She will be getting a baseline hearing and vision test.  She will need monthly labs to watch tac level and for hepatotoxicity.

## 2022-09-26 ENCOUNTER — TRANSFERRED RECORDS (OUTPATIENT)
Dept: HEALTH INFORMATION MANAGEMENT | Facility: CLINIC | Age: 82
End: 2022-09-26

## 2022-09-27 ENCOUNTER — TELEPHONE (OUTPATIENT)
Dept: PHARMACY | Facility: CLINIC | Age: 82
End: 2022-09-27

## 2022-09-27 NOTE — TELEPHONE ENCOUNTER
"Patient called and asked writer to pass along a message to Dr. Sun.     Patient hopes Dr. Sun has a great upcoming trip and she will see him when he gets back.     Checked-in with patient regarding plan to start antibiotics. Patient states she has the medications but felt sick looking at them and had a few episodes of diarrhea. She drank some Kombucha and it resolved. She completed the baseline eye exam yesterday and they said her vision was \"20/20\".     She is going to focus on completing other baseline things this week (tacrolimus lab, EKG). Will give her a call next week to see if she's ready to start the antibiotics.     Mirian Garrett, PharmD   Medication Therapy Management Pharmacist   September 27, 2022  [phone]    "

## 2022-09-28 ENCOUNTER — TELEPHONE (OUTPATIENT)
Dept: TRANSPLANT | Facility: CLINIC | Age: 82
End: 2022-09-28

## 2022-09-28 DIAGNOSIS — Z94.4 LIVER TRANSPLANTED (H): Primary | ICD-10-CM

## 2022-09-28 LAB — BACTERIA BLD CULT: NO GROWTH

## 2022-09-28 NOTE — TELEPHONE ENCOUNTER
Christa was to have had a tac level drawn on Monday prior to starting planned treatment for mycobacterium.  It doesn't appear to have been drawn. Call to Christa to check in.  She doesn't plan to start the medications this week.  She is getting a properly timed tac level tomorrow.  She will let me know when she plans to start.   She is overwhelmed by all of the calls she is getting from so many people re: this plan for treatment, including her own family.  She has some questions about insurance / concerns about the cost of all of this.  I gave her Janjody's phone number to talk about insurance / supplements, general guidance.  Provided support.

## 2022-09-29 ENCOUNTER — OFFICE VISIT (OUTPATIENT)
Dept: FAMILY MEDICINE | Facility: CLINIC | Age: 82
End: 2022-09-29
Payer: COMMERCIAL

## 2022-09-29 VITALS
RESPIRATION RATE: 14 BRPM | SYSTOLIC BLOOD PRESSURE: 158 MMHG | HEART RATE: 60 BPM | OXYGEN SATURATION: 96 % | DIASTOLIC BLOOD PRESSURE: 70 MMHG | HEIGHT: 58 IN | WEIGHT: 190 LBS | BODY MASS INDEX: 39.88 KG/M2 | TEMPERATURE: 97.6 F

## 2022-09-29 DIAGNOSIS — Z94.4 LIVER TRANSPLANTED (H): ICD-10-CM

## 2022-09-29 DIAGNOSIS — A31.0 MYCOBACTERIUM AVIUM COMPLEX (H): ICD-10-CM

## 2022-09-29 DIAGNOSIS — N18.32 STAGE 3B CHRONIC KIDNEY DISEASE (H): Primary | ICD-10-CM

## 2022-09-29 DIAGNOSIS — M10.071 ACUTE IDIOPATHIC GOUT OF RIGHT FOOT: ICD-10-CM

## 2022-09-29 LAB
TACROLIMUS BLD-MCNC: 7.4 UG/L (ref 5–15)
TME LAST DOSE: NORMAL H
TME LAST DOSE: NORMAL H
URATE SERPL-MCNC: 3.4 MG/DL (ref 2.4–5.7)

## 2022-09-29 PROCEDURE — 99214 OFFICE O/P EST MOD 30 MIN: CPT | Performed by: NURSE PRACTITIONER

## 2022-09-29 PROCEDURE — 80197 ASSAY OF TACROLIMUS: CPT | Performed by: NURSE PRACTITIONER

## 2022-09-29 PROCEDURE — 84550 ASSAY OF BLOOD/URIC ACID: CPT | Performed by: NURSE PRACTITIONER

## 2022-09-29 PROCEDURE — 36415 COLL VENOUS BLD VENIPUNCTURE: CPT | Performed by: NURSE PRACTITIONER

## 2022-09-29 PROCEDURE — 93000 ELECTROCARDIOGRAM COMPLETE: CPT | Performed by: NURSE PRACTITIONER

## 2022-09-29 ASSESSMENT — PAIN SCALES - GENERAL: PAINLEVEL: SEVERE PAIN (6)

## 2022-09-29 NOTE — PROGRESS NOTES
Assessment & Plan     Stage 3b chronic kidney disease (H)  Stable on review of labs.     Acute idiopathic gout of right foot  Suspect that symptoms of her right foot are related to gout.  We will check a uric acid level today.  Currently on 100 mg of allopurinol.  Uric acid level last checked spring 2022 and was within normal limits.  - Uric acid; Future  - Uric acid    Liver transplanted (H)  History of liver transplant.  Labs to be drawn for Tacrolimiss level prior to starting medication.   - Tacrolimus by [employer]    Mycobacterium avium complex (H)  Recently diagnosed with mycobacterium EKG and traclomis level to be drawn prior to starting treatment.  I reviewed EKG. No acute change, baseline obtained.   - EKG 12-lead complete w/read - Clinics     Return if symptoms worsen or fail to improve.    JEMAL Tam CNP  [facility]   Christa is a 82 year old accompanied by her daughter, presenting for the following health issues:  Heart Problem (Pt needs EKG for starting a new medication and lab draw) and Ankle Pain (Pt is unsure of what she did but is having pain in right ankle and foot x 2 days )      HPI     Pain History:  When did you first notice your pain? - Acute Pain   Have you seen anyone else for your pain? No  Where in your body do you have pain? Musculoskeletal problem/pain  Onset/Duration: 2 days  Description  Location: foot and ankle - right  Joint Swelling: YES  Redness: YES  Pain: YES  Warmth: No  Intensity:  moderate  Progression of Symptoms:  worsening and constant  Accompanying signs and symptoms:   Fevers: No  Numbness/tingling/weakness: YES- weakness  History  Trauma to the area: unsure  Recent illness:  No  Previous similar problem: No  Previous evaluation:  No  Precipitating or alleviating factors:  Aggravating factors include: standing, walking, climbing stairs, exercise and overuse  Therapies tried and outcome:  "rest/inactivity      She has a history of gout currently on allopurinol.  She declines history of injury however does endorse that she was wearing a shoe that did not fit her right which caused pain on the outside of her foot.  Pain is now present on the inside of her ankle with some mild swelling and redness present.    Review of Systems   Constitutional, HEENT, cardiovascular, pulmonary, gi and gu systems are negative, except as otherwise noted.      Objective    BP (!) 158/70 (BP Location: Right arm, Patient Position: Chair, Cuff Size: Adult Small)   Pulse 60   Temp 97.6  F (36.4  C) (Tympanic)   Resp 14   Ht 1.473 m (4' 10\")   Wt 86.2 kg (190 lb)   LMP  (LMP Unknown)   SpO2 96%   BMI 39.71 kg/m    Body mass index is 39.71 kg/m .  Physical Exam   GENERAL: healthy, alert and no distress  NECK: no adenopathy, no asymmetry, masses, or scars and thyroid normal to palpation  RESP: lungs clear to auscultation - no rales, rhonchi or wheezes  CV: regular rate and rhythm, normal S1 S2, no S3 or S4, no murmur, click or rub, no peripheral edema and peripheral pulses strong  ABDOMEN: soft, nontender, no hepatosplenomegaly, no masses and bowel sounds normal  MS: no gross musculoskeletal defects noted.  Acute swelling noted of the right ankle with erythremia and warmth present at the medial malleolus.  Tenderness to palpation is present.  No tenderness to the lateral aspect of her foot.                    "

## 2022-09-30 ENCOUNTER — TELEPHONE (OUTPATIENT)
Dept: NURSING | Facility: CLINIC | Age: 82
End: 2022-09-30

## 2022-09-30 ENCOUNTER — TELEPHONE (OUTPATIENT)
Dept: FAMILY MEDICINE | Facility: CLINIC | Age: 82
End: 2022-09-30

## 2022-09-30 DIAGNOSIS — Z94.4 LIVER TRANSPLANTED (H): Primary | ICD-10-CM

## 2022-09-30 RX ORDER — PREDNISONE 20 MG/1
40 TABLET ORAL DAILY
Qty: 10 TABLET | Refills: 0 | Status: SHIPPED | OUTPATIENT
Start: 2022-09-30 | End: 2023-02-07

## 2022-09-30 RX ORDER — TACROLIMUS 0.5 MG/1
0.5 CAPSULE ORAL EVERY 12 HOURS
Qty: 180 CAPSULE | Refills: 3 | Status: SHIPPED | OUTPATIENT
Start: 2022-09-30 | End: 2023-07-13

## 2022-09-30 NOTE — TELEPHONE ENCOUNTER
Yola calling regarding gout medication.    No consent to communicate on file found. Yola notified.    Yola wants to make sure that patient's provider got the message regarding gout medication.    Message updated and resent to the provider.    Marta Iglesias RN on 9/30/2022 at 4:03 PM

## 2022-09-30 NOTE — TELEPHONE ENCOUNTER
We will start some prednisone.  I sent this to [facility].  I discussed this with Yola.  Jackie Garza, APRN CNP

## 2022-09-30 NOTE — TELEPHONE ENCOUNTER
ISSUE:   Tacrolimus IR level 7.4 on 9/29, goal 3-5, dose 1 mg BID.    PLAN:   Please call patient and confirm this was an accurate 12-hour trough. Verify Tacrolimus IR dose. Confirm no new medications or illness. Confirm no missed doses. If accurate trough and accurate dose, decrease Tacrolimus IR dose to 0.5 mg BID and repeat labs in 2 mos.  Candace Crowder RN    OUTCOME:   Spoke with patient, they confirm accurate trough level and current dose 1 mg BID. Patient confirmed dose change to 0.5 mg BID and to repeat labs in 2 months. Orders sent to preferred pharmacy for dose change and lab for repeat labs. Patient voiced understanding of plan.    Susanne Martinez LPN

## 2022-09-30 NOTE — TELEPHONE ENCOUNTER
Yola calling again and she is hoping to get the gout medication sent to the pharmacy before the weekend.    Marta Iglesias RN on 9/30/2022 at 4:01 PM

## 2022-09-30 NOTE — TELEPHONE ENCOUNTER
Jackie Garza:    Received a call from Candace, Liver Transplant coordinator at the [facility], she is wanting to let you know that they do not treat gout any differently that a primary provider would and asks that you please review and treat patient for the gout. There are a few my chart messages from FRANCIS Maria.    Please advise.      FRANCIS Krueger

## 2022-10-03 ENCOUNTER — TELEPHONE (OUTPATIENT)
Dept: TRANSPLANT | Facility: CLINIC | Age: 82
End: 2022-10-03

## 2022-10-03 NOTE — TELEPHONE ENCOUNTER
"Call to Christa to make sure she was ordered for treatment of her gout. She did get the 5 day steroid taper for her right foot (gout) from her PCP and is feeling \"great\"!  "

## 2022-10-04 ENCOUNTER — DOCUMENTATION ONLY (OUTPATIENT)
Dept: TRANSPLANT | Facility: CLINIC | Age: 82
End: 2022-10-04

## 2022-10-04 ENCOUNTER — MYC MEDICAL ADVICE (OUTPATIENT)
Dept: FAMILY MEDICINE | Facility: CLINIC | Age: 82
End: 2022-10-04

## 2022-10-04 DIAGNOSIS — R13.10 DYSPHAGIA, UNSPECIFIED TYPE: Primary | ICD-10-CM

## 2022-10-06 ENCOUNTER — TELEPHONE (OUTPATIENT)
Dept: PHARMACY | Facility: CLINIC | Age: 82
End: 2022-10-06

## 2022-10-06 NOTE — TELEPHONE ENCOUNTER
Called Christa to follow-up with starting antibiotics for MAC tenosynovitis infection.    Christa just finished up 5 day course of prednisone for a suspected gout flare of her foot, although uric acid was normal. She's feeling well.     Has not picked up lower dose of tacrolimus yet but plans to get that today. Transplant recommended to decrease dose to 0.5 mg q12 hours.     Had eye appt - states it went well and no concerns. Does not have monthly follow-ups set up yet. Has audiology scheduled for end of November. Baseline QTc 382- normal.     Christa states her wrist is a little swollen but overall stable. She has an appt with Dr. Lomeli on 10/28, she'd like to start antibiotics after that. She said she's made up her mind and she WILL start the antibiotics and give it a shot.     Plan:   Call patient on 11/1/22 to start the antibiotics with lab follow-up 4 weeks after that.     Mirian Garrett, PharmD   Medication Therapy Management Pharmacist   October 6, 2022  [phone]

## 2022-10-07 ENCOUNTER — MYC MEDICAL ADVICE (OUTPATIENT)
Dept: FAMILY MEDICINE | Facility: CLINIC | Age: 82
End: 2022-10-07

## 2022-10-07 DIAGNOSIS — Z74.09 MOBILITY IMPAIRED: ICD-10-CM

## 2022-10-07 DIAGNOSIS — M81.0 OSTEOPOROSIS WITHOUT CURRENT PATHOLOGICAL FRACTURE, UNSPECIFIED OSTEOPOROSIS TYPE: Primary | ICD-10-CM

## 2022-10-07 DIAGNOSIS — R26.9 ABNORMAL GAIT: ICD-10-CM

## 2022-10-07 NOTE — TELEPHONE ENCOUNTER
Bharati,    Please see pt's mychart msg.   I have started DME orders for your consideration.    Anastacia Shirley RN

## 2022-10-12 ENCOUNTER — TELEPHONE (OUTPATIENT)
Dept: TRANSPLANT | Facility: CLINIC | Age: 82
End: 2022-10-12

## 2022-10-12 NOTE — PROGRESS NOTES
Transplant Social Work Services Phone Call      Data: I received a call from patient with questions about Medicare open enrollment.  Intervention: Medicare education provided and we discussed free medication programs through individual  if there are high out of pocket copays for medications.  Assessment: Christa has a [employer] Medicare Advantage Plan.  She denies any concerns with affording her premiums or out of pocket costs though she wonders if she should explore alternative health insurance options.  She received a liver transplant in 1984 and did not have Medicare at the time of transplantation.  Education provided by SW: [employer], virtual liver transplant support group (DDR[employer]hart sent)  Plan: Christa will consult with [employer].  She will reach back out to me if additional consultation is needed.      KAYLEE Tidwell, [facility]  Liver Transplant   Phone [phone]  Pager [phone]

## 2022-10-21 ENCOUNTER — LAB (OUTPATIENT)
Dept: LAB | Facility: CLINIC | Age: 82
End: 2022-10-21
Payer: COMMERCIAL

## 2022-10-21 DIAGNOSIS — A31.9 MYCOBACTERIUM INFECTION: ICD-10-CM

## 2022-10-21 DIAGNOSIS — Z13.220 LIPID SCREENING: ICD-10-CM

## 2022-10-21 DIAGNOSIS — Z94.4 LIVER REPLACED BY TRANSPLANT (H): ICD-10-CM

## 2022-10-21 LAB
ALBUMIN SERPL BCG-MCNC: 3.7 G/DL (ref 3.5–5.2)
ALP SERPL-CCNC: 262 U/L (ref 35–104)
ALT SERPL W P-5'-P-CCNC: 42 U/L (ref 10–35)
ANION GAP SERPL CALCULATED.3IONS-SCNC: 9 MMOL/L (ref 7–15)
AST SERPL W P-5'-P-CCNC: 36 U/L (ref 10–35)
BACTERIA BLD CULT: NO GROWTH
BILIRUB DIRECT SERPL-MCNC: <0.2 MG/DL (ref 0–0.3)
BILIRUB SERPL-MCNC: 0.4 MG/DL
BUN SERPL-MCNC: 25.4 MG/DL (ref 8–23)
CALCIUM SERPL-MCNC: 9.7 MG/DL (ref 8.8–10.2)
CHLORIDE SERPL-SCNC: 105 MMOL/L (ref 98–107)
CREAT SERPL-MCNC: 1.16 MG/DL (ref 0.51–0.95)
DEPRECATED HCO3 PLAS-SCNC: 27 MMOL/L (ref 22–29)
ERYTHROCYTE [DISTWIDTH] IN BLOOD BY AUTOMATED COUNT: 16.7 % (ref 10–15)
GFR SERPL CREATININE-BSD FRML MDRD: 47 ML/MIN/1.73M2
GLUCOSE SERPL-MCNC: 89 MG/DL (ref 70–99)
HCT VFR BLD AUTO: 39.5 % (ref 35–47)
HGB BLD-MCNC: 12.6 G/DL (ref 11.7–15.7)
MCH RBC QN AUTO: 31.9 PG (ref 26.5–33)
MCHC RBC AUTO-ENTMCNC: 31.9 G/DL (ref 31.5–36.5)
MCV RBC AUTO: 100 FL (ref 78–100)
PLATELET # BLD AUTO: 241 10E3/UL (ref 150–450)
POTASSIUM SERPL-SCNC: 4.3 MMOL/L (ref 3.4–5.3)
PROT SERPL-MCNC: 6.6 G/DL (ref 6.4–8.3)
RBC # BLD AUTO: 3.95 10E6/UL (ref 3.8–5.2)
SODIUM SERPL-SCNC: 141 MMOL/L (ref 136–145)
TACROLIMUS BLD-MCNC: 3.2 UG/L (ref 5–15)
TME LAST DOSE: ABNORMAL H
TME LAST DOSE: ABNORMAL H
WBC # BLD AUTO: 4.9 10E3/UL (ref 4–11)

## 2022-10-21 PROCEDURE — 82248 BILIRUBIN DIRECT: CPT

## 2022-10-21 PROCEDURE — 80053 COMPREHEN METABOLIC PANEL: CPT

## 2022-10-21 PROCEDURE — 36415 COLL VENOUS BLD VENIPUNCTURE: CPT

## 2022-10-21 PROCEDURE — 85027 COMPLETE CBC AUTOMATED: CPT

## 2022-10-21 PROCEDURE — 80197 ASSAY OF TACROLIMUS: CPT

## 2022-10-28 ENCOUNTER — TELEPHONE (OUTPATIENT)
Dept: TRANSPLANT | Facility: CLINIC | Age: 82
End: 2022-10-28

## 2022-10-28 ENCOUNTER — OFFICE VISIT (OUTPATIENT)
Dept: GASTROENTEROLOGY | Facility: CLINIC | Age: 82
End: 2022-10-28
Attending: INTERNAL MEDICINE
Payer: COMMERCIAL

## 2022-10-28 VITALS
DIASTOLIC BLOOD PRESSURE: 80 MMHG | BODY MASS INDEX: 22.77 KG/M2 | SYSTOLIC BLOOD PRESSURE: 172 MMHG | HEART RATE: 58 BPM | HEIGHT: 58 IN | OXYGEN SATURATION: 98 % | WEIGHT: 108.5 LBS

## 2022-10-28 DIAGNOSIS — A31.0 MYCOBACTERIUM AVIUM-INTRACELLULARE INFECTION (H): ICD-10-CM

## 2022-10-28 DIAGNOSIS — N18.4 ANEMIA IN STAGE 4 CHRONIC KIDNEY DISEASE (H): ICD-10-CM

## 2022-10-28 DIAGNOSIS — Z94.4 LIVER REPLACED BY TRANSPLANT (H): Primary | ICD-10-CM

## 2022-10-28 DIAGNOSIS — D63.1 ANEMIA IN STAGE 4 CHRONIC KIDNEY DISEASE (H): ICD-10-CM

## 2022-10-28 PROCEDURE — G0463 HOSPITAL OUTPT CLINIC VISIT: HCPCS

## 2022-10-28 PROCEDURE — 99214 OFFICE O/P EST MOD 30 MIN: CPT | Performed by: INTERNAL MEDICINE

## 2022-10-28 NOTE — LETTER
10/28/2022         RE: Amanda Nails  [address]  [place] [address]        Dear Colleague,    Thank you for referring your patient, Amanda Nails, to the [facility] [place]. Please see a copy of my visit note below.    HISTORY OF PRESENT ILLNESS:  I had the pleasure of seeing Anali Nails for followup in the Liver Transplant Clinic at the [facility] on 10/28/2022.  Ms. 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  She saw Infectious Disease who recommended triple antibiotic therapy, which she was quite reluctant to pursue.  At the time, her CRP was normal and what we opted to do is to decrease her immunosuppression and see what happened.  Low and behold, it has decreased in size.  She has no fevers, night sweats or decrease in appetite.  As I mentioned, her CRP is normal as well.    At present, she denies any abdominal pain, itching or skin rash.  She does have numerous actinic keratoses.  She does have some mild fatigue.  She denies any increased abdominal girth or lower extremity edema.  She has not had any fevers or chills, cough or shortness of breath.  She denies any nausea or vomiting.  She has some intermittent diarrhea, no constipation.  Her appetite is good and her weight for the most part has been stable.    Current Outpatient Medications   Medication     Acetaminophen (TYLENOL PO)     allopurinol (ZYLOPRIM) 100 MG tablet     amLODIPine (NORVASC) 5 MG tablet     Ascorbic Acid (VITAMIN C PO)     CALCIUM-VITAMIN D PO     carvedilol (COREG) 25 MG tablet     furosemide (LASIX) 20 MG tablet     Multiple Vitamins-Minerals (PRESERVISION AREDS 2 PO)     Omega-3 Fatty Acids (FISH OIL PO)     predniSONE (DELTASONE) 1 MG tablet     sertraline (ZOLOFT) 50 MG tablet     tacrolimus  "(GENERIC EQUIVALENT) 0.5 MG capsule     ursodiol (ACTIGALL) 300 MG capsule     azithromycin (ZITHROMAX) 500 MG tablet     ethambutol (MYAMBUTOL) 400 MG tablet     predniSONE (DELTASONE) 20 MG tablet     rifabutin (MYCOBUTIN) 150 MG capsule     No current facility-administered medications for this visit.     BP (!) 172/80   Pulse 58   Ht 1.473 m (4' 9.99\")   Wt 49.2 kg (108 lb 8 oz)   LMP  (LMP Unknown)   SpO2 98%   BMI 22.68 kg/m      PHYSICAL EXAMINATION:    GENERAL:  She looks fairly well.  HEENT:  Shows no scleral icterus and temporal muscle wasting.  CHEST:  Clear.  ABDOMEN:  No increase in girth.  No masses or tenderness to palpation are present.  Her liver is 10-11 cm span with a slightly prominent left lobe.  No spleen tip is palpable.  EXTREMITIES:  No edema.  SKIN:  Numerous actinic keratoses.  NEUROLOGIC:  Nonfocal.    LABORATORY DATA:  Her most recent laboratory tests are from 10/21/2022 and showed her white count is 4.9, hemoglobin is 12.6 and platelets are 241,000.  Her sodium is 141, potassium 4.3, BUN is 25, creatinine 1.16, AST is 36, ALT is 42, alkaline phosphatase 362, albumin is 3.7 with a total protein of 6.6 and total bilirubin is 0.4.    IMPRESSION:  Ms. Nails is 41 years status post liver transplantation and really doing quite well.  She does have recurrence of PBC, but her liver tests are  mildly abnormal.  She has some mild CKD as well.    In terms of her M avium intracellulare, we have lowered her immunosuppression has been measuring the lesion on her wrist and it has been decreasing in size.  It is not warm or inflamed and with a normal CRP, I think we can continue this direction.      Otherwise, she is up to date with regard to vaccines and other cancer screening including Dermatology every 3-6 months, and I will see her back in the clinic again in 6 months.    Thank you very much for allowing me to participate in the care of this patient.  If you have any questions regarding my " recommendations, please do not hesitate to contact me.         Luis Daniel Lomeli MD      Professor of Medicine  [employer]      Executive Medical Director, Solid Organ Transplant Program  [facility]

## 2022-10-28 NOTE — LETTER
10/28/2022         RE: Amanda Nails  [address]  [place] [address]      HISTORY OF PRESENT ILLNESS:  I had the pleasure of seeing Anali Nails for followup in the Liver Transplant Clinic at the [facility] on 10/28/2022.  Ms. 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  She saw Infectious Disease who recommended triple antibiotic therapy, which she was quite reluctant to pursue.  At the time, her CRP was normal and what we opted to do is to decrease her immunosuppression and see what happened.  Low and behold, it has decreased in size.  She has no fevers, night sweats or decrease in appetite.  As I mentioned, her CRP is normal as well.    At present, she denies any abdominal pain, itching or skin rash.  She does have numerous actinic keratoses.  She does have some mild fatigue.  She denies any increased abdominal girth or lower extremity edema.  She has not had any fevers or chills, cough or shortness of breath.  She denies any nausea or vomiting.  She has some intermittent diarrhea, no constipation.  Her appetite is good and her weight for the most part has been stable.    Current Outpatient Medications   Medication     Acetaminophen (TYLENOL PO)     allopurinol (ZYLOPRIM) 100 MG tablet     amLODIPine (NORVASC) 5 MG tablet     Ascorbic Acid (VITAMIN C PO)     CALCIUM-VITAMIN D PO     carvedilol (COREG) 25 MG tablet     furosemide (LASIX) 20 MG tablet     Multiple Vitamins-Minerals (PRESERVISION AREDS 2 PO)     Omega-3 Fatty Acids (FISH OIL PO)     predniSONE (DELTASONE) 1 MG tablet     sertraline (ZOLOFT) 50 MG tablet     tacrolimus (GENERIC EQUIVALENT) 0.5 MG capsule     ursodiol (ACTIGALL) 300 MG capsule     azithromycin (ZITHROMAX) 500 MG tablet     ethambutol (MYAMBUTOL) 400 MG tablet     predniSONE  "(DELTASONE) 20 MG tablet     rifabutin (MYCOBUTIN) 150 MG capsule     No current facility-administered medications for this visit.     BP (!) 172/80   Pulse 58   Ht 1.473 m (4' 9.99\")   Wt 49.2 kg (108 lb 8 oz)   LMP  (LMP Unknown)   SpO2 98%   BMI 22.68 kg/m      PHYSICAL EXAMINATION:    GENERAL:  She looks fairly well.  HEENT:  Shows no scleral icterus and temporal muscle wasting.  CHEST:  Clear.  ABDOMEN:  No increase in girth.  No masses or tenderness to palpation are present.  Her liver is 10-11 cm span with a slightly prominent left lobe.  No spleen tip is palpable.  EXTREMITIES:  No edema.  SKIN:  Numerous actinic keratoses.  NEUROLOGIC:  Nonfocal.    LABORATORY DATA:  Her most recent laboratory tests are from 10/21/2022 and showed her white count is 4.9, hemoglobin is 12.6 and platelets are 241,000.  Her sodium is 141, potassium 4.3, BUN is 25, creatinine 1.16, AST is 36, ALT is 42, alkaline phosphatase 362, albumin is 3.7 with a total protein of 6.6 and total bilirubin is 0.4.    IMPRESSION:  Ms. Nails is 41 years status post liver transplantation and really doing quite well.  She does have recurrence of PBC, but her liver tests are  mildly abnormal.  She has some mild CKD as well.    In terms of her M avium intracellulare, we have lowered her immunosuppression has been measuring the lesion on her wrist and it has been decreasing in size.  It is not warm or inflamed and with a normal CRP, I think we can continue this direction.      Otherwise, she is up to date with regard to vaccines and other cancer screening including Dermatology every 3-6 months, and I will see her back in the clinic again in 6 months.    Thank you very much for allowing me to participate in the care of this patient.  If you have any questions regarding my recommendations, please do not hesitate to contact me.         Luis Daniel Lomeli MD      Professor of Medicine  [employer]      Executive Medical " Director, Solid Organ Transplant Program  [facility]

## 2022-10-28 NOTE — TELEPHONE ENCOUNTER
"Christa and her daughter met w/ Dr. Lomeli this morning. They had a \"great\" visit.  She is not going to take the medications for mycobacterium.  She is very comfortable  and relieved to with this decision.   "

## 2022-10-28 NOTE — NURSING NOTE
"Chief Complaint   Patient presents with     RECHECK     Liver TX     BP (!) 184/78   Pulse 58   Ht 1.473 m (4' 9.99\")   Wt 49.2 kg (108 lb 8 oz)   LMP  (LMP Unknown)   SpO2 98%   BMI 22.68 kg/m      Evan Eason MA  "

## 2022-10-28 NOTE — PROGRESS NOTES
HISTORY OF PRESENT ILLNESS:  I had the pleasure of seeing Anali Nails for followup in the Liver Transplant Clinic at the [facility] on 10/28/2022.  Ms. 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  She saw Infectious Disease who recommended triple antibiotic therapy, which she was quite reluctant to pursue.  At the time, her CRP was normal and what we opted to do is to decrease her immunosuppression and see what happened.  Low and behold, it has decreased in size.  She has no fevers, night sweats or decrease in appetite.  As I mentioned, her CRP is normal as well.    At present, she denies any abdominal pain, itching or skin rash.  She does have numerous actinic keratoses.  She does have some mild fatigue.  She denies any increased abdominal girth or lower extremity edema.  She has not had any fevers or chills, cough or shortness of breath.  She denies any nausea or vomiting.  She has some intermittent diarrhea, no constipation.  Her appetite is good and her weight for the most part has been stable.    Current Outpatient Medications   Medication     Acetaminophen (TYLENOL PO)     allopurinol (ZYLOPRIM) 100 MG tablet     amLODIPine (NORVASC) 5 MG tablet     Ascorbic Acid (VITAMIN C PO)     CALCIUM-VITAMIN D PO     carvedilol (COREG) 25 MG tablet     furosemide (LASIX) 20 MG tablet     Multiple Vitamins-Minerals (PRESERVISION AREDS 2 PO)     Omega-3 Fatty Acids (FISH OIL PO)     predniSONE (DELTASONE) 1 MG tablet     sertraline (ZOLOFT) 50 MG tablet     tacrolimus (GENERIC EQUIVALENT) 0.5 MG capsule     ursodiol (ACTIGALL) 300 MG capsule     azithromycin (ZITHROMAX) 500 MG tablet     ethambutol (MYAMBUTOL) 400 MG tablet     predniSONE (DELTASONE) 20 MG tablet     rifabutin (MYCOBUTIN) 150 MG capsule     No current facility-administered  "medications for this visit.     BP (!) 172/80   Pulse 58   Ht 1.473 m (4' 9.99\")   Wt 49.2 kg (108 lb 8 oz)   LMP  (LMP Unknown)   SpO2 98%   BMI 22.68 kg/m      PHYSICAL EXAMINATION:    GENERAL:  She looks fairly well.  HEENT:  Shows no scleral icterus and temporal muscle wasting.  CHEST:  Clear.  ABDOMEN:  No increase in girth.  No masses or tenderness to palpation are present.  Her liver is 10-11 cm span with a slightly prominent left lobe.  No spleen tip is palpable.  EXTREMITIES:  No edema.  SKIN:  Numerous actinic keratoses.  NEUROLOGIC:  Nonfocal.    LABORATORY DATA:  Her most recent laboratory tests are from 10/21/2022 and showed her white count is 4.9, hemoglobin is 12.6 and platelets are 241,000.  Her sodium is 141, potassium 4.3, BUN is 25, creatinine 1.16, AST is 36, ALT is 42, alkaline phosphatase 362, albumin is 3.7 with a total protein of 6.6 and total bilirubin is 0.4.    IMPRESSION:  Ms. Nails is 41 years status post liver transplantation and really doing quite well.  She does have recurrence of PBC, but her liver tests are  mildly abnormal.  She has some mild CKD as well.    In terms of her M avium intracellulare, we have lowered her immunosuppression has been measuring the lesion on her wrist and it has been decreasing in size.  It is not warm or inflamed and with a normal CRP, I think we can continue this direction.      Otherwise, she is up to date with regard to vaccines and other cancer screening including Dermatology every 3-6 months, and I will see her back in the clinic again in 6 months.    Thank you very much for allowing me to participate in the care of this patient.  If you have any questions regarding my recommendations, please do not hesitate to contact me.         Luis Daniel Lomeli MD      Professor of Medicine  [employer]      Executive Medical Director, Solid Organ Transplant Program  [facility]       "

## 2022-11-03 ENCOUNTER — VIRTUAL VISIT (OUTPATIENT)
Dept: INFECTIOUS DISEASES | Facility: CLINIC | Age: 82
End: 2022-11-03
Attending: STUDENT IN AN ORGANIZED HEALTH CARE EDUCATION/TRAINING PROGRAM
Payer: COMMERCIAL

## 2022-11-03 DIAGNOSIS — A31.0 MYCOBACTERIUM AVIUM INFECTION (H): Primary | ICD-10-CM

## 2022-11-03 DIAGNOSIS — Z94.4 LIVER REPLACED BY TRANSPLANT (H): ICD-10-CM

## 2022-11-03 DIAGNOSIS — M65.10 INFECTIOUS TENOSYNOVITIS: ICD-10-CM

## 2022-11-03 PROCEDURE — 99215 OFFICE O/P EST HI 40 MIN: CPT | Mod: 95 | Performed by: STUDENT IN AN ORGANIZED HEALTH CARE EDUCATION/TRAINING PROGRAM
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
## 2022-11-03 NOTE — PATIENT INSTRUCTIONS
- As per discussion today, Ok to monitor off antibiotic therapy  - Continue follow up with Liver transplant team as planned  - Recommend checking CRP every 3 months or so  - Please inform me if you develop fevers, chills, night sweats or worsening pain/swelling in right wrist
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
## 2022-11-18 ENCOUNTER — ANESTHESIA EVENT (OUTPATIENT)
Dept: GASTROENTEROLOGY | Facility: CLINIC | Age: 82
End: 2022-11-18
Payer: COMMERCIAL

## 2022-11-18 RX ORDER — NALOXONE HYDROCHLORIDE 0.4 MG/ML
0.4 INJECTION, SOLUTION INTRAMUSCULAR; INTRAVENOUS; SUBCUTANEOUS
Status: CANCELLED | OUTPATIENT
Start: 2022-11-18

## 2022-11-18 RX ORDER — HYDROMORPHONE HCL IN WATER/PF 6 MG/30 ML
0.4 PATIENT CONTROLLED ANALGESIA SYRINGE INTRAVENOUS EVERY 5 MIN PRN
Status: CANCELLED | OUTPATIENT
Start: 2022-11-18

## 2022-11-18 RX ORDER — FENTANYL CITRATE 50 UG/ML
50 INJECTION, SOLUTION INTRAMUSCULAR; INTRAVENOUS EVERY 5 MIN PRN
Status: CANCELLED | OUTPATIENT
Start: 2022-11-18

## 2022-11-18 RX ORDER — ONDANSETRON 4 MG/1
4 TABLET, ORALLY DISINTEGRATING ORAL EVERY 6 HOURS PRN
Status: CANCELLED | OUTPATIENT
Start: 2022-11-18

## 2022-11-18 RX ORDER — ONDANSETRON 2 MG/ML
4 INJECTION INTRAMUSCULAR; INTRAVENOUS EVERY 30 MIN PRN
Status: CANCELLED | OUTPATIENT
Start: 2022-11-18

## 2022-11-18 RX ORDER — NALOXONE HYDROCHLORIDE 0.4 MG/ML
0.2 INJECTION, SOLUTION INTRAMUSCULAR; INTRAVENOUS; SUBCUTANEOUS
Status: CANCELLED | OUTPATIENT
Start: 2022-11-18

## 2022-11-18 RX ORDER — PROCHLORPERAZINE MALEATE 5 MG
5 TABLET ORAL EVERY 6 HOURS PRN
Status: CANCELLED | OUTPATIENT
Start: 2022-11-18

## 2022-11-18 RX ORDER — ONDANSETRON 2 MG/ML
4 INJECTION INTRAMUSCULAR; INTRAVENOUS EVERY 6 HOURS PRN
Status: CANCELLED | OUTPATIENT
Start: 2022-11-18

## 2022-11-18 RX ORDER — SODIUM CHLORIDE, SODIUM LACTATE, POTASSIUM CHLORIDE, CALCIUM CHLORIDE 600; 310; 30; 20 MG/100ML; MG/100ML; MG/100ML; MG/100ML
INJECTION, SOLUTION INTRAVENOUS CONTINUOUS
Status: CANCELLED | OUTPATIENT
Start: 2022-11-18

## 2022-11-18 RX ORDER — MEPERIDINE HYDROCHLORIDE 25 MG/ML
12.5 INJECTION INTRAMUSCULAR; INTRAVENOUS; SUBCUTANEOUS
Status: CANCELLED | OUTPATIENT
Start: 2022-11-18

## 2022-11-18 RX ORDER — FENTANYL CITRATE 50 UG/ML
25 INJECTION, SOLUTION INTRAMUSCULAR; INTRAVENOUS EVERY 5 MIN PRN
Status: CANCELLED | OUTPATIENT
Start: 2022-11-18

## 2022-11-18 RX ORDER — ONDANSETRON 4 MG/1
4 TABLET, ORALLY DISINTEGRATING ORAL EVERY 30 MIN PRN
Status: CANCELLED | OUTPATIENT
Start: 2022-11-18

## 2022-11-18 RX ORDER — FENTANYL CITRATE 50 UG/ML
25 INJECTION, SOLUTION INTRAMUSCULAR; INTRAVENOUS
Status: CANCELLED | OUTPATIENT
Start: 2022-11-18

## 2022-11-18 RX ORDER — HYDROMORPHONE HCL IN WATER/PF 6 MG/30 ML
0.2 PATIENT CONTROLLED ANALGESIA SYRINGE INTRAVENOUS EVERY 5 MIN PRN
Status: CANCELLED | OUTPATIENT
Start: 2022-11-18

## 2022-11-18 RX ORDER — FLUMAZENIL 0.1 MG/ML
0.2 INJECTION, SOLUTION INTRAVENOUS
Status: CANCELLED | OUTPATIENT
Start: 2022-11-18 | End: 2022-11-19

## 2022-11-18 ASSESSMENT — LIFESTYLE VARIABLES: TOBACCO_USE: 1
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
## 2022-11-22 ENCOUNTER — ANESTHESIA (OUTPATIENT)
Dept: GASTROENTEROLOGY | Facility: CLINIC | Age: 82
End: 2022-11-22
Payer: COMMERCIAL

## 2022-11-22 ENCOUNTER — HOSPITAL ENCOUNTER (OUTPATIENT)
Facility: CLINIC | Age: 82
Discharge: HOME | End: 2022-11-22
Attending: SURGERY | Admitting: SURGERY
Payer: COMMERCIAL

## 2022-11-22 VITALS
HEIGHT: 58 IN | TEMPERATURE: 97.8 F | HEART RATE: 72 BPM | SYSTOLIC BLOOD PRESSURE: 146 MMHG | BODY MASS INDEX: 22.67 KG/M2 | RESPIRATION RATE: 20 BRPM | WEIGHT: 108 LBS | OXYGEN SATURATION: 96 % | DIASTOLIC BLOOD PRESSURE: 74 MMHG

## 2022-11-22 DIAGNOSIS — K22.10 ULCER OF ESOPHAGUS WITHOUT BLEEDING: ICD-10-CM

## 2022-11-22 DIAGNOSIS — K29.70 GASTRITIS WITHOUT BLEEDING, UNSPECIFIED CHRONICITY, UNSPECIFIED GASTRITIS TYPE: Primary | ICD-10-CM

## 2022-11-22 LAB — UPPER GI ENDOSCOPY: NORMAL

## 2022-11-22 PROCEDURE — 43249 ESOPH EGD DILATION <30 MM: CPT | Performed by: SURGERY

## 2022-11-22 PROCEDURE — 250N000011 HC RX IP 250 OP 636: Performed by: NURSE ANESTHETIST, CERTIFIED REGISTERED

## 2022-11-22 PROCEDURE — 370N000017 HC ANESTHESIA TECHNICAL FEE, PER MIN: Performed by: SURGERY

## 2022-11-22 PROCEDURE — 258N000003 HC RX IP 258 OP 636: Performed by: SURGERY

## 2022-11-22 PROCEDURE — 88305 TISSUE EXAM BY PATHOLOGIST: CPT | Mod: TC | Performed by: SURGERY

## 2022-11-22 PROCEDURE — 43239 EGD BIOPSY SINGLE/MULTIPLE: CPT | Mod: XS | Performed by: SURGERY

## 2022-11-22 PROCEDURE — 43239 EGD BIOPSY SINGLE/MULTIPLE: CPT | Mod: 59 | Performed by: SURGERY

## 2022-11-22 PROCEDURE — 250N000009 HC RX 250: Performed by: NURSE ANESTHETIST, CERTIFIED REGISTERED

## 2022-11-22 PROCEDURE — 250N000009 HC RX 250: Performed by: SURGERY

## 2022-11-22 RX ORDER — LIDOCAINE 40 MG/G
CREAM TOPICAL
Status: DISCONTINUED | OUTPATIENT
Start: 2022-11-22 | End: 2022-11-22 | Stop reason: HOSPADM

## 2022-11-22 RX ORDER — LIDOCAINE HYDROCHLORIDE 10 MG/ML
INJECTION, SOLUTION EPIDURAL; INFILTRATION; INTRACAUDAL; PERINEURAL PRN
Status: DISCONTINUED | OUTPATIENT
Start: 2022-11-22 | End: 2022-11-22

## 2022-11-22 RX ORDER — SODIUM CHLORIDE, SODIUM LACTATE, POTASSIUM CHLORIDE, CALCIUM CHLORIDE 600; 310; 30; 20 MG/100ML; MG/100ML; MG/100ML; MG/100ML
INJECTION, SOLUTION INTRAVENOUS CONTINUOUS
Status: DISCONTINUED | OUTPATIENT
Start: 2022-11-22 | End: 2022-11-22 | Stop reason: HOSPADM

## 2022-11-22 RX ORDER — OMEPRAZOLE 20 MG/1
20 TABLET, DELAYED RELEASE ORAL DAILY
Qty: 30 TABLET | Refills: 11 | Status: SHIPPED | OUTPATIENT
Start: 2022-11-22 | End: 2023-01-01

## 2022-11-22 RX ORDER — PROPOFOL 10 MG/ML
INJECTION, EMULSION INTRAVENOUS CONTINUOUS PRN
Status: DISCONTINUED | OUTPATIENT
Start: 2022-11-22 | End: 2022-11-22

## 2022-11-22 RX ORDER — PROPOFOL 10 MG/ML
INJECTION, EMULSION INTRAVENOUS PRN
Status: DISCONTINUED | OUTPATIENT
Start: 2022-11-22 | End: 2022-11-22

## 2022-11-22 RX ADMIN — LIDOCAINE HYDROCHLORIDE 0.1 ML: 10 INJECTION, SOLUTION EPIDURAL; INFILTRATION; INTRACAUDAL; PERINEURAL at 11:29

## 2022-11-22 RX ADMIN — PROPOFOL 150 MCG/KG/MIN: 10 INJECTION, EMULSION INTRAVENOUS at 11:41

## 2022-11-22 RX ADMIN — SODIUM CHLORIDE, POTASSIUM CHLORIDE, SODIUM LACTATE AND CALCIUM CHLORIDE: 600; 310; 30; 20 INJECTION, SOLUTION INTRAVENOUS at 11:29

## 2022-11-22 RX ADMIN — TOPICAL ANESTHETIC 1 SPRAY: 200 SPRAY DENTAL; PERIODONTAL at 11:38

## 2022-11-22 RX ADMIN — LIDOCAINE HYDROCHLORIDE 50 MG: 10 INJECTION, SOLUTION EPIDURAL; INFILTRATION; INTRACAUDAL; PERINEURAL at 11:41

## 2022-11-22 RX ADMIN — PROPOFOL 100 MG: 10 INJECTION, EMULSION INTRAVENOUS at 11:41

## 2022-11-22 ASSESSMENT — ACTIVITIES OF DAILY LIVING (ADL)
ADLS_ACUITY_SCORE: 37
ADLS_ACUITY_SCORE: 37

## 2022-11-22 NOTE — ANESTHESIA POSTPROCEDURE EVALUATION
Patient: Amanda Nails    Procedure: Procedure(s):  ESOPHAGOGASTRODUODENOSCOPY, WITH BIOPSY  ESOPHAGOGASTRODUODENOSCOPY, WITH BALLOON DILATION OF LESS THAN 30 MILLIMETERS       Anesthesia Type:  General    Note:  Disposition: Outpatient   Postop Pain Control: Uneventful            Sign Out: Well controlled pain   PONV: No   Neuro/Psych: Uneventful            Sign Out: Acceptable/Baseline neuro status   Airway/Respiratory: Uneventful            Sign Out: Acceptable/Baseline resp. status   CV/Hemodynamics: Uneventful            Sign Out: Acceptable CV status; No obvious hypovolemia; No obvious fluid overload   Other NRE: NONE   DID A NON-ROUTINE EVENT OCCUR? No           Last vitals:  Vitals Value Taken Time   /59 11/22/22 1200   Temp     Pulse 72 11/22/22 1200   Resp     SpO2 93 % 11/22/22 1202   Vitals shown include unvalidated device data.    Electronically Signed By: Edd Baltazar CRNA, APRN CRNA  November 22, 2022  12:03 PM

## 2022-11-22 NOTE — LETTER
"November 30, 2022      Christa Nails  [address]   [place] [address]        Dear ,    We are writing to inform you of your test results.    Shows \"gastritis\"  Your pathologies showed no cancer and no bacteria that can cause stomach issues. There was some irritation that may be due to certain medications (including NSAIDs like ibuprofen and aspirin), excess alcohol, corticosteroid like prednisone, or bile reflux. Please discuss avoidance of certain medications and if any possible additional treatment with your regular doctor.    Resulted Orders   Surgical Pathology Exam   Result Value Ref Range    Case Report       Surgical Pathology Report                         Case: [id number]                                  Authorizing Provider:  Jase Pelayo MD Collected:           11/22/2022 11:46 AM          Ordering Location:     [facility]   Received:            11/22/2022 01:07 PM                                 [place]                                                                      Pathologist:           Matteo Florez MD                                                           Specimens:   A) - Stomach, Antrum                                                                                B) - Esophagus, Distal                                                                     Final Diagnosis       A.  Stomach, antrum: Biopsy:  - Antral-type mucosa with minimal chronic inflammation  - No Helicobacter pylori-like organisms seen on H&E examination     B.  Esophagus, distal: Biopsy:  - Active esophagitis with ulceration, granulation tissue, and reactive epithelial changes  - PAS-D stain for fungal organisms is negative  - Immunostains for CMV and HSV are negative      Clinical Information       Procedure:  ESOPHAGOGASTRODUODENOSCOPY, WITH BIOPSY  ESOPHAGOGASTRODUODENOSCOPY, WITH BALLOON DILATION OF LESS THAN 30 MILLIMETERS  Pre-op Diagnosis: Dysphagia, unspecified " type [R13.10]  Post-op Diagnosis: R13.10 - Dysphagia, unspecified type [ICD-10-CM]      Gross Description       A(1). Stomach, Antrum, :  The specimen is received in formalin, labeled with the patient's name, medical record number and other identifying information and designated  stomach, antrum biopsy . It consists of a single tan soft tissue fragment measuring 0.5 cm in greatest dimension. Entirely submitted in one cassette.    B(2). Esophagus, Distal, :  The specimen is received in formalin, labeled with the patient's name, medical record number and other identifying information and designated  distal esophageal biopsy . It consists of a single tan soft tissue fragment measuring 0.5 cm in greatest dimension. Entirely submitted in one cassette.  (Tamra Rangel [facility] Tech)      Microscopic Description       The microscopic findings substantiates the final diagnosis.     All immunohistochemical and special stains were performed with adequate controls.       Disclaimer       Analyte Specific Reagents (ASRs) are used in many laboratory tests necessary for standard medical care and generally do not require FDA approval. This test was developed and its performance characteristics determined by [facility]. It has not been cleared or approved by the U.S. Food and Drug Administration.  [facility] are certified for the performance of high-complexity clinical testing under the Clinical Laboratory Improvement Amendments of 1988 (CLIA), and in keeping with the certification requirements, the laboratory has verified this test's accuracy, precision and/or validity of the method.        Performing Labs       The technical component of this testing was completed at [facility]      Case Images         If you have any questions or concerns, please call the clinic at the number listed above.        Sincerely,

## 2022-11-22 NOTE — ANESTHESIA CARE TRANSFER NOTE
Patient: Amanda Nails    Procedure: Procedure(s):  ESOPHAGOGASTRODUODENOSCOPY, WITH BIOPSY  ESOPHAGOGASTRODUODENOSCOPY, WITH BALLOON DILATION OF LESS THAN 30 MILLIMETERS       Diagnosis: Dysphagia, unspecified type [R13.10]  Diagnosis Additional Information: No value filed.    Anesthesia Type:   General     Note:    Oropharynx: oropharynx clear of all foreign objects and spontaneously breathing  Level of Consciousness: drowsy  Oxygen Supplementation: nasal cannula  Level of Supplemental Oxygen (L/min / FiO2): 2  Independent Airway: airway patency satisfactory and stable  Dentition: dentition unchanged  Vital Signs Stable: post-procedure vital signs reviewed and stable  Report to RN Given: handoff report given  Patient transferred to: PACU    Handoff Report: Identifed the Patient, Identified the Reponsible Provider, Reviewed the pertinent medical history, Discussed the surgical course, Reviewed Intra-OP anesthesia mangement and issues during anesthesia, Set expectations for post-procedure period and Allowed opportunity for questions and acknowledgement of understanding      Vitals:  Vitals Value Taken Time   BP     Temp     Pulse     Resp     SpO2         Electronically Signed By: Edd Baltazar CRNA, APRN CRNA  November 22, 2022  11:58 AM
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
## 2022-11-22 NOTE — LETTER
[facility]           [address] [place] [address]  Amanda Nails  [address]  [place] [address]    November 29, 2022    Dear Amanda,   This letter is to inform you of the results of your pathology report on your upper endoscopy (EGD).   If you do have further questions please don t hesitate to call my assistant at [phone].  We do not have someone answering the phone all the time at my assistants number so if leave a message it may take a day or so to get back to you.  So if more urgent then call the below number.    To make an appointment call Please call [phone], for  [facility] or  for [facility], to schedule a follow up appointment if needed.     Your pathology report was:  A.  Stomach, antrum: Biopsy:  - Antral-type mucosa with minimal chronic inflammation  - No Helicobacter pylori-like organisms seen on H&E examination      B.  Esophagus, distal: Biopsy:  - Active esophagitis with ulceration, granulation tissue, and reactive epithelial changes  - PAS-D stain for fungal organisms is negative  - Immunostains for CMV and HSV are negative    Showed findings consistent with reflux (heartburn)    If you are doing well with your treatment for moderate reflux disease (heartburn), then follow up as directed on your post-endoscopy letter.    If you are interested in other options for your treatment of reflux disease (heartburn), then please see the options below.  1. We will first need to study your esophagus and make sure that it is working properly. The study is called a mamometry study. It involves placing a small catheter into your esophagus and measuring the pressures and coordination of your esophagus when swallowing.  2. If you wish to have this done, please call the above numbers to set up the study.  3. After the study is completed you will need to set up a follow up appointment with me to discuss  the results and treatment options. To schedule an appointment, please call our Specialty Scheduling Line at [phone]. 4.  Or you may just call our Specialty Scheduling Line at [phone] to set up an appointment with me to discuss you options.  negative for bacteria that sometimes causes inflammation of the stomach.   Showed findings consistent with a chronic inflamed stomach. Please follow up with your primary care doctor or with myself by calling our Specialty Scheduling Line at [phone] if you continue to have upper abdominal pain.    Sincerely,         Jase Pelayo M.D.

## 2022-11-28 ENCOUNTER — TELEPHONE (OUTPATIENT)
Dept: TRANSPLANT | Facility: CLINIC | Age: 82
End: 2022-11-28

## 2022-11-28 LAB
PATH REPORT.COMMENTS IMP SPEC: NORMAL
PATH REPORT.FINAL DX SPEC: NORMAL
PATH REPORT.GROSS SPEC: NORMAL
PATH REPORT.MICROSCOPIC SPEC OTHER STN: NORMAL
PATH REPORT.RELEVANT HX SPEC: NORMAL
PHOTO IMAGE: NORMAL

## 2022-11-28 PROCEDURE — 88305 TISSUE EXAM BY PATHOLOGIST: CPT | Mod: 26 | Performed by: PATHOLOGY

## 2022-11-28 PROCEDURE — 88341 IMHCHEM/IMCYTCHM EA ADD ANTB: CPT | Mod: 26 | Performed by: PATHOLOGY

## 2022-11-28 PROCEDURE — 88342 IMHCHEM/IMCYTCHM 1ST ANTB: CPT | Mod: 26 | Performed by: PATHOLOGY

## 2022-11-28 PROCEDURE — 88312 SPECIAL STAINS GROUP 1: CPT | Mod: 26 | Performed by: PATHOLOGY

## 2022-11-28 NOTE — TELEPHONE ENCOUNTER
"Call to Christa to check in after her upper endosc which shows gastritis on 11/22.  She got\"'some sort of sickness\" after the endoscopy - sinus / cold symptoms ( No fever, did not test for covid) so she hasn't yet started the daily omeprazole that was ordered.  She will get is today or tomorrow and start it.  She is feeling much better.    "

## 2022-12-28 DIAGNOSIS — Z13.220 LIPID SCREENING: Primary | ICD-10-CM

## 2022-12-28 DIAGNOSIS — Z94.4 LIVER REPLACED BY TRANSPLANT (H): ICD-10-CM

## 2022-12-28 RX ORDER — URSODIOL 300 MG/1
CAPSULE ORAL
Qty: 270 CAPSULE | Refills: 3 | Status: SHIPPED | OUTPATIENT
Start: 2022-12-28 | End: 2023-01-01

## 2023-01-01 ENCOUNTER — TELEPHONE (OUTPATIENT)
Dept: TRANSPLANT | Facility: CLINIC | Age: 83
End: 2023-01-01
Payer: COMMERCIAL

## 2023-01-01 ENCOUNTER — LAB (OUTPATIENT)
Dept: LAB | Facility: CLINIC | Age: 83
End: 2023-01-01
Payer: COMMERCIAL

## 2023-01-01 ENCOUNTER — TELEPHONE (OUTPATIENT)
Dept: FAMILY MEDICINE | Facility: CLINIC | Age: 83
End: 2023-01-01

## 2023-01-01 ENCOUNTER — OFFICE VISIT (OUTPATIENT)
Dept: FAMILY MEDICINE | Facility: CLINIC | Age: 83
End: 2023-01-01
Payer: COMMERCIAL

## 2023-01-01 VITALS
HEIGHT: 57 IN | HEART RATE: 54 BPM | WEIGHT: 101 LBS | SYSTOLIC BLOOD PRESSURE: 128 MMHG | RESPIRATION RATE: 20 BRPM | TEMPERATURE: 98.2 F | BODY MASS INDEX: 21.79 KG/M2 | OXYGEN SATURATION: 99 % | DIASTOLIC BLOOD PRESSURE: 76 MMHG

## 2023-01-01 DIAGNOSIS — N39.46 MIXED INCONTINENCE: ICD-10-CM

## 2023-01-01 DIAGNOSIS — M10.072 ACUTE IDIOPATHIC GOUT OF LEFT FOOT: ICD-10-CM

## 2023-01-01 DIAGNOSIS — N18.4 ANEMIA IN STAGE 4 CHRONIC KIDNEY DISEASE (H): ICD-10-CM

## 2023-01-01 DIAGNOSIS — I48.91 NEW ONSET A-FIB (H): ICD-10-CM

## 2023-01-01 DIAGNOSIS — Z00.00 ENCOUNTER FOR MEDICARE ANNUAL WELLNESS EXAM: Primary | ICD-10-CM

## 2023-01-01 DIAGNOSIS — K22.10 ULCER OF ESOPHAGUS WITHOUT BLEEDING: ICD-10-CM

## 2023-01-01 DIAGNOSIS — N18.32 STAGE 3B CHRONIC KIDNEY DISEASE (H): ICD-10-CM

## 2023-01-01 DIAGNOSIS — Z94.4 LIVER REPLACED BY TRANSPLANT (H): ICD-10-CM

## 2023-01-01 DIAGNOSIS — I10 ESSENTIAL HYPERTENSION WITH GOAL BLOOD PRESSURE LESS THAN 140/90: ICD-10-CM

## 2023-01-01 DIAGNOSIS — K74.3 PRIMARY BILIARY CIRRHOSIS (H): Chronic | ICD-10-CM

## 2023-01-01 DIAGNOSIS — D63.1 ANEMIA IN STAGE 4 CHRONIC KIDNEY DISEASE (H): ICD-10-CM

## 2023-01-01 DIAGNOSIS — Z94.4 LIVER TRANSPLANTED (H): Primary | ICD-10-CM

## 2023-01-01 DIAGNOSIS — Z94.4 LIVER REPLACED BY TRANSPLANT (H): Chronic | ICD-10-CM

## 2023-01-01 DIAGNOSIS — R60.0 MILD PERIPHERAL EDEMA: ICD-10-CM

## 2023-01-01 DIAGNOSIS — M81.0 OSTEOPOROSIS WITHOUT CURRENT PATHOLOGICAL FRACTURE, UNSPECIFIED OSTEOPOROSIS TYPE: ICD-10-CM

## 2023-01-01 LAB
ALBUMIN SERPL BCG-MCNC: 3.7 G/DL (ref 3.5–5.2)
ALBUMIN SERPL BCG-MCNC: 3.8 G/DL (ref 3.5–5.2)
ALP SERPL-CCNC: 290 U/L (ref 35–104)
ALP SERPL-CCNC: 293 U/L (ref 35–104)
ALT SERPL W P-5'-P-CCNC: 35 U/L (ref 0–50)
ALT SERPL W P-5'-P-CCNC: 38 U/L (ref 0–50)
ANION GAP SERPL CALCULATED.3IONS-SCNC: 8 MMOL/L (ref 7–15)
ANION GAP SERPL CALCULATED.3IONS-SCNC: 9 MMOL/L (ref 7–15)
AST SERPL W P-5'-P-CCNC: 36 U/L (ref 0–45)
AST SERPL W P-5'-P-CCNC: 36 U/L (ref 0–45)
BILIRUB DIRECT SERPL-MCNC: 0.41 MG/DL (ref 0–0.3)
BILIRUB DIRECT SERPL-MCNC: 0.43 MG/DL (ref 0–0.3)
BILIRUB SERPL-MCNC: 0.7 MG/DL
BILIRUB SERPL-MCNC: 0.8 MG/DL
BUN SERPL-MCNC: 43 MG/DL (ref 8–23)
BUN SERPL-MCNC: 47.9 MG/DL (ref 8–23)
CALCIUM SERPL-MCNC: 10 MG/DL (ref 8.8–10.2)
CALCIUM SERPL-MCNC: 10.1 MG/DL (ref 8.8–10.2)
CHLORIDE SERPL-SCNC: 103 MMOL/L (ref 98–107)
CHLORIDE SERPL-SCNC: 107 MMOL/L (ref 98–107)
CREAT SERPL-MCNC: 1.22 MG/DL (ref 0.51–0.95)
CREAT SERPL-MCNC: 1.41 MG/DL (ref 0.51–0.95)
CRP SERPL-MCNC: 6.82 MG/L
DEPRECATED HCO3 PLAS-SCNC: 28 MMOL/L (ref 22–29)
DEPRECATED HCO3 PLAS-SCNC: 31 MMOL/L (ref 22–29)
EGFRCR SERPLBLD CKD-EPI 2021: 37 ML/MIN/1.73M2
EGFRCR SERPLBLD CKD-EPI 2021: 44 ML/MIN/1.73M2
ERYTHROCYTE [DISTWIDTH] IN BLOOD BY AUTOMATED COUNT: 15.3 % (ref 10–15)
ERYTHROCYTE [DISTWIDTH] IN BLOOD BY AUTOMATED COUNT: 15.5 % (ref 10–15)
GLUCOSE SERPL-MCNC: 84 MG/DL (ref 70–99)
GLUCOSE SERPL-MCNC: 88 MG/DL (ref 70–99)
HCT VFR BLD AUTO: 35.4 % (ref 35–47)
HCT VFR BLD AUTO: 35.9 % (ref 35–47)
HGB BLD-MCNC: 11.7 G/DL (ref 11.7–15.7)
HGB BLD-MCNC: 11.9 G/DL (ref 11.7–15.7)
MCH RBC QN AUTO: 33.5 PG (ref 26.5–33)
MCH RBC QN AUTO: 33.7 PG (ref 26.5–33)
MCHC RBC AUTO-ENTMCNC: 33.1 G/DL (ref 31.5–36.5)
MCHC RBC AUTO-ENTMCNC: 33.1 G/DL (ref 31.5–36.5)
MCV RBC AUTO: 101 FL (ref 78–100)
MCV RBC AUTO: 102 FL (ref 78–100)
PLATELET # BLD AUTO: 229 10E3/UL (ref 150–450)
PLATELET # BLD AUTO: 235 10E3/UL (ref 150–450)
POTASSIUM SERPL-SCNC: 3.7 MMOL/L (ref 3.4–5.3)
POTASSIUM SERPL-SCNC: 3.8 MMOL/L (ref 3.4–5.3)
PROT SERPL-MCNC: 6.4 G/DL (ref 6.4–8.3)
PROT SERPL-MCNC: 6.5 G/DL (ref 6.4–8.3)
RBC # BLD AUTO: 3.49 10E6/UL (ref 3.8–5.2)
RBC # BLD AUTO: 3.53 10E6/UL (ref 3.8–5.2)
SODIUM SERPL-SCNC: 143 MMOL/L (ref 135–145)
SODIUM SERPL-SCNC: 143 MMOL/L (ref 136–145)
TACROLIMUS BLD-MCNC: 5 UG/L (ref 5–15)
TME LAST DOSE: NORMAL H
TME LAST DOSE: NORMAL H
WBC # BLD AUTO: 5.1 10E3/UL (ref 4–11)
WBC # BLD AUTO: 5.5 10E3/UL (ref 4–11)

## 2023-01-01 PROCEDURE — 80197 ASSAY OF TACROLIMUS: CPT

## 2023-01-01 PROCEDURE — 85027 COMPLETE CBC AUTOMATED: CPT

## 2023-01-01 PROCEDURE — 86140 C-REACTIVE PROTEIN: CPT

## 2023-01-01 PROCEDURE — 36415 COLL VENOUS BLD VENIPUNCTURE: CPT

## 2023-01-01 PROCEDURE — 80053 COMPREHEN METABOLIC PANEL: CPT

## 2023-01-01 PROCEDURE — 82248 BILIRUBIN DIRECT: CPT

## 2023-01-01 PROCEDURE — G0439 PPPS, SUBSEQ VISIT: HCPCS | Performed by: NURSE PRACTITIONER

## 2023-01-01 PROCEDURE — 99214 OFFICE O/P EST MOD 30 MIN: CPT | Mod: 25 | Performed by: NURSE PRACTITIONER

## 2023-01-01 RX ORDER — OMEPRAZOLE 20 MG/1
20 TABLET, DELAYED RELEASE ORAL DAILY
Qty: 90 TABLET | Refills: 2 | Status: SHIPPED | OUTPATIENT
Start: 2023-01-01

## 2023-01-01 RX ORDER — AMLODIPINE BESYLATE 5 MG/1
5 TABLET ORAL DAILY
Qty: 90 TABLET | Refills: 3 | Status: SHIPPED | OUTPATIENT
Start: 2023-01-01

## 2023-01-01 RX ORDER — FUROSEMIDE 20 MG
10 TABLET ORAL DAILY
Qty: 45 TABLET | Refills: 3 | Status: SHIPPED | OUTPATIENT
Start: 2023-01-01

## 2023-01-01 RX ORDER — ALLOPURINOL 100 MG/1
TABLET ORAL
Qty: 180 TABLET | Refills: 1 | Status: SHIPPED | OUTPATIENT
Start: 2023-01-01 | End: 2024-01-01

## 2023-01-01 RX ORDER — URSODIOL 300 MG/1
CAPSULE ORAL
Qty: 270 CAPSULE | Refills: 3 | Status: SHIPPED | OUTPATIENT
Start: 2023-01-01

## 2023-01-01 RX ORDER — OMEPRAZOLE 20 MG/1
20 TABLET, DELAYED RELEASE ORAL DAILY
Qty: 90 TABLET | Refills: 2 | OUTPATIENT
Start: 2023-01-01

## 2023-01-01 RX ORDER — OMEPRAZOLE 20 MG/1
20 TABLET, DELAYED RELEASE ORAL DAILY
Qty: 30 TABLET | Refills: 11 | Status: SHIPPED | OUTPATIENT
Start: 2023-01-01 | End: 2023-01-01

## 2023-01-01 ASSESSMENT — ENCOUNTER SYMPTOMS
HEMATOCHEZIA: 0
NAUSEA: 0
MYALGIAS: 0
EYE PAIN: 0
NERVOUS/ANXIOUS: 0
COUGH: 0
FEVER: 0
PALPITATIONS: 0
BREAST MASS: 0
WEAKNESS: 0
JOINT SWELLING: 0
SHORTNESS OF BREATH: 0
SORE THROAT: 0
ARTHRALGIAS: 0
DYSURIA: 0
PARESTHESIAS: 0
CONSTIPATION: 0
ABDOMINAL PAIN: 0
HEMATURIA: 0
DIARRHEA: 0
HEADACHES: 0
HEARTBURN: 0
FREQUENCY: 1
CHILLS: 0
DIZZINESS: 0

## 2023-01-01 ASSESSMENT — PAIN SCALES - GENERAL: PAINLEVEL: NO PAIN (0)

## 2023-01-01 ASSESSMENT — ACTIVITIES OF DAILY LIVING (ADL): CURRENT_FUNCTION: SHOPPING REQUIRES ASSISTANCE

## 2023-01-09 ENCOUNTER — MYC MEDICAL ADVICE (OUTPATIENT)
Dept: FAMILY MEDICINE | Facility: CLINIC | Age: 83
End: 2023-01-09

## 2023-02-06 ENCOUNTER — LAB (OUTPATIENT)
Dept: LAB | Facility: CLINIC | Age: 83
End: 2023-02-06
Payer: COMMERCIAL

## 2023-02-06 DIAGNOSIS — Z94.4 LIVER REPLACED BY TRANSPLANT (H): ICD-10-CM

## 2023-02-06 DIAGNOSIS — Z94.4 LIVER REPLACED BY TRANSPLANT (H): Primary | ICD-10-CM

## 2023-02-06 DIAGNOSIS — N18.32 STAGE 3B CHRONIC KIDNEY DISEASE (H): Chronic | ICD-10-CM

## 2023-02-06 DIAGNOSIS — D63.1 ANEMIA IN STAGE 4 CHRONIC KIDNEY DISEASE (H): ICD-10-CM

## 2023-02-06 DIAGNOSIS — Z13.220 LIPID SCREENING: ICD-10-CM

## 2023-02-06 DIAGNOSIS — N18.4 ANEMIA IN STAGE 4 CHRONIC KIDNEY DISEASE (H): ICD-10-CM

## 2023-02-06 LAB
ALBUMIN SERPL BCG-MCNC: 3.6 G/DL (ref 3.5–5.2)
ALP SERPL-CCNC: 373 U/L (ref 35–104)
ALT SERPL W P-5'-P-CCNC: 113 U/L (ref 10–35)
ANION GAP SERPL CALCULATED.3IONS-SCNC: 13 MMOL/L (ref 7–15)
AST SERPL W P-5'-P-CCNC: 98 U/L (ref 10–35)
BILIRUB DIRECT SERPL-MCNC: 1.37 MG/DL (ref 0–0.3)
BILIRUB SERPL-MCNC: 1.8 MG/DL
BUN SERPL-MCNC: 30.8 MG/DL (ref 8–23)
CALCIUM SERPL-MCNC: 10.2 MG/DL (ref 8.8–10.2)
CHLORIDE SERPL-SCNC: 101 MMOL/L (ref 98–107)
CHOLEST SERPL-MCNC: 318 MG/DL
CREAT SERPL-MCNC: 1.29 MG/DL (ref 0.51–0.95)
DEPRECATED HCO3 PLAS-SCNC: 26 MMOL/L (ref 22–29)
ERYTHROCYTE [DISTWIDTH] IN BLOOD BY AUTOMATED COUNT: 17.4 % (ref 10–15)
GFR SERPL CREATININE-BSD FRML MDRD: 42 ML/MIN/1.73M2
GLUCOSE SERPL-MCNC: 162 MG/DL (ref 70–99)
HCT VFR BLD AUTO: 35.8 % (ref 35–47)
HDLC SERPL-MCNC: 105 MG/DL
HGB BLD-MCNC: 11.9 G/DL (ref 11.7–15.7)
LDLC SERPL CALC-MCNC: 191 MG/DL
MCH RBC QN AUTO: 32.7 PG (ref 26.5–33)
MCHC RBC AUTO-ENTMCNC: 33.2 G/DL (ref 31.5–36.5)
MCV RBC AUTO: 98 FL (ref 78–100)
NONHDLC SERPL-MCNC: 213 MG/DL
PLATELET # BLD AUTO: 252 10E3/UL (ref 150–450)
POTASSIUM SERPL-SCNC: 4.1 MMOL/L (ref 3.4–5.3)
PROT SERPL-MCNC: 6.5 G/DL (ref 6.4–8.3)
RBC # BLD AUTO: 3.64 10E6/UL (ref 3.8–5.2)
SODIUM SERPL-SCNC: 140 MMOL/L (ref 136–145)
TRIGL SERPL-MCNC: 112 MG/DL
WBC # BLD AUTO: 3.9 10E3/UL (ref 4–11)

## 2023-02-06 PROCEDURE — 80197 ASSAY OF TACROLIMUS: CPT

## 2023-02-06 PROCEDURE — 36415 COLL VENOUS BLD VENIPUNCTURE: CPT

## 2023-02-06 PROCEDURE — 82248 BILIRUBIN DIRECT: CPT

## 2023-02-06 PROCEDURE — 85027 COMPLETE CBC AUTOMATED: CPT

## 2023-02-06 PROCEDURE — 80053 COMPREHEN METABOLIC PANEL: CPT

## 2023-02-06 PROCEDURE — 80061 LIPID PANEL: CPT

## 2023-02-07 ENCOUNTER — TELEPHONE (OUTPATIENT)
Dept: TRANSPLANT | Facility: CLINIC | Age: 83
End: 2023-02-07
Payer: COMMERCIAL

## 2023-02-07 DIAGNOSIS — R79.89 ABNORMAL LIVER FUNCTION TESTS: Primary | ICD-10-CM

## 2023-02-07 LAB
TACROLIMUS BLD-MCNC: 8.2 UG/L (ref 5–15)
TME LAST DOSE: NORMAL H
TME LAST DOSE: NORMAL H

## 2023-02-07 NOTE — TELEPHONE ENCOUNTER
"Call to Christa.  She started feeling very fatigued since after catarino.  She went to see a dermatologist on 1/25.  This woman burned off several spots - 75 to be exact. After this she didn't feel well.  Vomiting, diarrhea.  The next morning had some confusion. She \"wasn't coherent\".  She felt better about a week later.    She did NOT start meds ordered for TB.  She is itching but it seems to be a bit better. She is not taking any new meds.    She is going to [place] for 2 weeks on Friday with her sisters.  She will repeat labs.  She attributes her labs to not eating well and not drinking enough.  "

## 2023-02-27 ENCOUNTER — LAB (OUTPATIENT)
Dept: LAB | Facility: CLINIC | Age: 83
End: 2023-02-27
Payer: COMMERCIAL

## 2023-02-27 DIAGNOSIS — Z94.4 LIVER REPLACED BY TRANSPLANT (H): ICD-10-CM

## 2023-02-27 DIAGNOSIS — R79.89 ABNORMAL LIVER FUNCTION TESTS: ICD-10-CM

## 2023-02-27 DIAGNOSIS — N18.32 STAGE 3B CHRONIC KIDNEY DISEASE (H): ICD-10-CM

## 2023-02-27 LAB
ALBUMIN MFR UR ELPH: 43.4 MG/DL (ref 1–14)
ALBUMIN SERPL BCG-MCNC: 3.7 G/DL (ref 3.5–5.2)
ALBUMIN UR-MCNC: ABNORMAL MG/DL
ALP SERPL-CCNC: 311 U/L (ref 35–104)
ALT SERPL W P-5'-P-CCNC: 79 U/L (ref 10–35)
ANION GAP SERPL CALCULATED.3IONS-SCNC: 10 MMOL/L (ref 7–15)
APPEARANCE UR: CLEAR
AST SERPL W P-5'-P-CCNC: 75 U/L (ref 10–35)
BACTERIA #/AREA URNS HPF: ABNORMAL /HPF
BILIRUB DIRECT SERPL-MCNC: 1.25 MG/DL (ref 0–0.3)
BILIRUB SERPL-MCNC: 1.9 MG/DL
BILIRUB UR QL STRIP: ABNORMAL
BUN SERPL-MCNC: 34.5 MG/DL (ref 8–23)
CALCIUM SERPL-MCNC: 10.5 MG/DL (ref 8.8–10.2)
CHLORIDE SERPL-SCNC: 104 MMOL/L (ref 98–107)
COLOR UR AUTO: YELLOW
CREAT SERPL-MCNC: 1.16 MG/DL (ref 0.51–0.95)
CREAT UR-MCNC: 210.9 MG/DL
DEPRECATED HCO3 PLAS-SCNC: 28 MMOL/L (ref 22–29)
GFR SERPL CREATININE-BSD FRML MDRD: 47 ML/MIN/1.73M2
GLUCOSE SERPL-MCNC: 96 MG/DL (ref 70–99)
GLUCOSE UR STRIP-MCNC: NEGATIVE MG/DL
HGB UR QL STRIP: NEGATIVE
KETONES UR STRIP-MCNC: NEGATIVE MG/DL
LEUKOCYTE ESTERASE UR QL STRIP: NEGATIVE
NITRATE UR QL: NEGATIVE
PH UR STRIP: 6 [PH] (ref 5–7)
POTASSIUM SERPL-SCNC: 4 MMOL/L (ref 3.4–5.3)
PROT SERPL-MCNC: 6.8 G/DL (ref 6.4–8.3)
PROT/CREAT 24H UR: 0.21 MG/MG CR (ref 0–0.2)
RBC #/AREA URNS AUTO: ABNORMAL /HPF
SODIUM SERPL-SCNC: 142 MMOL/L (ref 136–145)
SP GR UR STRIP: 1.01 (ref 1–1.03)
SQUAMOUS #/AREA URNS AUTO: ABNORMAL /LPF
TACROLIMUS BLD-MCNC: 4.5 UG/L (ref 5–15)
TME LAST DOSE: ABNORMAL H
TME LAST DOSE: ABNORMAL H
UROBILINOGEN UR STRIP-ACNC: 4 E.U./DL
WBC #/AREA URNS AUTO: ABNORMAL /HPF

## 2023-02-27 PROCEDURE — 80197 ASSAY OF TACROLIMUS: CPT

## 2023-02-27 PROCEDURE — 82248 BILIRUBIN DIRECT: CPT

## 2023-02-27 PROCEDURE — 84156 ASSAY OF PROTEIN URINE: CPT

## 2023-02-27 PROCEDURE — 36415 COLL VENOUS BLD VENIPUNCTURE: CPT

## 2023-02-27 PROCEDURE — 81001 URINALYSIS AUTO W/SCOPE: CPT

## 2023-02-27 PROCEDURE — 80053 COMPREHEN METABOLIC PANEL: CPT

## 2023-03-13 ENCOUNTER — TELEPHONE (OUTPATIENT)
Dept: GASTROENTEROLOGY | Facility: CLINIC | Age: 83
End: 2023-03-13

## 2023-03-13 DIAGNOSIS — M10.072 ACUTE IDIOPATHIC GOUT OF LEFT FOOT: ICD-10-CM

## 2023-03-13 NOTE — TELEPHONE ENCOUNTER
Pending Prescriptions:                       Disp   Refills    allopurinol (ZYLOPRIM) 100 MG tablet [Phar*180 ta*1        Sig: TAKE TWO TABLETS BY MOUTH ONCE DAILY        Routing refill request to provider for review/approval because:  Normal serum creatinine on file in the past 12 months        Recent Labs   Lab Test 02/27/23  1018   CR 1.16*     Last Written Prescription Date:  8/22/22  Last Fill Quantity: 180,  # refills: 1   Last office visit: 9/9/2022 with prescribing provider.       Marichuy Hoyt RN

## 2023-03-13 NOTE — TELEPHONE ENCOUNTER
[facility] Prior Authorization Team   Phone: [phone]  Fax: [phone]    PA Initiation    Medication: Tacrolimus (PA Initiated)  Insurance Company: [employer] - Phone [phone] Fax [phone]  Pharmacy Filling the Rx: [facility] - [place], [place] - [address]  Filling Pharmacy Phone: [phone]  Filling Pharmacy Fax: [phone]  Start Date: 3/13/2023

## 2023-03-14 RX ORDER — ALLOPURINOL 100 MG/1
TABLET ORAL
Qty: 180 TABLET | Refills: 1 | Status: SHIPPED | OUTPATIENT
Start: 2023-03-14 | End: 2023-01-01

## 2023-03-14 NOTE — TELEPHONE ENCOUNTER
Refilled for 6 months.  Seen by Bharati for routine PE last fall 2022.  Nick Ackerman MD  Family Medicine

## 2023-03-17 ENCOUNTER — TELEPHONE (OUTPATIENT)
Dept: TRANSPLANT | Facility: CLINIC | Age: 83
End: 2023-03-17
Payer: COMMERCIAL

## 2023-03-17 NOTE — TELEPHONE ENCOUNTER
Call from Anali.  She has been really tired, she's not sure why.  She naps and falls asleep for hours. She says she doesn't have time for all of these naps, she has too much to do!  We did start her on zoloft for itching.  She still has intermittent issues w/ itching but not that many.  I suggested she consider coming down on the dose or weaning off to see if that helps.  She currently only takes 50 mg daily.  I suggested she talk to our pharmacist Jase Mace about how best to wean off or decrease dose.    She wants to have lab testing first, if all good will consider decreasing or weaning off zoloft.

## 2023-03-24 NOTE — TELEPHONE ENCOUNTER
Prior Authorization Approval    Authorization Effective Date: 1/1/2023  Authorization Expiration Date: 12/31/2099  Medication: Tacrolimus (approved)  Approved Dose/Quantity: 180  Reference #:     Insurance Company: [employer] - Phone [phone] Fax [phone]  Expected CoPay:       CoPay Card Available:      Foundation Assistance Needed:    Which Pharmacy is filling the prescription (Not needed for infusion/clinic administered): [facility] - [place], [place] - [address]  Pharmacy Notified: Yes  Patient Notified: Yes

## 2023-04-02 ENCOUNTER — HOSPITAL ENCOUNTER (EMERGENCY)
Facility: CLINIC | Age: 83
Discharge: HOME | End: 2023-04-02
Attending: PHYSICIAN ASSISTANT | Admitting: PHYSICIAN ASSISTANT
Payer: COMMERCIAL

## 2023-04-02 VITALS
RESPIRATION RATE: 20 BRPM | OXYGEN SATURATION: 97 % | DIASTOLIC BLOOD PRESSURE: 58 MMHG | TEMPERATURE: 97 F | HEART RATE: 54 BPM | SYSTOLIC BLOOD PRESSURE: 160 MMHG

## 2023-04-02 DIAGNOSIS — N39.0 URINARY TRACT INFECTION: ICD-10-CM

## 2023-04-02 LAB
ALBUMIN UR-MCNC: 30 MG/DL
APPEARANCE UR: ABNORMAL
BACTERIA #/AREA URNS HPF: ABNORMAL /HPF
BILIRUB UR QL STRIP: NEGATIVE
COLOR UR AUTO: ABNORMAL
GLUCOSE UR STRIP-MCNC: NEGATIVE MG/DL
HGB UR QL STRIP: NEGATIVE
HYALINE CASTS: 6 /LPF
KETONES UR STRIP-MCNC: NEGATIVE MG/DL
LEUKOCYTE ESTERASE UR QL STRIP: ABNORMAL
MUCOUS THREADS #/AREA URNS LPF: PRESENT /LPF
NITRATE UR QL: NEGATIVE
PH UR STRIP: 5 [PH] (ref 5–7)
RBC URINE: 7 /HPF
SP GR UR STRIP: 1.02 (ref 1–1.03)
SQUAMOUS EPITHELIAL: 2 /HPF
TRANSITIONAL EPI: 3 /HPF
UROBILINOGEN UR STRIP-MCNC: 2 MG/DL
WBC CLUMPS #/AREA URNS HPF: PRESENT /HPF
WBC URINE: >182 /HPF

## 2023-04-02 PROCEDURE — 87186 SC STD MICRODIL/AGAR DIL: CPT | Performed by: PHYSICIAN ASSISTANT

## 2023-04-02 PROCEDURE — 99213 OFFICE O/P EST LOW 20 MIN: CPT | Performed by: PHYSICIAN ASSISTANT

## 2023-04-02 PROCEDURE — G0463 HOSPITAL OUTPT CLINIC VISIT: HCPCS | Performed by: PHYSICIAN ASSISTANT

## 2023-04-02 PROCEDURE — 81001 URINALYSIS AUTO W/SCOPE: CPT | Performed by: PHYSICIAN ASSISTANT

## 2023-04-02 RX ORDER — CEPHALEXIN 500 MG/1
500 CAPSULE ORAL 3 TIMES DAILY
Qty: 21 CAPSULE | Refills: 0 | Status: SHIPPED | OUTPATIENT
Start: 2023-04-02 | End: 2023-04-09

## 2023-04-02 ASSESSMENT — ENCOUNTER SYMPTOMS
GASTROINTESTINAL NEGATIVE: 1
CONSTITUTIONAL NEGATIVE: 1
FEVER: 0
FREQUENCY: 1
DYSURIA: 0
MUSCULOSKELETAL NEGATIVE: 1

## 2023-04-02 ASSESSMENT — ACTIVITIES OF DAILY LIVING (ADL): ADLS_ACUITY_SCORE: 37
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
## 2023-04-03 LAB — BACTERIA UR CULT: ABNORMAL

## 2023-04-05 ENCOUNTER — LAB (OUTPATIENT)
Dept: LAB | Facility: CLINIC | Age: 83
End: 2023-04-05
Payer: COMMERCIAL

## 2023-04-05 DIAGNOSIS — Z94.4 LIVER REPLACED BY TRANSPLANT (H): ICD-10-CM

## 2023-04-05 LAB
ALBUMIN SERPL BCG-MCNC: 3.7 G/DL (ref 3.5–5.2)
ALP SERPL-CCNC: 265 U/L (ref 35–104)
ALT SERPL W P-5'-P-CCNC: 49 U/L (ref 10–35)
ANION GAP SERPL CALCULATED.3IONS-SCNC: 10 MMOL/L (ref 7–15)
AST SERPL W P-5'-P-CCNC: 46 U/L (ref 10–35)
BILIRUB DIRECT SERPL-MCNC: 0.67 MG/DL (ref 0–0.3)
BILIRUB SERPL-MCNC: 1.1 MG/DL
BUN SERPL-MCNC: 33.6 MG/DL (ref 8–23)
CALCIUM SERPL-MCNC: 10.2 MG/DL (ref 8.8–10.2)
CHLORIDE SERPL-SCNC: 106 MMOL/L (ref 98–107)
CREAT SERPL-MCNC: 1.28 MG/DL (ref 0.51–0.95)
DEPRECATED HCO3 PLAS-SCNC: 26 MMOL/L (ref 22–29)
ERYTHROCYTE [DISTWIDTH] IN BLOOD BY AUTOMATED COUNT: 15.6 % (ref 10–15)
GFR SERPL CREATININE-BSD FRML MDRD: 42 ML/MIN/1.73M2
GLUCOSE SERPL-MCNC: 85 MG/DL (ref 70–99)
HCT VFR BLD AUTO: 37.2 % (ref 35–47)
HGB BLD-MCNC: 12.6 G/DL (ref 11.7–15.7)
MCH RBC QN AUTO: 34 PG (ref 26.5–33)
MCHC RBC AUTO-ENTMCNC: 33.9 G/DL (ref 31.5–36.5)
MCV RBC AUTO: 100 FL (ref 78–100)
PLATELET # BLD AUTO: 207 10E3/UL (ref 150–450)
POTASSIUM SERPL-SCNC: 3.8 MMOL/L (ref 3.4–5.3)
PROT SERPL-MCNC: 6.7 G/DL (ref 6.4–8.3)
RBC # BLD AUTO: 3.71 10E6/UL (ref 3.8–5.2)
SODIUM SERPL-SCNC: 142 MMOL/L (ref 136–145)
TACROLIMUS BLD-MCNC: 3.1 UG/L (ref 5–15)
TME LAST DOSE: ABNORMAL H
TME LAST DOSE: ABNORMAL H
WBC # BLD AUTO: 5.5 10E3/UL (ref 4–11)

## 2023-04-05 PROCEDURE — 85027 COMPLETE CBC AUTOMATED: CPT

## 2023-04-05 PROCEDURE — 80197 ASSAY OF TACROLIMUS: CPT

## 2023-04-05 PROCEDURE — 82248 BILIRUBIN DIRECT: CPT

## 2023-04-05 PROCEDURE — 80053 COMPREHEN METABOLIC PANEL: CPT

## 2023-04-05 PROCEDURE — 36415 COLL VENOUS BLD VENIPUNCTURE: CPT

## 2023-04-11 ENCOUNTER — TELEPHONE (OUTPATIENT)
Dept: FAMILY MEDICINE | Facility: CLINIC | Age: 83
End: 2023-04-11
Payer: COMMERCIAL

## 2023-04-11 DIAGNOSIS — R30.0 DYSURIA: Primary | ICD-10-CM

## 2023-04-11 NOTE — TELEPHONE ENCOUNTER
Bharati,    Patient was seen in the ER for UTI and treated with keflex.  Patient feels it never totally cleared.  She still has urgency and is on her last dose of abx.  Would like a UA/UC order placed to see if this is gone.  Barbara RODRIGUEZ RN

## 2023-04-11 NOTE — CONFIDENTIAL NOTE
Patient is notified, she is told the lab is closing soon, she is told to call back tomorrow for appointment to drop off the sample, she is very agreeable to this.      FRANCIS Krueger

## 2023-04-12 ENCOUNTER — LAB (OUTPATIENT)
Dept: LAB | Facility: CLINIC | Age: 83
End: 2023-04-12
Payer: COMMERCIAL

## 2023-04-12 DIAGNOSIS — R30.0 DYSURIA: ICD-10-CM

## 2023-04-12 LAB
ALBUMIN UR-MCNC: ABNORMAL MG/DL
APPEARANCE UR: CLEAR
BACTERIA #/AREA URNS HPF: ABNORMAL /HPF
BILIRUB UR QL STRIP: NEGATIVE
COLOR UR AUTO: YELLOW
GLUCOSE UR STRIP-MCNC: NEGATIVE MG/DL
HGB UR QL STRIP: NEGATIVE
KETONES UR STRIP-MCNC: NEGATIVE MG/DL
LEUKOCYTE ESTERASE UR QL STRIP: NEGATIVE
MUCOUS THREADS #/AREA URNS LPF: PRESENT /LPF
NITRATE UR QL: NEGATIVE
PH UR STRIP: 6 [PH] (ref 5–7)
RBC #/AREA URNS AUTO: ABNORMAL /HPF
SP GR UR STRIP: 1.01 (ref 1–1.03)
SQUAMOUS #/AREA URNS AUTO: ABNORMAL /LPF
UROBILINOGEN UR STRIP-ACNC: 1 E.U./DL
WBC #/AREA URNS AUTO: ABNORMAL /HPF

## 2023-04-12 PROCEDURE — 81001 URINALYSIS AUTO W/SCOPE: CPT

## 2023-04-24 ENCOUNTER — HOSPITAL ENCOUNTER (EMERGENCY)
Facility: CLINIC | Age: 83
Discharge: HOME | End: 2023-04-24
Attending: EMERGENCY MEDICINE | Admitting: EMERGENCY MEDICINE
Payer: COMMERCIAL

## 2023-04-24 ENCOUNTER — APPOINTMENT (OUTPATIENT)
Dept: GENERAL RADIOLOGY | Facility: CLINIC | Age: 83
End: 2023-04-24
Attending: EMERGENCY MEDICINE
Payer: COMMERCIAL

## 2023-04-24 ENCOUNTER — TELEPHONE (OUTPATIENT)
Dept: TRANSPLANT | Facility: CLINIC | Age: 83
End: 2023-04-24

## 2023-04-24 ENCOUNTER — MYC MEDICAL ADVICE (OUTPATIENT)
Dept: FAMILY MEDICINE | Facility: CLINIC | Age: 83
End: 2023-04-24

## 2023-04-24 VITALS
SYSTOLIC BLOOD PRESSURE: 121 MMHG | TEMPERATURE: 96.5 F | OXYGEN SATURATION: 95 % | HEIGHT: 57 IN | BODY MASS INDEX: 23.73 KG/M2 | WEIGHT: 110 LBS | HEART RATE: 111 BPM | DIASTOLIC BLOOD PRESSURE: 88 MMHG

## 2023-04-24 DIAGNOSIS — I48.91 ATRIAL FIBRILLATION, UNSPECIFIED TYPE (H): ICD-10-CM

## 2023-04-24 DIAGNOSIS — Z94.4 LIVER REPLACED BY TRANSPLANT (H): ICD-10-CM

## 2023-04-24 LAB
ALBUMIN SERPL BCG-MCNC: 3.2 G/DL (ref 3.5–5.2)
ALBUMIN UR-MCNC: NEGATIVE MG/DL
ALP SERPL-CCNC: 214 U/L (ref 35–104)
ALT SERPL W P-5'-P-CCNC: 39 U/L (ref 10–35)
ANION GAP SERPL CALCULATED.3IONS-SCNC: 11 MMOL/L (ref 7–15)
APPEARANCE UR: CLEAR
AST SERPL W P-5'-P-CCNC: 40 U/L (ref 10–35)
BASOPHILS # BLD AUTO: 0 10E3/UL (ref 0–0.2)
BASOPHILS NFR BLD AUTO: 0 %
BILIRUB DIRECT SERPL-MCNC: 1.22 MG/DL (ref 0–0.3)
BILIRUB SERPL-MCNC: 1.5 MG/DL
BILIRUB UR QL STRIP: NEGATIVE
BUN SERPL-MCNC: 33.9 MG/DL (ref 8–23)
CALCIUM SERPL-MCNC: 9.7 MG/DL (ref 8.8–10.2)
CHLORIDE SERPL-SCNC: 101 MMOL/L (ref 98–107)
COLOR UR AUTO: YELLOW
CREAT SERPL-MCNC: 1.38 MG/DL (ref 0.51–0.95)
DEPRECATED HCO3 PLAS-SCNC: 26 MMOL/L (ref 22–29)
EOSINOPHIL # BLD AUTO: 0.3 10E3/UL (ref 0–0.7)
EOSINOPHIL NFR BLD AUTO: 3 %
ERYTHROCYTE [DISTWIDTH] IN BLOOD BY AUTOMATED COUNT: 16.7 % (ref 10–15)
GFR SERPL CREATININE-BSD FRML MDRD: 39 ML/MIN/1.73M2
GLUCOSE SERPL-MCNC: 161 MG/DL (ref 70–99)
GLUCOSE UR STRIP-MCNC: NEGATIVE MG/DL
HCT VFR BLD AUTO: 35 % (ref 35–47)
HGB BLD-MCNC: 11.8 G/DL (ref 11.7–15.7)
HGB UR QL STRIP: NEGATIVE
HOLD SPECIMEN: NORMAL
IMM GRANULOCYTES # BLD: 0 10E3/UL
IMM GRANULOCYTES NFR BLD: 0 %
KETONES UR STRIP-MCNC: NEGATIVE MG/DL
LACTATE SERPL-SCNC: 0.8 MMOL/L (ref 0.7–2)
LEUKOCYTE ESTERASE UR QL STRIP: NEGATIVE
LIPASE SERPL-CCNC: 22 U/L (ref 13–60)
LYMPHOCYTES # BLD AUTO: 1.2 10E3/UL (ref 0.8–5.3)
LYMPHOCYTES NFR BLD AUTO: 15 %
MAGNESIUM SERPL-MCNC: 1.6 MG/DL (ref 1.7–2.3)
MCH RBC QN AUTO: 33.7 PG (ref 26.5–33)
MCHC RBC AUTO-ENTMCNC: 33.7 G/DL (ref 31.5–36.5)
MCV RBC AUTO: 100 FL (ref 78–100)
MONOCYTES # BLD AUTO: 0.7 10E3/UL (ref 0–1.3)
MONOCYTES NFR BLD AUTO: 9 %
NEUTROPHILS # BLD AUTO: 5.9 10E3/UL (ref 1.6–8.3)
NEUTROPHILS NFR BLD AUTO: 73 %
NITRATE UR QL: NEGATIVE
NRBC # BLD AUTO: 0 10E3/UL
NRBC BLD AUTO-RTO: 0 /100
PH UR STRIP: 7 [PH] (ref 5–7)
PLATELET # BLD AUTO: 182 10E3/UL (ref 150–450)
POTASSIUM SERPL-SCNC: 4.2 MMOL/L (ref 3.4–5.3)
PROT SERPL-MCNC: 6.2 G/DL (ref 6.4–8.3)
RBC # BLD AUTO: 3.5 10E6/UL (ref 3.8–5.2)
RBC URINE: 1 /HPF
SODIUM SERPL-SCNC: 138 MMOL/L (ref 136–145)
SP GR UR STRIP: 1.01 (ref 1–1.03)
T4 FREE SERPL-MCNC: 0.87 NG/DL (ref 0.9–1.7)
TROPONIN T SERPL HS-MCNC: 29 NG/L
TROPONIN T SERPL HS-MCNC: 39 NG/L
TSH SERPL DL<=0.005 MIU/L-ACNC: 5.1 UIU/ML (ref 0.3–4.2)
UROBILINOGEN UR STRIP-MCNC: 4 MG/DL
WBC # BLD AUTO: 8.2 10E3/UL (ref 4–11)
WBC URINE: 1 /HPF

## 2023-04-24 PROCEDURE — 93005 ELECTROCARDIOGRAM TRACING: CPT

## 2023-04-24 PROCEDURE — 81001 URINALYSIS AUTO W/SCOPE: CPT | Performed by: EMERGENCY MEDICINE

## 2023-04-24 PROCEDURE — 96365 THER/PROPH/DIAG IV INF INIT: CPT

## 2023-04-24 PROCEDURE — 82248 BILIRUBIN DIRECT: CPT | Performed by: EMERGENCY MEDICINE

## 2023-04-24 PROCEDURE — 84439 ASSAY OF FREE THYROXINE: CPT | Performed by: EMERGENCY MEDICINE

## 2023-04-24 PROCEDURE — 99284 EMERGENCY DEPT VISIT MOD MDM: CPT | Mod: 25 | Performed by: EMERGENCY MEDICINE

## 2023-04-24 PROCEDURE — 93010 ELECTROCARDIOGRAM REPORT: CPT | Performed by: EMERGENCY MEDICINE

## 2023-04-24 PROCEDURE — 99285 EMERGENCY DEPT VISIT HI MDM: CPT | Mod: 25

## 2023-04-24 PROCEDURE — 84484 ASSAY OF TROPONIN QUANT: CPT | Mod: 91 | Performed by: EMERGENCY MEDICINE

## 2023-04-24 PROCEDURE — 71046 X-RAY EXAM CHEST 2 VIEWS: CPT

## 2023-04-24 PROCEDURE — 250N000011 HC RX IP 250 OP 636: Performed by: EMERGENCY MEDICINE

## 2023-04-24 PROCEDURE — 84484 ASSAY OF TROPONIN QUANT: CPT | Performed by: EMERGENCY MEDICINE

## 2023-04-24 PROCEDURE — 96361 HYDRATE IV INFUSION ADD-ON: CPT

## 2023-04-24 PROCEDURE — 36415 COLL VENOUS BLD VENIPUNCTURE: CPT | Performed by: EMERGENCY MEDICINE

## 2023-04-24 PROCEDURE — 85025 COMPLETE CBC W/AUTO DIFF WBC: CPT | Performed by: EMERGENCY MEDICINE

## 2023-04-24 PROCEDURE — 84443 ASSAY THYROID STIM HORMONE: CPT | Performed by: EMERGENCY MEDICINE

## 2023-04-24 PROCEDURE — 93308 TTE F-UP OR LMTD: CPT

## 2023-04-24 PROCEDURE — 258N000003 HC RX IP 258 OP 636: Performed by: EMERGENCY MEDICINE

## 2023-04-24 PROCEDURE — 83735 ASSAY OF MAGNESIUM: CPT | Performed by: EMERGENCY MEDICINE

## 2023-04-24 PROCEDURE — 93308 TTE F-UP OR LMTD: CPT | Mod: 26 | Performed by: EMERGENCY MEDICINE

## 2023-04-24 PROCEDURE — 80053 COMPREHEN METABOLIC PANEL: CPT | Performed by: EMERGENCY MEDICINE

## 2023-04-24 PROCEDURE — 83605 ASSAY OF LACTIC ACID: CPT | Performed by: EMERGENCY MEDICINE

## 2023-04-24 PROCEDURE — 83690 ASSAY OF LIPASE: CPT | Performed by: EMERGENCY MEDICINE

## 2023-04-24 RX ORDER — MAGNESIUM SULFATE HEPTAHYDRATE 40 MG/ML
2 INJECTION, SOLUTION INTRAVENOUS ONCE
Status: COMPLETED | OUTPATIENT
Start: 2023-04-24 | End: 2023-04-24

## 2023-04-24 RX ADMIN — SODIUM CHLORIDE, POTASSIUM CHLORIDE, SODIUM LACTATE AND CALCIUM CHLORIDE 1000 ML: 600; 310; 30; 20 INJECTION, SOLUTION INTRAVENOUS at 17:15

## 2023-04-24 RX ADMIN — MAGNESIUM SULFATE HEPTAHYDRATE 2 G: 40 INJECTION, SOLUTION INTRAVENOUS at 19:37

## 2023-04-24 ASSESSMENT — ENCOUNTER SYMPTOMS
CHILLS: 0
SHORTNESS OF BREATH: 0
UNEXPECTED WEIGHT CHANGE: 0
FEVER: 0
DIARRHEA: 0
ABDOMINAL PAIN: 0
VOMITING: 0
COUGH: 0
DYSURIA: 0
BACK PAIN: 0
NAUSEA: 0
FREQUENCY: 0
SORE THROAT: 0

## 2023-04-24 ASSESSMENT — ACTIVITIES OF DAILY LIVING (ADL)
ADLS_ACUITY_SCORE: 37
ADLS_ACUITY_SCORE: 37

## 2023-04-24 NOTE — TELEPHONE ENCOUNTER
Patient Call: General  Route to LPN    Reason for call: pt experiencing low blood pressure    Call back needed? Yes    Return Call Needed  Same as documented in contacts section  When to return call?: Greater than one day: Route standard priority

## 2023-04-24 NOTE — ED TRIAGE NOTES
Pt here with concerns for low BP. Pt reports SBP 80 at home. Pt normally has high BP is on medication, did not take today. Pt states feels weak when up. Denies pain.      Triage Assessment     Row Name 04/24/23 1216       Triage Assessment (Adult)    Airway WDL WDL       Respiratory WDL    Respiratory WDL WDL       Skin Circulation/Temperature WDL    Skin Circulation/Temperature WDL WDL       Cardiac WDL    Cardiac WDL WDL       Peripheral/Neurovascular WDL    Peripheral Neurovascular WDL WDL       Cognitive/Neuro/Behavioral WDL    Cognitive/Neuro/Behavioral WDL WDL

## 2023-04-24 NOTE — TELEPHONE ENCOUNTER
Yola calls to state that pt is in the urgent for low Bp and wanted some direction. Advised Yola to reach out to pt's PCP as she was the one that prescribed the medications. Yola will.
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
## 2023-04-25 NOTE — TELEPHONE ENCOUNTER
Ok to continue to hold. Restart if blood pressure > 130/90.    Monitor blood pressure readings and send via [employer].  HAO Bains

## 2023-04-25 NOTE — DISCHARGE INSTRUCTIONS
You have something called atrial fibrillation. This is very common. I put in a referral to cardiology. Normally we put patients with atrial fibrillation on a blood thinner. I prescribed one. I want you to call Dr. Lomeli tomorrow to discuss if you should be on it. Dr. Lomeli and a cardiologist can discuss how best to manage your atrial fibrillation.     I want you to eat a potato everyday to keep your potassium and magnesium levels up.     Thanks for your patience.    It was very nice to meet you.

## 2023-04-27 ENCOUNTER — VIRTUAL VISIT (OUTPATIENT)
Dept: FAMILY MEDICINE | Facility: CLINIC | Age: 83
End: 2023-04-27
Payer: COMMERCIAL

## 2023-04-27 DIAGNOSIS — D84.821 IMMUNODEFICIENCY DUE TO DRUGS (CODE) (H): ICD-10-CM

## 2023-04-27 DIAGNOSIS — D63.1 ANEMIA IN STAGE 4 CHRONIC KIDNEY DISEASE (H): ICD-10-CM

## 2023-04-27 DIAGNOSIS — I10 ESSENTIAL HYPERTENSION: ICD-10-CM

## 2023-04-27 DIAGNOSIS — Z94.4 LIVER REPLACED BY TRANSPLANT (H): ICD-10-CM

## 2023-04-27 DIAGNOSIS — I95.9 HYPOTENSION, UNSPECIFIED HYPOTENSION TYPE: ICD-10-CM

## 2023-04-27 DIAGNOSIS — I48.91 NEW ONSET A-FIB (H): Primary | ICD-10-CM

## 2023-04-27 DIAGNOSIS — K74.3 PRIMARY BILIARY CIRRHOSIS (H): ICD-10-CM

## 2023-04-27 DIAGNOSIS — N18.4 ANEMIA IN STAGE 4 CHRONIC KIDNEY DISEASE (H): ICD-10-CM

## 2023-04-27 PROCEDURE — 99214 OFFICE O/P EST MOD 30 MIN: CPT | Mod: VID | Performed by: NURSE PRACTITIONER

## 2023-04-27 NOTE — PROGRESS NOTES
Christa is a 82 year old who is being evaluated via a billable video visit.      How would you like to obtain your AVS? MyChart  If the video visit is dropped, the invitation should be resent by: Text to cell phone: [phone]  Will anyone else be joining your video visit? Yes: daughter. How would they like to receive their invitation? Text to cell phone: [phone]          Assessment & Plan     New onset a-fib (H)  Presented to ED with hypotension, shortness of breath and overall fatigue. Found to be in a fib with controlled rate. [id number] score = 5  Started on Eliquis. Doing well. Discussed bleeding risk with patient.  Continue beta blocker for rate control.    Hypotension, unspecified hypotension type  Discussed medication changes. Stop Amlodipine. Continue beta blocker given new a fib. Monitor blood pressure and follow up in 1 week with readings.    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 Lomeli - post transplant.    Primary biliary cirrhosis (H)       Anemia in stage 4 chronic kidney disease (H)  Stable    Essential hypertension            MED REC REQUIRED   Post Medication Reconciliation Status:       See Patient Instructions    Bharati Mitchell NP  [facility]    Faustina Goodwin is a 82 year old, presenting for the following health issues:  ER F/U (4/24/23)        4/27/2023     4:11 PM   Additional Questions   Roomed by Herlinda Crews   Accompanied by none         4/27/2023     4:11 PM   Patient Reported Additional Medications   Patient reports taking the following new medications elequis, magnesium     HPI     ED/UC Followup:    Facility:  [facility]  Date of visit: 4/24/23  Reason for visit: sob, dx with a-fib  Current Status: Pt is feeling better but has been taking it easy.    Was having hypotension for a few days to week prior to ED visit.   Blood pressures 80/40-50's    Upon arrival to ED - patient was  found to be in atrial fibrillation.  Placed on Eliquis 2.5 mg twice daily.  No other change in medications.    Since arriving at home - 110-140's/70's  Skipped blood pressure medications in am and given pm medications.    US heart done in ED:  Findings:    Global left ventricular function appears intact.  Chambers do not appear dilated.  No large effusion     IMPRESSION: grossly normal function, no chamber dilation, no large effusion    Review of Systems   Constitutional, HEENT, cardiovascular, pulmonary, GI, , musculoskeletal, neuro, skin, endocrine and psych systems are negative, except as otherwise noted.      Objective    Vitals - Patient Reported  Systolic (Patient Reported): 129  Diastolic (Patient Reported): 70  Weight (Patient Reported): 49.9 kg (110 lb)  Pulse (Patient Reported): 56  Pain Score: No Pain (0)        Physical Exam   GENERAL: Healthy, alert and no distress  EYES: Eyes grossly normal to inspection.  No discharge or erythema, or obvious scleral/conjunctival abnormalities.  RESP: No audible wheeze, cough, or visible cyanosis.  No visible retractions or increased work of breathing.    SKIN: Visible skin clear. No significant rash, abnormal pigmentation or lesions.  NEURO: Cranial nerves grossly intact.  Mentation and speech appropriate for age.  PSYCH: Mentation appears normal, affect normal/bright, judgement and insight intact, normal speech and appearance well-groomed.           Video-Visit Details    Type of service:  Video Visit        Originating Location (pt. Location): Home   Distant Location (provider location):  On-site  Platform used for Video Visit: Cristin

## 2023-05-02 ENCOUNTER — OFFICE VISIT (OUTPATIENT)
Dept: GASTROENTEROLOGY | Facility: CLINIC | Age: 83
End: 2023-05-02
Attending: INTERNAL MEDICINE
Payer: COMMERCIAL

## 2023-05-02 VITALS
OXYGEN SATURATION: 96 % | HEART RATE: 54 BPM | BODY MASS INDEX: 21.86 KG/M2 | WEIGHT: 101 LBS | TEMPERATURE: 97.3 F | SYSTOLIC BLOOD PRESSURE: 137 MMHG | DIASTOLIC BLOOD PRESSURE: 77 MMHG

## 2023-05-02 DIAGNOSIS — Z94.4 LIVER REPLACED BY TRANSPLANT (H): Primary | ICD-10-CM

## 2023-05-02 PROCEDURE — G0463 HOSPITAL OUTPT CLINIC VISIT: HCPCS | Performed by: INTERNAL MEDICINE

## 2023-05-02 PROCEDURE — 99214 OFFICE O/P EST MOD 30 MIN: CPT | Performed by: INTERNAL MEDICINE

## 2023-05-02 ASSESSMENT — PAIN SCALES - GENERAL: PAINLEVEL: NO PAIN (0)

## 2023-05-02 NOTE — NURSING NOTE
Chief Complaint   Patient presents with     RECHECK     Liver Tx     BP (!) 148/78 (BP Location: Left arm, Patient Position: Sitting, Cuff Size: Adult Regular)   Pulse 54   Temp 97.3  F (36.3  C) (Oral)   Wt 45.8 kg (101 lb)   LMP  (LMP Unknown)   SpO2 96%   BMI 21.86 kg/m

## 2023-05-02 NOTE — PROGRESS NOTES
HISTORY OF PRESENT ILLNESS:  I had the pleasure of seeing Anali Nails for followup in the Liver Transplant Clinic at the [facility] on 05/02/2023.  Ms. 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    The new event is that she had episode of atrial fibrillation.  She was started on carvedilol and Eliquis and seems to be tolerating those medications well.  She denies any dizziness.  Both her daughter feel like she may have some occasional episodes of lightheadedness.    She otherwise denies any abdominal pain.  She does have numerous skin lesions and is scheduled to see dermatology in July.  She does report some fatigue, particularly when she is at home by herself.  She denies any increased abdominal girth.  She has no lower extremity edema.    She denies any fevers or chills or cough.  She has some mild shortness of breath.  She does complain of some dry eyes and dry mouth.  She denies any nausea or vomiting, diarrhea or constipation.  Her appetite, she says is good, but her weight is down.    Current Outpatient Medications   Medication     Acetaminophen (TYLENOL PO)     allopurinol (ZYLOPRIM) 100 MG tablet     apixaban ANTICOAGULANT (ELIQUIS ANTICOAGULANT) 2.5 MG tablet     Ascorbic Acid (VITAMIN C PO)     CALCIUM-VITAMIN D PO     carvedilol (COREG) 25 MG tablet     Multiple Vitamins-Minerals (PRESERVISION AREDS 2 PO)     Omega-3 Fatty Acids (FISH OIL PO)     omeprazole (PRILOSEC OTC) 20 MG EC tablet     predniSONE (DELTASONE) 1 MG tablet     sertraline (ZOLOFT) 50 MG tablet     tacrolimus (GENERIC EQUIVALENT) 0.5 MG capsule     ursodiol (ACTIGALL) 300 MG capsule     amLODIPine (NORVASC) 5 MG tablet     No current facility-administered medications for this visit.     /77   Pulse 54   Temp 97.3  F (36.3  C) (Oral)   Wt 45.8 kg (101 lb)   LMP  (LMP Unknown)   SpO2 96%   BMI 21.86 kg/m      PHYSICAL EXAMINATION:     GENERAL:  She looks largely unchanged.  HEENT:  Shows no scleral icterus or temporal muscle wasting.  CHEST:  Clear.  ABDOMEN:  No increase in girth.  No masses or tenderness to palpation are present.  Her liver is 10 cm in span without left lobe enlargement.  No spleen tip is palpable at the left costal margin.  EXTREMITIES:  No edema.  SKIN:  Numerous senile keratoses.  It is difficult to appreciate whether or skin cancer may be present.  NEUROLOGIC:  Nonfocal.    LABORATORY:  Most recent laboratory tests are from 04/24/2023 and showed her white count is 8.2, hemoglobin is 11.8, and platelets are 182,000.  Her sodium is 138, potassium 4.2, BUN is 34, creatinine 1.38.  Her AST is 40, ALT is 39, alkaline phosphatase is 214.  Her albumin is 3.2, total protein of 6.2 and total bilirubin is 1.5 with direct reacting bilirubin 1.2.    IMPRESSION:  Ms. Nails is 38-1/2 years status post liver transplantation for primary biliary cholangitis.  She does have recurrence of PBC, but her disease has been relatively stable.  She did have a mildly elevated TSH on recent blood draw.  I have recommended that just be reperformed in 6 months.  She did have a mycobacterial infection of her skin that seems to have resolved.  I have tried to get her an appointment here in the Dermatology Clinic as they are more experienced with transplant patients and the risk of skin cancer.  Otherwise, my plan will be to see her back in the clinic again in 1 year.      I did spend a total of 30 minutes (on the date of the encounter), including 20 minutes of face-to-face clinic time including counseling. The rest of the time was spent in documentation and review of records.     Thank you very much for allowing me to participate in the care of this patient.  If you have any questions regarding recommendations, please do not hesitate to contact me.         Luis Daniel Lomeli MD      Professor of Medicine  [employer]      Executive  Medical Director, Solid Organ Transplant Program  [facility]

## 2023-05-02 NOTE — LETTER
5/2/2023         RE: Amanda Nails  [address]  [place] [address]        Dear Colleague,    Thank you for referring your patient, Amanda Nails, to the [facility] [place]. Please see a copy of my visit note below.    HISTORY OF PRESENT ILLNESS:  I had the pleasure of seeing Anali Nails for followup in the Liver Transplant Clinic at the [facility] on 05/02/2023.  Ms. 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    The new event is that she had episode of atrial fibrillation.  She was started on carvedilol and Eliquis and seems to be tolerating those medications well.  She denies any dizziness.  Both her daughter feel like she may have some occasional episodes of lightheadedness.    She otherwise denies any abdominal pain.  She does have numerous skin lesions and is scheduled to see dermatology in July.  She does report some fatigue, particularly when she is at home by herself.  She denies any increased abdominal girth.  She has no lower extremity edema.    She denies any fevers or chills or cough.  She has some mild shortness of breath.  She does complain of some dry eyes and dry mouth.  She denies any nausea or vomiting, diarrhea or constipation.  Her appetite, she says is good, but her weight is down.    Current Outpatient Medications   Medication    Acetaminophen (TYLENOL PO)    allopurinol (ZYLOPRIM) 100 MG tablet    apixaban ANTICOAGULANT (ELIQUIS ANTICOAGULANT) 2.5 MG tablet    Ascorbic Acid (VITAMIN C PO)    CALCIUM-VITAMIN D PO    carvedilol (COREG) 25 MG tablet    Multiple Vitamins-Minerals (PRESERVISION AREDS 2 PO)    Omega-3 Fatty Acids (FISH OIL PO)    omeprazole (PRILOSEC OTC) 20 MG EC tablet    predniSONE (DELTASONE) 1 MG tablet    sertraline (ZOLOFT) 50 MG tablet    tacrolimus (GENERIC EQUIVALENT) 0.5 MG capsule    ursodiol (ACTIGALL) 300 MG capsule     amLODIPine (NORVASC) 5 MG tablet     No current facility-administered medications for this visit.     /77   Pulse 54   Temp 97.3  F (36.3  C) (Oral)   Wt 45.8 kg (101 lb)   LMP  (LMP Unknown)   SpO2 96%   BMI 21.86 kg/m      PHYSICAL EXAMINATION:    GENERAL:  She looks largely unchanged.  HEENT:  Shows no scleral icterus or temporal muscle wasting.  CHEST:  Clear.  ABDOMEN:  No increase in girth.  No masses or tenderness to palpation are present.  Her liver is 10 cm in span without left lobe enlargement.  No spleen tip is palpable at the left costal margin.  EXTREMITIES:  No edema.  SKIN:  Numerous senile keratoses.  It is difficult to appreciate whether or skin cancer may be present.  NEUROLOGIC:  Nonfocal.    LABORATORY:  Most recent laboratory tests are from 04/24/2023 and showed her white count is 8.2, hemoglobin is 11.8, and platelets are 182,000.  Her sodium is 138, potassium 4.2, BUN is 34, creatinine 1.38.  Her AST is 40, ALT is 39, alkaline phosphatase is 214.  Her albumin is 3.2, total protein of 6.2 and total bilirubin is 1.5 with direct reacting bilirubin 1.2.    IMPRESSION:  Ms. Nails is 38-1/2 years status post liver transplantation for primary biliary cholangitis.  She does have recurrence of PBC, but her disease has been relatively stable.  She did have a mildly elevated TSH on recent blood draw.  I have recommended that just be reperformed in 6 months.  She did have a mycobacterial infection of her skin that seems to have resolved.  I have tried to get her an appointment here in the Dermatology Clinic as they are more experienced with transplant patients and the risk of skin cancer.  Otherwise, my plan will be to see her back in the clinic again in 1 year.      I did spend a total of 30 minutes (on the date of the encounter), including 20 minutes of face-to-face clinic time including counseling. The rest of the time was spent in documentation and review of records.     Thank you very  much for allowing me to participate in the care of this patient.  If you have any questions regarding recommendations, please do not hesitate to contact me.         Luis Daniel Lomeli MD      Professor of Medicine  [employer] [facility] [employer]      Executive Medical Director, Solid Organ Transplant Program  [facility]

## 2023-05-09 DIAGNOSIS — D64.9 ANEMIA: Primary | ICD-10-CM

## 2023-05-15 ENCOUNTER — OFFICE VISIT (OUTPATIENT)
Dept: FAMILY MEDICINE | Facility: CLINIC | Age: 83
End: 2023-05-15
Payer: COMMERCIAL

## 2023-05-15 VITALS
SYSTOLIC BLOOD PRESSURE: 138 MMHG | DIASTOLIC BLOOD PRESSURE: 76 MMHG | BODY MASS INDEX: 21.62 KG/M2 | HEART RATE: 54 BPM | WEIGHT: 99.9 LBS | OXYGEN SATURATION: 96 % | RESPIRATION RATE: 16 BRPM | TEMPERATURE: 97.1 F

## 2023-05-15 DIAGNOSIS — H61.23 BILATERAL IMPACTED CERUMEN: Primary | ICD-10-CM

## 2023-05-15 LAB — FERRITIN SERPL-MCNC: 229 NG/ML (ref 11–328)

## 2023-05-15 PROCEDURE — 36415 COLL VENOUS BLD VENIPUNCTURE: CPT | Performed by: NURSE PRACTITIONER

## 2023-05-15 PROCEDURE — 82728 ASSAY OF FERRITIN: CPT | Performed by: NURSE PRACTITIONER

## 2023-05-15 PROCEDURE — 69209 REMOVE IMPACTED EAR WAX UNI: CPT | Mod: 50 | Performed by: NURSE PRACTITIONER

## 2023-05-15 ASSESSMENT — PAIN SCALES - GENERAL: PAINLEVEL: NO PAIN (0)

## 2023-05-15 NOTE — PROGRESS NOTES
Assessment & Plan     Bilateral impacted cerumen  Cerumenosis is noted.  Wax is removed by syringing and manual debridement. Instructions for home care to prevent wax buildup are given.  - REMOVE IMPACTED CERUMEN            JEMAL Saldivar CNP [facility]    Faustina Goodwin is a 83 year old, presenting for the following health issues:  Ear Problem (Both of her ears feel plugged )        5/15/2023     1:14 PM   Additional Questions   Roomed by Marisela         5/15/2023     1:14 PM   Patient Reported Additional Medications   Patient reports taking the following new medications none     Chief Complaint   Patient presents with     Ear Problem     Both of her ears feel plugged          Review of Systems   Constitutional, HEENT, cardiovascular, pulmonary, gi and gu systems are negative, except as otherwise noted.      Objective    /76 (BP Location: Left arm, Patient Position: Sitting, Cuff Size: Adult Regular)   Pulse 54   Temp 97.1  F (36.2  C) (Tympanic)   Resp 16   Wt 45.3 kg (99 lb 14.4 oz)   LMP  (LMP Unknown)   SpO2 96%   BMI 21.62 kg/m    Body mass index is 21.62 kg/m .  Physical Exam   GENERAL: healthy, alert and no distress  EYES: Eyes grossly normal to inspection, PERRL and conjunctivae and sclerae normal  HENT: normal cephalic/atraumatic and both ears: occluded with wax  NECK: no adenopathy, no asymmetry, masses, or scars and thyroid normal to palpation  NEURO: Normal strength and tone, mentation intact and speech normal  PSYCH: mentation appears normal, affect normal/bright

## 2023-05-19 ENCOUNTER — OFFICE VISIT (OUTPATIENT)
Dept: CARDIOLOGY | Facility: CLINIC | Age: 83
End: 2023-05-19
Payer: COMMERCIAL

## 2023-05-19 VITALS
WEIGHT: 101.5 LBS | DIASTOLIC BLOOD PRESSURE: 82 MMHG | HEIGHT: 58 IN | BODY MASS INDEX: 21.31 KG/M2 | OXYGEN SATURATION: 94 % | HEART RATE: 57 BPM | SYSTOLIC BLOOD PRESSURE: 148 MMHG

## 2023-05-19 DIAGNOSIS — I48.91 ATRIAL FIBRILLATION, UNSPECIFIED TYPE (H): ICD-10-CM

## 2023-05-19 PROCEDURE — 99204 OFFICE O/P NEW MOD 45 MIN: CPT | Performed by: INTERNAL MEDICINE
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
## 2023-06-12 ENCOUNTER — HOSPITAL ENCOUNTER (OUTPATIENT)
Dept: CARDIOLOGY | Facility: CLINIC | Age: 83
Discharge: HOME | End: 2023-06-12
Attending: INTERNAL MEDICINE
Payer: COMMERCIAL

## 2023-06-12 DIAGNOSIS — I48.91 ATRIAL FIBRILLATION, UNSPECIFIED TYPE (H): ICD-10-CM

## 2023-06-12 LAB — LVEF ECHO: NORMAL

## 2023-06-12 PROCEDURE — 93306 TTE W/DOPPLER COMPLETE: CPT

## 2023-06-12 PROCEDURE — 93242 EXT ECG>48HR<7D RECORDING: CPT

## 2023-06-12 PROCEDURE — 93244 EXT ECG>48HR<7D REV&INTERPJ: CPT | Performed by: INTERNAL MEDICINE

## 2023-06-12 PROCEDURE — 93306 TTE W/DOPPLER COMPLETE: CPT | Mod: 26 | Performed by: INTERNAL MEDICINE

## 2023-06-13 ENCOUNTER — TELEPHONE (OUTPATIENT)
Dept: CARDIOLOGY | Facility: CLINIC | Age: 83
End: 2023-06-13
Payer: COMMERCIAL

## 2023-06-13 NOTE — TELEPHONE ENCOUNTER
"Team received results from Christa's Echocardiogram 6\12.  She was seen in clinic by Dr. Santana on 5\19\23.  She has upcoming apmnt on 6\30\23.    \"The visual ejection fraction is 65-70%.  Grade II or moderate diastolic dysfunction.  The left atrium is mild to moderately dilated.  The mean AoV pressure gradient is 20mmHg.  Mild to moderate valvular aortic stenosis.  The calculated aortic valve area is 1.3cm2.  Compared to prior study, there is no significant change. \"    The Ziopatch results are not completed yet.    Writer called Christa, she was very pleasant and thankful for the phone call.  She states she just had a sad news phone call prior to us.  She is happy to hear that her valve has not changed in the past 2 years.  She verbalized understanding that the Ziopatch is not completed yet, and that we will call her once we see those results.    Ursula Forbes RN on 6/13/2023 at 9:28 AM    "

## 2023-06-30 ENCOUNTER — OFFICE VISIT (OUTPATIENT)
Dept: CARDIOLOGY | Facility: CLINIC | Age: 83
End: 2023-06-30
Attending: INTERNAL MEDICINE
Payer: COMMERCIAL

## 2023-06-30 VITALS
HEIGHT: 57 IN | SYSTOLIC BLOOD PRESSURE: 128 MMHG | HEART RATE: 55 BPM | WEIGHT: 100.9 LBS | BODY MASS INDEX: 21.77 KG/M2 | DIASTOLIC BLOOD PRESSURE: 69 MMHG

## 2023-06-30 DIAGNOSIS — I48.91 ATRIAL FIBRILLATION, UNSPECIFIED TYPE (H): ICD-10-CM

## 2023-06-30 PROCEDURE — 99214 OFFICE O/P EST MOD 30 MIN: CPT | Performed by: INTERNAL MEDICINE

## 2023-06-30 RX ORDER — FUROSEMIDE 20 MG
10 TABLET ORAL DAILY PRN
COMMUNITY
End: 2023-01-01
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
## 2023-07-12 DIAGNOSIS — Z94.4 LIVER TRANSPLANTED (H): ICD-10-CM

## 2023-07-13 RX ORDER — TACROLIMUS 0.5 MG/1
CAPSULE ORAL
Qty: 180 CAPSULE | Refills: 3 | Status: SHIPPED | OUTPATIENT
Start: 2023-07-13 | End: 2024-01-01

## 2023-07-17 NOTE — TELEPHONE ENCOUNTER
[facility] Prior Authorization Team   Phone: [phone]  Fax: [phone]  PA Initiation    Medication: sirolimus (RAPAMUNE - GENERIC EQUIVALENT) 0.5 MG tablet   Insurance Company: [employer] - Phone [phone] Fax [phone]  Pharmacy Filling the Rx: [facility] - [place] [address]  Filling Pharmacy Phone: [phone]  Filling Pharmacy Fax: [phone]  Start Date: 2/9/2017       Pt. Called to ask which medication she should stop taking before Endo procedure. ENDO Appt.  8/1/23

## 2023-07-25 ENCOUNTER — OFFICE VISIT (OUTPATIENT)
Dept: DERMATOLOGY | Facility: CLINIC | Age: 83
End: 2023-07-25
Payer: COMMERCIAL

## 2023-07-25 ENCOUNTER — APPOINTMENT (OUTPATIENT)
Dept: LAB | Facility: CLINIC | Age: 83
End: 2023-07-25
Payer: COMMERCIAL

## 2023-07-25 ENCOUNTER — NURSE TRIAGE (OUTPATIENT)
Dept: NURSING | Facility: CLINIC | Age: 83
End: 2023-07-25

## 2023-07-25 DIAGNOSIS — Z94.4 LIVER REPLACED BY TRANSPLANT (H): ICD-10-CM

## 2023-07-25 DIAGNOSIS — Z85.828 HISTORY OF SKIN CANCER: Primary | ICD-10-CM

## 2023-07-25 DIAGNOSIS — L82.0 SEBORRHEIC KERATOSES, INFLAMED: ICD-10-CM

## 2023-07-25 DIAGNOSIS — D23.9 DERMAL NEVUS: ICD-10-CM

## 2023-07-25 DIAGNOSIS — D18.01 ANGIOMA OF SKIN: ICD-10-CM

## 2023-07-25 DIAGNOSIS — L81.4 LENTIGO: ICD-10-CM

## 2023-07-25 DIAGNOSIS — L82.1 SEBORRHEIC KERATOSES: ICD-10-CM

## 2023-07-25 PROCEDURE — 11310 SHAVE SKIN LESION 0.5 CM/<: CPT | Performed by: DERMATOLOGY

## 2023-07-25 PROCEDURE — 99203 OFFICE O/P NEW LOW 30 MIN: CPT | Mod: 25 | Performed by: DERMATOLOGY

## 2023-07-25 PROCEDURE — 11312 SHAVE SKIN LESION 1.1-2.0 CM: CPT | Performed by: DERMATOLOGY

## 2023-07-25 ASSESSMENT — PAIN SCALES - GENERAL: PAINLEVEL: NO PAIN (0)

## 2023-07-25 NOTE — TELEPHONE ENCOUNTER
Daughter Yola calling to discuss medication, but she is not on consent to communicate.  Patient gave verbal CTC.        Nurse Triage SBAR    Is this a 2nd Level Triage? NO    Situation: Patient had a procedure today to remove five small spots on her face and wants to know if she should take her Eliquis.     Background:  History of atrial fibrillation.  Was     Assessment: Incision dressing is soaked, but not actively bleeding. Blood on dressing is now darkened.    Protocol Recommended Disposition:   Call PCP Now    Recommendation: Patient wants to know if she should hold her Eliquis tonight.     Paged to provider    Paged Dr. Marks on call at 6:53 p via smart web.  Provider recommended patient should indeed confer with cardiology.        Does the patient meet one of the following criteria for ADS visit consideration? 16+ years old, with an [facility] PCP     TIP  Providers, please consider if this condition is appropriate for management at one of our Acute and Diagnostic Services sites.     If patient is a good candidate, please use dotphrase <dot>triageresponse and select Refer to ADS to document.      Reason for Disposition   Dressing soaked with blood or body fluid (e.g., drainage)    Additional Information   Negative: [1] Major abdominal surgical incision AND [2] wound gaping open AND [3] visible internal organs   Negative: Sounds like a life-threatening emergency to the triager   Negative: [1] Bleeding from incision AND [2] won't stop after 10 minutes of direct pressure   Negative: [1] Bleeding (more than a few drops) from incision AND [2] blood vessel surgery (e.g., carotid endarterectomy, femoral bypass graft, kidney dialysis fistula, tracheostomy)   Negative: [1] Widespread rash AND [2] bright red, sunburn-like   Negative: Severe pain in the incision   Negative: [1] Incision gaping open AND [2] < 48 hours since wound re-opened   Negative: [1] Incision gaping open AND [2] length of opening > 2 inches (5  cm)   Negative: Patient sounds very sick or weak to the triager   Negative: Sounds like a serious complication to the triager   Negative: Fever > 100.4 F (38.0 C)   Negative: [1] Incision looks infected (spreading redness, pain) AND [2] fever > 99.5 F (37.5 C)   Negative: [1] Incision looks infected (spreading redness, pain) AND [2] large red area (> 2 in. or 5 cm)   Negative: [1] Incision looks infected (spreading redness, pain) AND [2] face wound   Negative: [1] Red streak runs from the incision AND [2] longer than 1 inch (2.5 cm)   Negative: [1] Pus or bad-smelling fluid draining from incision AND [2] no fever   Negative: [1] Post-op pain AND [2] not controlled with pain medications    Protocols used: Post-Op Incision Symptoms and [id number]
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
## 2023-07-25 NOTE — PATIENT INSTRUCTIONS
Wound Care Instructions     FOR SUPERFICIAL WOUNDS     [facility] [phone]    [facility] [phone]                       AFTER 24 HOURS YOU SHOULD REMOVE THE BANDAGE AND BEGIN DAILY DRESSING CHANGES AS FOLLOWS:     1) Remove Dressing.     2) Clean and dry the area with tap water using a Q-tip or sterile gauze pad.     3) Apply Vaseline, Aquaphor, Polysporin ointment or Bacitracin ointment over entire wound.  Do NOT use Neosporin ointment.     4) Cover the wound with a band-aid, or a sterile non-stick gauze pad and micropore paper tape      REPEAT THESE INSTRUCTIONS AT LEAST ONCE A DAY UNTIL THE WOUND HAS COMPLETELY HEALED.    It is an old wives tale that a wound heals better when it is exposed to air and allowed to dry out. The wound will heal faster with a better cosmetic result if it is kept moist with ointment and covered with a bandage.    **Do not let the wound dry out.**      Supplies Needed:      *Cotton tipped applicators (Q-tips)    *Polysporin Ointment or Bacitracin Ointment (NOT NEOSPORIN)    *Band-aids or non-stick gauze pads and micropore paper tape.      PATIENT INFORMATION:    During the healing process you will notice a number of changes. All wounds develop a small halo of redness surrounding the wound.  This means healing is occurring. Severe itching with extensive redness usually indicates sensitivity to the ointment or bandage tape used to dress the wound.  You should call our office if this develops.      Swelling  and/or discoloration around your surgical site is common, particularly when performed around the eye.    All wounds normally drain.  The larger the wound the more drainage there will be.  After 7-10 days, you will notice the wound beginning to shrink and new skin will begin to grow.  The wound is healed when you can see skin has formed over the entire area.  A healed wound has a healthy, shiny look to the surface and is red to dark pink in color  to normalize.  Wounds may take approximately 4-6 weeks to heal.  Larger wounds may take 6-8 weeks.  After the wound is healed you may discontinue dressing changes.    You may experience a sensation of tightness as your wound heals. This is normal and will gradually subside.    Your healed wound may be sensitive to temperature changes. This sensitivity improves with time, but if you re having a lot of discomfort, try to avoid temperature extremes.    Patients frequently experience itching after their wound appears to have healed because of the continue healing under the skin.  Plain Vaseline will help relieve the itching.        POSSIBLE COMPLICATIONS    BLEEDING:    Leave the bandage in place.  Use tightly rolled up gauze or a cloth to apply direct pressure over the bandage for 30  minutes.  Reapply pressure for an additional 30 minutes if necessary  Use additional gauze and tape to maintain pressure once the bleeding has stopped. Patient Education       Proper skin care from [facility]:    -Eliminate harsh soaps as they strip the natural oils from the skin, often resulting in dry itchy skin ( i.e. Dial, Zest, [place])  -Use mild soaps such as Cetaphil or Dove Sensitive Skin in the shower. You do not need to use soap on arms, legs, and trunk every time you shower unless visibly soiled.   -Avoid hot or cold showers.  -After showering, lightly dry off and apply moisturizing within 2-3 minutes. This will help trap moisture in the skin.   -Aggressive use of a moisturizer at least 1-2 times a day to the entire body (including -Vanicream, Cetaphil, Aquaphor or Cerave) and moisturize hands after every washing.  -We recommend using moisturizers that come in a tub that needs to be scooped out, not a pump. This has more of an oil base. It will hold moisture in your skin much better than a water base moisturizer. The above recommended are non-pore clogging.      Wear a sunscreen with at least SPF 30 on your  face, ears, neck and V of the chest daily. Wear sunscreen on other areas of the body if those areas are exposed to the sun throughout the day. Sunscreens can contain physical and/or chemical blockers. Physical blockers are less likely to clog pores, these include zinc oxide and titanium dioxide. Reapply every two hour and after swimming.     Sunscreen examples: https://www.ewg.org/sunscreen/    UV radiation  UVA radiation remains constant throughout the day and throughout the year. It is a longer wavelength than UVB and therefore penetrates deeper into the skin leading to immediate and delayed tanning, photoaging, and skin cancer. 70-80% of UVA and UVB radiation occurs between the hours of 10am-2pm.  UVB radiation  UVB radiation causes the most harmful effects and is more significant during the summer months. However, snow and ice can reflect UVB radiation leading to skin damage during the winter months as well. UVB radiation is responsible for tanning, burning, inflammation, delayed erythema (pinkness), pigmentation (brown spots), and skin cancer.     I recommend self monthly full body exams and yearly full body exams with a dermatology provider. If you develop a new or changing lesion please follow up for examination. Most skin cancers are pink and scaly or pink and pearly. However, we do see blue/brown/black skin cancers.  Consider the ABCDEs of melanoma when giving yourself your monthly full body exam ( don't forget the groin, buttocks, feet, toes, etc). A-asymmetry, B-borders, C-color, D-diameter, E-elevation or evolving. If you see any of these changes please follow up in clinic. If you cannot see your back I recommend purchasing a hand held mirror to use with a larger wall mirror.       Checking for Skin Cancer  You can find cancer early by checking your skin each month. There are 3 kinds of skin cancer. They are melanoma, basal cell carcinoma, and squamous cell carcinoma. Doing monthly skin checks is the  best way to find new marks or skin changes. Follow the instructions below for checking your skin.   The ABCDEs of checking moles for melanoma   Check your moles or growths for signs of melanoma using ABCDE:   Asymmetry: the sides of the mole or growth don t match  Border: the edges are ragged, notched, or blurred  Color: the color within the mole or growth varies  Diameter: the mole or growth is larger than 6 mm (size of a pencil eraser)  Evolving: the size, shape, or color of the mole or growth is changing (evolving is not shown in the images below)    Checking for other types of skin cancer  Basal cell carcinoma or squamous cell carcinoma have symptoms such as:     A spot or mole that looks different from all other marks on your skin  Changes in how an area feels, such as itching, tenderness, or pain  Changes in the skin's surface, such as oozing, bleeding, or scaliness  A sore that does not heal  New swelling or redness beyond the border of a mole    Who s at risk?  Anyone can get skin cancer. But you are at greater risk if you have:   Fair skin, light-colored hair, or light-colored eyes  Many moles or abnormal moles on your skin  A history of sunburns from sunlight or tanning beds  A family history of skin cancer  A history of exposure to radiation or chemicals  A weakened immune system  If you have had skin cancer in the past, you are at risk for recurring skin cancer.   How to check your skin  Do your monthly skin checkups in front of a full-length mirror. Check all parts of your body, including your:   Head (ears, face, neck, and scalp)  Torso (front, back, and sides)  Arms (tops, undersides, upper, and lower armpits)  Hands (palms, backs, and fingers, including under the nails)  Buttocks and genitals  Legs (front, back, and sides)  Feet (tops, soles, toes, including under the nails, and between toes)  If you have a lot of moles, take digital photos of them each month. Make sure to take photos both up close  and from a distance. These can help you see if any moles change over time.   Most skin changes are not cancer. But if you see any changes in your skin, call your doctor right away. Only he or she can diagnose a problem. If you have skin cancer, seeing your doctor can be the first step toward getting the treatment that could save your life.   Real [employer] Translation last reviewed this educational content on 4/1/2019 2000-2020 [employer]. [address] [place] [address], [place] [address]. All rights reserved. This information is not intended as a substitute for professional medical care. Always follow your healthcare professional's instructions.       When should I call my doctor?  If you are worsening or not improving, please, contact us or seek urgent care as noted below.     Who should I call with questions (adults)?  [facility] (adult and pediatric): [phone]  [facility] (adult): [phone]  [facility] [place] [phone]  For urgent needs outside of business hours call the [facility] at [phone] and ask for the dermatology resident on call to be paged  If this is a medical emergency and you are unable to reach an ER, Call [phone]      If you need a prescription refill, please contact your pharmacy. Refills are approved or denied by our Physicians during normal business hours, Monday through Fridays  Per office policy, refills will not be granted if you have not been seen within the past year (or sooner depending on your child's condition)
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
## 2023-07-26 ENCOUNTER — TELEPHONE (OUTPATIENT)
Dept: DERMATOLOGY | Facility: CLINIC | Age: 83
End: 2023-07-26

## 2023-07-26 ENCOUNTER — ALLIED HEALTH/NURSE VISIT (OUTPATIENT)
Dept: DERMATOLOGY | Facility: CLINIC | Age: 83
End: 2023-07-26
Payer: COMMERCIAL

## 2023-07-26 DIAGNOSIS — Z48.01 ENCOUNTER FOR CHANGE OR REMOVAL OF SURGICAL WOUND DRESSING: Primary | ICD-10-CM

## 2023-07-26 PROCEDURE — 99207 PR NO CHARGE NURSE ONLY: CPT

## 2023-07-26 NOTE — PROGRESS NOTES
Amanda Nails comes into clinic today at the request of Dr. Gipson Ordering Provider for Wound Check Action taken: See Below.    This service provided today was under the supervising provider of the day , who was available if needed.    Pt returned to clinic for bleeding after skin tag removal from forehead. Pt has had bleeding on and off all night long. Patient is on a blood thinner. Dried bloody bandages were still in place. Dried blood noted in forehead hair. Pt denies pain. Bandage removed from forehead and right cheek x 2. Writer used normal saline to soak off bandages. Patient started bleeding again after forehead bandage was removed. Small amount of lidocaine was injection to the area to be able to use the liquid cautery. Bleed was stopped. Aquaphor, Telfa, and sensitive skin tape was applied.    Advised to watch for signs/sx of infection; spreading redness, drainage, odor, fever. Call or report promptly to clinic. Pt given written instructions and informed to rtc as needed. Patient verbalized understanding.     Alessandra Kapadia LPN   7/26/23

## 2023-07-26 NOTE — TELEPHONE ENCOUNTER
[facility] Call Center    Phone Message    May a detailed message be left on voicemail: yes     Reason for Call: Symptoms or Concerns     If patient has red-flag symptoms, warm transfer to triage line    Current symptom or concern: Pt's daughter Yola states Pt had skin tags removed yesterday but because she is on blood thinners she is still bleeding.     Symptoms have been present for:  1 day    Has patient previously been seen for this? Yes    By : Dr. Gipson    Date: 7/25/23    Are there any new or worsening symptoms? Yes: See above.     Action Taken: Other: [facility]    Travel Screening: Not Applicable

## 2023-07-26 NOTE — TELEPHONE ENCOUNTER
Patient Contact    Attempt # 1    Was call answered?  Yes    Called patient's daughter Yola barajas. Patient has bleeding on and off all night long. They did apply compression and did get it to stop for a brief period of time. Writer did offer appointment to have a nurse look at spot. They accepted.     Alessandra Kapadia LPN   [facility]   [phone]

## 2023-08-05 ENCOUNTER — MYC MEDICAL ADVICE (OUTPATIENT)
Dept: FAMILY MEDICINE | Facility: CLINIC | Age: 83
End: 2023-08-05
Payer: COMMERCIAL

## 2023-08-05 DIAGNOSIS — I10 ESSENTIAL HYPERTENSION WITH GOAL BLOOD PRESSURE LESS THAN 140/90: ICD-10-CM

## 2023-08-07 DIAGNOSIS — I10 ESSENTIAL HYPERTENSION WITH GOAL BLOOD PRESSURE LESS THAN 140/90: ICD-10-CM

## 2023-08-07 RX ORDER — CARVEDILOL 25 MG/1
25 TABLET ORAL 2 TIMES DAILY WITH MEALS
Qty: 180 TABLET | Refills: 3 | Status: CANCELLED | OUTPATIENT
Start: 2023-08-07

## 2023-08-07 RX ORDER — CARVEDILOL 25 MG/1
TABLET ORAL
Qty: 180 TABLET | Refills: 1 | Status: SHIPPED | OUTPATIENT
Start: 2023-08-07 | End: 2024-01-01

## 2023-08-17 DIAGNOSIS — Z94.4 LIVER REPLACED BY TRANSPLANT (H): ICD-10-CM

## 2023-08-17 RX ORDER — PREDNISONE 1 MG/1
TABLET ORAL
Qty: 270 TABLET | Refills: 3 | Status: SHIPPED | OUTPATIENT
Start: 2023-08-17

## 2023-08-21 DIAGNOSIS — I48.91 NEW ONSET A-FIB (H): Primary | ICD-10-CM

## 2023-08-22 NOTE — TELEPHONE ENCOUNTER
Routing to provider for review/consideration, initially ordered by hospitalist     Esme Salgado MSN, RN

## 2023-08-22 NOTE — TELEPHONE ENCOUNTER
[facility] Cardiology Refill Guideline reviewed.  Medication meets criteria for refill. Refills sent. Tamela Magana RN Cardiology August 22, 2023, 3:04 PM

## 2023-09-14 NOTE — TELEPHONE ENCOUNTER
Patient Quality Outreach    Patient is due for the following:   Physical Annual Wellness Visit    Next Steps:   Schedule a Annual Wellness Visit    Type of outreach:    Sent [employer] message.      Questions for provider review:    None           Terra Merrill

## 2023-09-20 NOTE — TELEPHONE ENCOUNTER
Call to Christa to ask her to please go back to lab soon for a 12 hour trough tac level. No answer, LM asking her to go back in.

## 2023-10-06 PROBLEM — I95.9 HYPOTENSION, UNSPECIFIED HYPOTENSION TYPE: Status: RESOLVED | Noted: 2023-04-27 | Resolved: 2023-01-01

## 2023-10-06 NOTE — PROGRESS NOTES
"SUBJECTIVE:   Christa is a 83 year old who presents for Preventive Visit.      10/6/2023    11:08 AM   Additional Questions   Roomed by Sandra VILLALBA       Are you in the first 12 months of your Medicare coverage?  No    Healthy Habits:     In general, how would you rate your overall health?  Good    Frequency of exercise:  2-3 days/week    Duration of exercise:  Less than 15 minutes    Do you usually eat at least 4 servings of fruit and vegetables a day, include whole grains    & fiber and avoid regularly eating high fat or \"junk\" foods?  Yes    Taking medications regularly:  Yes    Barriers to taking medications:  Problems remembering to take them    Medication side effects:  None    Ability to successfully perform activities of daily living:  Shopping requires assistance    Home Safety:  No safety concerns identified    Hearing Impairment:  No hearing concerns    In the past 6 months, have you been bothered by leaking of urine? Yes    In general, how would you rate your overall mental or emotional health?  Good    Additional concerns today:  Yes (would like to discuss getting a handicapped sticker for parking.  Due to Afib diagnoses.)    Seen by Cardiology 05/2023 and 06/2023  Tests reviewed/interpreted independently in clinic today:   EKG: Atrial fibrillation  Echocardiogram: Normal LV size and systolic function, moderate aortic stenosis with aortic valve area of 1.3 cm . No change from prior.  Blood work: CBC, BMP, LFTs.   mg/dL  Rhythm monitor- 5% burden of PACs, occasional A-fib, rate controlled    Recommendations per Cardiology:  Paroxysmal atrial fibrillation- on Eliquis for anticoagulation  Moderate aortic stenosis-aortic valve area 1.3 cm  in 2021  History of liver transplantation-on immunosuppression  Essential hypertension- amlodipine discontinued for hypotension during recent ER visit.  Hyperlipidemia- has previously refused statin therapy     Today's PHQ-2 Score:       10/6/2023    11:06 AM   PHQ-2 " (  [employer])   Q1: Little interest or pleasure in doing things 0   Q2: Feeling down, depressed or hopeless 0   PHQ-2 Score 0   Q1: Little interest or pleasure in doing things Not at all   Q2: Feeling down, depressed or hopeless Not at all   PHQ-2 Score 0     Have you ever done Advance Care Planning? (For example, a Health Directive, POLST, or a discussion with a medical provider or your loved ones about your wishes): Yes, advance care planning is on file.    Fall risk  Fallen 2 or more times in the past year?: No  Any fall with injury in the past year?: No    Cognitive Screening   1) Repeat 3 items (Leader, Season, Table)    2) Clock draw: NORMAL  3) 3 item recall: Recalls 3 objects  Results: 3 items recalled: COGNITIVE IMPAIRMENT LESS LIKELY    Mini-CogTM Copyright MARIFER Bullard. Licensed by the author for use in [facility]; reprinted with permission (mercedes@[facility]). All rights reserved.      Do you have sleep apnea, excessive snoring or daytime drowsiness? : yes    Reviewed and updated as needed this visit by clinical staff   Tobacco  Allergies  Meds              Reviewed and updated as needed this visit by Provider                 Social History     Tobacco Use    Smoking status: Former     Packs/day: 0.25     Years: 3.00     Pack years: 0.75     Types: Cigarettes     Start date:      Quit date: 1963     Years since quittin.8    Smokeless tobacco: Never   Substance Use Topics    Alcohol use: Yes     Comment: very rarely             10/6/2023    11:05 AM   Alcohol Use   Prescreen: >3 drinks/day or >7 drinks/week? No     Do you have a current opioid prescription? No  Do you use any other controlled substances or medications that are not prescribed by a provider? None           Hypertension Follow-up    Do you check your blood pressure regularly outside of the clinic? Yes   Are you following a low salt diet? Yes  Are your blood pressures ever more than 140 on the top number (systolic) OR  more   than 90 on the bottom number (diastolic), for example 140/90? No    Atrial Fibrillation Follow-up    Symptoms: no recent chest pain, significant palpitations, dizziness/lightheadedness, dyspnea, or increased peripheral edema.  Stroke prevention: DOAC (Eliquis, Xarelto, Pradaxa)        5/2/2023    10:25 AM 5/15/2023     1:24 PM 5/19/2023     1:06 PM 6/30/2023    12:55 PM 10/6/2023    11:19 AM   Date   Pulse 54 54 57 55 54     Current [id number]-FEYx Score: 4 points, which represents a 4.8% annual risk of major embolic event, without anti-coagulation or an LAAO device.        Chronic Kidney Disease Follow-up     Do you take any over the counter pain medicine?: No    Current providers sharing in care for this patient include:   Patient Care Team:  Bharati Mitchell DNP as PCP - General (Nurse Practitioner - Family)  Sarai Abrams MD as MD (Nephrology)  Jase Mace RPH as Pharmacist (Pharmacist)  Bharati Mitchell DNP as Assigned PCP  Luis Daniel Lomeli MD as Assigned Surgical Provider  Benjy Kate MD as Hospitalist (Infectious Diseases)  Eladio Sun MD as Assigned Infectious Disease Provider  Mirian Garrett RPH as Pharmacist (Pharmacist Ambulatory Care)  Mirian Garrett RPH as Assigned MTM Pharmacist  Stanton Gipson MD as MD (Dermatology)  Yeison Santana MD as MD (Cardiology)  Yeison Santana MD as Assigned Heart and Vascular Provider    The following health maintenance items are reviewed in Epic and correct as of today:  Health Maintenance   Topic Date Due    MENINGITIS IMMUNIZATION (1 - Risk 2-dose series) Never done    ZOSTER IMMUNIZATION (1 of 2) Never done    HEPATITIS A IMMUNIZATION (1 of 2 - Risk 2-dose series) Never done    HEPATITIS B IMMUNIZATION (1 of 3 - Risk 3-dose series) Never done    MICROALBUMIN  01/07/2015    COVID-19 Vaccine (3 - Pfizer risk series) 09/09/2021    ANNUAL REVIEW OF HM ORDERS  03/31/2023    DEXA  04/15/2023    DTAP/TDAP/TD  IMMUNIZATION (2 - Td or Tdap) 07/10/2023    INFLUENZA VACCINE (1) 09/01/2023    MEDICARE ANNUAL WELLNESS VISIT  09/09/2023    BMP  12/29/2023    LIPID  02/06/2024    HEMOGLOBIN  03/29/2024    FALL RISK ASSESSMENT  10/06/2024    ADVANCE CARE PLANNING  10/06/2028    PARATHYROID  Completed    PHOSPHORUS  Completed    PHQ-2 (once per calendar year)  Completed    Pneumococcal Vaccine: 65+ Years  Completed    URINALYSIS  Completed    ALK PHOS  Completed    IPV IMMUNIZATION  Aged Out    HPV IMMUNIZATION  Aged Out    MAMMO SCREENING  Discontinued    LUNG CANCER SCREENING  Discontinued     Lab work is in process  Labs reviewed in EPIC  BP Readings from Last 3 Encounters:   10/06/23 128/76   06/30/23 128/69   05/19/23 (!) 148/82    Wt Readings from Last 3 Encounters:   10/06/23 45.8 kg (101 lb)   06/30/23 45.8 kg (100 lb 14.4 oz)   05/19/23 46 kg (101 lb 8 oz)                  Patient Active Problem List   Diagnosis    Chronic kidney disease, stage III (moderate) (H)    Liver replaced by transplant (H)    Primary biliary cirrhosis (H)    Essential hypertension    CARDIOVASCULAR SCREENING; LDL GOAL LESS THAN 130    Mixed hyperlipidemia    Iron deficiency anemia    Skin cancer    Osteoporosis    Advanced directives, counseling/discussion    Proteinuria    Anemia in stage 4 chronic kidney disease (H)    Urinary frequency    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)    New onset a-fib (H)     Past Surgical History:   Procedure Laterality Date    cholecytectomy      COLONOSCOPY  4/9/2013    Procedure: COLONOSCOPY;  Colonoscopy;  Surgeon: Kendra Archer MD;  Location: [facility]    ESOPHAGOSCOPY, GASTROSCOPY, DUODENOSCOPY (EGD), COMBINED  8/23/2013    Procedure: COMBINED ESOPHAGOSCOPY, GASTROSCOPY, DUODENOSCOPY (EGD), BIOPSY SINGLE OR MULTIPLE;  Gastroscopy      ESOPHAGOSCOPY, GASTROSCOPY, DUODENOSCOPY (EGD), COMBINED N/A 11/22/2022    Procedure: ESOPHAGOGASTRODUODENOSCOPY, WITH BIOPSY;  Surgeon: Jase Pelayo,  MD;  Location: [facility]    ESOPHAGOSCOPY, GASTROSCOPY, DUODENOSCOPY (EGD), DILATATION, COMBINED N/A 2020    Procedure: ESOPHAGOGASTRODUODENOSCOPY, WITH DILATION and biopsy;  Surgeon: Doe Wallace MD;  Location: [facility]    SPLENECTOMY      TRANSPLANT      liver       Social History     Tobacco Use    Smoking status: Former     Packs/day: 0.25     Years: 3.00     Pack years: 0.75     Types: Cigarettes     Start date:      Quit date:      Years since quittin.8    Smokeless tobacco: Never   Substance Use Topics    Alcohol use: Yes     Comment: very rarely     Family History   Problem Relation Age of Onset    Respiratory Mother     Gastrointestinal Disease Sister         celiac    Gastrointestinal Disease Son         celiac    Gastrointestinal Disease Daughter         celiac    Gastrointestinal Disease Sister     Gastrointestinal Disease Son         celiac    Gastrointestinal Disease Daughter         PBC    Endocrine Disease Daughter         Graves disease    Endocrine Disease Daughter         hypothyroidism         Current Outpatient Medications   Medication Sig Dispense Refill    Acetaminophen (TYLENOL PO) Take 500 mg by mouth every 6 hours as needed for mild pain or fever      allopurinol (ZYLOPRIM) 100 MG tablet TAKE TWO TABLETS BY MOUTH ONCE DAILY 180 tablet 1    amLODIPine (NORVASC) 5 MG tablet Take 1 tablet (5 mg) by mouth daily 90 tablet 3    apixaban ANTICOAGULANT (ELIQUIS ANTICOAGULANT) 2.5 MG tablet Take 1 tablet (2.5 mg) by mouth 2 times daily 180 tablet 3    Ascorbic Acid (VITAMIN C PO) Take by mouth daily      CALCIUM-VITAMIN D PO Take by mouth daily      carvedilol (COREG) 25 MG tablet TAKE ONE TABLET BY MOUTH TWICE A DAY (WITH MEALS) 180 tablet 1    furosemide (LASIX) 20 MG tablet Take 0.5 tablets (10 mg) by mouth daily 45 tablet 3    Multiple Vitamins-Minerals (PRESERVISION AREDS 2 PO) Take 1 tablet by mouth 2 times daily      Omega-3 Fatty Acids (FISH OIL PO) Take by mouth daily       omeprazole (PRILOSEC OTC) 20 MG EC tablet Take 1 tablet (20 mg) by mouth daily 30 tablet 11    predniSONE (DELTASONE) 1 MG tablet TAKE THREE TABLETS BY MOUTH ONCE DAILY 270 tablet 3    sertraline (ZOLOFT) 50 MG tablet Take 1 tablet (50 mg) by mouth daily 90 tablet 3    tacrolimus (GENERIC EQUIVALENT) 0.5 MG capsule TAKE ONE CAPSULE BY MOUTH EVERY 12 HOURS 180 capsule 3    ursodiol (ACTIGALL) 300 MG capsule TAKE TWO CAPSULES BY MOUTH EVERY MORNING & TAKE ONE CAPSULE BY MOUTH EVERY EVENING 270 capsule 3     Allergies   Allergen Reactions    Golytely [Peg 3350-Electrolytes] Swelling     Pt states her tongue swelled     Influenza Vaccines Headache and Diarrhea    Influenza Virus Vaccine Diarrhea, Fatigue, GI Disturbance, Nausea and Nausea and Vomiting    Lisinopril      Per patient she is allergic to this medication too    Nsaids Other (See Comments)     Can't take because of liver     Recent Labs   Lab Test 09/29/23  0847 09/14/23  0827 04/24/23  1715 04/24/23  1232 02/27/23  1018 02/06/23  1505 10/21/22  0855 09/09/22  1101 01/11/22  0930 11/02/21  1050 06/14/21  1027 03/05/21  0833   LDL  --   --   --   --   --  191*  --   --   --  143* 159*  --    HDL  --   --   --   --   --  105  --   --   --  102 104  --    TRIG  --   --   --   --   --  112  --   --   --  160* 140  --    ALT 38 35 39*  --    < > 113*   < > 44*   < > 45 37 44   CR 1.22* 1.41*  --  1.38*   < > 1.29*   < > 1.22*   < > 1.19* 1.43* 1.31*   GFRESTIMATED 44* 37*  --  39*   < > 42*   < > 45*   < > 44* 35* 39*   GFRESTBLACK  --   --   --   --   --   --   --   --   --   --  40* 45*   POTASSIUM 3.7 3.8  --  4.2   < > 4.1   < > 4.1   < > 3.9 3.6 3.6   TSH  --   --   --  5.10*  --   --   --  4.89*  --   --   --   --     < > = values in this interval not displayed.      Mammogram Screening: Mammogram Screening - Patient over age 75, has elected to discontinue screenings.      Pertinent mammograms are reviewed under the imaging tab.    Review of Systems  "  Constitutional:  Negative for chills and fever.   HENT:  Negative for congestion, ear pain, hearing loss and sore throat.    Eyes:  Negative for pain and visual disturbance.   Respiratory:  Negative for cough and shortness of breath.    Cardiovascular:  Negative for chest pain, palpitations and peripheral edema.   Gastrointestinal:  Negative for abdominal pain, constipation, diarrhea, heartburn, hematochezia and nausea.   Breasts:  Negative for tenderness, breast mass and discharge.   Genitourinary:  Positive for frequency and urgency. Negative for dysuria, genital sores, hematuria, pelvic pain, vaginal bleeding and vaginal discharge.   Musculoskeletal:  Negative for arthralgias, joint swelling and myalgias.   Skin:  Negative for rash.   Neurological:  Negative for dizziness, weakness, headaches and paresthesias.   Psychiatric/Behavioral:  Negative for mood changes. The patient is not nervous/anxious.    POS for urinary frequency, urgency - no dysuria or hematuria    OBJECTIVE:   /76   Pulse 54   Temp 98.2  F (36.8  C) (Tympanic)   Resp 20   Ht 1.448 m (4' 9\")   Wt 45.8 kg (101 lb)   LMP  (LMP Unknown)   SpO2 99%   BMI 21.86 kg/m   Estimated body mass index is 21.86 kg/m  as calculated from the following:    Height as of this encounter: 1.448 m (4' 9\").    Weight as of this encounter: 45.8 kg (101 lb).  Physical Exam  GENERAL: alert, no distress, frail, and elderly  EYES: Eyes grossly normal to inspection, PERRL and conjunctivae and sclerae normal  HENT: ear canals and TM's normal, nose and mouth without ulcers or lesions  NECK: no adenopathy, no asymmetry, masses, or scars and thyroid normal to palpation  RESP: lungs clear to auscultation - no rales, rhonchi or wheezes  CV: regular rate and rhythm, normal S1 S2, no S3 or S4, no murmur, click or rub, +1 bilateral pitting peripheral edema and peripheral pulses strong  ABDOMEN: soft, nontender, no hepatosplenomegaly, no masses and bowel sounds " normal  MS: no gross musculoskeletal defects noted, no edema  SKIN: Multiple scattered scaly lesions varying in size, flesh or light brown colored on bilateral arms, face,  neck.  NEURO: Normal strength and tone, mentation intact and speech normal  PSYCH: mentation appears normal, affect normal/bright    Diagnostic Test Results:  Labs reviewed in Epic    ASSESSMENT / PLAN:   (Z00.00) Encounter for Medicare annual wellness exam  (primary encounter diagnosis)  Comment:    Plan:      (N18.4,  D63.1) Anemia in stage 4 chronic kidney disease (H)  Comment:    Plan: Stable - follow up with Liver specialist    (M81.0) Osteoporosis without current pathological fracture, unspecified osteoporosis type  Comment:    Plan: Discussed DEXA - declined. Continue activity/ weight bearing, Calcium and Vit D    (Z94.4) Liver replaced by transplant (H)  Comment:    Plan: sertraline (ZOLOFT) 50 MG tablet        Stable - following with Liver transplant team. Labs stable - unchanged.    (K74.3) Primary biliary cirrhosis (H)  Comment:    Plan:         (I48.91) New onset a-fib (H)  Comment:    Plan: On DOAC, currently NSR, rate controlled.    (I10) Essential hypertension with goal blood pressure less than 140/90  Comment:    Plan: amLODIPine (NORVASC) 5 MG tablet        Controlled - continue blood pressure medications as prescribed    (N18.32) Stage 3b chronic kidney disease (H)  Comment:    Plan: amLODIPine (NORVASC) 5 MG tablet         Stable    (K22.10) Ulcer of esophagus without bleeding  Comment:    Plan: omeprazole (PRILOSEC OTC) 20 MG EC tablet        Resolved. Continue PPI    (R60.0) Mild peripheral edema  Comment:    Plan: furosemide (LASIX) 20 MG tablet           Mixed incontinence   Likely made worse by diuretic - but reports worsening SERGEI when stopping.  Discussed pharm and non pharm options. Will monitor and follow up if interested in starting Oxybutynin.    Patient has been advised of split billing requirements and indicates  understanding: Yes      COUNSELING:  Reviewed preventive health counseling, as reflected in patient instructions       Regular exercise       Healthy diet/nutrition       Vision screening       Hearing screening       Osteoporosis prevention/bone health        She reports that she quit smoking about 60 years ago. Her smoking use included cigarettes. She started smoking about 63 years ago. She has a 0.75 pack-year smoking history. She has never used smokeless tobacco.      Appropriate preventive services were discussed with this patient, including applicable screening as appropriate for fall prevention, nutrition, physical activity, Tobacco-use cessation, weight loss and cognition.  Checklist reviewing preventive services available has been given to the patient.    Reviewed patients plan of care and provided an AVS. The Complex Care Plan (for patients with higher acuity and needing more deliberate coordination of services) for Amanda meets the Care Plan requirement. This Care Plan has been established and reviewed with the Patient and daughter.          Bharati Mitchell, [facility]    Identified Health Risks:  I have reviewed Opioid Use Disorder and Substance Use Disorder risk factors and made any needed referrals.

## 2023-10-06 NOTE — PATIENT INSTRUCTIONS
Oxybutynin 5 mg XR - for overactive bladder and urinary urgency.  Continue Furosemide low dose (10 mg) for swelling in legs - but discussed this may be contributing to bladder symptoms.    Follow-up with Cardiology and Liver specialist.    Bharati Mitchell DNP      Patient Education   Personalized Prevention Plan  You are due for the preventive services outlined below.  Your care team is available to assist you in scheduling these services.  If you have already completed any of these items, please share that information with your care team to update in your medical record.  Health Maintenance Due   Topic Date Due    Meningitis A Vaccine (1 - Risk 2-dose series) Never done    Zoster (Shingles) Vaccine (1 of 2) Never done    Hepatitis A Vaccine (1 of 2 - Risk 2-dose series) Never done    Hepatitis B Vaccine (1 of 3 - Risk 3-dose series) Never done    Kidney Microalbumin Urine Test  01/07/2015    COVID-19 Vaccine (3 - Pfizer risk series) 09/09/2021    Osteoporosis Screening  04/15/2023    Diptheria Tetanus Pertussis (DTAP/TDAP/TD) Vaccine (2 - Td or Tdap) 07/10/2023    Flu Vaccine (1) 09/01/2023    Annual Wellness Visit  09/09/2023     Activities of Daily Living    Your Health Risk Assessment indicates you have difficulties with activities of daily living such as housework, bathing, preparing meals, taking medication, etc. Please make a follow up appointment for us to address this issue in more detail.  Bladder Training: Care Instructions  Your Care Instructions     Bladder training is used to treat urge incontinence and stress incontinence. Urge incontinence means that the need to urinate comes on so fast that you can't get to a toilet in time. Stress incontinence means that you leak urine because of pressure on your bladder. For example, it may happen when you laugh, cough, or lift something heavy.  Bladder training can increase how long you can wait before you have to urinate. It can also help your bladder hold more  urine. And it can give you better control over the urge to urinate.  It is important to remember that bladder training takes a few weeks to a few months to make a difference. You may not see results right away, but don't give up.  Follow-up care is a key part of your treatment and safety. Be sure to make and go to all appointments, and call your doctor if you are having problems. It's also a good idea to know your test results and keep a list of the medicines you take.  How can you care for yourself at home?  Work with your doctor to come up with a bladder training program that is right for you. You may use one or more of the following methods.  Delayed urination  In the beginning, try to keep from urinating for 5 minutes after you first feel the need to go.  While you wait, take deep, slow breaths to relax. Kegel exercises can also help you delay the need to go to the bathroom.  After some practice, when you can easily wait 5 minutes to urinate, try to wait 10 minutes before you urinate.  Slowly increase the waiting period until you are able to control when you have to urinate.  Scheduled urination  Empty your bladder when you first wake up in the morning.  Schedule times throughout the day when you will urinate.  Start by going to the bathroom every hour, even if you don't need to go.  Slowly increase the time between trips to the bathroom.  When you have found a schedule that works well for you, keep doing it.  If you wake up during the night and have to urinate, do it. Apply your schedule to waking hours only.  Kegel exercises  These tighten and strengthen pelvic muscles, which can help you control the flow of urine. (If doing these exercises causes pain, stop doing them and talk with your doctor.) To do Kegel exercises:  Squeeze your muscles as if you were trying not to pass gas. Or squeeze your muscles as if you were stopping the flow of urine. Your belly, legs, and buttocks shouldn't move.  Hold the squeeze  "for 3 seconds, then relax for 5 to 10 seconds.  Start with 3 seconds, then add 1 second each week until you are able to squeeze for 10 seconds.  Repeat the exercise 10 times a session. Do 3 to 8 sessions a day.  When should you call for help?  Watch closely for changes in your health, and be sure to contact your doctor if:    Your incontinence is getting worse.     You do not get better as expected.   Where can you learn more?  Go to https://[email or web address].net/patiented  Enter V684 in the search box to learn more about \"Bladder Training: Care Instructions.\"  Current as of: March 1, 2023               Content Version: 13.7    8382-6401 [employer].   Care instructions adapted under license by your healthcare professional. If you have questions about a medical condition or this instruction, always ask your healthcare professional. [employer] disclaims any warranty or liability for your use of this information.         "

## 2023-10-06 NOTE — PROGRESS NOTES
52The patient reports that she has difficulty with activities of daily living. I have asked that the patient make a follow up appointment in 52 weeks where this issue will be further evaluated and addressed.  Information on urinary incontinence and treatment options given to patient.

## 2023-10-16 NOTE — TELEPHONE ENCOUNTER
Call to Christa as requested.    No answer.  LM asking Christa to call me back if she has questions.

## 2023-10-25 NOTE — TELEPHONE ENCOUNTER
"Requested Prescriptions   Pending Prescriptions Disp Refills    allopurinol (ZYLOPRIM) 100 MG tablet [Pharmacy Med Name: ALLOPURINOL 100MG TABS] 180 tablet 1     Sig: TAKE TWO TABLETS BY MOUTH ONCE DAILY       Gout Agents Protocol Failed - 10/24/2023  5:20 PM        Failed - Has Uric Acid on file in past 12 months and value is less than 6     Recent Labs   Lab Test 09/29/22  0912   URIC 3.4     If level is 6mg/dL or greater, ok to refill one time and refer to provider.           Failed - Normal serum creatinine on file in the past 12 months     Recent Labs   Lab Test 09/29/23  0847   CR 1.22*       Ok to refill medication if creatinine is low          Passed - CBC on file in past 12 months     Recent Labs   Lab Test 09/29/23  0847   WBC 5.1   RBC 3.49*   HGB 11.7   HCT 35.4                    Passed - ALT on file in past 12 months     Recent Labs   Lab Test 09/29/23  0847   ALT 38             Passed - Recent (12 mo) or future (30 days) visit within the authorizing provider's specialty     Patient has had an office visit with the authorizing provider or a provider within the authorizing providers department within the previous 12 mos or has a future within next 30 days. See \"Patient Info\" tab in inbasket, or \"Choose Columns\" in Meds & Orders section of the refill encounter.              Passed - Medication is active on med list        Passed - Patient is age 18 or older        Passed - No active pregnancy on record        Passed - No positive pregnancy test in past year             "

## 2023-11-03 NOTE — TELEPHONE ENCOUNTER
"Call back to Christa.    She told me she's \"thinking about getting the flu shot\" - I strongly urged her to get it\".  She had a fib this summer , was put on eliquis and now has curly hair!  Her appt w/ Dr. Lomeli was cancelled this week and she wants to reschedule.  I sent a request for next available.   "

## 2024-01-01 ENCOUNTER — TELEPHONE (OUTPATIENT)
Dept: TRANSPLANT | Facility: CLINIC | Age: 84
End: 2024-01-01
Payer: COMMERCIAL

## 2024-01-01 ENCOUNTER — HOSPITAL ENCOUNTER (OUTPATIENT)
Dept: CARDIOLOGY | Facility: HOSPITAL | Age: 84
Discharge: HOME | End: 2024-07-26
Attending: INTERNAL MEDICINE | Admitting: INTERNAL MEDICINE
Payer: COMMERCIAL

## 2024-01-01 ENCOUNTER — OFFICE VISIT (OUTPATIENT)
Dept: DERMATOLOGY | Facility: CLINIC | Age: 84
End: 2024-01-01
Payer: COMMERCIAL

## 2024-01-01 ENCOUNTER — LAB (OUTPATIENT)
Dept: LAB | Facility: CLINIC | Age: 84
End: 2024-01-01
Payer: COMMERCIAL

## 2024-01-01 ENCOUNTER — OFFICE VISIT (OUTPATIENT)
Dept: FAMILY MEDICINE | Facility: CLINIC | Age: 84
End: 2024-01-01
Payer: COMMERCIAL

## 2024-01-01 ENCOUNTER — MYC MEDICAL ADVICE (OUTPATIENT)
Dept: CARDIOLOGY | Facility: CLINIC | Age: 84
End: 2024-01-01
Payer: COMMERCIAL

## 2024-01-01 ENCOUNTER — OFFICE VISIT (OUTPATIENT)
Dept: GASTROENTEROLOGY | Facility: CLINIC | Age: 84
End: 2024-01-01
Attending: INTERNAL MEDICINE
Payer: COMMERCIAL

## 2024-01-01 ENCOUNTER — MYC MEDICAL ADVICE (OUTPATIENT)
Dept: FAMILY MEDICINE | Facility: CLINIC | Age: 84
End: 2024-01-01
Payer: COMMERCIAL

## 2024-01-01 ENCOUNTER — TRANSFERRED RECORDS (OUTPATIENT)
Dept: HEALTH INFORMATION MANAGEMENT | Facility: CLINIC | Age: 84
End: 2024-01-01

## 2024-01-01 ENCOUNTER — ANCILLARY PROCEDURE (OUTPATIENT)
Dept: GENERAL RADIOLOGY | Facility: CLINIC | Age: 84
End: 2024-01-01
Attending: PHYSICIAN ASSISTANT
Payer: COMMERCIAL

## 2024-01-01 ENCOUNTER — MYC MEDICAL ADVICE (OUTPATIENT)
Dept: FAMILY MEDICINE | Facility: CLINIC | Age: 84
End: 2024-01-01

## 2024-01-01 ENCOUNTER — TRANSFERRED RECORDS (OUTPATIENT)
Dept: HEALTH INFORMATION MANAGEMENT | Facility: CLINIC | Age: 84
End: 2024-01-01
Payer: COMMERCIAL

## 2024-01-01 ENCOUNTER — TELEPHONE (OUTPATIENT)
Dept: CARDIOLOGY | Facility: CLINIC | Age: 84
End: 2024-01-01
Payer: COMMERCIAL

## 2024-01-01 ENCOUNTER — TELEPHONE (OUTPATIENT)
Dept: DERMATOLOGY | Facility: CLINIC | Age: 84
End: 2024-01-01

## 2024-01-01 ENCOUNTER — TELEPHONE (OUTPATIENT)
Dept: TRANSPLANT | Facility: CLINIC | Age: 84
End: 2024-01-01

## 2024-01-01 VITALS
HEART RATE: 65 BPM | SYSTOLIC BLOOD PRESSURE: 123 MMHG | OXYGEN SATURATION: 97 % | RESPIRATION RATE: 16 BRPM | TEMPERATURE: 97.7 F | DIASTOLIC BLOOD PRESSURE: 74 MMHG

## 2024-01-01 VITALS
BODY MASS INDEX: 21.85 KG/M2 | SYSTOLIC BLOOD PRESSURE: 122 MMHG | RESPIRATION RATE: 24 BRPM | DIASTOLIC BLOOD PRESSURE: 70 MMHG | HEART RATE: 57 BPM | WEIGHT: 101.3 LBS | HEIGHT: 57 IN | TEMPERATURE: 97.6 F | OXYGEN SATURATION: 98 %

## 2024-01-01 VITALS
HEART RATE: 54 BPM | SYSTOLIC BLOOD PRESSURE: 159 MMHG | OXYGEN SATURATION: 97 % | WEIGHT: 100.6 LBS | TEMPERATURE: 97.4 F | BODY MASS INDEX: 22.63 KG/M2 | DIASTOLIC BLOOD PRESSURE: 81 MMHG | RESPIRATION RATE: 16 BRPM | HEIGHT: 56 IN

## 2024-01-01 DIAGNOSIS — Z94.4 LIVER REPLACED BY TRANSPLANT (H): Chronic | ICD-10-CM

## 2024-01-01 DIAGNOSIS — M10.072 ACUTE IDIOPATHIC GOUT OF LEFT FOOT: ICD-10-CM

## 2024-01-01 DIAGNOSIS — Z94.4 LIVER REPLACED BY TRANSPLANT (H): ICD-10-CM

## 2024-01-01 DIAGNOSIS — M40.00 KYPHOSIS (ACQUIRED) (POSTURAL): ICD-10-CM

## 2024-01-01 DIAGNOSIS — I50.9 ACUTE ON CHRONIC CONGESTIVE HEART FAILURE, UNSPECIFIED HEART FAILURE TYPE (H): ICD-10-CM

## 2024-01-01 DIAGNOSIS — I10 ESSENTIAL HYPERTENSION WITH GOAL BLOOD PRESSURE LESS THAN 140/90: ICD-10-CM

## 2024-01-01 DIAGNOSIS — Z94.4 LIVER REPLACED BY TRANSPLANT (H): Primary | Chronic | ICD-10-CM

## 2024-01-01 DIAGNOSIS — L29.9 SCALP ITCH: ICD-10-CM

## 2024-01-01 DIAGNOSIS — D23.9 DERMAL NEVUS: Primary | ICD-10-CM

## 2024-01-01 DIAGNOSIS — R05.1 ACUTE COUGH: Primary | ICD-10-CM

## 2024-01-01 DIAGNOSIS — I48.91 NEW ONSET A-FIB (H): ICD-10-CM

## 2024-01-01 DIAGNOSIS — K74.3 PRIMARY BILIARY CIRRHOSIS (H): ICD-10-CM

## 2024-01-01 DIAGNOSIS — D63.1 ANEMIA IN STAGE 4 CHRONIC KIDNEY DISEASE (H): Primary | ICD-10-CM

## 2024-01-01 DIAGNOSIS — Z13.220 LIPID SCREENING: ICD-10-CM

## 2024-01-01 DIAGNOSIS — D63.1 ANEMIA IN STAGE 4 CHRONIC KIDNEY DISEASE (H): ICD-10-CM

## 2024-01-01 DIAGNOSIS — N18.4 ANEMIA IN STAGE 4 CHRONIC KIDNEY DISEASE (H): ICD-10-CM

## 2024-01-01 DIAGNOSIS — L82.0 INFLAMED SEBORRHEIC KERATOSIS: ICD-10-CM

## 2024-01-01 DIAGNOSIS — Z94.4 LIVER REPLACED BY TRANSPLANT (H): Primary | ICD-10-CM

## 2024-01-01 DIAGNOSIS — I48.91 ATRIAL FIBRILLATION, UNSPECIFIED TYPE (H): ICD-10-CM

## 2024-01-01 DIAGNOSIS — Z85.828 HISTORY OF SKIN CANCER: ICD-10-CM

## 2024-01-01 DIAGNOSIS — I10 ESSENTIAL HYPERTENSION: ICD-10-CM

## 2024-01-01 DIAGNOSIS — L82.1 SEBORRHEIC KERATOSES: ICD-10-CM

## 2024-01-01 DIAGNOSIS — D18.01 ANGIOMA OF SKIN: ICD-10-CM

## 2024-01-01 DIAGNOSIS — D84.821 IMMUNODEFICIENCY DUE TO DRUGS (CODE) (H): ICD-10-CM

## 2024-01-01 DIAGNOSIS — L98.9 SKIN LESION: ICD-10-CM

## 2024-01-01 DIAGNOSIS — E78.2 MIXED HYPERLIPIDEMIA: ICD-10-CM

## 2024-01-01 DIAGNOSIS — Z94.4 LIVER TRANSPLANTED (H): Primary | ICD-10-CM

## 2024-01-01 DIAGNOSIS — H04.123 DRY EYES: ICD-10-CM

## 2024-01-01 DIAGNOSIS — N32.81 OAB (OVERACTIVE BLADDER): ICD-10-CM

## 2024-01-01 DIAGNOSIS — E78.2 MIXED HYPERLIPIDEMIA: Chronic | ICD-10-CM

## 2024-01-01 DIAGNOSIS — M81.0 AGE-RELATED OSTEOPOROSIS WITHOUT CURRENT PATHOLOGICAL FRACTURE: ICD-10-CM

## 2024-01-01 DIAGNOSIS — N18.32 STAGE 3B CHRONIC KIDNEY DISEASE (H): Chronic | ICD-10-CM

## 2024-01-01 DIAGNOSIS — L81.4 LENTIGO: ICD-10-CM

## 2024-01-01 DIAGNOSIS — N18.4 ANEMIA IN STAGE 4 CHRONIC KIDNEY DISEASE (H): Primary | ICD-10-CM

## 2024-01-01 DIAGNOSIS — Z94.4 LIVER TRANSPLANTED (H): ICD-10-CM

## 2024-01-01 LAB
ALBUMIN SERPL BCG-MCNC: 3.2 G/DL (ref 3.5–5.2)
ALBUMIN SERPL BCG-MCNC: 3.6 G/DL (ref 3.5–5.2)
ALBUMIN SERPL BCG-MCNC: 3.8 G/DL (ref 3.5–5.2)
ALP SERPL-CCNC: 132 U/L (ref 40–150)
ALP SERPL-CCNC: 319 U/L (ref 40–150)
ALP SERPL-CCNC: 352 U/L (ref 40–150)
ALT SERPL W P-5'-P-CCNC: 50 U/L (ref 0–50)
ALT SERPL W P-5'-P-CCNC: 52 U/L (ref 0–50)
ALT SERPL W P-5'-P-CCNC: 86 U/L (ref 0–50)
ANION GAP SERPL CALCULATED.3IONS-SCNC: 10 MMOL/L (ref 7–15)
ANION GAP SERPL CALCULATED.3IONS-SCNC: 10 MMOL/L (ref 7–15)
ANION GAP SERPL CALCULATED.3IONS-SCNC: 12 MMOL/L (ref 7–15)
AST SERPL W P-5'-P-CCNC: 52 U/L (ref 0–45)
AST SERPL W P-5'-P-CCNC: 73 U/L (ref 0–45)
AST SERPL W P-5'-P-CCNC: 85 U/L (ref 0–45)
BILIRUB DIRECT SERPL-MCNC: 0.49 MG/DL (ref 0–0.3)
BILIRUB DIRECT SERPL-MCNC: 1.09 MG/DL (ref 0–0.3)
BILIRUB DIRECT SERPL-MCNC: 7.54 MG/DL (ref 0–0.3)
BILIRUB SERPL-MCNC: 0.8 MG/DL
BILIRUB SERPL-MCNC: 1.5 MG/DL
BILIRUB SERPL-MCNC: 9.2 MG/DL
BUN SERPL-MCNC: 41.2 MG/DL (ref 8–23)
BUN SERPL-MCNC: 49.3 MG/DL (ref 8–23)
BUN SERPL-MCNC: 51.7 MG/DL (ref 8–23)
CALCIUM SERPL-MCNC: 10.6 MG/DL (ref 8.8–10.4)
CALCIUM SERPL-MCNC: 9.8 MG/DL (ref 8.8–10.2)
CALCIUM SERPL-MCNC: 9.8 MG/DL (ref 8.8–10.2)
CHLORIDE SERPL-SCNC: 103 MMOL/L (ref 98–107)
CHLORIDE SERPL-SCNC: 104 MMOL/L (ref 98–107)
CHLORIDE SERPL-SCNC: 97 MMOL/L (ref 98–107)
CHOLEST SERPL-MCNC: 290 MG/DL
CREAT SERPL-MCNC: 1.31 MG/DL (ref 0.51–0.95)
CREAT SERPL-MCNC: 1.34 MG/DL (ref 0.51–0.95)
CREAT SERPL-MCNC: 2.25 MG/DL (ref 0.51–0.95)
CREAT UR-MCNC: 102.2 MG/DL
DEPRECATED HCO3 PLAS-SCNC: 27 MMOL/L (ref 22–29)
DEPRECATED HCO3 PLAS-SCNC: 30 MMOL/L (ref 22–29)
EGFRCR SERPLBLD CKD-EPI 2021: 21 ML/MIN/1.73M2
EGFRCR SERPLBLD CKD-EPI 2021: 40 ML/MIN/1.73M2
EGFRCR SERPLBLD CKD-EPI 2021: 41 ML/MIN/1.73M2
ERYTHROCYTE [DISTWIDTH] IN BLOOD BY AUTOMATED COUNT: 16.2 % (ref 10–15)
ERYTHROCYTE [DISTWIDTH] IN BLOOD BY AUTOMATED COUNT: 18.4 % (ref 10–15)
ERYTHROCYTE [DISTWIDTH] IN BLOOD BY AUTOMATED COUNT: 32.5 % (ref 10–15)
FASTING STATUS PATIENT QL REPORTED: YES
FLUAV AG SPEC QL IA: NEGATIVE
FLUBV AG SPEC QL IA: NEGATIVE
FRAGMENTS BLD QL SMEAR: SLIGHT
GLUCOSE SERPL-MCNC: 108 MG/DL (ref 70–99)
GLUCOSE SERPL-MCNC: 91 MG/DL (ref 70–99)
GLUCOSE SERPL-MCNC: 93 MG/DL (ref 70–99)
HCO3 SERPL-SCNC: 27 MMOL/L (ref 22–29)
HCT VFR BLD AUTO: 28.7 % (ref 35–47)
HCT VFR BLD AUTO: 35.2 % (ref 35–47)
HCT VFR BLD AUTO: 36.8 % (ref 35–47)
HDLC SERPL-MCNC: 105 MG/DL
HGB BLD-MCNC: 10.1 G/DL (ref 11.7–15.7)
HGB BLD-MCNC: 12 G/DL (ref 11.7–15.7)
HGB BLD-MCNC: 12 G/DL (ref 11.7–15.7)
HOWELL-JOLLY BOD BLD QL SMEAR: PRESENT
LDLC SERPL CALC-MCNC: 156 MG/DL
LVEF ECHO: NORMAL
MAGNESIUM SERPL-MCNC: 1.6 MG/DL (ref 1.7–2.3)
MCH RBC QN AUTO: 32.1 PG (ref 26.5–33)
MCH RBC QN AUTO: 33.5 PG (ref 26.5–33)
MCH RBC QN AUTO: 33.5 PG (ref 26.5–33)
MCHC RBC AUTO-ENTMCNC: 32.6 G/DL (ref 31.5–36.5)
MCHC RBC AUTO-ENTMCNC: 34.1 G/DL (ref 31.5–36.5)
MCHC RBC AUTO-ENTMCNC: 35.2 G/DL (ref 31.5–36.5)
MCV RBC AUTO: 103 FL (ref 78–100)
MCV RBC AUTO: 91 FL (ref 78–100)
MCV RBC AUTO: 98 FL (ref 78–100)
MICROALBUMIN UR-MCNC: 58.8 MG/L
MICROALBUMIN/CREAT UR: 57.53 MG/G CR (ref 0–25)
NONHDLC SERPL-MCNC: 185 MG/DL
PHOSPHATE SERPL-MCNC: 3.2 MG/DL (ref 2.5–4.5)
PLAT MORPH BLD: ABNORMAL
PLATELET # BLD AUTO: 209 10E3/UL (ref 150–450)
PLATELET # BLD AUTO: 211 10E3/UL (ref 150–450)
PLATELET # BLD AUTO: 218 10E3/UL (ref 150–450)
POTASSIUM SERPL-SCNC: 3.8 MMOL/L (ref 3.4–5.3)
POTASSIUM SERPL-SCNC: 3.9 MMOL/L (ref 3.4–5.3)
POTASSIUM SERPL-SCNC: 4.4 MMOL/L (ref 3.4–5.3)
PROT SERPL-MCNC: 6.1 G/DL (ref 6.4–8.3)
PROT SERPL-MCNC: 6.6 G/DL (ref 6.4–8.3)
PROT SERPL-MCNC: 6.7 G/DL (ref 6.4–8.3)
RBC # BLD AUTO: 3.15 10E6/UL (ref 3.8–5.2)
RBC # BLD AUTO: 3.58 10E6/UL (ref 3.8–5.2)
RBC # BLD AUTO: 3.58 10E6/UL (ref 3.8–5.2)
RBC MORPH BLD: ABNORMAL
SARS-COV-2 RNA RESP QL NAA+PROBE: POSITIVE
SODIUM SERPL-SCNC: 134 MMOL/L (ref 135–145)
SODIUM SERPL-SCNC: 142 MMOL/L (ref 135–145)
SODIUM SERPL-SCNC: 144 MMOL/L (ref 135–145)
TACROLIMUS BLD-MCNC: 5 UG/L (ref 5–15)
TACROLIMUS BLD-MCNC: 5.5 UG/L (ref 5–15)
TACROLIMUS BLD-MCNC: 6.4 UG/L (ref 5–15)
TARGETS BLD QL SMEAR: ABNORMAL
TME LAST DOSE: NORMAL H
TRIGL SERPL-MCNC: 143 MG/DL
WBC # BLD AUTO: 3.6 10E3/UL (ref 4–11)
WBC # BLD AUTO: 4.5 10E3/UL (ref 4–11)
WBC # BLD AUTO: 5.8 10E3/UL (ref 4–11)

## 2024-01-01 PROCEDURE — 80053 COMPREHEN METABOLIC PANEL: CPT

## 2024-01-01 PROCEDURE — 87635 SARS-COV-2 COVID-19 AMP PRB: CPT | Performed by: PHYSICIAN ASSISTANT

## 2024-01-01 PROCEDURE — 36415 COLL VENOUS BLD VENIPUNCTURE: CPT

## 2024-01-01 PROCEDURE — 99213 OFFICE O/P EST LOW 20 MIN: CPT | Performed by: PHYSICIAN ASSISTANT

## 2024-01-01 PROCEDURE — 82570 ASSAY OF URINE CREATININE: CPT

## 2024-01-01 PROCEDURE — 17110 DESTRUCTION B9 LES UP TO 14: CPT | Performed by: DERMATOLOGY

## 2024-01-01 PROCEDURE — 85027 COMPLETE CBC AUTOMATED: CPT

## 2024-01-01 PROCEDURE — 80197 ASSAY OF TACROLIMUS: CPT

## 2024-01-01 PROCEDURE — 93306 TTE W/DOPPLER COMPLETE: CPT | Mod: 26 | Performed by: INTERNAL MEDICINE

## 2024-01-01 PROCEDURE — G0463 HOSPITAL OUTPT CLINIC VISIT: HCPCS | Performed by: INTERNAL MEDICINE

## 2024-01-01 PROCEDURE — 80061 LIPID PANEL: CPT

## 2024-01-01 PROCEDURE — 82248 BILIRUBIN DIRECT: CPT

## 2024-01-01 PROCEDURE — 83735 ASSAY OF MAGNESIUM: CPT

## 2024-01-01 PROCEDURE — 87804 INFLUENZA ASSAY W/OPTIC: CPT | Performed by: PHYSICIAN ASSISTANT

## 2024-01-01 PROCEDURE — 71046 X-RAY EXAM CHEST 2 VIEWS: CPT | Mod: TC | Performed by: RADIOLOGY

## 2024-01-01 PROCEDURE — 82043 UR ALBUMIN QUANTITATIVE: CPT

## 2024-01-01 PROCEDURE — 99215 OFFICE O/P EST HI 40 MIN: CPT | Performed by: INTERNAL MEDICINE

## 2024-01-01 PROCEDURE — 99214 OFFICE O/P EST MOD 30 MIN: CPT | Performed by: NURSE PRACTITIONER

## 2024-01-01 PROCEDURE — 93306 TTE W/DOPPLER COMPLETE: CPT

## 2024-01-01 PROCEDURE — 99213 OFFICE O/P EST LOW 20 MIN: CPT | Mod: 25 | Performed by: DERMATOLOGY

## 2024-01-01 PROCEDURE — 84100 ASSAY OF PHOSPHORUS: CPT

## 2024-01-01 RX ORDER — ALLOPURINOL 100 MG/1
TABLET ORAL
Qty: 180 TABLET | Refills: 1 | Status: SHIPPED | OUTPATIENT
Start: 2024-01-01

## 2024-01-01 RX ORDER — TACROLIMUS 0.5 MG/1
0.5 CAPSULE ORAL
Qty: 180 CAPSULE | Refills: 3 | Status: SHIPPED | OUTPATIENT
Start: 2024-01-01

## 2024-01-01 RX ORDER — FENOFIBRATE 160 MG/1
160 TABLET ORAL DAILY
Qty: 90 TABLET | Refills: 3 | Status: SHIPPED | OUTPATIENT
Start: 2024-01-01

## 2024-01-01 RX ORDER — OXYBUTYNIN CHLORIDE 5 MG/1
5 TABLET, EXTENDED RELEASE ORAL DAILY
Qty: 30 TABLET | Refills: 1 | Status: SHIPPED | OUTPATIENT
Start: 2024-01-01

## 2024-01-01 RX ORDER — CLOBETASOL PROPIONATE 0.5 MG/ML
SOLUTION TOPICAL 2 TIMES DAILY
Qty: 100 ML | Refills: 6 | Status: SHIPPED | OUTPATIENT
Start: 2024-01-01

## 2024-01-01 RX ORDER — CARVEDILOL 25 MG/1
TABLET ORAL
Qty: 180 TABLET | Refills: 2 | Status: SHIPPED | OUTPATIENT
Start: 2024-01-01

## 2024-01-01 ASSESSMENT — PAIN SCALES - GENERAL
PAINLEVEL: NO PAIN (0)

## 2024-01-23 PROBLEM — M40.00 KYPHOSIS (ACQUIRED) (POSTURAL): Status: ACTIVE | Noted: 2024-01-01

## 2024-01-23 PROBLEM — I48.91 NEW ONSET A-FIB (H): Status: RESOLVED | Noted: 2023-04-27 | Resolved: 2024-01-01

## 2024-01-23 NOTE — PROGRESS NOTES
Assessment & Plan     Liver replaced by transplant (H)  Following with Liver specialist, Dr. Armani CLARK of [place].   Due to chronic immunosuppressive therapy would recommend routine dermatology skin checks as well as ophthalmology comprehensive eye exams.    - Adult Dermatology  Referral  - Adult Eye  Referral    Stage 3b chronic kidney disease (H)  Kidney function stable, continue to avoid NSAIDs    Primary biliary cirrhosis (H)  Following with liver specialist    Anemia in stage 4 chronic kidney disease (H)  Stable, increase iron in diet    Essential hypertension  Added back in amlodipine after last visit due to elevated blood pressures.  Patient denies any dizziness, lightheadedness or hypotension at home.  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  Discussed dietary changes with patient.  Patient declined any further intervention.    Kyphosis (acquired) (postural)  Discussed stretching and posture.  Denies any worsening or changing pain or falls.    OAB (overactive bladder)  Discussed options including pelvic floor therapy and medications.  Patient opted for low-dose oxybutynin.  Discussed risk and side effects with both patient and daughter.  Follow-up in 1 to 2 months for recheck    - oxyBUTYnin ER (DITROPAN XL) 5 MG 24 hr tablet  Dispense: 30 tablet; Refill: 1    Skin lesion  Changing lesions on face, history of skin cancer and on chronic immunosuppression  Follow-up with dermatology    - Adult Dermatology  Referral    Dry eyes  On over-the-counter saline drops.  Recommended follow-up with ophthalmology for other recommendations    - Adult Eye  Referral       30 minutes spent by me on the date of the encounter doing chart  review, review of outside records, review of test results, interpretation of tests, patient visit, documentation, and discussion with family       See Patient Instructions    Subjective   Christa is a 83 year old, presenting for the following health issues:  Hypertension, Atrial Fib, and Edema (resolved)        1/23/2024    10:44 AM   Additional Questions   Roomed by Tomy VILLALBA CMA   Accompanied by Daughter - Yola     History of Present Illness             Hypertension Follow-up    Do you check your blood pressure regularly outside of the clinic? No   Are you following a low salt diet? No  Are your blood pressures ever more than 140 on the top number (systolic) OR more   than 90 on the bottom number (diastolic), for example 140/90? No    Atrial Fibrillation Follow-up    Symptoms: no recent chest pain, significant palpitations, dizziness/lightheadedness, dyspnea, or increased peripheral edema.  Stroke prevention: DOAC (Eliquis, Xarelto, Pradaxa)        5/15/2023     1:24 PM 5/19/2023     1:06 PM 6/30/2023    12:55 PM 10/6/2023    11:19 AM 1/23/2024    10:48 AM   Date   Pulse 54 57 55 54 57     Current [id number] Score: 4 points, which represents a 4.8% annual risk of major embolic event, without anti-coagulation or an LAAO device.      History of paroxsymal a fib, event monitor done and showed a 5% a fib burden.  She denies any new symptoms (was having dizziness).  Due to be seen in June 2024 for repeat echo - reassess AORTIC STENOSIS and recheck in clinic.  How many servings of fruits and vegetables do you eat daily?  0-1  On average, how many sweetened beverages do you drink each day (Examples: soda, juice, sweet tea, etc.  Do NOT count diet or artificially sweetened beverages)?   0  How many days per week do you exercise enough to make your heart beat faster? 3 or less  How many minutes a day do you exercise enough to make your heart beat faster? 9 or less  How many days per week do you miss taking your  "medication? 1  What makes it hard for you to take your medications?  remembering to take  Chronic Kidney Disease Follow-up     Do you take any over the counter pain medicine?: No  How many servings of fruits and vegetables do you eat daily?  4 or more  On average, how many sweetened beverages do you drink each day (Examples: soda, juice, sweet tea, etc.  Do NOT count diet or artificially sweetened beverages)?   1  How many days per week do you exercise enough to make your heart beat faster? 4  How many minutes a day do you exercise enough to make your heart beat faster? 10 - 19  How many days per week do you miss taking your medication? 0      Review of Systems  Constitutional, HEENT, cardiovascular, pulmonary, GI, , musculoskeletal, neuro, skin, endocrine and psych systems are negative, except as otherwise noted.  POS for incontinence and urinary urgency.  POS for skin lesion changes, history of transplant and skin cancer.  POS for chronically dry eyes - unable to use eye drops due to /arthritis.  Wanting alternative and referral eye doctor      Objective    /70 (BP Location: Right arm, Patient Position: Sitting, Cuff Size: Adult Small)   Pulse 57   Temp 97.6  F (36.4  C) (Tympanic)   Resp 24   Ht 1.435 m (4' 8.5\")   Wt 45.9 kg (101 lb 4.8 oz)   LMP  (LMP Unknown)   SpO2 98%   BMI 22.31 kg/m    Body mass index is 22.31 kg/m .  Physical Exam   GENERAL: alert, no distress, frail, elderly, and accompanied by daughter  NECK: no adenopathy, no asymmetry, masses, or scars  RESP: lungs clear to auscultation - no rales, rhonchi or wheezes  CV: regular rates and rhythm, normal S1 S2, no S3 or S4, grade 4/6 systolic murmur heard best over the 2nd ICS, peripheral pulses strong, and 1+ bilateral lower extremity pitting edema to ankles    ABDOMEN: soft, nontender, no hepatosplenomegaly, no masses and bowel sounds normal  MS: no gross musculoskeletal defects noted, no edema  NEURO: Normal strength and tone, " mentation intact and speech normal  PSYCH: mentation appears normal, affect normal/bright             Signed Electronically by: Bharati Mitchell, DNP

## 2024-01-23 NOTE — TELEPHONE ENCOUNTER
This encounter is being sent to inform the clinic that this patient has a referral from Bharati Mitchell DNP for the diagnoses of Skin Lesion and has requested that this patient be seen within 1-2 weeks.  Based on the availability of our provider(s), we are unable to accommodate this request.    Were all sites offered this patient?  Return patient of Dr. Gipson.    Does scheduling algorithm request to schedule next available?  Patient has been scheduled for the first available opening with Dr. Stanton Gipson on 6/10/2024.  We have informed the patient that the clinic will review their referral and reach out if a sooner appointment is medically necessary.

## 2024-01-24 NOTE — TELEPHONE ENCOUNTER
Patient Contact    Attempt # 1    Was call answered?  No    Message left for pt to call back to make sooner appt.  Chelsie RODRIGUEZ RN BSN PHN  Specialty Clinics        [facility]   [phone]

## 2024-01-25 NOTE — TELEPHONE ENCOUNTER
Patient Contact    Attempt # 2    Was call answered?  No    Called patient. No answer. Left message to call back. Clinic number was provided.     Alessandra Kapadia LPN   [facility] Derm[facility]   [phone]

## 2024-01-26 NOTE — TELEPHONE ENCOUNTER
Patient Contact    Attempt # 3    Was call answered?  No    Called patient. No answer. Left message to call back. Clinic number was provided.     Alessandra Kapadia LPN   [facility] Dermat[facility]   [phone]      My Chart message was sent as well.

## 2024-01-29 NOTE — TELEPHONE ENCOUNTER
Patient Contact    Attempt # 4    Was call answered?  No and has not read My chart message, but did call back last week.     Called patient. No answer. Left message to call back. Clinic number was provided.     Nellie Espino RN    [facility] Dermat[facility]   [phone]

## 2024-01-29 NOTE — TELEPHONE ENCOUNTER
"Patient replied to My chart message and is \"out of town\" wants an appointment in March. Awaiting patient's reply via My chart. Nellie Espino RN    "

## 2024-02-06 PROBLEM — N39.0 E. COLI UTI: Status: ACTIVE | Noted: 2023-04-02

## 2024-02-06 PROBLEM — B96.20 E. COLI UTI: Status: ACTIVE | Noted: 2023-04-02
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
## 2024-03-04 NOTE — PATIENT INSTRUCTIONS
Take 1 Claritin in morning and 1 Zyrtec at bedtime      WOUND CARE INSTRUCTIONS   FOR CRYOSURGERY   This area treated with liquid nitrogen should form a blister (areas treated may or may not blister-skin may just turn dark and slough off). You do not need to bandage the area unless a blister forms and breaks (which may be a few days). When the blister breaks, begin daily dressing changes as follows:  1) Clean and dry the area with tap water using clean Q-tip or sterile gauze pad.   2) Apply Polysporin ointment or Bacitracin ointment over entire wound. Do NOT use Neosporin ointment.   3) Cover the wound with a band-aid or sterile non-stick gauze pad and micropore paper tape.   REPEAT THESE INSTRUCTIONS AT LEAST ONCE A DAY UNTIL THE WOUND HAS COMPLETELY HEALED.   It is an old wives tale that a wound heals better when it is exposed to air and allowed to dry out. The wound will heal faster with a better cosmetic result if it is kept moist with ointment and covered with a bandage.   Do not let the wound dry out.   IMPORTANT INFORMATION ON REVERSE SIDE   Supplies Needed:   *Cotton tipped applicators (Q-tips)   *Polysporin ointment or Bacitracin ointment (NOT NEOSPORIN)   *Band-aids, or non stick gauze pads and micropore paper tape   PATIENT INFORMATION   During the healing process you will notice a number of changes. All wounds develop a small halo of redness surrounding the wound. This means healing is occurring. Severe itching with extensive redness usually indicates sensitivity to the ointment or bandage tape used to dress the wound. You should call our office if this develops.   Swelling and/or discoloration around your surgical site is common, particularly when performed around the eye.   All wounds normally drain. The larger the wound the more drainage there will be. After 7-10 days, you will notice the wound beginning to shrink and new skin will begin to grow. The wound is healed when you can see skin has formed  over the entire area. A healed wound has a healthy, shiny look to the surface and is red to dark pink in color to normalize. Wounds may take approximately 4-6 weeks to heal. Larger wounds may take 6-8 weeks. After the wound is healed you may discontinue dressing changes.   You may experience a sensation of tightness as your wound heals. This is normal and will gradually subside.   Your healed wound may be sensitive to temperature changes. This sensitivity improves with time, but if you re having a lot of discomfort, try to avoid temperature extremes.   Patients frequently experience itching after their wound appears to have healed because of the continue healing under the skin. Plain Vaseline will help relieve the itching.
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
## 2024-03-24 NOTE — TELEPHONE ENCOUNTER
"Per Dr. [place], \"Let's start her on oral iron.  Any OTC prep once daily and increas to 2 daily if she does not get constipated.\"   Message to patient on my chart. Provided number for call back with questions.  Debbie Quintana LPN  Nephrology  [facility]  [phone]    "
Call to patient per Dr. [place], to check in on BP. Patient requested a call back in a few days. Also encouraged patient to use my chart if that is easier for her too.  Debbie Quintana LPN  Nephrology  [facility]  [phone]    
Call to patient regarding BP per Dr. [place]. Will send patient my chart message.  Debbie Quintana LPN  Nephrology  [facility]  [phone]    
Call to patient regarding BP. Left voice message. Provided number for call back.  Debbie Quintana LPN  Nephrology  [facility]  [phone]    
Call to patient regarding messages below. Left voice message. Provided number for call back.  Debbie Quintana LPN  Nephrology  [facility]  [phone]    
Previously Declined (within the last year)

## 2024-04-01 NOTE — TELEPHONE ENCOUNTER
Patient Call: General  Route to LPN    Reason for call: Christa would like to talk with Coordinator, regarding her appointment with Dr. Aguilera on Thursday 4/4/24, no other details was provided patient stated I just want to talk with Candace    Call back needed? Yes    Return Call Needed  Same as documented in contacts section  When to return call?: Same day: Route High Priority

## 2024-04-03 NOTE — TELEPHONE ENCOUNTER
"Call to Christa.  Daughter Yola has thyroid cancer (or something like this, she's not sure), she will be done w/ treatment as of next week.  This has been hard for her.   \"I'm tired a lot - I find myself wanting to take naps\" - I told her this is ok! Suggested she try not to sleep more than an hour as it may affect her night time sleep routine.   She was a little stressed out and confused about her lab appt today.  I confirmed that she has an appt in [place].  She will try to get there.    She is  happy to be seeing Dr. Lomeli tomorrow.   "

## 2024-04-04 NOTE — LETTER
4/4/2024         RE: Amanda Nails  [id number]  [place] [address]        Dear Colleague,    Thank you for referring your patient, Amanda Nails, to the [facility] [place]. Please see a copy of my visit note below.    Solid Organ Transplant Hepatology Follow-Up Visit:     HISTORY OF PRESENT ILLNESS:   I had the pleasure of seeing Amanda Nails for followup in the Liver Clinic at the [facility] on April 4, 2024. Ms. 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  She is on sertraline but at a dose only 50 mg/day and I will increase that to 100.  I also will add fenofibrate to ursodiol to better manage her recurrence of the PBC.    She was diagnosed about 1 year ago with atrial fibrillation but from a symptom standpoint has not apparently had recurrence over the past year.  She is also on Eliquis to prevent stroke.     She otherwise denies any abdominal pain.  She does have numerous skin lesions.  She does report some fatigue, particularly when she is at home by herself.  She denies any increased abdominal girth.  She has no lower extremity edema.     She denies any fevers or chills or cough.  She has some mild shortness of breath.  She does complain of some dry eyes and dry mouth.  She denies any nausea or vomiting, diarrhea or constipation.  Her appetite, she says is good, and her weight is unchanged.    Medications:   Current Outpatient Medications   Medication Sig Dispense Refill    Acetaminophen (TYLENOL PO) Take 500 mg by mouth every 6 hours as needed for mild pain or fever      allopurinol (ZYLOPRIM) 100 MG tablet TAKE TWO TABLETS BY MOUTH ONCE DAILY 180 tablet 1    amLODIPine (NORVASC) 5 MG tablet Take 1 tablet (5 mg) by mouth daily 90 tablet 3    apixaban ANTICOAGULANT (ELIQUIS  "ANTICOAGULANT) 2.5 MG tablet Take 1 tablet (2.5 mg) by mouth 2 times daily 180 tablet 3    Ascorbic Acid (VITAMIN C PO) Take by mouth daily      CALCIUM-VITAMIN D PO Take by mouth daily      carvedilol (COREG) 25 MG tablet TAKE ONE TABLET BY MOUTH TWICE A DAY  (WITH MEALS) 180 tablet 2    clobetasol (TEMOVATE) 0.05 % external solution Apply topically 2 times daily 100 mL 6    fenofibrate (TRIGLIDE/LOFIBRA) 160 MG tablet Take 1 tablet (160 mg) by mouth daily 90 tablet 3    furosemide (LASIX) 20 MG tablet Take 0.5 tablets (10 mg) by mouth daily 45 tablet 3    Multiple Vitamins-Minerals (PRESERVISION AREDS 2 PO) Take 1 tablet by mouth 2 times daily      Omega-3 Fatty Acids (FISH OIL PO) Take by mouth daily      omeprazole (PRILOSEC OTC) 20 MG EC tablet Take 1 tablet (20 mg) by mouth daily 90 tablet 2    predniSONE (DELTASONE) 1 MG tablet TAKE THREE TABLETS BY MOUTH ONCE DAILY 270 tablet 3    sertraline (ZOLOFT) 50 MG tablet Take 2 tablets (100 mg) by mouth daily 90 tablet 3    tacrolimus (GENERIC EQUIVALENT) 0.5 MG capsule TAKE ONE CAPSULE BY MOUTH EVERY 12 HOURS 180 capsule 3    ursodiol (ACTIGALL) 300 MG capsule TAKE TWO CAPSULES BY MOUTH EVERY MORNING & TAKE ONE CAPSULE BY MOUTH EVERY EVENING 270 capsule 3    oxyBUTYnin ER (DITROPAN XL) 5 MG 24 hr tablet Take 1 tablet (5 mg) by mouth daily (Patient not taking: Reported on 4/4/2024) 30 tablet 1     No current facility-administered medications for this visit.      Vitals:   BP (!) 159/81 (BP Location: Right arm, Patient Position: Sitting, Cuff Size: Adult Small)   Pulse 54   Temp 97.4  F (36.3  C) (Oral)   Resp 16   Ht 1.435 m (4' 8.5\")   Wt 45.6 kg (100 lb 9.6 oz)   LMP  (LMP Unknown)   SpO2 97%   BMI 22.16 kg/m      Physical Exam:   In general she looks quite well. HEENT exam shows no scleral icterus or temporal muscle wasting. Chest is clear. Abdominal exam shows no increase in girth. No masses or tenderness to palpation are present. Liver is 10 cm in span " "without left lobe enlargement. No spleen tip is palpable. Extremity exam shows no edema. Skin exam shows no stigmata of chronic liver disease. Neurologic exam shows no asterixis.     Labs:   Lab Results   Component Value Date     04/03/2024    POTASSIUM 4.4 04/03/2024    CHLORIDE 104 04/03/2024    ANIONGAP 10 04/03/2024    CO2 30 (H) 04/03/2024    BUN 49.3 (H) 04/03/2024    CR 1.31 (H) 04/03/2024    GFRESTIMATED 41 (L) 04/03/2024    BLANK 9.8 04/03/2024      Lab Results   Component Value Date    WBC 4.5 04/03/2024    HGB 12.0 04/03/2024    HCT 35.2 04/03/2024    MCV 98 04/03/2024    MCH 33.5 (H) 04/03/2024    MCHC 34.1 04/03/2024    RDW 18.4 (H) 04/03/2024     04/03/2024     Lab Results   Component Value Date    ALBUMIN 3.6 04/03/2024    ALKPHOS 352 (H) 04/03/2024    AST 85 (H) 04/03/2024    BILICONJ 0.0 04/07/2014     No results found for: \"INR\"    Recent Labs   Lab Test 04/03/24  1015 01/17/24  1036 09/29/23  0847 09/14/23  0827 04/24/23  1715 04/05/23  1001 02/27/23  1018 02/06/23  1505 10/21/22  0855 09/09/22  1101   ALKPHOS 352* 319* 290* 293* 214* 265* 311* 373* 262* 333*   ALT 86* 52* 38 35 39* 49* 79* 113* 42* 44*   AST 85* 52* 36 36 40* 46* 75* 98* 36* 39*      Imaging:   No images are attached to the encounter.     Assessment/Plan:   IMPRESSION:   Ms. 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  She does have recurrence of PBC, and her bilirubin is a bit higher.  In order to better treat her PBC, I will add fenofibrate to ursodiol.  She has been seeing a number of different dermatologist most of whom she is not medically happy with and I will try to get her into dermatology here for skin cancer screening and management of the skin changes associated with her itching.  She did have a mycobacterial infection of her skin that seems to have resolved.  I did encourage her to get additional COVID, RSV and influenza vaccinations this fall.  Otherwise, my " plan will be to see her back in the clinic again in 1 year.       I did spend a total of 40 minutes (on the date of the encounter), including 30 minutes of face-to-face clinic time including counseling. The rest of the time was spent in documentation and review of records.    Thank you very much for allowing me to participate in the care of this patient.  If you have any questions regarding my recommendations, please do not hesitate to contact me.         Luis Daniel Lomeli MD      Professor of Medicine  [employer]      Executive Medical Director, Solid Organ Transplant Program  [facility]      Again, thank you for allowing me to participate in the care of your patient.        Sincerely,        Luis Daniel Lomeli MD

## 2024-04-04 NOTE — NURSING NOTE
"Chief Complaint   Patient presents with    RECHECK     post liver transplant 11/7/1984     Vital signs:  Temp: 97.4  F (36.3  C) Temp src: Oral BP: (!) 159/81 Pulse: 54   Resp: 16 SpO2: 97 %     Height: 143.5 cm (4' 8.5\") Weight: 45.6 kg (100 lb 9.6 oz)  Estimated body mass index is 22.16 kg/m  as calculated from the following:    Height as of this encounter: 1.435 m (4' 8.5\").    Weight as of this encounter: 45.6 kg (100 lb 9.6 oz).        Patricia Karimi, [facility]  4/4/2024 11:11 AM    "

## 2024-04-04 NOTE — PROGRESS NOTES
Solid Organ Transplant Hepatology Follow-Up Visit:     HISTORY OF PRESENT ILLNESS:   I had the pleasure of seeing Amanda Nails for followup in the Liver Clinic at the [facility] on April 4, 2024. Ms. 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  She is on sertraline but at a dose only 50 mg/day and I will increase that to 100.  I also will add fenofibrate to ursodiol to better manage her recurrence of the PBC.    She was diagnosed about 1 year ago with atrial fibrillation but from a symptom standpoint has not apparently had recurrence over the past year.  She is also on Eliquis to prevent stroke.     She otherwise denies any abdominal pain.  She does have numerous skin lesions.  She does report some fatigue, particularly when she is at home by herself.  She denies any increased abdominal girth.  She has no lower extremity edema.     She denies any fevers or chills or cough.  She has some mild shortness of breath.  She does complain of some dry eyes and dry mouth.  She denies any nausea or vomiting, diarrhea or constipation.  Her appetite, she says is good, and her weight is unchanged.    Medications:   Current Outpatient Medications   Medication Sig Dispense Refill    Acetaminophen (TYLENOL PO) Take 500 mg by mouth every 6 hours as needed for mild pain or fever      allopurinol (ZYLOPRIM) 100 MG tablet TAKE TWO TABLETS BY MOUTH ONCE DAILY 180 tablet 1    amLODIPine (NORVASC) 5 MG tablet Take 1 tablet (5 mg) by mouth daily 90 tablet 3    apixaban ANTICOAGULANT (ELIQUIS ANTICOAGULANT) 2.5 MG tablet Take 1 tablet (2.5 mg) by mouth 2 times daily 180 tablet 3    Ascorbic Acid (VITAMIN C PO) Take by mouth daily      CALCIUM-VITAMIN D PO Take by mouth daily      carvedilol (COREG) 25 MG tablet TAKE ONE TABLET BY MOUTH TWICE A DAY  (WITH MEALS) 180  "tablet 2    clobetasol (TEMOVATE) 0.05 % external solution Apply topically 2 times daily 100 mL 6    fenofibrate (TRIGLIDE/LOFIBRA) 160 MG tablet Take 1 tablet (160 mg) by mouth daily 90 tablet 3    furosemide (LASIX) 20 MG tablet Take 0.5 tablets (10 mg) by mouth daily 45 tablet 3    Multiple Vitamins-Minerals (PRESERVISION AREDS 2 PO) Take 1 tablet by mouth 2 times daily      Omega-3 Fatty Acids (FISH OIL PO) Take by mouth daily      omeprazole (PRILOSEC OTC) 20 MG EC tablet Take 1 tablet (20 mg) by mouth daily 90 tablet 2    predniSONE (DELTASONE) 1 MG tablet TAKE THREE TABLETS BY MOUTH ONCE DAILY 270 tablet 3    sertraline (ZOLOFT) 50 MG tablet Take 2 tablets (100 mg) by mouth daily 90 tablet 3    tacrolimus (GENERIC EQUIVALENT) 0.5 MG capsule TAKE ONE CAPSULE BY MOUTH EVERY 12 HOURS 180 capsule 3    ursodiol (ACTIGALL) 300 MG capsule TAKE TWO CAPSULES BY MOUTH EVERY MORNING & TAKE ONE CAPSULE BY MOUTH EVERY EVENING 270 capsule 3    oxyBUTYnin ER (DITROPAN XL) 5 MG 24 hr tablet Take 1 tablet (5 mg) by mouth daily (Patient not taking: Reported on 4/4/2024) 30 tablet 1     No current facility-administered medications for this visit.      Vitals:   BP (!) 159/81 (BP Location: Right arm, Patient Position: Sitting, Cuff Size: Adult Small)   Pulse 54   Temp 97.4  F (36.3  C) (Oral)   Resp 16   Ht 1.435 m (4' 8.5\")   Wt 45.6 kg (100 lb 9.6 oz)   LMP  (LMP Unknown)   SpO2 97%   BMI 22.16 kg/m      Physical Exam:   In general she looks quite well. HEENT exam shows no scleral icterus or temporal muscle wasting. Chest is clear. Abdominal exam shows no increase in girth. No masses or tenderness to palpation are present. Liver is 10 cm in span without left lobe enlargement. No spleen tip is palpable. Extremity exam shows no edema. Skin exam shows no stigmata of chronic liver disease. Neurologic exam shows no asterixis.     Labs:   Lab Results   Component Value Date     04/03/2024    POTASSIUM 4.4 04/03/2024    " "CHLORIDE 104 04/03/2024    ANIONGAP 10 04/03/2024    CO2 30 (H) 04/03/2024    BUN 49.3 (H) 04/03/2024    CR 1.31 (H) 04/03/2024    GFRESTIMATED 41 (L) 04/03/2024    BLANK 9.8 04/03/2024      Lab Results   Component Value Date    WBC 4.5 04/03/2024    HGB 12.0 04/03/2024    HCT 35.2 04/03/2024    MCV 98 04/03/2024    MCH 33.5 (H) 04/03/2024    MCHC 34.1 04/03/2024    RDW 18.4 (H) 04/03/2024     04/03/2024     Lab Results   Component Value Date    ALBUMIN 3.6 04/03/2024    ALKPHOS 352 (H) 04/03/2024    AST 85 (H) 04/03/2024    BILICONJ 0.0 04/07/2014     No results found for: \"INR\"    Recent Labs   Lab Test 04/03/24  1015 01/17/24  1036 09/29/23  0847 09/14/23  0827 04/24/23  1715 04/05/23  1001 02/27/23  1018 02/06/23  1505 10/21/22  0855 09/09/22  1101   ALKPHOS 352* 319* 290* 293* 214* 265* 311* 373* 262* 333*   ALT 86* 52* 38 35 39* 49* 79* 113* 42* 44*   AST 85* 52* 36 36 40* 46* 75* 98* 36* 39*      Imaging:   No images are attached to the encounter.     Assessment/Plan:   IMPRESSION:   Ms. 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  She does have recurrence of PBC, and her bilirubin is a bit higher.  In order to better treat her PBC, I will add fenofibrate to ursodiol.  She has been seeing a number of different dermatologist most of whom she is not medically happy with and I will try to get her into dermatology here for skin cancer screening and management of the skin changes associated with her itching.  She did have a mycobacterial infection of her skin that seems to have resolved.  I did encourage her to get additional COVID, RSV and influenza vaccinations this fall.  Otherwise, my plan will be to see her back in the clinic again in 1 year.       I did spend a total of 40 minutes (on the date of the encounter), including 30 minutes of face-to-face clinic time including counseling. The rest of the time was spent in documentation and review of " records.    Thank you very much for allowing me to participate in the care of this patient.  If you have any questions regarding my recommendations, please do not hesitate to contact me.         Luis Daniel Lomeli MD      Professor of Medicine  [employer]      Executive Medical Director, Solid Organ Transplant Program  [facility]

## 2024-05-06 NOTE — TELEPHONE ENCOUNTER
Due for yearly visit. x1 alex refill given.     No further refills until seen by cardiology--call [phone] to schedule    [facility] Cardiology Refill Guideline reviewed.  Medication meets criteria for refill.    Sierra Parks RN

## 2024-05-06 NOTE — TELEPHONE ENCOUNTER
Last Office Visit: 06/30/23 Dr. Santana  Next Office Visit: TBD  Last Fill Date: 02/10/24  Rekha Lebron MA Cardiology   5/6/2024 8:09 AM

## 2024-05-08 NOTE — TELEPHONE ENCOUNTER
Mohan Maria,     Just wanted you to know that I ve had the stomach flu for 3 days.  My daughter took care of me, and I was unable to keep meds down for 2 days.  She did give me tacrolimus, and I kept that one down.  Also 3 prednisone.  If you could give me a call, I would like reassurance that this is ok.   [phone]  Thank you Candace!     Christa    Call to Christa to check in.  No answer.  I did receive a 2nd message telling me that she is feeling better so that's good.  Suggested she call me back if needs to chat or is still unable to take her meds as ordered.

## 2024-07-27 NOTE — PROGRESS NOTES
Assessment & Plan:      Problem List Items Addressed This Visit    None  Visit Diagnoses       Acute cough    -  Primary    Relevant Orders    XR Chest 2 Views (Completed)    Symptomatic COVID-19 Virus (Coronavirus) by PCR Nose    Influenza A & B Antigen - Clinic Collect (Completed)          Medical Decision Making  Patient presents with cough progressing over the last 4 to 5 days.  Physical exam is reassuring with no signs of respiratory distress and clear lung auscultation on exam.  Chest x-ray is negative for signs of focal pneumonia.  Influenza is negative at this time.  Symptoms appear consistent with a viral upper respiratory infection.  Recommend continuing with conservative measures.  Even if patient is positive for COVID-19, she has had symptoms for longer than 5 days, thus do not recommend starting on antivirals at this time.  Discussed treatment and symptomatic care.  Allergies and medication interactions reviewed.  Discussed signs of worsening symptoms and when to follow-up with PCP if no symptom improvement.     Subjective:      History provided by the daughter.  Amanda Nails is a 84 year old female with history of liver transplant and chronic kidney disease, here for evaluation of cough.  Onset of symptoms was 4 to 5 days ago.  Cough was initially in the morning in the evening.  Now cough is present during the daytime.  Cough is slightly productive.  Otherwise no obvious fevers or shortness of breath.     The following portions of the patient's history were reviewed and updated as appropriate: allergies, current medications, and problem list.     Review of Systems  Pertinent items are noted in HPI.    Allergies  Allergies   Allergen Reactions    Golytely [Peg 3350-Kcl-Nabcb-Nacl-Nasulf] Swelling     Pt states her tongue swelled     Influenza Vaccines Headache and Diarrhea    Influenza Virus Vaccine Diarrhea, Fatigue, GI Disturbance, Nausea and Nausea and Vomiting    Lisinopril      Per patient  she is allergic to this medication too    Nsaids Other (See Comments)     Can't take because of liver       Family History   Problem Relation Age of Onset    Respiratory Mother     Gastrointestinal Disease Sister         celiac    Gastrointestinal Disease Son         celiac    Gastrointestinal Disease Daughter         celiac    Gastrointestinal Disease Sister     Gastrointestinal Disease Son         celiac    Gastrointestinal Disease Daughter         PBC    Endocrine Disease Daughter         Graves disease    Endocrine Disease Daughter         hypothyroidism       Social History     Tobacco Use    Smoking status: Former     Current packs/day: 0.00     Average packs/day: 0.3 packs/day for 3.0 years (0.8 ttl pk-yrs)     Types: Cigarettes     Start date:      Quit date:      Years since quittin.6    Smokeless tobacco: Never   Substance Use Topics    Alcohol use: Yes     Comment: very rarely        Objective:      /74   Pulse 65   Temp 97.7  F (36.5  C) (Oral)   Resp 16   LMP  (LMP Unknown)   SpO2 97%   General appearance - alert, well appearing, and in no distress and non-toxic  Ears - bilateral TM's and external ear canals normal  Nose - normal and patent, no erythema, discharge or polyps  Mouth - mucous membranes moist, pharynx normal without lesions  Neck - supple, no significant adenopathy  Chest - clear to auscultation, no wheezes, rales or rhonchi, symmetric air entry  Heart - systolic murmur heard, irregular rhythm  Skin - patient's skin is jaundiced in the sclera of the eyes and the face     Lab & Imaging Results    Results for orders placed or performed in visit on 24   XR Chest 2 Views     Status: None    Narrative    EXAM: XR CHEST 2 VIEWS  LOCATION: [facility]  DATE: 2024    INDICATION: worsening cough x 1 week; rule out pneumonia  COMPARISON: 2023      Impression    IMPRESSION: Negative chest.   Influenza A & B Antigen - Clinic Collect      Status: Normal    Specimen: Nose; Swab   Result Value Ref Range    Influenza A antigen Negative Negative    Influenza B antigen Negative Negative    Narrative    Test results must be correlated with clinical data. If necessary, results should be confirmed by a molecular assay or viral culture.       I personally reviewed these results and discussed findings with the patient.    The use of Dragon/[employer] services was used to construct the content of this note; any grammatical errors are non-intentional. Please contact the author directly if you are in need of any clarification.

## 2024-07-27 NOTE — PATIENT INSTRUCTIONS
Cough    You were seen today for a cough. This is likely due to a virus and will improve over the next 1-2 weeks on its own.    Symptom management:  - Drink plenty of non-caffeinated fluids  - Avoid smoke exposure  - May use tylenol or ibuprofen for discomfort  - Drink a warm non-caffeinated tea with honey  - Place a warm humidifier in your bedroom at night  - [employer] Vapor[employer]    Reasons to return for re-evaluation:  - Develop a fever 100.4 or higher, current fever worsens, or fever does not improve in 72 hours  - Difficulty breathing or shortness of breath  - Cough continues to worsen including coughing up blood or coughing up thick, colored phlegm  - Unable to tolerate fluids    Otherwise, if symptoms have not improved in 7 days, follow-up with your primary care provider.

## 2024-07-29 NOTE — TELEPHONE ENCOUNTER
Provider's review of ECHO:  Yeison Santana MD Newman, Jenah, RN     Mild progression of aortic stenosis which does not need intervention at this time.  IVC was dilated.  Check with her [edema, weight gain, shortness of breath] and see if she needs additional dose of Lasix for a couple of days.     Attempted to call patient- LM on  for patient to call [phone]    [employer] message sent to patient as well.      Marco Mayes RN on 7/29/2024 at 11:16 AM

## 2024-07-29 NOTE — TELEPHONE ENCOUNTER
Provider's review of ECHO:  Yeison Santana MD Newman, Jenah, RN    Mild progression of aortic stenosis which does not need intervention at this time.  IVC was dilated.  Check with her [edema, weight gain, shortness of breath] and see if she needs additional dose of Lasix for a couple of days.    Attempted to call patient- LM on  for patient to call [phone]    [email or web address] message sent to patient as well.     Marco Mayes RN on 7/29/2024 at 11:16 AM

## 2024-07-29 NOTE — TELEPHONE ENCOUNTER
I received a message from Christa's daughter Yola letting me know that Christa tested pos for COVID.  She was beyond the 5 day period of symptoms so she was not treated.  Called Christa to see how she's doing. No answer.  Called Yola.  Christa moved in to Yola's house this weekend permanently. I did get to talk to Christa, she is feeling better - her worst symptom was a cough.  Encouraged rest, fluids but to try to get outside to get some fresh air.  She is getting all the help she needs.

## 2024-07-31 NOTE — TELEPHONE ENCOUNTER
Spoke with daughter  Patient doing okay in regards to weight and breathing. LE is noted however.   More swelling then normal per daughter. Patient elevting legs often. Has a hx of only taking lasix intermittently per daughter  Daughter will assess swelling tomorrow and if additional dose needed, will start for 2 days and update staff.     Marco Mayes RN on 7/31/2024 at 4:20 PM

## 2024-07-31 NOTE — TELEPHONE ENCOUNTER
Called patient. No answer. VM after 1 ring  Called daughter- LM for callback or to respond via zbigniew Mayes RN on 7/31/2024 at 4:06 PM

## 2024-08-07 NOTE — TELEPHONE ENCOUNTER
"Labs abnormal:  creat 2.25, t bili 9.2, direct bili 7.54. Tested pos for Covid 7/27 but was beyond treatment period for use of paxlovid.   Called Christa, no answer, called daughter Yola with whom Christa is now living.  Yola tells me that Christa is cognitively she is not doing well.  She has trouble remembering what day it is. Is very.  She is very weak.  They don't note shortness of breath but she has recently started coughing a lot.  Having trouble using her phone, can't operate TV remote anymore. Family noticed \"decline\" in December.  She is not eating well or drinking - maybe 2 cups of liquid / day.  Her weight is low but stable.  Dorothy is up, has been taking lasix 10 mg daily.  Has intermittent swelling.  None today but last week her feet were \"busting out of her shoes\".   Today she was very wobbly getting in the lab, had to use a wheelchair, didn't even realize she was in a wheelchair.    She has made it clear in a healthcare directive that specifies she does NOT want intervention if she does not have a good outlook.    Reviewed w/ Dr. Lomeli.  He says she does NOT have liver failure but does have significant heart disease, to fix or evaluate this would take considerable effort.    He supports whatever the family chooses to do.   Call again to Yola. I discussed this w/ Anali and her girls and her sister.  They would like to try to get her to eat and hydrate at home, see if the appt w/ Dr. Santana could be moved up.  "[employer]T message to Dr. Santana.   "

## 2024-08-26 ENCOUNTER — POST MORTEM DOCUMENTATION (OUTPATIENT)
Dept: TRANSPLANT | Facility: CLINIC | Age: 84
End: 2024-08-26
Payer: COMMERCIAL

## 2024-08-26 DIAGNOSIS — I48.91 NEW ONSET A-FIB (H): ICD-10-CM

## 2024-08-26 NOTE — TELEPHONE ENCOUNTER
[facility] Cardiology Refill Guideline reviewed.  Medication meets criteria for refill.Overdue for follow up. No order in chart. Order placed for MIRYAM follow up. 90 day alex fill had already been given. 30 day alex fill given and message to scheduling to reach out to patient. Tamela Magana RN Cardiology August 26, 2024, 8:52 AM

## 2024-08-26 NOTE — TELEPHONE ENCOUNTER
Last Office Visit: 06/30/23 Dr. Santana  Next Office Visit: TBD  Last Fill Date: 05/29/24  Rekha Lebron MA Cardiology   8/26/2024 8:46 AM

## 2024-08-26 NOTE — PROGRESS NOTES
Received notification on  at 7:20pm of patient's death from Heart Failure, contact information is daughter.  Place of death was reported as home.  Graft status at the time of death was reported as Functioning.  Additional information: mychart message received from daughter regarding death, patient was in hospice care for heart failure, not liver failure  TIS verification is: Pending  The Transplant Office has been notified that patient is . The Post Mortem Encounter has been completed. Notifications have been sent to the Care team.   Instructions have been sent to cancel pending appointments, discontinue pending orders, eliminate paper chart, and send a sympathy card to the family.     ALESIA HARRIS  Received notification of patient's death from Chely Avalos RN.  Place of death was reported as home on hospice.  Graft status at the time of death was reported as Functioning.  TIS verification is: Complete

## 2024-08-30 ENCOUNTER — TELEPHONE (OUTPATIENT)
Dept: TRANSPLANT | Facility: CLINIC | Age: 84
End: 2024-08-30
Payer: COMMERCIAL

## 2024-08-30 NOTE — TELEPHONE ENCOUNTER
Call to Yola when I received notice today that Christa passed on 8/23 while on hospice, surrounded by her very loving family.

## 2024-09-05 NOTE — ADDENDUM NOTE
Addended by: DIANA PIEDRA on: 3/5/2021 08:45 AM     Modules accepted: Orders     Sharon Duenas discharged at 1:00 pm and 09/05/24   Patient discharged to home.  Discharge education and teaching completed with Sharon Duenas.  RN signature: Samantha Clark

## 2024-11-06 NOTE — PATIENT INSTRUCTIONS
1. Start taking Allopurinol 2 tablet once a day  2. Start taking Lasix once a day  3. Labs and office visit in 1 month          Thank you for choosing [facility].  You may be receiving a survey in the mail from Martín Guardado regarding your visit today.  Please take a few minutes to complete and return the survey to let us know how we are doing.      If you have questions or concerns, please contact us via [employer] or you can contact your care team at [phone].    Our Clinic hours are:  Monday 6:40 am  to 7:00 pm  Tuesday -Friday 6:40 am to 5:00 pm    The [place] outpatient lab hours are:  Monday - Friday 6:10 am to 4:45 pm  Saturdays 7:00 am to 11:00 am  Appointments are required, call [phone]    If you have clinical questions after hours or would like to schedule an appointment,  call the clinic at [phone].   I called the patient and received voicemail. I left a detailed message letting her know that Dr. Jameson is not currently taking new patient's at this time but we do have two other providers that we could schedule her with and if she would like to do that she can give the office a call back.

## 2024-11-13 NOTE — TELEPHONE ENCOUNTER
"Requested Prescriptions   Pending Prescriptions Disp Refills    allopurinol (ZYLOPRIM) 100 MG tablet 180 tablet 1     Sig: TAKE TWO TABLETS BY MOUTH ONCE DAILY        Gout Agents Protocol Failed - 8/18/2022  2:53 PM        Failed - Normal serum creatinine on file in the past 12 months     Recent Labs   Lab Test 08/17/22  1349   CR 1.37*       Ok to refill medication if creatinine is low          Passed - CBC on file in past 12 months     Recent Labs   Lab Test 08/17/22  1349   WBC 6.7   RBC 3.84   HGB 12.0   HCT 37.6                      Passed - ALT on file in past 12 months     Recent Labs   Lab Test 08/17/22  1349   ALT 45               Passed - Has Uric Acid on file in past 12 months and value is less than 6     Recent Labs   Lab Test 04/28/22  1149   URIC 3.5     If level is 6mg/dL or greater, ok to refill one time and refer to provider.             Passed - Recent (12 mo) or future (30 days) visit within the authorizing provider's specialty     Patient has had an office visit with the authorizing provider or a provider within the authorizing providers department within the previous 12 mos or has a future within next 30 days. See \"Patient Info\" tab in inbasket, or \"Choose Columns\" in Meds & Orders section of the refill encounter.              Passed - Medication is active on med list        Passed - Patient is age 18 or older        Passed - No active pregnancy on record        Passed - No positive pregnancy test in past year              "
no

## 2025-03-01 NOTE — TELEPHONE ENCOUNTER
Carvedilol 6.25mg tabs      Last Written Prescription Date: 07/22/2016  Last Fill Quantity: 180, # refills: 1  Last Office Visit with G, P or [facility] prescribing provider: 01/26/2017       POTASSIUM   Date Value Ref Range Status   01/26/2017 3.6 3.4 - 5.3 mmol/L Final     CREATININE   Date Value Ref Range Status   01/26/2017 1.26* 0.52 - 1.04 mg/dL Final     BP Readings from Last 3 Encounters:   01/26/17 166/70   01/16/17 132/72   11/17/16 158/86       
Please choose to order as prescribed or order as patient reported. Thank you!    Lyly Arellano RN    
rolling walker

## 2025-06-12 NOTE — TELEPHONE ENCOUNTER
Patient presents to ED due to Chest pain which pt states began this morning around 6am, pt also reports low heart rates 48 with dizziness and nausea, currently denies Chest pain, SOB, NVD.   Tried to call patient who was shopping. Patient will call back.  Debbie Quintana LPN  Nephrology  [facility]  [phone]

## (undated) DEVICE — ENDO CATH BALLOON DILATATION ELMNTR 15MMX8X180CM 000344

## (undated) RX ORDER — PROPOFOL 10 MG/ML
INJECTION, EMULSION INTRAVENOUS
Status: DISPENSED
Start: 2022-11-22

## (undated) RX ORDER — GLYCOPYRROLATE 0.2 MG/ML
INJECTION, SOLUTION INTRAMUSCULAR; INTRAVENOUS
Status: DISPENSED
Start: 2018-04-30

## (undated) RX ORDER — LIDOCAINE HYDROCHLORIDE 10 MG/ML
INJECTION, SOLUTION EPIDURAL; INFILTRATION; INTRACAUDAL; PERINEURAL
Status: DISPENSED
Start: 2018-04-30

## (undated) RX ORDER — LIDOCAINE HYDROCHLORIDE 10 MG/ML
INJECTION, SOLUTION EPIDURAL; INFILTRATION; INTRACAUDAL; PERINEURAL
Status: DISPENSED
Start: 2022-11-22